# Patient Record
Sex: MALE | Race: WHITE | Employment: OTHER | URBAN - NONMETROPOLITAN AREA
[De-identification: names, ages, dates, MRNs, and addresses within clinical notes are randomized per-mention and may not be internally consistent; named-entity substitution may affect disease eponyms.]

---

## 2017-01-03 RX ORDER — DULOXETIN HYDROCHLORIDE 60 MG/1
60 CAPSULE, DELAYED RELEASE ORAL DAILY
Qty: 90 CAPSULE | Refills: 3 | Status: SHIPPED | OUTPATIENT
Start: 2017-01-03 | End: 2018-01-02 | Stop reason: SDUPTHER

## 2017-02-15 ENCOUNTER — OFFICE VISIT (OUTPATIENT)
Dept: CARDIOLOGY | Age: 82
End: 2017-02-15
Payer: MEDICARE

## 2017-02-15 VITALS
DIASTOLIC BLOOD PRESSURE: 78 MMHG | WEIGHT: 211 LBS | BODY MASS INDEX: 29.54 KG/M2 | HEART RATE: 67 BPM | HEIGHT: 71 IN | SYSTOLIC BLOOD PRESSURE: 128 MMHG

## 2017-02-15 DIAGNOSIS — I25.10 CORONARY ARTERY DISEASE INVOLVING NATIVE CORONARY ARTERY OF NATIVE HEART WITHOUT ANGINA PECTORIS: Primary | ICD-10-CM

## 2017-02-15 DIAGNOSIS — I10 ESSENTIAL HYPERTENSION: ICD-10-CM

## 2017-02-15 DIAGNOSIS — E78.00 HYPERCHOLESTEROLEMIA: ICD-10-CM

## 2017-02-15 PROCEDURE — 1036F TOBACCO NON-USER: CPT | Performed by: INTERNAL MEDICINE

## 2017-02-15 PROCEDURE — G8484 FLU IMMUNIZE NO ADMIN: HCPCS | Performed by: INTERNAL MEDICINE

## 2017-02-15 PROCEDURE — 99212 OFFICE O/P EST SF 10 MIN: CPT | Performed by: INTERNAL MEDICINE

## 2017-02-15 PROCEDURE — G8598 ASA/ANTIPLAT THER USED: HCPCS | Performed by: INTERNAL MEDICINE

## 2017-02-15 PROCEDURE — 1123F ACP DISCUSS/DSCN MKR DOCD: CPT | Performed by: INTERNAL MEDICINE

## 2017-02-15 PROCEDURE — G8427 DOCREV CUR MEDS BY ELIG CLIN: HCPCS | Performed by: INTERNAL MEDICINE

## 2017-02-15 PROCEDURE — 4040F PNEUMOC VAC/ADMIN/RCVD: CPT | Performed by: INTERNAL MEDICINE

## 2017-02-15 PROCEDURE — G8420 CALC BMI NORM PARAMETERS: HCPCS | Performed by: INTERNAL MEDICINE

## 2017-04-11 ENCOUNTER — OFFICE VISIT (OUTPATIENT)
Dept: GASTROENTEROLOGY | Facility: CLINIC | Age: 82
End: 2017-04-11

## 2017-04-11 VITALS
DIASTOLIC BLOOD PRESSURE: 70 MMHG | HEART RATE: 83 BPM | SYSTOLIC BLOOD PRESSURE: 132 MMHG | WEIGHT: 196 LBS | TEMPERATURE: 97.1 F | BODY MASS INDEX: 27.44 KG/M2 | HEIGHT: 71 IN | OXYGEN SATURATION: 90 %

## 2017-04-11 DIAGNOSIS — K63.5 COLON POLYPS: Primary | ICD-10-CM

## 2017-04-11 PROBLEM — C18.9 COLON CANCER (HCC): Status: ACTIVE | Noted: 2017-04-11

## 2017-04-11 PROBLEM — C18.9 COLON CANCER (HCC): Status: RESOLVED | Noted: 2017-04-11 | Resolved: 2017-04-11

## 2017-04-11 PROBLEM — Z85.038 HISTORY OF COLON CANCER: Status: ACTIVE | Noted: 2017-04-11

## 2017-04-11 PROCEDURE — S0260 H&P FOR SURGERY: HCPCS | Performed by: NURSE PRACTITIONER

## 2017-04-11 RX ORDER — SODIUM, POTASSIUM,MAG SULFATES 17.5-3.13G
2 SOLUTION, RECONSTITUTED, ORAL ORAL ONCE
Qty: 2 BOTTLE | Refills: 0 | Status: SHIPPED | OUTPATIENT
Start: 2017-04-11 | End: 2017-04-11

## 2017-04-11 NOTE — PROGRESS NOTES
Chief Complaint   Patient presents with   • Colon Cancer Screening     Patient is here today for colon screening.     Subjective   HPI  Nicolas Nieves is a 81 y.o. male who presents as a referral for preventative maintenance. He has no complaints of nausea or vomiting. No change in bowels. No wt loss. No BRBPR. No melena. There is no family hx for colon cancer. No abdominal pain. Last colonoscopy performed on 4/25/14 polyps.History of colon cancer.  Past Medical History:   Diagnosis Date   • Harmon syndrome    • CAD (coronary artery disease)    • Cancer     COLON   • Colon polyp    • Gastritis    • GERD (gastroesophageal reflux disease)    • Hemorrhoids    • Hyperlipidemia    • Hypertension    • Peptic ulcer    • Renal calculi      Past Surgical History:   Procedure Laterality Date   • CHOLECYSTECTOMY     • COLONOSCOPY  04/25/2014   • LUNG REMOVAL, PARTIAL     • UPPER GASTROINTESTINAL ENDOSCOPY  04/17/2013       Current Outpatient Prescriptions:   •  aspirin 81 MG EC tablet, Take 81 mg by mouth Daily., Disp: , Rfl:   •  ezetimibe (ZETIA) 10 MG tablet, Take 10 mg by mouth Daily., Disp: , Rfl:   •  isosorbide mononitrate (IMDUR) 30 MG 24 hr tablet, Take 30 mg by mouth Daily., Disp: , Rfl:   •  metoprolol tartrate (LOPRESSOR) 50 MG tablet, Take 50 mg by mouth 2 (Two) Times a Day., Disp: , Rfl:   •  pantoprazole (PROTONIX) 40 MG EC tablet, Take 40 mg by mouth Daily., Disp: , Rfl:   •  pregabalin (LYRICA) 75 MG capsule, Take 75 mg by mouth 2 (Two) Times a Day., Disp: , Rfl:   •  sodium-potassium-magnesium sulfates (SUPREP BOWEL PREP) solution oral solution, Take 2 bottles by mouth 1 (One) Time for 1 dose. Split dose prep as directed by office instructions provided.  2 bottles = one kit., Disp: 2 bottle, Rfl: 0  Allergies   Allergen Reactions   • Lortab [Hydrocodone-Acetaminophen]    • Morphine And Related      Social History     Social History   • Marital status:      Spouse name: N/A   • Number of children: N/A   •  "Years of education: N/A     Occupational History   • Not on file.     Social History Main Topics   • Smoking status: Former Smoker   • Smokeless tobacco: Not on file   • Alcohol use No   • Drug use: Not on file   • Sexual activity: Not on file     Other Topics Concern   • Not on file     Social History Narrative     History reviewed. No pertinent family history.    REVIEW OF SYSTEMS  General: well appearing, no fever chills or sweats, no unexplained wt loss  HEENT: no acute visual or hearing disturbances  Cardiovascular: No chest pain or palpitations  Pulmonary: No shortness of breath, coughing, wheezing or hemoptysis  : No burning, urgency, hematuria, or dysuria  Musculoskeletal: No joint pain or stiffness  Peripheral: no edema  Skin: No lesions or rashes  Neuro: No dizziness, headaches, stroke, syncope  Endocrine: No hot or cold intolerances  Hematological: No blood dyscrasias    Objective   Vitals:    04/11/17 1357   BP: 132/70   Pulse: 83   Temp: 97.1 °F (36.2 °C)   SpO2: 90%   Weight: 196 lb (88.9 kg)   Height: 71\" (180.3 cm)     Body mass index is 27.34 kg/(m^2).    PHYSICAL EXAM  General: age appropriate well nourished well appearing, no acute distress  Head: normocephalic and atraumatic  Global assessment-supple  Neck-No JVD noted, no lymphadenopathy  Pulmonary-clear to auscultation bilaterally, normal respiratory effort  Cardiovascular-normal rate and rhythm, normal heart sounds, S1 and S2 noted  Abdomen-soft, non tender, non distended, normal bowel sounds all 4 quadrants, no hepatosplenomegaly noted  Extremities-No clubbing cyanosis or edema  Neuro-Non focal, converses appropriately, awake, alert, oriented    Imaging Results (most recent)     None        Assessment/Plan   Nicolas was seen today for colon cancer screening.    Diagnoses and all orders for this visit:    Colon polyps  -     Case request    Other orders  -     sodium-potassium-magnesium sulfates (SUPREP BOWEL PREP) solution oral solution; Take " 2 bottles by mouth 1 (One) Time for 1 dose. Split dose prep as directed by office instructions provided.  2 bottles = one kit.      COLONOSCOPY WITH ANESTHESIA (N/A)  No Follow-up on file.  There are no Patient Instructions on file for this visit.    All risks, benefits, alternatives, and indications of colonoscopy procedure have been discussed with the patient. Risks to include perforation of the colon requiring possible surgery or colostomy, risk of bleeding from biopsies or removal of colon tissue, possibility of missing a colon polyp or cancer, or adverse drug reaction.  Benefits to include the diagnosis and management of disease of the colon and rectum. Alternatives to include barium enema, radiographic evaluation, lab testing or no intervention. Pt verbalizes understanding and agrees.

## 2017-04-21 RX ORDER — METOPROLOL TARTRATE 50 MG/1
TABLET, FILM COATED ORAL
Qty: 90 TABLET | Refills: 0 | Status: SHIPPED | OUTPATIENT
Start: 2017-04-21 | End: 2017-07-24 | Stop reason: SDUPTHER

## 2017-05-16 ENCOUNTER — ANESTHESIA EVENT (OUTPATIENT)
Dept: GASTROENTEROLOGY | Facility: HOSPITAL | Age: 82
End: 2017-05-16

## 2017-05-17 ENCOUNTER — ANESTHESIA (OUTPATIENT)
Dept: GASTROENTEROLOGY | Facility: HOSPITAL | Age: 82
End: 2017-05-17

## 2017-05-17 ENCOUNTER — HOSPITAL ENCOUNTER (OUTPATIENT)
Facility: HOSPITAL | Age: 82
Setting detail: HOSPITAL OUTPATIENT SURGERY
Discharge: HOME | End: 2017-05-17
Attending: INTERNAL MEDICINE | Admitting: INTERNAL MEDICINE

## 2017-05-17 VITALS
TEMPERATURE: 97 F | OXYGEN SATURATION: 94 % | WEIGHT: 186 LBS | HEIGHT: 71 IN | HEART RATE: 64 BPM | DIASTOLIC BLOOD PRESSURE: 68 MMHG | BODY MASS INDEX: 26.04 KG/M2 | RESPIRATION RATE: 16 BRPM | SYSTOLIC BLOOD PRESSURE: 131 MMHG

## 2017-05-17 PROCEDURE — G0105 COLORECTAL SCRN; HI RISK IND: HCPCS | Performed by: INTERNAL MEDICINE

## 2017-05-17 PROCEDURE — 25010000002 PROPOFOL 10 MG/ML EMULSION: Performed by: NURSE ANESTHETIST, CERTIFIED REGISTERED

## 2017-05-17 RX ORDER — SODIUM CHLORIDE 0.9 % (FLUSH) 0.9 %
1-10 SYRINGE (ML) INJECTION AS NEEDED
Status: DISCONTINUED | OUTPATIENT
Start: 2017-05-17 | End: 2017-05-17 | Stop reason: HOSPADM

## 2017-05-17 RX ORDER — PROPOFOL 10 MG/ML
VIAL (ML) INTRAVENOUS AS NEEDED
Status: DISCONTINUED | OUTPATIENT
Start: 2017-05-17 | End: 2017-05-17 | Stop reason: SURG

## 2017-05-17 RX ORDER — LIDOCAINE HYDROCHLORIDE 20 MG/ML
INJECTION, SOLUTION INFILTRATION; PERINEURAL AS NEEDED
Status: DISCONTINUED | OUTPATIENT
Start: 2017-05-17 | End: 2017-05-17 | Stop reason: SURG

## 2017-05-17 RX ORDER — SODIUM CHLORIDE 9 MG/ML
100 INJECTION, SOLUTION INTRAVENOUS CONTINUOUS
Status: DISCONTINUED | OUTPATIENT
Start: 2017-05-17 | End: 2017-05-17 | Stop reason: HOSPADM

## 2017-05-17 RX ADMIN — PROPOFOL 50 MG: 10 INJECTION, EMULSION INTRAVENOUS at 09:35

## 2017-05-17 RX ADMIN — PROPOFOL 50 MG: 10 INJECTION, EMULSION INTRAVENOUS at 09:38

## 2017-05-17 RX ADMIN — PROPOFOL 50 MG: 10 INJECTION, EMULSION INTRAVENOUS at 09:43

## 2017-05-17 RX ADMIN — SODIUM CHLORIDE 100 ML/HR: 9 INJECTION, SOLUTION INTRAVENOUS at 09:00

## 2017-05-17 RX ADMIN — PROPOFOL 50 MG: 10 INJECTION, EMULSION INTRAVENOUS at 09:32

## 2017-05-17 RX ADMIN — LIDOCAINE HYDROCHLORIDE 1 ML: 10 INJECTION, SOLUTION EPIDURAL; INFILTRATION; INTRACAUDAL; PERINEURAL at 09:00

## 2017-05-17 RX ADMIN — PROPOFOL 50 MG: 10 INJECTION, EMULSION INTRAVENOUS at 09:41

## 2017-05-17 RX ADMIN — LIDOCAINE HYDROCHLORIDE 40 MG: 20 INJECTION, SOLUTION INFILTRATION; PERINEURAL at 09:32

## 2017-07-07 ENCOUNTER — HOSPITAL ENCOUNTER (EMERGENCY)
Age: 82
Discharge: HOME | End: 2017-07-07
Attending: EMERGENCY MEDICINE
Payer: MEDICARE

## 2017-07-07 ENCOUNTER — APPOINTMENT (OUTPATIENT)
Dept: GENERAL RADIOLOGY | Age: 82
End: 2017-07-07
Payer: MEDICARE

## 2017-07-07 VITALS
SYSTOLIC BLOOD PRESSURE: 154 MMHG | BODY MASS INDEX: 26.6 KG/M2 | RESPIRATION RATE: 22 BRPM | HEART RATE: 76 BPM | DIASTOLIC BLOOD PRESSURE: 89 MMHG | HEIGHT: 71 IN | TEMPERATURE: 97.8 F | OXYGEN SATURATION: 97 % | WEIGHT: 190 LBS

## 2017-07-07 DIAGNOSIS — R07.9 CHEST PAIN, UNSPECIFIED TYPE: Primary | ICD-10-CM

## 2017-07-07 LAB
ALBUMIN SERPL-MCNC: 4.6 G/DL (ref 3.5–5.2)
ALP BLD-CCNC: 66 U/L (ref 40–130)
ALT SERPL-CCNC: 25 U/L (ref 5–41)
ANION GAP SERPL CALCULATED.3IONS-SCNC: 14 MMOL/L (ref 7–19)
AST SERPL-CCNC: 24 U/L (ref 5–40)
BASOPHILS ABSOLUTE: 0 K/UL (ref 0–0.2)
BASOPHILS RELATIVE PERCENT: 0.1 % (ref 0–1)
BILIRUB SERPL-MCNC: 0.8 MG/DL (ref 0.2–1.2)
BUN BLDV-MCNC: 17 MG/DL (ref 8–23)
CALCIUM SERPL-MCNC: 9.8 MG/DL (ref 8.8–10.2)
CHLORIDE BLD-SCNC: 104 MMOL/L (ref 98–111)
CO2: 26 MMOL/L (ref 22–29)
CREAT SERPL-MCNC: 1.2 MG/DL (ref 0.5–1.2)
EOSINOPHILS ABSOLUTE: 0.2 K/UL (ref 0–0.6)
EOSINOPHILS RELATIVE PERCENT: 2.4 % (ref 0–5)
GFR NON-AFRICAN AMERICAN: 58
GLUCOSE BLD-MCNC: 106 MG/DL (ref 74–109)
HCT VFR BLD CALC: 46.2 % (ref 42–52)
HEMOGLOBIN: 15.6 G/DL (ref 14–18)
LYMPHOCYTES ABSOLUTE: 1.7 K/UL (ref 1.1–4.5)
LYMPHOCYTES RELATIVE PERCENT: 21.2 % (ref 20–40)
MCH RBC QN AUTO: 32.1 PG (ref 27–31)
MCHC RBC AUTO-ENTMCNC: 33.8 G/DL (ref 33–37)
MCV RBC AUTO: 95.1 FL (ref 80–94)
MONOCYTES ABSOLUTE: 0.9 K/UL (ref 0–0.9)
MONOCYTES RELATIVE PERCENT: 10.8 % (ref 0–10)
NEUTROPHILS ABSOLUTE: 5.1 K/UL (ref 1.5–7.5)
NEUTROPHILS RELATIVE PERCENT: 64.9 % (ref 50–65)
PDW BLD-RTO: 13.8 % (ref 11.5–14.5)
PERFORMED ON: NORMAL
PERFORMED ON: NORMAL
PLATELET # BLD: 178 K/UL (ref 130–400)
PMV BLD AUTO: 10.4 FL (ref 9.4–12.4)
POC TROPONIN I: 0.01 NG/ML (ref 0–0.08)
POC TROPONIN I: 0.02 NG/ML (ref 0–0.08)
POTASSIUM SERPL-SCNC: 4.4 MMOL/L (ref 3.5–5)
RBC # BLD: 4.86 M/UL (ref 4.7–6.1)
SODIUM BLD-SCNC: 144 MMOL/L (ref 136–145)
TOTAL PROTEIN: 7.9 G/DL (ref 6.6–8.7)
WBC # BLD: 7.9 K/UL (ref 4.8–10.8)

## 2017-07-07 PROCEDURE — 36415 COLL VENOUS BLD VENIPUNCTURE: CPT

## 2017-07-07 PROCEDURE — 99285 EMERGENCY DEPT VISIT HI MDM: CPT

## 2017-07-07 PROCEDURE — 93350 STRESS TTE ONLY: CPT

## 2017-07-07 PROCEDURE — 99283 EMERGENCY DEPT VISIT LOW MDM: CPT | Performed by: EMERGENCY MEDICINE

## 2017-07-07 PROCEDURE — 84484 ASSAY OF TROPONIN QUANT: CPT

## 2017-07-07 PROCEDURE — 6370000000 HC RX 637 (ALT 250 FOR IP): Performed by: EMERGENCY MEDICINE

## 2017-07-07 PROCEDURE — 80053 COMPREHEN METABOLIC PANEL: CPT

## 2017-07-07 PROCEDURE — 93005 ELECTROCARDIOGRAM TRACING: CPT

## 2017-07-07 PROCEDURE — 85025 COMPLETE CBC W/AUTO DIFF WBC: CPT

## 2017-07-07 PROCEDURE — 71010 XR CHEST PORTABLE: CPT

## 2017-07-07 RX ORDER — NITROGLYCERIN 0.4 MG/1
0.4 TABLET SUBLINGUAL EVERY 5 MIN PRN
Status: DISCONTINUED | OUTPATIENT
Start: 2017-07-07 | End: 2017-07-07 | Stop reason: HOSPADM

## 2017-07-07 RX ORDER — PANTOPRAZOLE SODIUM 40 MG/1
40 TABLET, DELAYED RELEASE ORAL
COMMUNITY
End: 2017-07-18

## 2017-07-07 RX ORDER — ISOSORBIDE MONONITRATE 30 MG/1
30 TABLET, EXTENDED RELEASE ORAL
COMMUNITY
End: 2017-07-18

## 2017-07-07 RX ORDER — ASPIRIN 81 MG/1
81 TABLET ORAL
COMMUNITY
End: 2017-07-18

## 2017-07-07 RX ORDER — METOPROLOL TARTRATE 50 MG/1
50 TABLET, FILM COATED ORAL
COMMUNITY
End: 2017-07-18

## 2017-07-07 RX ADMIN — NITROGLYCERIN 0.4 MG: 0.4 TABLET SUBLINGUAL at 13:19

## 2017-07-07 RX ADMIN — NITROGLYCERIN 0.4 MG: 0.4 TABLET SUBLINGUAL at 13:28

## 2017-07-07 RX ADMIN — NITROGLYCERIN 0.4 MG: 0.4 TABLET SUBLINGUAL at 13:12

## 2017-07-07 ASSESSMENT — ENCOUNTER SYMPTOMS
SHORTNESS OF BREATH: 0
NAUSEA: 1
BACK PAIN: 0
VOMITING: 0

## 2017-07-07 ASSESSMENT — PAIN SCALES - GENERAL
PAINLEVEL_OUTOF10: 6
PAINLEVEL_OUTOF10: 0

## 2017-07-10 LAB
EKG P AXIS: 63 DEGREES
EKG P-R INTERVAL: 152 MS
EKG Q-T INTERVAL: 322 MS
EKG QRS DURATION: 86 MS
EKG QTC CALCULATION (BAZETT): 385 MS
EKG T AXIS: 43 DEGREES

## 2017-07-11 ENCOUNTER — CARE COORDINATION (OUTPATIENT)
Dept: PRIMARY CARE CLINIC | Age: 82
End: 2017-07-11

## 2017-07-11 LAB
EKG P AXIS: 44 DEGREES
EKG P-R INTERVAL: 154 MS
EKG Q-T INTERVAL: 348 MS
EKG QRS DURATION: 88 MS
EKG QTC CALCULATION (BAZETT): 370 MS
EKG T AXIS: 33 DEGREES

## 2017-07-18 ENCOUNTER — OFFICE VISIT (OUTPATIENT)
Dept: PRIMARY CARE CLINIC | Age: 82
End: 2017-07-18
Payer: MEDICARE

## 2017-07-18 VITALS
SYSTOLIC BLOOD PRESSURE: 130 MMHG | DIASTOLIC BLOOD PRESSURE: 82 MMHG | TEMPERATURE: 98.4 F | HEIGHT: 71 IN | OXYGEN SATURATION: 99 % | HEART RATE: 76 BPM | BODY MASS INDEX: 26.46 KG/M2 | WEIGHT: 189 LBS

## 2017-07-18 DIAGNOSIS — I10 ESSENTIAL HYPERTENSION: ICD-10-CM

## 2017-07-18 DIAGNOSIS — K21.00 GASTROESOPHAGEAL REFLUX DISEASE WITH ESOPHAGITIS: ICD-10-CM

## 2017-07-18 DIAGNOSIS — K22.70 BARRETT'S ESOPHAGUS WITHOUT DYSPLASIA: ICD-10-CM

## 2017-07-18 DIAGNOSIS — R07.89 OTHER CHEST PAIN: Primary | ICD-10-CM

## 2017-07-18 DIAGNOSIS — I25.10 CORONARY ARTERY DISEASE INVOLVING NATIVE CORONARY ARTERY OF NATIVE HEART WITHOUT ANGINA PECTORIS: ICD-10-CM

## 2017-07-18 DIAGNOSIS — I71.40 ABDOMINAL AORTIC ANEURYSM (AAA) WITHOUT RUPTURE: ICD-10-CM

## 2017-07-18 DIAGNOSIS — E78.00 HYPERCHOLESTEROLEMIA: ICD-10-CM

## 2017-07-18 PROCEDURE — G8598 ASA/ANTIPLAT THER USED: HCPCS | Performed by: PEDIATRICS

## 2017-07-18 PROCEDURE — G8427 DOCREV CUR MEDS BY ELIG CLIN: HCPCS | Performed by: PEDIATRICS

## 2017-07-18 PROCEDURE — 1123F ACP DISCUSS/DSCN MKR DOCD: CPT | Performed by: PEDIATRICS

## 2017-07-18 PROCEDURE — 1036F TOBACCO NON-USER: CPT | Performed by: PEDIATRICS

## 2017-07-18 PROCEDURE — G8419 CALC BMI OUT NRM PARAM NOF/U: HCPCS | Performed by: PEDIATRICS

## 2017-07-18 PROCEDURE — 99214 OFFICE O/P EST MOD 30 MIN: CPT | Performed by: PEDIATRICS

## 2017-07-18 PROCEDURE — 4040F PNEUMOC VAC/ADMIN/RCVD: CPT | Performed by: PEDIATRICS

## 2017-07-18 ASSESSMENT — ENCOUNTER SYMPTOMS
CHEST TIGHTNESS: 0
COUGH: 0
ABDOMINAL PAIN: 0
NAUSEA: 0
BACK PAIN: 0
SHORTNESS OF BREATH: 0
RHINORRHEA: 0
SINUS PRESSURE: 0
DIARRHEA: 0
EYE PAIN: 0
BLOOD IN STOOL: 0
SORE THROAT: 0
CONSTIPATION: 0
WHEEZING: 0
COLOR CHANGE: 0
EYE ITCHING: 0
VOMITING: 0
EYE REDNESS: 0
EYE DISCHARGE: 0

## 2017-07-25 RX ORDER — PANTOPRAZOLE SODIUM 40 MG/1
40 TABLET, DELAYED RELEASE ORAL DAILY
Qty: 90 TABLET | Refills: 3 | Status: SHIPPED | OUTPATIENT
Start: 2017-07-25 | End: 2018-07-24 | Stop reason: SDUPTHER

## 2017-07-25 RX ORDER — METOPROLOL TARTRATE 50 MG/1
50 TABLET, FILM COATED ORAL DAILY
Qty: 90 TABLET | Refills: 3 | Status: SHIPPED | OUTPATIENT
Start: 2017-07-25 | End: 2017-10-18 | Stop reason: DRUGHIGH

## 2017-07-25 RX ORDER — TAMSULOSIN HYDROCHLORIDE 0.4 MG/1
0.4 CAPSULE ORAL DAILY
Qty: 90 CAPSULE | Refills: 3 | Status: SHIPPED | OUTPATIENT
Start: 2017-07-25 | End: 2017-10-18

## 2017-07-25 RX ORDER — ISOSORBIDE MONONITRATE 30 MG/1
TABLET, EXTENDED RELEASE ORAL
Qty: 90 TABLET | Refills: 3 | Status: SHIPPED | OUTPATIENT
Start: 2017-07-25 | End: 2018-07-24 | Stop reason: SDUPTHER

## 2017-08-16 ENCOUNTER — OFFICE VISIT (OUTPATIENT)
Dept: CARDIOLOGY | Age: 82
End: 2017-08-16
Payer: MEDICARE

## 2017-08-16 VITALS
HEIGHT: 71 IN | HEART RATE: 78 BPM | BODY MASS INDEX: 26.6 KG/M2 | DIASTOLIC BLOOD PRESSURE: 52 MMHG | SYSTOLIC BLOOD PRESSURE: 128 MMHG | WEIGHT: 190 LBS

## 2017-08-16 DIAGNOSIS — I25.10 CORONARY ARTERY DISEASE INVOLVING NATIVE CORONARY ARTERY OF NATIVE HEART WITHOUT ANGINA PECTORIS: Primary | ICD-10-CM

## 2017-08-16 DIAGNOSIS — I10 ESSENTIAL HYPERTENSION: ICD-10-CM

## 2017-08-16 DIAGNOSIS — E78.00 HYPERCHOLESTEROLEMIA: ICD-10-CM

## 2017-08-16 PROCEDURE — 1123F ACP DISCUSS/DSCN MKR DOCD: CPT | Performed by: INTERNAL MEDICINE

## 2017-08-16 PROCEDURE — G8598 ASA/ANTIPLAT THER USED: HCPCS | Performed by: INTERNAL MEDICINE

## 2017-08-16 PROCEDURE — G8427 DOCREV CUR MEDS BY ELIG CLIN: HCPCS | Performed by: INTERNAL MEDICINE

## 2017-08-16 PROCEDURE — 4040F PNEUMOC VAC/ADMIN/RCVD: CPT | Performed by: INTERNAL MEDICINE

## 2017-08-16 PROCEDURE — G8419 CALC BMI OUT NRM PARAM NOF/U: HCPCS | Performed by: INTERNAL MEDICINE

## 2017-08-16 PROCEDURE — 1036F TOBACCO NON-USER: CPT | Performed by: INTERNAL MEDICINE

## 2017-08-16 PROCEDURE — 99212 OFFICE O/P EST SF 10 MIN: CPT | Performed by: INTERNAL MEDICINE

## 2017-10-18 ENCOUNTER — OFFICE VISIT (OUTPATIENT)
Dept: PRIMARY CARE CLINIC | Age: 82
End: 2017-10-18
Payer: MEDICARE

## 2017-10-18 VITALS
OXYGEN SATURATION: 94 % | WEIGHT: 194.5 LBS | HEIGHT: 71 IN | HEART RATE: 63 BPM | SYSTOLIC BLOOD PRESSURE: 128 MMHG | BODY MASS INDEX: 27.23 KG/M2 | TEMPERATURE: 97 F | DIASTOLIC BLOOD PRESSURE: 64 MMHG

## 2017-10-18 DIAGNOSIS — H61.23 BILATERAL IMPACTED CERUMEN: ICD-10-CM

## 2017-10-18 DIAGNOSIS — E78.00 HYPERCHOLESTEROLEMIA: ICD-10-CM

## 2017-10-18 DIAGNOSIS — M94.0 COSTOCHONDRITIS: ICD-10-CM

## 2017-10-18 DIAGNOSIS — Z00.00 VISIT FOR PREVENTIVE HEALTH EXAMINATION: Primary | ICD-10-CM

## 2017-10-18 DIAGNOSIS — R53.83 FATIGUE, UNSPECIFIED TYPE: ICD-10-CM

## 2017-10-18 DIAGNOSIS — I25.10 CORONARY ARTERY DISEASE INVOLVING NATIVE CORONARY ARTERY OF NATIVE HEART WITHOUT ANGINA PECTORIS: ICD-10-CM

## 2017-10-18 DIAGNOSIS — M15.9 PRIMARY OSTEOARTHRITIS INVOLVING MULTIPLE JOINTS: ICD-10-CM

## 2017-10-18 DIAGNOSIS — I10 ESSENTIAL HYPERTENSION: ICD-10-CM

## 2017-10-18 DIAGNOSIS — K21.00 GASTROESOPHAGEAL REFLUX DISEASE WITH ESOPHAGITIS: ICD-10-CM

## 2017-10-18 DIAGNOSIS — Z23 NEED FOR PNEUMOCOCCAL VACCINATION: ICD-10-CM

## 2017-10-18 DIAGNOSIS — K22.70 BARRETT'S ESOPHAGUS WITHOUT DYSPLASIA: ICD-10-CM

## 2017-10-18 PROCEDURE — G0009 ADMIN PNEUMOCOCCAL VACCINE: HCPCS | Performed by: PEDIATRICS

## 2017-10-18 PROCEDURE — 90732 PPSV23 VACC 2 YRS+ SUBQ/IM: CPT | Performed by: PEDIATRICS

## 2017-10-18 PROCEDURE — G0439 PPPS, SUBSEQ VISIT: HCPCS | Performed by: PEDIATRICS

## 2017-10-18 RX ORDER — CHOLECALCIFEROL (VITAMIN D3) 1250 MCG
50000 CAPSULE ORAL DAILY
COMMUNITY
End: 2019-05-03 | Stop reason: ALTCHOICE

## 2017-10-18 ASSESSMENT — ENCOUNTER SYMPTOMS
SORE THROAT: 0
BACK PAIN: 0
DIARRHEA: 0
SHORTNESS OF BREATH: 0
VOMITING: 0
CHEST TIGHTNESS: 0
WHEEZING: 0
NAUSEA: 0
ABDOMINAL PAIN: 0
COUGH: 0

## 2017-10-18 NOTE — PROGRESS NOTES
[facility], [address]  Phone [phone]   Fax [phone]      OFFICE VISIT: 10/18/2017    Brunilda Pass- : 1935      HPI  Reason For Visit:  Jhony Monroe is a [de-identified] y.o. Health Maintenance   He is due for a tetanus and pneumococcal 23 valent vaccine    Date of Most Recent Physical:  Over a year  Medicare Health Risk Assessment Form completed and in chart?  no    The patient presents today for three month follow up and annual wellness exam  He was last seen on 2017 for hospital follow-up    He is having his costochondrititis. This is baseline for him    Hyperlipidemia:  He has not had any labs done recently  He is not on a statin medication    Hypertension:  Not monitoring at all   Lopressor but taking the tartrate form once daily  imdur    Cad: Stable and had recent eval  He is taking asa    GERD:  Well controlled without any medication. height is 5' 11\" (1.803 m) and weight is 194 lb 8 oz (88.2 kg). His temporal temperature is 97 °F (36.1 °C). His blood pressure is 128/64 and his pulse is 63. His oxygen saturation is 94%. Body mass index is 27.13 kg/m².       I have reviewed the following with the Mr. Riana Alvarado   Lab Review   Admission on 2017, Discharged on 2017   Component Date Value    P-R Interval 07/10/2017 152     QRS Duration 07/10/2017 86     Q-T Interval 07/10/2017 322     QTc Calculation (Bazett) 07/10/2017 385     P Axis 07/10/2017 63     T Axis 07/10/2017 43     WBC 2017 7.9     RBC 2017 4.86     Hemoglobin 2017 15.6     Hematocrit 2017 46.2     MCV 2017 95.1*    MCH 2017 32.1*    MCHC 2017 33.8     RDW 2017 13.8     Platelets  178     MPV 2017 10.4     Neutrophils % 2017 64.9     Lymphocytes % 2017 21.2     Monocytes % 2017 10.8*    Eosinophils % 2017 2.4     Basophils % 2017 0.1     Neutrophils # 2017 5.1     Lymphocytes # 07/07/2017 1.7     Monocytes # 07/07/2017 0.90     Eosinophils # 07/07/2017 0.20     Basophils # 07/07/2017 0.00     Sodium 07/07/2017 144     Potassium 07/07/2017 4.4     Chloride 07/07/2017 104     CO2 07/07/2017 26     Anion Gap 07/07/2017 14     Glucose 07/07/2017 106     BUN 07/07/2017 17     CREATININE 07/07/2017 1.2     GFR Non- 07/07/2017 58*    Calcium 07/07/2017 9.8     Total Protein 07/07/2017 7.9     Alb 07/07/2017 4.6     Total Bilirubin 07/07/2017 0.8     Alkaline Phosphatase 07/07/2017 66     ALT 07/07/2017 25     AST 07/07/2017 24     POC Troponin I 07/07/2017 0.01     Performed on 07/07/2017 i-Stat     P-R Interval 07/11/2017 154     QRS Duration 07/11/2017 88     Q-T Interval 07/11/2017 348     QTc Calculation (Bazett) 07/11/2017 370     P Axis 07/11/2017 44     T Axis 07/11/2017 33     POC Troponin I 07/07/2017 0.02     Performed on 07/07/2017 i-Stat      Copies of these are in the chart. Current Outpatient Prescriptions   Medication Sig Dispense Refill    Cholecalciferol (VITAMIN D3) 66239 units CAPS Take 50,000 Units by mouth      metoprolol tartrate (LOPRESSOR) 25 MG tablet Take 1 tablet by mouth 2 times daily 60 tablet 11    pantoprazole (PROTONIX) 40 MG tablet Take 1 tablet by mouth daily 90 tablet 3    isosorbide mononitrate (IMDUR) 30 MG extended release tablet TAKE ONE TABLET BY MOUTH EVERY 24 HOURS 90 tablet 3    Omega-3 Fatty Acids (OMEGA 3 PO) Take by mouth      DULoxetine (CYMBALTA) 60 MG extended release capsule Take 1 capsule by mouth daily 90 capsule 3    vitamin B-12 (CYANOCOBALAMIN) 100 MCG tablet Take 50 mcg by mouth daily.  aspirin 81 MG EC tablet Take 81 mg by mouth daily. No current facility-administered medications for this visit. Allergies: Review of patient's allergies indicates no known allergies.     Past Medical History:   Diagnosis Date    Adenocarcinoma ([facility] [address]     [facility] Neurological: He is alert and oriented to person, place, and time. He has normal strength. No sensory deficit (no numbness or tingling). Skin: Skin is warm and dry. No rash noted. Psychiatric: He has a normal mood and affect. His behavior is normal. Judgment and thought content normal.   Vitals reviewed. ASSESSMENT      ICD-10-CM ICD-9-CM    1. Visit for preventive health examination Z00.00 V70.0 Comprehensive Metabolic Panel      CBC Auto Differential      Lipid Panel      Microalbumin / Creatinine Urine Ratio      TSH without Reflex      T4, Free   2. Essential hypertension I10 401.9 Comprehensive Metabolic Panel      CBC Auto Differential      metoprolol tartrate (LOPRESSOR) 25 MG tablet   3. Hypercholesterolemia E78.00 272.0 Lipid Panel   4. Coronary artery disease involving native coronary artery of native heart without angina pectoris I25.10 414.01 metoprolol tartrate (LOPRESSOR) 25 MG tablet   5. Costochondritis M94.0 733.6    6. Gastroesophageal reflux disease with esophagitis K21.0 530.11    7. Burgos's esophagus without dysplasia K22.70 530.85    8. Fatigue, unspecified type R53.83 780.79 TSH without Reflex      T4, Free   9. Bilateral impacted cerumen H61.23 380.4    10. Need for pneumococcal vaccination Z23 V03.82    11. Primary osteoarthritis involving multiple joints M15.0 715.09        PLAN      ICD-10-CM ICD-9-CM    1. Visit for preventive health examination Z00.00 V70.0 Comprehensive Metabolic Panel      CBC Auto Differential      Lipid Panel      Microalbumin / Creatinine Urine Ratio      TSH without Reflex      T4, Free  Routine age-appropriate anticipatory guidance was provided. 2. Essential hypertension I10 401.9 Comprehensive Metabolic Panel      CBC Auto Differential      metoprolol tartrate (LOPRESSOR) 25 MG tablet  25 mg twice daily (every 12 hours will replace 50 mg of the tartrate form once daily that he was previously on.   Blood pressure has been excellently controlled self tracking handout on bp and instructed them to bring it with them to his next appointment. Discussed use, benefit, and side effects of prescribed medications. Barriers to medication compliance addressed. All patient questions answered. Pt voiced understanding. If you need to reach us for an appointment or any other urgent   scheduling issue,   please press the (*) sign at the beginning of the phone tree prompt after   Dialing the normal office number.     Melissa Watt, DO

## 2017-10-18 NOTE — PATIENT INSTRUCTIONS
and other things that can increase your risk for heart attack and stroke. · Blood pressure. Have your blood pressure checked during a routine doctor visit. Your doctor will tell you how often to check your blood pressure based on your age, your blood pressure results, and other factors. · Diabetes. Ask your doctor whether you should have tests for diabetes. · Vision. Experts recommend that you have yearly exams for glaucoma and other age-related eye problems. · Hearing. Tell your doctor if you notice any change in your hearing. You can have tests to find out how well you hear. · Colon cancer tests. Keep having colon cancer tests as your doctor recommends. You can have one of several types of tests. · Heart attack and stroke risk. At least every 4 to 6 years, you should have your risk for heart attack and stroke assessed. Your doctor uses factors such as your age, blood pressure, cholesterol, and whether you smoke or have diabetes to show what your risk for a heart attack or stroke is over the next 10 years. · Osteoporosis. Talk to your doctor about whether you should have a bone density test to find out whether you have thinning bones. Also ask your doctor about whether you should take calcium and vitamin D supplements. For women  · Pap test and pelvic exam. You may no longer need a Pap test. Talk with your doctor about whether to stop or continue to have Pap tests. · Breast exam and mammogram. Ask how often you should have a mammogram, which is an X-ray of your breasts. A mammogram can spot breast cancer before it can be felt and when it is easiest to treat. · Thyroid disease. Talk to your doctor about whether to have your thyroid checked as part of a regular physical exam. Women have an increased chance of a thyroid problem. For men  · Prostate exam. Talk to your doctor about whether you should have a blood test (called a PSA test) for prostate cancer.  Experts disagree on whether men should have this

## 2017-11-22 ENCOUNTER — OFFICE VISIT (OUTPATIENT)
Dept: VASCULAR SURGERY | Age: 82
End: 2017-11-22
Payer: MEDICARE

## 2017-11-22 ENCOUNTER — HOSPITAL ENCOUNTER (OUTPATIENT)
Dept: ULTRASOUND IMAGING | Age: 82
Discharge: HOME | End: 2017-11-22
Payer: MEDICARE

## 2017-11-22 VITALS
DIASTOLIC BLOOD PRESSURE: 70 MMHG | RESPIRATION RATE: 18 BRPM | HEART RATE: 72 BPM | OXYGEN SATURATION: 96 % | SYSTOLIC BLOOD PRESSURE: 120 MMHG

## 2017-11-22 DIAGNOSIS — I71.40 ABDOMINAL AORTIC ANEURYSM (AAA) WITHOUT RUPTURE: Primary | ICD-10-CM

## 2017-11-22 DIAGNOSIS — I71.40 ABDOMINAL AORTIC ANEURYSM (AAA) WITHOUT RUPTURE: ICD-10-CM

## 2017-11-22 PROCEDURE — G8482 FLU IMMUNIZE ORDER/ADMIN: HCPCS | Performed by: NURSE PRACTITIONER

## 2017-11-22 PROCEDURE — 93978 VASCULAR STUDY: CPT

## 2017-11-22 PROCEDURE — 99212 OFFICE O/P EST SF 10 MIN: CPT | Performed by: NURSE PRACTITIONER

## 2017-11-22 PROCEDURE — 4040F PNEUMOC VAC/ADMIN/RCVD: CPT | Performed by: NURSE PRACTITIONER

## 2017-11-22 PROCEDURE — 1123F ACP DISCUSS/DSCN MKR DOCD: CPT | Performed by: NURSE PRACTITIONER

## 2017-11-22 PROCEDURE — G8427 DOCREV CUR MEDS BY ELIG CLIN: HCPCS | Performed by: NURSE PRACTITIONER

## 2017-11-22 PROCEDURE — 1036F TOBACCO NON-USER: CPT | Performed by: NURSE PRACTITIONER

## 2017-11-22 PROCEDURE — G8598 ASA/ANTIPLAT THER USED: HCPCS | Performed by: NURSE PRACTITIONER

## 2017-11-22 PROCEDURE — G8417 CALC BMI ABV UP PARAM F/U: HCPCS | Performed by: NURSE PRACTITIONER

## 2017-11-22 NOTE — PROGRESS NOTES
Patient Care Team:  Savita Cooper DO as PCP - General  Leander White MD as PCP - Cardiology (Cardiology)  Savita Cooper DO as PCP - S Attributed Provider  Leander White MD (Cardiology)  Shannon Ramirez (Nurse Practitioner)  Chel Su (Gastroenterology)  Savita Cooper DO      Subjective    He has a known history of abdominal aortic aneurysm for 1 - 5 years. He has not had abdominal pain. He has chronic had back pain in the cervical spine, thoracic spine, lumbar spine and sacral spine region. This pain is unchanged since the last visit. Pain is rated as 4.      Agnieszka Kraft is a [de-identified] y.o. male with the following history reviewed and recorded in [facility]:  Patient Active Problem List    Diagnosis Date Noted    CAD (coronary artery disease), native coronary artery      Priority: High     Stents in the LAD, LCX, RCA  12/14/2007  Cath  Patent stents, normal LVFX  12/26/2008  SE  negative for myocardial ischemia  7/13/2009  Cath  Patent stents, normal LVFX  2/15/2010  SE  negative for myocardial ischemia  10/4/2011  Cath  Patent stents, normal LVFX  8/8/12  SE  negative for myocardial ischemia, DTS 0.5 [facility])  5/3/2013  SE  negative for myocardial ischemia, DTS 5  7/7/17 SE negative for myocardial ischemia        AAA (abdominal aortic aneurysm) ([facility] [address] 11/12/2013    Hypercholesterolemia     Weight loss     Burgos's esophagus     Ulcer, gastric, acute 03/14/2012    Non-cardiac chest pain 12/20/2011    Hypertension     Gastroesophageal reflux disease     Arthritis     Renal calculi     Costochondritis      Current Outpatient Prescriptions   Medication Sig Dispense Refill    Cholecalciferol (VITAMIN D3) 55124 units CAPS Take 50,000 Units by mouth      metoprolol tartrate (LOPRESSOR) 25 MG tablet Take 1 tablet by mouth 2 times daily 60 tablet 11    pantoprazole (PROTONIX) 40 MG tablet Take 1 tablet by mouth daily 90 tablet 3    isosorbide mononitrate (IMDUR) 30 MG extended release tablet rub, or gallop. Carotid pulses are 2+ to palpation bilaterally without bruit. Extremities - Radial and brachial pulses are 2+ to palpation bilaterally. Right femoral pulse: present 2+; Right popliteal pulse: absent Right DP: absent; Right PT absent; Left femoral pulse: present 2+; Left popliteal pulse: absent; Left DP: absent; Left PT: absent   No cyanosis, clubbing, or significant edema. No signs atheroembolic event. Pulmonary  effort appears normal.  No respiratory distress. Lungs - Breath sounds normal. No wheezes or rales. GI - Abdomen  soft, non tender, bowel sounds X 4 quadrants. No guarding or rebound tenderness. No distension or palpable mass. Genitourinary  deferred. Musculoskeletal  ROM appears normal.  No significant edema. Neurologic  alert and oriented X 3. Physiologic. Skin  warm, dry, and intact. No rash, erythema, or pallor. Psychiatric  mood, affect, and behavior appear normal.  Judgment and thought processes appear normal.    Risk factors for aneurysmal disease including tobacco abuse, hyperlipidemia, male gender, age >57, emphysema, obesity, HTN, atherosclerosis, family history, and diabetes mellitus were discussed with the patient. Aortic ultrasound:  3.7 cm abdominal aortic aneurysm. Individual films reviewed:  Yes. This aneurysm has increased since the last visit. These results have been reviewed with the patient. Options have been discussed with the patient including continued medical management. Patient has opted to proceed with continued medical management. Assessment    1. Abdominal aortic aneurysm (AAA) without rupture ([address]          Plan    Follow up in  12  Month(s) with aortic u/s  Strongly encouraged start/continue statin therapy  Recommended no smoking  Symptoms of rupture reviewed with the patient including sudden onset severe back pain or abdominal pain. This pain can sometimes radiate into the groin or leg.   The patient may experience a

## 2017-12-11 ENCOUNTER — HOSPITAL ENCOUNTER (INPATIENT)
Age: 82
LOS: 1 days | Discharge: HOME | DRG: 310 | End: 2017-12-12
Attending: INTERNAL MEDICINE | Admitting: INTERNAL MEDICINE
Payer: MEDICARE

## 2017-12-11 PROBLEM — I48.91 ATRIAL FIBRILLATION (HCC): Status: ACTIVE | Noted: 2017-12-11

## 2017-12-11 PROCEDURE — 2140000000 HC CCU INTERMEDIATE R&B

## 2017-12-12 VITALS
DIASTOLIC BLOOD PRESSURE: 67 MMHG | HEART RATE: 67 BPM | WEIGHT: 191.6 LBS | HEIGHT: 71 IN | BODY MASS INDEX: 26.82 KG/M2 | OXYGEN SATURATION: 93 % | TEMPERATURE: 97.6 F | SYSTOLIC BLOOD PRESSURE: 108 MMHG | RESPIRATION RATE: 16 BRPM

## 2017-12-12 PROBLEM — I48.0 PAROXYSMAL ATRIAL FIBRILLATION (HCC): Status: ACTIVE | Noted: 2017-12-11

## 2017-12-12 LAB
ANION GAP SERPL CALCULATED.3IONS-SCNC: 15 MMOL/L (ref 7–19)
BUN BLDV-MCNC: 17 MG/DL (ref 8–23)
CALCIUM SERPL-MCNC: 9.1 MG/DL (ref 8.8–10.2)
CHLORIDE BLD-SCNC: 107 MMOL/L (ref 98–111)
CO2: 23 MMOL/L (ref 22–29)
CREAT SERPL-MCNC: 1.3 MG/DL (ref 0.5–1.2)
GFR NON-AFRICAN AMERICAN: 53
GLUCOSE BLD-MCNC: 139 MG/DL (ref 74–109)
HCT VFR BLD CALC: 41 % (ref 42–52)
HEMOGLOBIN: 13.7 G/DL (ref 14–18)
LV EF: 58 %
LVEF MODALITY: NORMAL
MCH RBC QN AUTO: 31.5 PG (ref 27–31)
MCHC RBC AUTO-ENTMCNC: 33.4 G/DL (ref 33–37)
MCV RBC AUTO: 94.3 FL (ref 80–94)
PDW BLD-RTO: 14.1 % (ref 11.5–14.5)
PLATELET # BLD: 158 K/UL (ref 130–400)
PMV BLD AUTO: 10.9 FL (ref 9.4–12.4)
POTASSIUM SERPL-SCNC: 4.1 MMOL/L (ref 3.5–5)
PRO-BNP: 1530 PG/ML (ref 0–1800)
RBC # BLD: 4.35 M/UL (ref 4.7–6.1)
SODIUM BLD-SCNC: 145 MMOL/L (ref 136–145)
T4 FREE: 1.2 NG/DL (ref 0.9–1.7)
TROPONIN: <0.01 NG/ML (ref 0–0.03)
TSH SERPL DL<=0.05 MIU/L-ACNC: 3.9 UIU/ML (ref 0.27–4.2)
WBC # BLD: 8 K/UL (ref 4.8–10.8)

## 2017-12-12 PROCEDURE — 6360000002 HC RX W HCPCS: Performed by: INTERNAL MEDICINE

## 2017-12-12 PROCEDURE — 93306 TTE W/DOPPLER COMPLETE: CPT

## 2017-12-12 PROCEDURE — 6370000000 HC RX 637 (ALT 250 FOR IP): Performed by: NURSE PRACTITIONER

## 2017-12-12 PROCEDURE — 6370000000 HC RX 637 (ALT 250 FOR IP): Performed by: INTERNAL MEDICINE

## 2017-12-12 PROCEDURE — 84443 ASSAY THYROID STIM HORMONE: CPT

## 2017-12-12 PROCEDURE — 2500000003 HC RX 250 WO HCPCS: Performed by: INTERNAL MEDICINE

## 2017-12-12 PROCEDURE — 99236 HOSP IP/OBS SAME DATE HI 85: CPT | Performed by: INTERNAL MEDICINE

## 2017-12-12 PROCEDURE — 93005 ELECTROCARDIOGRAM TRACING: CPT

## 2017-12-12 PROCEDURE — 80048 BASIC METABOLIC PNL TOTAL CA: CPT

## 2017-12-12 PROCEDURE — 36415 COLL VENOUS BLD VENIPUNCTURE: CPT

## 2017-12-12 PROCEDURE — 99999 PR OFFICE/OUTPT VISIT,PROCEDURE ONLY: CPT | Performed by: INTERNAL MEDICINE

## 2017-12-12 PROCEDURE — 83880 ASSAY OF NATRIURETIC PEPTIDE: CPT

## 2017-12-12 PROCEDURE — 84439 ASSAY OF FREE THYROXINE: CPT

## 2017-12-12 PROCEDURE — 85027 COMPLETE CBC AUTOMATED: CPT

## 2017-12-12 PROCEDURE — 84484 ASSAY OF TROPONIN QUANT: CPT

## 2017-12-12 RX ORDER — OMEGA-3 FATTY ACIDS CAP DELAYED RELEASE 1000 MG 1000 MG
1000 CAPSULE DELAYED RELEASE ORAL DAILY
Status: DISCONTINUED | OUTPATIENT
Start: 2017-12-12 | End: 2017-12-12 | Stop reason: HOSPADM

## 2017-12-12 RX ORDER — DULOXETIN HYDROCHLORIDE 60 MG/1
60 CAPSULE, DELAYED RELEASE ORAL DAILY
Status: DISCONTINUED | OUTPATIENT
Start: 2017-12-12 | End: 2017-12-12 | Stop reason: HOSPADM

## 2017-12-12 RX ORDER — ASPIRIN 81 MG/1
81 TABLET ORAL DAILY
Status: DISCONTINUED | OUTPATIENT
Start: 2017-12-12 | End: 2017-12-12 | Stop reason: HOSPADM

## 2017-12-12 RX ORDER — UBIDECARENONE 75 MG
50 CAPSULE ORAL DAILY
Status: DISCONTINUED | OUTPATIENT
Start: 2017-12-12 | End: 2017-12-12 | Stop reason: HOSPADM

## 2017-12-12 RX ORDER — TAMSULOSIN HYDROCHLORIDE 0.4 MG/1
0.4 CAPSULE ORAL DAILY
COMMUNITY
End: 2018-12-23

## 2017-12-12 RX ORDER — DILTIAZEM HCL/D5W 125 MG/125
5 PLASTIC BAG, INJECTION (ML) INTRAVENOUS CONTINUOUS
Status: DISCONTINUED | OUTPATIENT
Start: 2017-12-12 | End: 2017-12-12

## 2017-12-12 RX ORDER — TAMSULOSIN HYDROCHLORIDE 0.4 MG/1
0.4 CAPSULE ORAL DAILY
Status: DISCONTINUED | OUTPATIENT
Start: 2017-12-12 | End: 2017-12-12 | Stop reason: HOSPADM

## 2017-12-12 RX ORDER — CHOLECALCIFEROL (VITAMIN D3) 1250 MCG
50000 CAPSULE ORAL DAILY
Status: DISCONTINUED | OUTPATIENT
Start: 2017-12-12 | End: 2017-12-12

## 2017-12-12 RX ORDER — PANTOPRAZOLE SODIUM 40 MG/1
40 TABLET, DELAYED RELEASE ORAL DAILY
Status: DISCONTINUED | OUTPATIENT
Start: 2017-12-12 | End: 2017-12-12 | Stop reason: HOSPADM

## 2017-12-12 RX ORDER — METOPROLOL TARTRATE 50 MG/1
50 TABLET, FILM COATED ORAL 2 TIMES DAILY
Status: DISCONTINUED | OUTPATIENT
Start: 2017-12-12 | End: 2017-12-12

## 2017-12-12 RX ORDER — METOPROLOL TARTRATE 75 MG/1
75 TABLET, FILM COATED ORAL 2 TIMES DAILY
Qty: 60 TABLET | Refills: 3 | Status: SHIPPED | OUTPATIENT
Start: 2017-12-12 | End: 2018-04-11 | Stop reason: SDUPTHER

## 2017-12-12 RX ORDER — ISOSORBIDE MONONITRATE 30 MG/1
30 TABLET, EXTENDED RELEASE ORAL DAILY
Status: DISCONTINUED | OUTPATIENT
Start: 2017-12-12 | End: 2017-12-12 | Stop reason: HOSPADM

## 2017-12-12 RX ADMIN — ENOXAPARIN SODIUM 90 MG: 100 INJECTION SUBCUTANEOUS at 00:50

## 2017-12-12 RX ADMIN — ASPIRIN 81 MG: 81 TABLET, COATED ORAL at 09:42

## 2017-12-12 RX ADMIN — VITAMIN B12 0.1 MG ORAL TABLET 50 MCG: 0.1 TABLET ORAL at 09:44

## 2017-12-12 RX ADMIN — METOPROLOL TARTRATE 50 MG: 50 TABLET ORAL at 09:42

## 2017-12-12 RX ADMIN — ISOSORBIDE MONONITRATE 30 MG: 30 TABLET, EXTENDED RELEASE ORAL at 09:42

## 2017-12-12 RX ADMIN — APIXABAN 2.5 MG: 2.5 TABLET, FILM COATED ORAL at 10:10

## 2017-12-12 RX ADMIN — PANTOPRAZOLE SODIUM 40 MG: 40 TABLET, DELAYED RELEASE ORAL at 09:42

## 2017-12-12 RX ADMIN — DULOXETINE HYDROCHLORIDE 60 MG: 60 CAPSULE, DELAYED RELEASE ORAL at 09:42

## 2017-12-12 RX ADMIN — DILTIAZEM HCL 125 MG/125ML IN DEXTROSE 5% IV SOLN (1 MG/ML) 2.5 MG/HR: 125-5/125 SOLUTION at 09:40

## 2017-12-12 RX ADMIN — TAMSULOSIN HYDROCHLORIDE 0.4 MG: 0.4 CAPSULE ORAL at 09:42

## 2017-12-12 RX ADMIN — OMEGA-3 FATTY ACIDS CAP DELAYED RELEASE 1000 MG 1000 MG: 1000 CAPSULE DELAYED RELEASE at 09:42

## 2017-12-12 RX ADMIN — DILTIAZEM HCL 125 MG/125ML IN DEXTROSE 5% IV SOLN (1 MG/ML) 5 MG/HR: 125-5/125 SOLUTION at 00:50

## 2017-12-12 NOTE — PLAN OF CARE
Problem: FALL RISK  Goal: Able to ambulate independently  Able to ambulate independently    Outcome: Met This Shift

## 2017-12-12 NOTE — PROGRESS NOTES
Patient converted to NSR approx 08:05. EKG ordered.    Electronically signed by Kasey Rae RN on 12/12/2017 at 10:16 AM

## 2017-12-12 NOTE — PLAN OF CARE
Problem: FALL RISK  Goal: Able to ambulate independently  Able to ambulate independently     Outcome: Ongoing

## 2017-12-12 NOTE — H&P
[facility]      History and Physical Cardiology       Date of Admission:  12/11/2017 11:56 PM    Date of Initially Being Seen / Consultation:  12/12/17    Cardiologist:  Dr. Deniece Kayser  Cardiology Attending: Dr. Janie Moreland Attending: Dr. Deniece Kayser     PCP:  Mariza Naik DO    Reason for Consultation or Admission / Chief Complaint:  Atrial fibrillation    SUBJECTIVE AND HISTORY OF PRESENT ILLNESS:    Source of the history:  Patient, family, previous inpatient and outpatient records in [facility], and from records from:  April Huang is a [de-identified] y.o. male who presents to [facility] PCU as a direct admit from [facility] with symptoms / signs / problem or diagnosis of atrial fibrillation. Patient states for the past three days he has been fatigued and not felt himself. Last night he felt it was getting worse and checked his blood pressure and heart rate on a home device. His blood pressure was elevated and heart rate was in the 140's. Patient presented to [facility] ER where an EKG performed showed atrial fibrillation with RVR, 152bpm. Patient was given cardizem bolus and drip and sent here for further evaluation and treatment. Patient is currently on cardizem drip and telemetry shows SR, 72 bpm. Patient states he has had intermittent chest discomfort. He states it is never in the same spot and last for a few minutes at a time. There are no alleviating or aggravating factors. Currently he has mild, substernal chest discomfort. This is a dull ache. He does take IMDUR and states that it is probably anxiety related. He denies history of atrial fibrillation. He does have a history of CAD. He denied fever/chills, shortness of breath, diaphoresis, lightheadedness, dizziness, palpitations, and syncope.      Family present:  no      CARDIAC RISK PROFILE:    Risk Factor Yes / No / Unknown       Gender Male   Cigarette Use Yes: Former   Family History of Cardiovascular Disease Yes: Father   Diabetes Mellitus no   Hypercholesteremia yes   Hypertension yes          Cardiac Specific Problems:    Specialty Problems        Cardiology Problems    CAD (coronary artery disease), native coronary artery        Hypertension        Hypercholesterolemia        AAA (abdominal aortic aneurysm) ([facility])        Atrial fibrillation ([facility])                PRIOR CARDIAC PROBLEM LIST  (IF APPLICABLE):    Stents in the LAD, LCX, RCA  12/14/2007  Cath  Patent stents, normal LVFX  12/26/2008  SE  negative for myocardial ischemia  7/13/2009  Cath  Patent stents, normal LVFX  2/15/2010  SE  negative for myocardial ischemia  10/4/2011  Cath  Patent stents, normal LVFX  8/8/12  SE  negative for myocardial ischemia, DTS 0.5 [facility])  5/3/2013  SE  negative for myocardial ischemia, DTS 5  7/7/17 SE negative for myocardial ischemia      Past Medical History:    Past Medical History:   Diagnosis Date    Adenocarcinoma ([address]     Aneurysm ([address] aortic    Anxiety     Arthritis     Polyarticular    Burgos's esophagus 2010    H/o    CAD (coronary artery disease)     CAD (coronary artery disease), native coronary artery     Chronic respiratory failure (HCC)     Costochondritis     Erectile dysfunction     Gastroesophageal reflux disease     GERD (gastroesophageal reflux disease)     Heart block stents    High cholesterol     Hypercholesterolemia 2010    Hypertension     Hypertension     MI (mitral incompetence)     x4 stents    Nephrolithiasis     Non-cardiac chest pain 12/20/2011    Renal calculi     Right upper lobe consolidation ([address]     Tobacco abuse 2010    Ulcer, gastric, acute 3/14/2012    Weight loss 2010         Past Surgical History:    Past Surgical History:   Procedure Laterality Date    ANKLE SURGERY      left    APPENDECTOMY      CARDIAC CATHETERIZATION  12/12/07,07/13/09    Left Heart Cath    CHOLECYSTECTOMY      right    COLON SURGERY      GALLBLADDER SURGERY      LEG SURGERY      right    LUNG REMOVAL, PARTIAL      MANDIBLE FRACTURE SURGERY      ORTHOPEDIC SURGERY      legs, hand and ankles    SKIN CANCER EXCISION  2016         Home Medications:   Prior to Admission medications    Medication Sig Start Date End Date Taking? Authorizing Provider   tamsulosin (FLOMAX) 0.4 MG capsule Take 0.4 mg by mouth daily   Yes Historical Provider, MD   Cholecalciferol (VITAMIN D3) 83144 units CAPS Take 50,000 Units by mouth daily    Yes Historical Provider, MD   metoprolol tartrate (LOPRESSOR) 25 MG tablet Take 1 tablet by mouth 2 times daily  Patient taking differently: Take 50 mg by mouth 2 times daily  10/18/17  Yes GISELLE Mulligan DO   pantoprazole (PROTONIX) 40 MG tablet Take 1 tablet by mouth daily 7/25/17  Yes DAVIE Torres   isosorbide mononitrate (IMDUR) 30 MG extended release tablet TAKE ONE TABLET BY MOUTH EVERY 24 HOURS 7/25/17  Yes DAVIE Torres   Omega-3 Fatty Acids (OMEGA 3 PO) Take by mouth   Yes Historical Provider, MD   DULoxetine (CYMBALTA) 60 MG extended release capsule Take 1 capsule by mouth daily 1/3/17  Yes GISELLE Mulligan DO   vitamin B-12 (CYANOCOBALAMIN) 100 MCG tablet Take 50 mcg by mouth daily. Yes Historical Provider, MD   aspirin 81 MG EC tablet Take 81 mg by mouth daily. Yes Historical Provider, MD        Facility Administered Medications:    aspirin  81 mg Oral Daily    DULoxetine  60 mg Oral Daily    isosorbide mononitrate  30 mg Oral Daily    fish oil  1,000 mg Oral Daily    pantoprazole  40 mg Oral Daily    tamsulosin  0.4 mg Oral Daily    vitamin B-12  50 mcg Oral Daily    metoprolol tartrate  75 mg Oral BID    apixaban  2.5 mg Oral BID       Allergies:  Review of patient's allergies indicates no known allergies.      Social History:       Social History     Social History    Marital status:      Spouse name: N/A    Number of children: N/A    Years of education: N/A Occupational History    Not on file. Social History Main Topics    Smoking status: Former Smoker    Smokeless tobacco: Never Used      Comment: quit in 1983    Alcohol use No    Drug use: No    Sexual activity: Not on file     Other Topics Concern    Not on file     Social History Narrative    No narrative on file       Family History:     Family History   Problem Relation Age of Onset    Diabetes Mother     Coronary Art Dis Father     High Blood Pressure Father     High Cholesterol Father     Heart Attack Father     Stroke Father     Kidney Disease Son     Kidney Disease Son          REVIEW OF SYSTEMS:     Except as noted in the HPI, all other systems are negative        PHYSICAL EXAMINATION:     /62 Comment: manual  Pulse 68   Temp 97 °F (36.1 °C)   Resp 18   Ht 5' 11\" (1.803 m)   Wt 191 lb 9.6 oz (86.9 kg)   SpO2 93%   BMI 26.72 kg/m²     GENERAL - well developed and well nourished, in no amount of generalized distress  HEENT   PERRLA, Hearing appears normal, conjunctiva and lids are normal, ears and nose appear normal  NECK - no thyromegaly, no JVD, trachea is in the midline  CARDIOVASCULAR  PMI is in the left mid line clavicular position, Normal S1 and S2 without systolic murmur. No S3 or S4    PULMONARY  No respiratory distress. No wheezes and rales.   Breath sounds in both  lung fields are Normal  ABDOMEN   soft, non tender, no rebound, no hepatomegaly or splenomegaly  MUSCULOSKELETAL   Sitting, digitals and nails are without clubbing or cyanosis  EXTREMITIES - No edema  NEUROLOGIC - cranial nerves, II-XII, are normal  SKIN - turgor is normal, no rash  PSYCHIATRIC - normal mood and affect, alert and orientated x 3, judgement and insight appear appropriate      LABORATORY EVALUATION & TESTING:    I have personally reviewed and interpreted the results of the following diagnostic testing      EKG and or Telemetry:  which was personally reviewed me:  Sinus rhythm, 72 Increase Metoprolol dose. [id number] Score for Atrial Fibrillation Stroke Risk   Risk   Factors  Component Value   C CHF No 0   H HTN Yes 1   A2 Age >= 76 Yes,  [de-identified] y.o.) 2   D DM No 0   S2 Prior Stroke/TIA No 0   V Vascular Disease No 0   A Age 74-69 No,  [de-identified] y.o.) 0   Sc Sex male 0    [id number]  Score  3   Score last updated 02/31/07 36:34 AM    Click here for a link to the UpToDate guideline \"Atrial Fibrillation: Anticoagulation therapy to prevent embolization    Disclaimer: Risk Score calculation is dependent on accuracy of patient problem list and past encounter diagnosis. 2. Chest discomfort Initial presentation during this evaluation   EKG without acute change. Troponin negative. Review and summation of old records:    Stents in the LAD, LCX, RCA  12/14/2007  Cath  Patent stents, normal LVFX  12/26/2008  SE  negative for myocardial ischemia  7/13/2009  Cath  Patent stents, normal LVFX  2/15/2010  SE  negative for myocardial ischemia  10/4/2011  Cath  Patent stents, normal LVFX  8/8/12  SE  negative for myocardial ischemia, DTS 0.5 [facility])  5/3/2013  SE  negative for myocardial ischemia, DTS 5  7/7/17 SE negative for myocardial ischemia   Yes: TTE Continue current medications:    Yes: IMDUR           3. Hypertension Initial presentation during this evaluation Metoprolol and IMDUR  SBP min 96 max 154  DBP min 62 max 84  No Continue current medications:       No: Increase Metoprolol dose         PLAN:    1. Continue present medications except for changes as noted above  2. Continue to monitor rhythm  3. Further orders per clinical course. 4. Admit to PCU           Discussed with patient and nursing.     I greatly appreciate the opportunity and your confidence in allowing me to participate in the care of Stas Wilson    Electronically signed by Carin Cushing, CNP on 12/12/17     [facility]

## 2017-12-12 NOTE — DISCHARGE SUMMARY
given cardizem bolus and drip and sent here for further evaluation and treatment. Patient is currently on cardizem drip and telemetry shows SR, 72 bpm. Patient states he has had intermittent chest discomfort. He states it is never in the same spot and last for a few minutes at a time. There are no alleviating or aggravating factors. Currently he has mild, substernal chest discomfort. This is a dull ache. He does take IMDUR and states that it is probably anxiety related. He denies history of atrial fibrillation. He does have a history of CAD. He denied fever/chills, shortness of breath, diaphoresis, lightheadedness, dizziness, palpitations, and syncope. Hospital Course:     After admission, the patient was noted to be normal sinus rhythm. The lopressor was increased to 75 mg orally twice a day. Eliquis 2.5 mg orally twice a day was started. An echocardiogram revealed:    Normal size left atrium.   Normal left ventricular size with preserved LV function and an estimated   ejection fraction of approximately 55-60%.  Mild concentric left ventricular hypertrophy. The rest of the patient's hospitalization was uneventful. He was discharged home on medical therapy with outpatient follow up. Consults:     None      Significant Diagnostic Studies:    1. echocardiogram, 12/12/17     Significant Therapeutic Endeavors:      1. none      Activity: activity as directed per discharge instructions. Diet:  Cardiac diet    Labs:  For convenience and continuity at follow-up the following most recent labs are provided:    CBC:    Lab Results   Component Value Date    WBC 8.0 12/12/2017    HGB 13.7 12/12/2017    HCT 41.0 12/12/2017    HCT 44.7 10/06/2011     12/12/2017     10/06/2011       Renal:    Lab Results   Component Value Date     12/12/2017     10/06/2011    K 4.1 12/12/2017    K 4.5 10/06/2011     12/12/2017     10/06/2011    CO2 23 12/12/2017    BUN 17 12/12/2017 CREATININE 1.3 12/12/2017    CREATININE 1.3 10/06/2011    CALCIUM 9.1 12/12/2017         Discharge Medications:     Current Discharge Medication List           Details   apixaban (ELIQUIS) 2.5 MG TABS tablet Take 1 tablet by mouth 2 times daily  Qty: 60 tablet, Refills: 3              Details   metoprolol tartrate 75 MG TABS Take 75 mg by mouth 2 times daily  Qty: 60 tablet, Refills: 3              Details   tamsulosin (FLOMAX) 0.4 MG capsule Take 0.4 mg by mouth daily      Cholecalciferol (VITAMIN D3) 66516 units CAPS Take 50,000 Units by mouth daily       pantoprazole (PROTONIX) 40 MG tablet Take 1 tablet by mouth daily  Qty: 90 tablet, Refills: 3      isosorbide mononitrate (IMDUR) 30 MG extended release tablet TAKE ONE TABLET BY MOUTH EVERY 24 HOURS  Qty: 90 tablet, Refills: 3      Omega-3 Fatty Acids (OMEGA 3 PO) Take by mouth      DULoxetine (CYMBALTA) 60 MG extended release capsule Take 1 capsule by mouth daily  Qty: 90 capsule, Refills: 3      vitamin B-12 (CYANOCOBALAMIN) 100 MCG tablet Take 50 mcg by mouth daily. aspirin 81 MG EC tablet Take 81 mg by mouth daily. Disposition:      1. Home  2. Follow up with cardiology as arranged  3. Follow up with primary care provider as arranged  .     Electronically signed by Azam Huggins MD on 12/12/17

## 2017-12-13 ENCOUNTER — CARE COORDINATION (OUTPATIENT)
Dept: CASE MANAGEMENT | Age: 82
End: 2017-12-13

## 2017-12-13 LAB
EKG P AXIS: 13 DEGREES
EKG P-R INTERVAL: 124 MS
EKG Q-T INTERVAL: 376 MS
EKG QRS DURATION: 92 MS
EKG QTC CALCULATION (BAZETT): 395 MS
EKG T AXIS: 18 DEGREES

## 2017-12-14 NOTE — CARE COORDINATION
Natalie 45 Transitions Initial Follow Up Call    Call within 2 business days of discharge: Yes    Patient: Rashmi Manley Patient : 1935   MRN: [id number]  Reason for Admission: There are no discharge diagnoses documented for the most recent discharge. Discharge Date: 17 RARS: [facility] Risk Score: 7.5     Spoke with: Wife    [id number]    Non-face-to-face services provided:      Inpatient Assessment  Care Transitions 24 Hour Call    Do you have any ongoing symptoms?:  No  Do you have a copy of your discharge instructions?:  Yes  Do you have all of your prescriptions and are they filled?:  Yes  Have you scheduled your follow up appointment?:  Yes  How are you going to get to your appointment?:  Car - family or friend to transport  Were you discharged with any Home Care or Post Acute Services:  No  Do you feel like you have everything you need to keep you well at home?:  Yes  Care Transitions Interventions         Follow Up: Spoke with wife. She reported that patient is doing well. Denied any complaints or problems. Discussed meds. Voiced understanding of new meds as well as old home meds. Reported that he is eating well, drinking, up and about. No chest pain, shortness of breath or other issues reported.     Future Appointments  Date Time Provider Reuben Rice   2017 4:00 PM [facility]   1/10/2018 8:15 AM Fernando Roberts MD [facility]   2018 8:30 AM DO [facility]da [facility]       Rick Galaviz RN

## 2017-12-15 ENCOUNTER — CARE COORDINATION (OUTPATIENT)
Dept: CASE MANAGEMENT | Age: 82
End: 2017-12-15

## 2017-12-19 ENCOUNTER — CARE COORDINATION (OUTPATIENT)
Dept: CASE MANAGEMENT | Age: 82
End: 2017-12-19

## 2017-12-19 NOTE — CARE COORDINATION
Natalie 45 Transitions Follow Up Call    2017    Patient: Guera Wheeler  Patient : 1935   MRN: [id number]  Reason for Admission: There are no discharge diagnoses documented for the most recent discharge. Discharge Date: 17 RARS: Risk Score: 7.5       Spoke with: Guera Wheeler    Non-face-to-face services provided:      Inpatient Assessment  Care Transitions Subsequent and Final Call    Subsequent and Final Calls  Do you have any ongoing symptoms?:  No  Have your medications changed?:  No  Do you have any questions related to your medications?:  No  Do you currently have any active services?:  No  Do you have any needs or concerns that I can assist you with?:  No  Identified Barriers:  None  Care Transitions Interventions  Other Interventions: Follow Up:  Spoke with patient for follow up call. Denied any chest pain, shortness of breath or other symptoms. Reported that he is doing well. Eating well, drinking well. Up and about without distress. Sleeping well. Goes to see PCP tomorrow. Knowledgeable about meds. No new issues identified. Discussed CTC discharge after PCP appointment if all is well. Agreeable.      Future Appointments  Date Time Provider Reuben Rice   2017 4:00 PM [facility] DAVIE Domínguez Dam[facility]   1/10/2018 8:15 AM [facility]   2018 8:30 AM DO Nereyda Williamson [facility]       Charisma Akins, MADONNA

## 2017-12-20 ENCOUNTER — OFFICE VISIT (OUTPATIENT)
Dept: PRIMARY CARE CLINIC | Age: 82
End: 2017-12-20
Payer: MEDICARE

## 2017-12-20 VITALS
TEMPERATURE: 98.1 F | SYSTOLIC BLOOD PRESSURE: 110 MMHG | HEIGHT: 71 IN | WEIGHT: 194.75 LBS | DIASTOLIC BLOOD PRESSURE: 70 MMHG | BODY MASS INDEX: 27.27 KG/M2 | HEART RATE: 53 BPM | OXYGEN SATURATION: 95 %

## 2017-12-20 DIAGNOSIS — I25.10 CORONARY ARTERY DISEASE INVOLVING NATIVE CORONARY ARTERY OF NATIVE HEART WITHOUT ANGINA PECTORIS: ICD-10-CM

## 2017-12-20 DIAGNOSIS — I48.0 PAROXYSMAL ATRIAL FIBRILLATION (HCC): Primary | ICD-10-CM

## 2017-12-20 DIAGNOSIS — I10 ESSENTIAL HYPERTENSION: ICD-10-CM

## 2017-12-20 PROCEDURE — 99495 TRANSJ CARE MGMT MOD F2F 14D: CPT | Performed by: NURSE PRACTITIONER

## 2017-12-20 PROCEDURE — 1111F DSCHRG MED/CURRENT MED MERGE: CPT | Performed by: NURSE PRACTITIONER

## 2017-12-20 ASSESSMENT — ENCOUNTER SYMPTOMS
EYE REDNESS: 0
VOMITING: 0
COUGH: 0
SORE THROAT: 0
RHINORRHEA: 0
SHORTNESS OF BREATH: 0
DIARRHEA: 0
CONSTIPATION: 0

## 2017-12-20 NOTE — PATIENT INSTRUCTIONS
Patient Education        Atrial Fibrillation: Care Instructions  Your Care Instructions    Atrial fibrillation is an irregular and often fast heartbeat. Treating this condition is important for several reasons. It can cause blood clots, which can travel from your heart to your brain and cause a stroke. If you have a fast heartbeat, you may feel lightheaded, dizzy, and weak. An irregular heartbeat can also increase your risk for heart failure. Atrial fibrillation is often the result of another heart condition, such as high blood pressure or coronary artery disease. Making changes to improve your heart condition will help you stay healthy and active. Follow-up care is a key part of your treatment and safety. Be sure to make and go to all appointments, and call your doctor if you are having problems. It's also a good idea to know your test results and keep a list of the medicines you take. How can you care for yourself at home? Medicines  ? · Take your medicines exactly as prescribed. Call your doctor if you think you are having a problem with your medicine. You will get more details on the specific medicines your doctor prescribes. ? · If your doctor has given you a blood thinner to prevent a stroke, be sure you get instructions about how to take your medicine safely. Blood thinners can cause serious bleeding problems. ? · Do not take any vitamins, over-the-counter drugs, or herbal products without talking to your doctor first.   ? Lifestyle changes  ? · Do not smoke. Smoking can increase your chance of a stroke and heart attack. If you need help quitting, talk to your doctor about stop-smoking programs and medicines. These can increase your chances of quitting for good. ? · Eat a heart-healthy diet. ? · Stay at a healthy weight. Lose weight if you need to.   ? · Limit alcohol to 2 drinks a day for men and 1 drink a day for women. Too much alcohol can cause health problems. ? · Avoid colds and flu.  Get a pneumococcal vaccine shot. If you have had one before, ask your doctor whether you need another dose. Get a flu shot every year. If you must be around people with colds or flu, wash your hands often. Activity  ? · If your doctor recommends it, get more exercise. Walking is a good choice. Bit by bit, increase the amount you walk every day. Try for at least 30 minutes on most days of the week. You also may want to swim, bike, or do other activities. Your doctor may suggest that you join a cardiac rehabilitation program so that you can have help increasing your physical activity safely. ? · Start light exercise if your doctor says it is okay. Even a small amount will help you get stronger, have more energy, and manage stress. Walking is an easy way to get exercise. Start out by walking a little more than you did in the hospital. Gradually increase the amount you walk. ? · When you exercise, watch for signs that your heart is working too hard. You are pushing too hard if you cannot talk while you are exercising. If you become short of breath or dizzy or have chest pain, sit down and rest immediately. ? · Check your pulse regularly. Place two fingers on the artery at the palm side of your wrist, in line with your thumb. If your heartbeat seems uneven or fast, talk to your doctor. When should you call for help? Call 911 anytime you think you may need emergency care. For example, call if:  ? · You have symptoms of a heart attack. These may include:  ¨ Chest pain or pressure, or a strange feeling in the chest.  ¨ Sweating. ¨ Shortness of breath. ¨ Nausea or vomiting. ¨ Pain, pressure, or a strange feeling in the back, neck, jaw, or upper belly or in one or both shoulders or arms. ¨ Lightheadedness or sudden weakness. ¨ A fast or irregular heartbeat. After you call 911, the  may tell you to chew 1 adult-strength or 2 to 4 low-dose aspirin. Wait for an ambulance. Do not try to drive yourself.    ? · You have symptoms of a stroke. These may include:  ¨ Sudden numbness, tingling, weakness, or loss of movement in your face, arm, or leg, especially on only one side of your body. ¨ Sudden vision changes. ¨ Sudden trouble speaking. ¨ Sudden confusion or trouble understanding simple statements. ¨ Sudden problems with walking or balance. ¨ A sudden, severe headache that is different from past headaches. ? · You passed out (lost consciousness). ?Call your doctor now or seek immediate medical care if:  ? · You have new or increased shortness of breath. ? · You feel dizzy or lightheaded, or you feel like you may faint. ? · Your heart rate becomes irregular. ? · You can feel your heart flutter in your chest or skip heartbeats. Tell your doctor if these symptoms are new or worse. ? Watch closely for changes in your health, and be sure to contact your doctor if you have any problems. Where can you learn more? Go to https://[employer]Traveler | VIP. org and sign in to your [employer] account. Enter U020 in the Meet.com box to learn more about \"Atrial Fibrillation: Care Instructions. \"     If you do not have an account, please click on the \"Sign Up Now\" link. Current as of: September 21, 2016  Content Version: 11.4  © 0647-1073 [employer], [employer]. Care instructions adapted under license by [facility] [address] If you have questions about a medical condition or this instruction, always ask your healthcare professional. [place] [address] any warranty or liability for your use of this information.

## 2017-12-20 NOTE — PROGRESS NOTES
hospital encounter of 34/02/20   Basic Metabolic Panel   Result Value Ref Range    Sodium 145 136 - 145 mmol/L    Potassium 4.1 3.5 - 5.0 mmol/L    Chloride 107 98 - 111 mmol/L    CO2 23 22 - 29 mmol/L    Anion Gap 15 7 - 19 mmol/L    Glucose 139 (H) 74 - 109 mg/dL    BUN 17 8 - 23 mg/dL    CREATININE 1.3 (H) 0.5 - 1.2 mg/dL    GFR Non- 53 (A) >60    Calcium 9.1 8.8 - 10.2 mg/dL   CBC   Result Value Ref Range    WBC 8.0 4.8 - 10.8 K/uL    RBC 4.35 (L) 4.70 - 6.10 M/uL    Hemoglobin 13.7 (L) 14.0 - 18.0 g/dL    Hematocrit 41.0 (L) 42.0 - 52.0 %    MCV 94.3 (H) 80.0 - 94.0 fL    MCH 31.5 (H) 27.0 - 31.0 pg    MCHC 33.4 33.0 - 37.0 g/dL    RDW 14.1 11.5 - 14.5 %    Platelets 323 427 - 052 K/uL    MPV 10.9 9.4 - 12.4 fL   Troponin   Result Value Ref Range    Troponin <0.01 0.00 - 0.03 ng/mL   TSH without Reflex   Result Value Ref Range    TSH 3.900 0.270 - 4.200 uIU/mL   T4, Free   Result Value Ref Range    T4 Free 1.2 0.9 - 1.7 ng/dL   PROBNP, N-TERMINAL   Result Value Ref Range    Pro-BNP 1,530 0 - 1,800 pg/mL   EKG 12 Lead   Result Value Ref Range    P-R Interval 124 ms    QRS Duration 92 ms    Q-T Interval 376 ms    QTc Calculation (Bazett) 395 ms    P Axis 13 degrees    T Axis 18 degrees       I have reviewed the following with the Mr. Lakshmi Herrera   Lab Review   Admission on 12/11/2017, Discharged on 12/12/2017   Component Date Value    Sodium 12/12/2017 145     Potassium 12/12/2017 4.1     Chloride 12/12/2017 107     CO2 12/12/2017 23     Anion Gap 12/12/2017 15     Glucose 12/12/2017 139*    BUN 12/12/2017 17     CREATININE 12/12/2017 1.3*    GFR Non- 12/12/2017 53*    Calcium 12/12/2017 9.1     WBC 12/12/2017 8.0     RBC 12/12/2017 4.35*    Hemoglobin 12/12/2017 13.7*    Hematocrit 12/12/2017 41.0*    MCV 12/12/2017 94.3*    MCH 12/12/2017 31.5*    MCHC 12/12/2017 33.4     RDW 12/12/2017 14.1     Platelets 36/65/8126 158     MPV 12/12/2017 10.9     Troponin 12/12/2017 <0.01     P-R Interval 12/13/2017 124     QRS Duration 12/13/2017 92     Q-T Interval 12/13/2017 376     QTc Calculation (Bazett) 12/13/2017 395     P Axis 12/13/2017 13     T Axis 12/13/2017 18     TSH 12/12/2017 3.900     T4 Free 12/12/2017 1.2     Pro-BNP 12/12/2017 1530    Admission on 07/07/2017, Discharged on 07/07/2017   Component Date Value    P-R Interval 07/10/2017 152     QRS Duration 07/10/2017 86     Q-T Interval 07/10/2017 322     QTc Calculation (Bazett) 07/10/2017 385     P Axis 07/10/2017 63     T Axis 07/10/2017 43     WBC 07/07/2017 7.9     RBC 07/07/2017 4.86     Hemoglobin 07/07/2017 15.6     Hematocrit 07/07/2017 46.2     MCV 07/07/2017 95.1*    MCH 07/07/2017 32.1*    MCHC 07/07/2017 33.8     RDW 07/07/2017 13.8     Platelets 44/90/0327 178     MPV 07/07/2017 10.4     Neutrophils % 07/07/2017 64.9     Lymphocytes % 07/07/2017 21.2     Monocytes % 07/07/2017 10.8*    Eosinophils % 07/07/2017 2.4     Basophils % 07/07/2017 0.1     Neutrophils # 07/07/2017 5.1     Lymphocytes # 07/07/2017 1.7     Monocytes # 07/07/2017 0.90     Eosinophils # 07/07/2017 0.20     Basophils # 07/07/2017 0.00     Sodium 07/07/2017 144     Potassium 07/07/2017 4.4     Chloride 07/07/2017 104     CO2 07/07/2017 26     Anion Gap 07/07/2017 14     Glucose 07/07/2017 106     BUN 07/07/2017 17     CREATININE 07/07/2017 1.2     GFR Non- 07/07/2017 58*    Calcium 07/07/2017 9.8     Total Protein 07/07/2017 7.9     Alb 07/07/2017 4.6     Total Bilirubin 07/07/2017 0.8     Alkaline Phosphatase 07/07/2017 66     ALT 07/07/2017 25     AST 07/07/2017 24     POC Troponin I 07/07/2017 0.01     Performed on 07/07/2017 i-Stat     P-R Interval 07/11/2017 154     QRS Duration 07/11/2017 88     Q-T Interval 07/11/2017 348     QTc Calculation (Bazett) 07/11/2017 370     P Axis 07/11/2017 44     T Axis 07/11/2017 33     POC Troponin I 07/07/2017 0.02  Performed on 07/07/2017 i-Stat      Copies of these are in the chart. Prior to Visit Medications    Medication Sig Taking? Authorizing Provider   tamsulosin (FLOMAX) 0.4 MG capsule Take 0.4 mg by mouth daily Yes Historical Provider, MD   apixaban (ELIQUIS) 2.5 MG TABS tablet Take 1 tablet by mouth 2 times daily Yes Richard Ramires MD   metoprolol tartrate 75 MG TABS Take 75 mg by mouth 2 times daily Yes Richard Ramires MD   Cholecalciferol (VITAMIN D3) 91176 units CAPS Take 50,000 Units by mouth daily  Yes Historical Provider, MD   pantoprazole (PROTONIX) 40 MG tablet Take 1 tablet by mouth daily Yes Stana Rings APROLIVER   isosorbide mononitrate (IMDUR) 30 MG extended release tablet TAKE ONE TABLET BY MOUTH EVERY 24 HOURS Yes Stana Rings APRN   Omega-3 Fatty Acids (OMEGA 3 PO) Take by mouth Yes Historical Provider, MD   DULoxetine (CYMBALTA) 60 MG extended release capsule Take 1 capsule by mouth daily Yes B Epifanio Day, DO   vitamin B-12 (CYANOCOBALAMIN) 100 MCG tablet Take 50 mcg by mouth daily. Yes Historical Provider, MD   aspirin 81 MG EC tablet Take 81 mg by mouth daily. Yes Historical Provider, MD       Allergies: Review of patient's allergies indicates no known allergies.     Past Medical History:   Diagnosis Date    Aden[facility] ([facility]     Aneurysm ([facility] aortic    Anxiety     Arthritis     Polyarticular    Burgos's esophagus 2010    H/o    CAD (coronary artery disease)     CAD (coronary artery disease), native coronary artery     Chronic respiratory failure (HCC)     Costochondritis     Erectile dysfunction     Gastroesophageal reflux disease     GERD (gastroesophageal reflux disease)     Heart block stents    High cholesterol     Hypercholesterolemia 2010    Hypertension     Hypertension     MI (mitral incompetence)     x4 stents    Nephrolithiasis     Non-cardiac chest pain 12/20/2011    Renal calculi     Right upper lobe consolidation ([facility]     reviewed. ASSESSMENT      ICD-10-CM ICD-9-CM    1. Paroxysmal atrial fibrillation (HCC) I48.0 427.31 Continue Eliquis  Continue metoprolol   2. Essential hypertension I10 401.9 The current medical regimen is effective;  continue present plan and medications. 3. Coronary artery disease involving native coronary artery of native heart without angina pectoris I25.10 414.01 The current medical regimen is effective;  continue present plan and medications. Keep routine follow-up with Dr. Thony Sawyer    No orders of the defined types were placed in this encounter. Return in about 6 months (around 6/20/2018), or if symptoms worsen or fail to improve. Patient Instructions       Patient Education        Atrial Fibrillation: Care Instructions  Your Care Instructions    Atrial fibrillation is an irregular and often fast heartbeat. Treating this condition is important for several reasons. It can cause blood clots, which can travel from your heart to your brain and cause a stroke. If you have a fast heartbeat, you may feel lightheaded, dizzy, and weak. An irregular heartbeat can also increase your risk for heart failure. Atrial fibrillation is often the result of another heart condition, such as high blood pressure or coronary artery disease. Making changes to improve your heart condition will help you stay healthy and active. Follow-up care is a key part of your treatment and safety. Be sure to make and go to all appointments, and call your doctor if you are having problems. It's also a good idea to know your test results and keep a list of the medicines you take. How can you care for yourself at home? Medicines  ? · Take your medicines exactly as prescribed. Call your doctor if you think you are having a problem with your medicine. You will get more details on the specific medicines your doctor prescribes.    ? · If your doctor has given you a blood thinner to prevent a stroke, be sure you get thumb. If your heartbeat seems uneven or fast, talk to your doctor. When should you call for help? Call 911 anytime you think you may need emergency care. For example, call if:  ? · You have symptoms of a heart attack. These may include:  ¨ Chest pain or pressure, or a strange feeling in the chest.  ¨ Sweating. ¨ Shortness of breath. ¨ Nausea or vomiting. ¨ Pain, pressure, or a strange feeling in the back, neck, jaw, or upper belly or in one or both shoulders or arms. ¨ Lightheadedness or sudden weakness. ¨ A fast or irregular heartbeat. After you call 911, the  may tell you to chew 1 adult-strength or 2 to 4 low-dose aspirin. Wait for an ambulance. Do not try to drive yourself. ? · You have symptoms of a stroke. These may include:  ¨ Sudden numbness, tingling, weakness, or loss of movement in your face, arm, or leg, especially on only one side of your body. ¨ Sudden vision changes. ¨ Sudden trouble speaking. ¨ Sudden confusion or trouble understanding simple statements. ¨ Sudden problems with walking or balance. ¨ A sudden, severe headache that is different from past headaches. ? · You passed out (lost consciousness). ?Call your doctor now or seek immediate medical care if:  ? · You have new or increased shortness of breath. ? · You feel dizzy or lightheaded, or you feel like you may faint. ? · Your heart rate becomes irregular. ? · You can feel your heart flutter in your chest or skip heartbeats. Tell your doctor if these symptoms are new or worse. ? Watch closely for changes in your health, and be sure to contact your doctor if you have any problems. Where can you learn more? Go to [email or web address] and sign in to your [employer] account. Enter U020 in the Interventional Imaging box to learn more about \"Atrial Fibrillation: Care Instructions. \"     If you do not have an account, please click on the \"Sign Up Now\" link.   Current as of: September 21, 2016  Content Version: 11.4  © 4695-1119 Healthwise, Incorporated. Care instructions adapted under license by [facility] ([facility]). If you have questions about a medical condition or this instruction, always ask your healthcare professional. [address] any warranty or liability for your use of this information. Controlled Substances Monitoring:  n/a            Additional Instructions: As always, patient is advised to bring in medication bottles in order to correctly reconcile with our current list.    Vincent Rios received counseling on the following healthy behaviors: n/a    Patient given educational materials on dx    I have instructed Vincent Rios to complete a self tracking handout on n/a and instructed them to bring it with them to his next appointment. Discussed use, benefit, and side effects of prescribed medications. Barriers to medication compliance addressed. All patient questions answered. Pt voiced understanding.      DAVIE Whiteheda

## 2017-12-22 ENCOUNTER — CARE COORDINATION (OUTPATIENT)
Dept: CASE MANAGEMENT | Age: 82
End: 2017-12-22

## 2017-12-22 NOTE — CARE COORDINATION
Natalie [facility] Transitions Follow Up Call    2017    Patient: Chip Blind  Patient : 1935   MRN: [id number]  Reason for Admission: There are no discharge diagnoses documented for the most recent discharge. Discharge Date: 17 RARS: Risk Score: 7.5       Spoke with: N/A    Non-face-to-face services provided:      Inpatient Assessment  Care Transitions Subsequent and Final Call    Subsequent and Final Calls  Care Transitions Interventions  Other Interventions: Follow Up: Attempted to make contact with patient for follow up. No answer, left message.     Future Appointments  Date Time Provider Reuben Rice   1/10/2018 8:15 AM MD LUBA Jacobs [facility]   2018 8:30 AM DO Nereyda Love [facility]   2018 11:00 AM Arcelia Fernandez, [address] [place] [address]       Bruna Contreras RN

## 2017-12-26 ENCOUNTER — CARE COORDINATION (OUTPATIENT)
Dept: CASE MANAGEMENT | Age: 82
End: 2017-12-26

## 2017-12-26 NOTE — CARE COORDINATION
Natalie 45 Transitions Follow Up Call    2017    Patient: Stephanie William  Patient : 1935   MRN: [id number]  Reason for Admission: There are no discharge diagnoses documented for the most recent discharge. Discharge Date: 17 RARS: Risk Score: 7.5       Spoke with: Mrs. Gonzalez [facility]    Non-face-to-face services provided:      Inpatient Assessment  Care Transitions Subsequent and Final Call    Subsequent and Final Calls  Do you have any ongoing symptoms?:  No  Have your medications changed?:  No  Do you have any questions related to your medications?:  No  Do you currently have any active services?:  No  Do you have any needs or concerns that I can assist you with?:  No  Identified Barriers:  None  Care Transitions Interventions  Other Interventions: Follow Up: Spoke with wife, with patient at her side. She would ask him the questions that I asked her and then relay his response back to me. Reported that he is doing very well, other than just feeling a little tired all of the time. She reported that they do check his blood pressure often and so far, it has been running within normal limits. Denied any chest pain, shortness of breath or other issues. Eating and drinking well. Up and about, seeing to his personal care needs. No issues noted. Did see PCP. No changes. Goes to see cardiologist tomorrow. Discussed discharge from [facility] process. They are agreeable.     Future Appointments  Date Time Provider Reuben Rice   1/10/2018 8:15 AM MD LUBA Sandoval [facility]   2018 8:30 AM DO Nereyda Atkinson [facility]   2018 11:00 AM Machelle Perez, [address] [place], [place] [address]JO ANN Liu RN

## 2017-12-27 ENCOUNTER — OFFICE VISIT (OUTPATIENT)
Dept: CARDIOLOGY | Age: 82
End: 2017-12-27
Payer: MEDICARE

## 2017-12-27 VITALS
HEART RATE: 56 BPM | BODY MASS INDEX: 27.58 KG/M2 | WEIGHT: 197 LBS | DIASTOLIC BLOOD PRESSURE: 70 MMHG | SYSTOLIC BLOOD PRESSURE: 120 MMHG | HEIGHT: 71 IN

## 2017-12-27 DIAGNOSIS — I25.10 CORONARY ARTERY DISEASE INVOLVING NATIVE CORONARY ARTERY OF NATIVE HEART WITHOUT ANGINA PECTORIS: Primary | ICD-10-CM

## 2017-12-27 DIAGNOSIS — I10 ESSENTIAL HYPERTENSION: ICD-10-CM

## 2017-12-27 DIAGNOSIS — E78.00 HYPERCHOLESTEROLEMIA: ICD-10-CM

## 2017-12-27 PROCEDURE — G8482 FLU IMMUNIZE ORDER/ADMIN: HCPCS | Performed by: INTERNAL MEDICINE

## 2017-12-27 PROCEDURE — G8417 CALC BMI ABV UP PARAM F/U: HCPCS | Performed by: INTERNAL MEDICINE

## 2017-12-27 PROCEDURE — 1111F DSCHRG MED/CURRENT MED MERGE: CPT | Performed by: INTERNAL MEDICINE

## 2017-12-27 PROCEDURE — 1036F TOBACCO NON-USER: CPT | Performed by: INTERNAL MEDICINE

## 2017-12-27 PROCEDURE — G8598 ASA/ANTIPLAT THER USED: HCPCS | Performed by: INTERNAL MEDICINE

## 2017-12-27 PROCEDURE — 4040F PNEUMOC VAC/ADMIN/RCVD: CPT | Performed by: INTERNAL MEDICINE

## 2017-12-27 PROCEDURE — 99213 OFFICE O/P EST LOW 20 MIN: CPT | Performed by: INTERNAL MEDICINE

## 2017-12-27 PROCEDURE — G8427 DOCREV CUR MEDS BY ELIG CLIN: HCPCS | Performed by: INTERNAL MEDICINE

## 2017-12-27 PROCEDURE — 1123F ACP DISCUSS/DSCN MKR DOCD: CPT | Performed by: INTERNAL MEDICINE

## 2017-12-27 NOTE — PROGRESS NOTES
Heart Attack Father     Stroke Father     Kidney Disease Son     Kidney Disease Son      Social History   Substance Use Topics    Smoking status: Former Smoker    Smokeless tobacco: Never Used      Comment: quit in 1983    Alcohol use No          Review of Systems:    General:      Complaint / Symptom Yes / No / Description if Yes       Fatigue No   Weight gain No   Insomnia No       Respiratory:        Complaint / Symptom Yes / No / Description if Yes       Cough No   Horseness No       Cardiovascular:    Complaint / Symptom Yes / No / Description if Yes       Chest Pain No   Shortness of Air / Orthopnea No   Presyncope / Syncope No   Palpitations No         Objective:    /70   Pulse 56   Ht 5' 11\" (1.803 m)   Wt 197 lb (89.4 kg)   BMI 27.48 kg/m²     GENERAL - well developed and well nourished, conversant  HEENT   PERRLA, Hearing appears normal  NECK - no thyromegaly, no JVD, trachea is in the midline  CARDIOVASCULAR  PMI is in the mid line clavicular position, Normal S1 and S2 with a grade 1/6 systolic murmur. No S3 or S4    PULMONARY  no respiratory distress. No wheezes or rales. Lungs are clear to ausculation   ABDOMEN   soft, non tender, no rebound  MUSCULOSKELETAL   range of motion of the upper and lower extermites appears normal and equal and is without pain   EXTREMITIES - no significant edema   NEUROLOGIC  gait and station are normal  SKIN - turgor is normal  PSYCHIATRIC - normal mood and affect, alert and orientated x 3,      ASSESSMENT:    ALL THE CARDIOLOGY PROBLEMS ARE LISTED ABOVE; HOWEVER, THE FOLLOWING SPECIFIC CARDIAC PROBLEMS / CONDITIONS WERE ADDRESSED AND TREATED DURING THE OFFICE VISIT TODAY:                                                                                            MEDICAL DECISION MAKING             Cardiac Specific Problem / Diagnosis  Discussion and Data Reviewed Diagnostic Procedures Ordered Management Options Selected           1.  CAD  show no

## 2018-01-03 RX ORDER — DULOXETIN HYDROCHLORIDE 60 MG/1
CAPSULE, DELAYED RELEASE ORAL
Qty: 90 CAPSULE | Refills: 3 | Status: SHIPPED | OUTPATIENT
Start: 2018-01-03 | End: 2018-04-10 | Stop reason: SDUPTHER

## 2018-04-10 ENCOUNTER — OFFICE VISIT (OUTPATIENT)
Dept: PRIMARY CARE CLINIC | Age: 83
End: 2018-04-10
Payer: MEDICARE

## 2018-04-10 VITALS
WEIGHT: 199.75 LBS | SYSTOLIC BLOOD PRESSURE: 120 MMHG | TEMPERATURE: 98.6 F | HEART RATE: 85 BPM | DIASTOLIC BLOOD PRESSURE: 70 MMHG | HEIGHT: 71 IN | OXYGEN SATURATION: 99 % | BODY MASS INDEX: 27.97 KG/M2

## 2018-04-10 DIAGNOSIS — E78.00 HYPERCHOLESTEROLEMIA: ICD-10-CM

## 2018-04-10 DIAGNOSIS — I10 ESSENTIAL HYPERTENSION: Primary | ICD-10-CM

## 2018-04-10 DIAGNOSIS — I25.10 CORONARY ARTERY DISEASE INVOLVING NATIVE CORONARY ARTERY OF NATIVE HEART WITHOUT ANGINA PECTORIS: ICD-10-CM

## 2018-04-10 DIAGNOSIS — H61.22 IMPACTED CERUMEN OF LEFT EAR: ICD-10-CM

## 2018-04-10 DIAGNOSIS — M94.0 COSTOCHONDRITIS: ICD-10-CM

## 2018-04-10 PROCEDURE — 1036F TOBACCO NON-USER: CPT | Performed by: PEDIATRICS

## 2018-04-10 PROCEDURE — G8598 ASA/ANTIPLAT THER USED: HCPCS | Performed by: PEDIATRICS

## 2018-04-10 PROCEDURE — 99214 OFFICE O/P EST MOD 30 MIN: CPT | Performed by: PEDIATRICS

## 2018-04-10 PROCEDURE — G8427 DOCREV CUR MEDS BY ELIG CLIN: HCPCS | Performed by: PEDIATRICS

## 2018-04-10 PROCEDURE — G8417 CALC BMI ABV UP PARAM F/U: HCPCS | Performed by: PEDIATRICS

## 2018-04-10 PROCEDURE — 1123F ACP DISCUSS/DSCN MKR DOCD: CPT | Performed by: PEDIATRICS

## 2018-04-10 PROCEDURE — 4040F PNEUMOC VAC/ADMIN/RCVD: CPT | Performed by: PEDIATRICS

## 2018-04-10 RX ORDER — DULOXETIN HYDROCHLORIDE 60 MG/1
60 CAPSULE, DELAYED RELEASE ORAL DAILY
Qty: 90 CAPSULE | Refills: 3 | Status: ON HOLD
Start: 2018-04-10 | End: 2020-01-03

## 2018-04-10 ASSESSMENT — ENCOUNTER SYMPTOMS
COUGH: 0
BACK PAIN: 0
SHORTNESS OF BREATH: 0
CHEST TIGHTNESS: 0
VOMITING: 0
SORE THROAT: 0
NAUSEA: 0
WHEEZING: 0
DIARRHEA: 0
ABDOMINAL PAIN: 0

## 2018-04-12 RX ORDER — METOPROLOL TARTRATE 75 MG/1
TABLET, FILM COATED ORAL
Qty: 180 TABLET | Refills: 3 | Status: SHIPPED | OUTPATIENT
Start: 2018-04-12 | End: 2018-07-26 | Stop reason: DRUGHIGH

## 2018-04-12 RX ORDER — APIXABAN 2.5 MG/1
TABLET, FILM COATED ORAL
Qty: 180 TABLET | Refills: 3 | Status: SHIPPED | OUTPATIENT
Start: 2018-04-12 | End: 2019-04-13 | Stop reason: SDUPTHER

## 2018-04-16 ENCOUNTER — OFFICE VISIT (OUTPATIENT)
Dept: PRIMARY CARE CLINIC | Age: 83
End: 2018-04-16
Payer: MEDICARE

## 2018-04-16 VITALS
BODY MASS INDEX: 27.58 KG/M2 | DIASTOLIC BLOOD PRESSURE: 80 MMHG | OXYGEN SATURATION: 95 % | HEIGHT: 71 IN | TEMPERATURE: 97.4 F | SYSTOLIC BLOOD PRESSURE: 143 MMHG | WEIGHT: 197 LBS | HEART RATE: 69 BPM

## 2018-04-16 DIAGNOSIS — M43.6 TORTICOLLIS, ACUTE: Primary | ICD-10-CM

## 2018-04-16 PROCEDURE — 1123F ACP DISCUSS/DSCN MKR DOCD: CPT | Performed by: NURSE PRACTITIONER

## 2018-04-16 PROCEDURE — G8417 CALC BMI ABV UP PARAM F/U: HCPCS | Performed by: NURSE PRACTITIONER

## 2018-04-16 PROCEDURE — G8427 DOCREV CUR MEDS BY ELIG CLIN: HCPCS | Performed by: NURSE PRACTITIONER

## 2018-04-16 PROCEDURE — 1036F TOBACCO NON-USER: CPT | Performed by: NURSE PRACTITIONER

## 2018-04-16 PROCEDURE — 99213 OFFICE O/P EST LOW 20 MIN: CPT | Performed by: NURSE PRACTITIONER

## 2018-04-16 PROCEDURE — 4040F PNEUMOC VAC/ADMIN/RCVD: CPT | Performed by: NURSE PRACTITIONER

## 2018-04-16 PROCEDURE — G8598 ASA/ANTIPLAT THER USED: HCPCS | Performed by: NURSE PRACTITIONER

## 2018-04-16 RX ORDER — DIAZEPAM 5 MG/1
5 TABLET ORAL EVERY 8 HOURS PRN
Qty: 30 TABLET | Refills: 0 | Status: SHIPPED | OUTPATIENT
Start: 2018-04-16 | End: 2018-04-26

## 2018-04-16 RX ORDER — METHYLPREDNISOLONE 4 MG/1
TABLET ORAL
Qty: 1 KIT | Refills: 0 | Status: SHIPPED | OUTPATIENT
Start: 2018-04-16 | End: 2018-04-22

## 2018-04-16 ASSESSMENT — ENCOUNTER SYMPTOMS
TROUBLE SWALLOWING: 0
SHORTNESS OF BREATH: 0
COUGH: 0
CONSTIPATION: 0
SORE THROAT: 0
ABDOMINAL PAIN: 0
RHINORRHEA: 0
VOMITING: 0
NAUSEA: 0
DIARRHEA: 0

## 2018-07-06 ENCOUNTER — OFFICE VISIT (OUTPATIENT)
Dept: CARDIOLOGY | Age: 83
End: 2018-07-06
Payer: MEDICARE

## 2018-07-06 VITALS
SYSTOLIC BLOOD PRESSURE: 100 MMHG | DIASTOLIC BLOOD PRESSURE: 60 MMHG | BODY MASS INDEX: 27.16 KG/M2 | HEIGHT: 71 IN | HEART RATE: 60 BPM | WEIGHT: 194 LBS

## 2018-07-06 DIAGNOSIS — I71.40 ABDOMINAL AORTIC ANEURYSM (AAA) WITHOUT RUPTURE: ICD-10-CM

## 2018-07-06 DIAGNOSIS — I48.0 PAROXYSMAL ATRIAL FIBRILLATION (HCC): ICD-10-CM

## 2018-07-06 DIAGNOSIS — I10 ESSENTIAL HYPERTENSION: Primary | ICD-10-CM

## 2018-07-06 DIAGNOSIS — E78.00 HYPERCHOLESTEROLEMIA: ICD-10-CM

## 2018-07-06 DIAGNOSIS — I25.10 CORONARY ARTERY DISEASE INVOLVING NATIVE CORONARY ARTERY OF NATIVE HEART WITHOUT ANGINA PECTORIS: ICD-10-CM

## 2018-07-06 PROBLEM — K63.5 COLON POLYPS: Status: ACTIVE | Noted: 2017-04-11

## 2018-07-06 PROBLEM — Z85.038 HISTORY OF COLON CANCER: Status: ACTIVE | Noted: 2017-04-11

## 2018-07-06 PROCEDURE — G8598 ASA/ANTIPLAT THER USED: HCPCS | Performed by: NURSE PRACTITIONER

## 2018-07-06 PROCEDURE — 1036F TOBACCO NON-USER: CPT | Performed by: NURSE PRACTITIONER

## 2018-07-06 PROCEDURE — G8417 CALC BMI ABV UP PARAM F/U: HCPCS | Performed by: NURSE PRACTITIONER

## 2018-07-06 PROCEDURE — 1123F ACP DISCUSS/DSCN MKR DOCD: CPT | Performed by: NURSE PRACTITIONER

## 2018-07-06 PROCEDURE — G8427 DOCREV CUR MEDS BY ELIG CLIN: HCPCS | Performed by: NURSE PRACTITIONER

## 2018-07-06 PROCEDURE — 99213 OFFICE O/P EST LOW 20 MIN: CPT | Performed by: NURSE PRACTITIONER

## 2018-07-06 PROCEDURE — 4040F PNEUMOC VAC/ADMIN/RCVD: CPT | Performed by: NURSE PRACTITIONER

## 2018-07-24 RX ORDER — ISOSORBIDE MONONITRATE 30 MG/1
TABLET, EXTENDED RELEASE ORAL
Qty: 90 TABLET | Refills: 3 | Status: SHIPPED | OUTPATIENT
Start: 2018-07-24 | End: 2019-07-24 | Stop reason: SDUPTHER

## 2018-07-24 RX ORDER — PANTOPRAZOLE SODIUM 40 MG/1
TABLET, DELAYED RELEASE ORAL
Qty: 90 TABLET | Refills: 3 | Status: SHIPPED | OUTPATIENT
Start: 2018-07-24 | End: 2019-12-11

## 2018-07-24 NOTE — TELEPHONE ENCOUNTER
Received fax from pharmacy requesting refill on pts medication(s). Pt was last seen in office on 4/16/2018  and has a follow up scheduled for Visit date not found. Will send request to  Johnathan Beauchamp  for authorization.      Requested Prescriptions     Pending Prescriptions Disp Refills    isosorbide mononitrate (IMDUR) 30 MG extended release tablet [Pharmacy Med Name: ISOSORB MONO ER 30MGTAB] 90 tablet 3     Sig: TAKE ONE TABLET BY MOUTH EVERY 24 HOURS    pantoprazole (PROTONIX) 40 MG tablet [Pharmacy Med Name: PANTOPRAZOLE SOD 40MG TAB] 90 tablet 3     Sig: TAKE ONE TABLET BY MOUTH ONCE DAILY

## 2018-07-25 ENCOUNTER — TELEPHONE (OUTPATIENT)
Dept: CARDIOLOGY | Age: 83
End: 2018-07-25

## 2018-07-26 RX ORDER — METOPROLOL TARTRATE 50 MG/1
50 TABLET, FILM COATED ORAL 2 TIMES DAILY
Qty: 60 TABLET | Refills: 3 | Status: SHIPPED | OUTPATIENT
Start: 2018-07-26 | End: 2018-11-26 | Stop reason: SDUPTHER

## 2018-07-26 RX ORDER — METOPROLOL TARTRATE 50 MG/1
50 TABLET, FILM COATED ORAL 2 TIMES DAILY
COMMUNITY
End: 2018-07-26 | Stop reason: SDUPTHER

## 2018-08-13 RX ORDER — TAMSULOSIN HYDROCHLORIDE 0.4 MG/1
CAPSULE ORAL
Qty: 90 CAPSULE | Refills: 3 | Status: SHIPPED | OUTPATIENT
Start: 2018-08-13 | End: 2019-08-13 | Stop reason: SDUPTHER

## 2018-11-26 ENCOUNTER — HOSPITAL ENCOUNTER (OUTPATIENT)
Dept: ULTRASOUND IMAGING | Age: 83
Discharge: HOME | End: 2018-11-26
Payer: MEDICARE

## 2018-11-26 ENCOUNTER — OFFICE VISIT (OUTPATIENT)
Dept: VASCULAR SURGERY | Age: 83
End: 2018-11-26
Payer: MEDICARE

## 2018-11-26 VITALS
SYSTOLIC BLOOD PRESSURE: 126 MMHG | HEART RATE: 90 BPM | OXYGEN SATURATION: 96 % | TEMPERATURE: 98.1 F | RESPIRATION RATE: 18 BRPM | DIASTOLIC BLOOD PRESSURE: 83 MMHG

## 2018-11-26 DIAGNOSIS — I71.40 ABDOMINAL AORTIC ANEURYSM (AAA) WITHOUT RUPTURE: ICD-10-CM

## 2018-11-26 DIAGNOSIS — I71.40 ABDOMINAL AORTIC ANEURYSM (AAA) WITHOUT RUPTURE: Primary | ICD-10-CM

## 2018-11-26 PROCEDURE — 1101F PT FALLS ASSESS-DOCD LE1/YR: CPT | Performed by: PHYSICIAN ASSISTANT

## 2018-11-26 PROCEDURE — 4040F PNEUMOC VAC/ADMIN/RCVD: CPT | Performed by: PHYSICIAN ASSISTANT

## 2018-11-26 PROCEDURE — G8417 CALC BMI ABV UP PARAM F/U: HCPCS | Performed by: PHYSICIAN ASSISTANT

## 2018-11-26 PROCEDURE — G8427 DOCREV CUR MEDS BY ELIG CLIN: HCPCS | Performed by: PHYSICIAN ASSISTANT

## 2018-11-26 PROCEDURE — 99213 OFFICE O/P EST LOW 20 MIN: CPT | Performed by: PHYSICIAN ASSISTANT

## 2018-11-26 PROCEDURE — G8484 FLU IMMUNIZE NO ADMIN: HCPCS | Performed by: PHYSICIAN ASSISTANT

## 2018-11-26 PROCEDURE — 1036F TOBACCO NON-USER: CPT | Performed by: PHYSICIAN ASSISTANT

## 2018-11-26 PROCEDURE — 1123F ACP DISCUSS/DSCN MKR DOCD: CPT | Performed by: PHYSICIAN ASSISTANT

## 2018-11-26 PROCEDURE — G8598 ASA/ANTIPLAT THER USED: HCPCS | Performed by: PHYSICIAN ASSISTANT

## 2018-11-26 PROCEDURE — 93978 VASCULAR STUDY: CPT

## 2018-11-26 RX ORDER — METOPROLOL TARTRATE 50 MG/1
TABLET, FILM COATED ORAL
Qty: 180 TABLET | Refills: 3 | Status: SHIPPED | OUTPATIENT
Start: 2018-11-26 | End: 2018-12-23

## 2018-11-26 RX ORDER — METOPROLOL TARTRATE 50 MG/1
50 TABLET, FILM COATED ORAL 2 TIMES DAILY
Qty: 180 TABLET | Refills: 1 | Status: SHIPPED | OUTPATIENT
Start: 2018-11-26 | End: 2019-01-18 | Stop reason: DRUGHIGH

## 2018-12-02 ENCOUNTER — APPOINTMENT (OUTPATIENT)
Dept: GENERAL RADIOLOGY | Age: 83
End: 2018-12-02
Payer: MEDICARE

## 2018-12-02 ENCOUNTER — HOSPITAL ENCOUNTER (EMERGENCY)
Age: 83
Discharge: HOME | End: 2018-12-02
Attending: EMERGENCY MEDICINE
Payer: MEDICARE

## 2018-12-02 VITALS
RESPIRATION RATE: 20 BRPM | WEIGHT: 200 LBS | SYSTOLIC BLOOD PRESSURE: 178 MMHG | BODY MASS INDEX: 27.89 KG/M2 | HEART RATE: 59 BPM | OXYGEN SATURATION: 95 % | DIASTOLIC BLOOD PRESSURE: 75 MMHG | TEMPERATURE: 98.7 F

## 2018-12-02 DIAGNOSIS — I49.3 PVC (PREMATURE VENTRICULAR CONTRACTION): Primary | ICD-10-CM

## 2018-12-02 DIAGNOSIS — Z87.09 HISTORY OF COPD: ICD-10-CM

## 2018-12-02 LAB
ALBUMIN SERPL-MCNC: 4.3 G/DL (ref 3.5–5.2)
ALP BLD-CCNC: 66 U/L (ref 40–130)
ALT SERPL-CCNC: 22 U/L (ref 5–41)
ANION GAP SERPL CALCULATED.3IONS-SCNC: 7 MMOL/L (ref 7–19)
APTT: 32.5 SEC (ref 26–36.2)
AST SERPL-CCNC: 19 U/L (ref 5–40)
BASOPHILS ABSOLUTE: 0.1 K/UL (ref 0–0.2)
BASOPHILS RELATIVE PERCENT: 0.6 % (ref 0–1)
BILIRUB SERPL-MCNC: 0.7 MG/DL (ref 0.2–1.2)
BUN BLDV-MCNC: 16 MG/DL (ref 8–23)
CALCIUM SERPL-MCNC: 9.6 MG/DL (ref 8.8–10.2)
CHLORIDE BLD-SCNC: 103 MMOL/L (ref 98–111)
CO2: 30 MMOL/L (ref 22–29)
CREAT SERPL-MCNC: 1.2 MG/DL (ref 0.5–1.2)
EOSINOPHILS ABSOLUTE: 0.3 K/UL (ref 0–0.6)
EOSINOPHILS RELATIVE PERCENT: 3.2 % (ref 0–5)
GFR NON-AFRICAN AMERICAN: 58
GLUCOSE BLD-MCNC: 71 MG/DL (ref 74–109)
HCT VFR BLD CALC: 44.7 % (ref 42–52)
HEMOGLOBIN: 14.5 G/DL (ref 14–18)
INR BLD: 1.08 (ref 0.88–1.18)
LYMPHOCYTES ABSOLUTE: 1.7 K/UL (ref 1.1–4.5)
LYMPHOCYTES RELATIVE PERCENT: 20.6 % (ref 20–40)
MCH RBC QN AUTO: 31.5 PG (ref 27–31)
MCHC RBC AUTO-ENTMCNC: 32.4 G/DL (ref 33–37)
MCV RBC AUTO: 97 FL (ref 80–94)
MONOCYTES ABSOLUTE: 0.9 K/UL (ref 0–0.9)
MONOCYTES RELATIVE PERCENT: 10.7 % (ref 0–10)
NEUTROPHILS ABSOLUTE: 5.4 K/UL (ref 1.5–7.5)
NEUTROPHILS RELATIVE PERCENT: 64.1 % (ref 50–65)
PDW BLD-RTO: 13.5 % (ref 11.5–14.5)
PERFORMED ON: NORMAL
PLATELET # BLD: 169 K/UL (ref 130–400)
PMV BLD AUTO: 10.2 FL (ref 9.4–12.4)
POC TROPONIN I: 0 NG/ML (ref 0–0.08)
POTASSIUM SERPL-SCNC: 4.5 MMOL/L (ref 3.5–5)
PROTHROMBIN TIME: 13.9 SEC (ref 12–14.6)
RBC # BLD: 4.61 M/UL (ref 4.7–6.1)
SODIUM BLD-SCNC: 140 MMOL/L (ref 136–145)
TOTAL PROTEIN: 7.2 G/DL (ref 6.6–8.7)
TROPONIN: <0.01 NG/ML (ref 0–0.03)
WBC # BLD: 8.4 K/UL (ref 4.8–10.8)

## 2018-12-02 PROCEDURE — 85610 PROTHROMBIN TIME: CPT

## 2018-12-02 PROCEDURE — 99285 EMERGENCY DEPT VISIT HI MDM: CPT

## 2018-12-02 PROCEDURE — 93005 ELECTROCARDIOGRAM TRACING: CPT

## 2018-12-02 PROCEDURE — 99284 EMERGENCY DEPT VISIT MOD MDM: CPT | Performed by: EMERGENCY MEDICINE

## 2018-12-02 PROCEDURE — 85025 COMPLETE CBC W/AUTO DIFF WBC: CPT

## 2018-12-02 PROCEDURE — 85730 THROMBOPLASTIN TIME PARTIAL: CPT

## 2018-12-02 PROCEDURE — 71045 X-RAY EXAM CHEST 1 VIEW: CPT

## 2018-12-02 PROCEDURE — 36415 COLL VENOUS BLD VENIPUNCTURE: CPT

## 2018-12-02 PROCEDURE — 80053 COMPREHEN METABOLIC PANEL: CPT

## 2018-12-02 PROCEDURE — 84484 ASSAY OF TROPONIN QUANT: CPT

## 2018-12-02 NOTE — ED PROVIDER NOTES
by ED Physician - none    LABS:  Labs Reviewed   CBC WITH AUTO DIFFERENTIAL - Abnormal; Notable for the following:        Result Value    RBC 4.61 (*)     MCV 97.0 (*)     MCH 31.5 (*)     MCHC 32.4 (*)     Monocytes % 10.7 (*)     All other components within normal limits   COMPREHENSIVE METABOLIC PANEL - Abnormal; Notable for the following:     CO2 30 (*)     Glucose 71 (*)     GFR Non- 58 (*)     All other components within normal limits   APTT   PROTIME-INR   TROPONIN   POCT VENOUS       All other labs were within normal range or not returned as of this dictation. EMERGENCY DEPARTMENT COURSE and DIFFERENTIALDIAGNOSIS/MDM:   Vitals:    Vitals:    12/02/18 1532 12/02/18 1536 12/02/18 1602 12/02/18 1606   BP: (!) 148/71  (!) 178/75    Pulse: 63 62 66 59   Resp:       Temp:       TempSrc:       SpO2:  92%  95%   Weight:           MDM  Number of Diagnoses or Management Options  History of COPD:   PVC (premature ventricular contraction):   Diagnosis management comments: Patient admits he is anxious his wife of 61 years. Him evaluated history of having some PVCs history of atrial fib diagnosis recently he wanted to be safely get checked out feeling fine now. He hears report and ready to go home. I think he can go home safely. CONSULTS:  None    PROCEDURES:  Unless otherwise notedbelow, none     Procedures    FINAL IMPRESSION     1. PVC (premature ventricular contraction)    2.  History of COPD          DISPOSITION/PLAN   DISPOSITION Decision To Discharge 12/02/2018 04:16:19 PM      PATIENT REFERRED TO:  @FUP@    DISCHARGE MEDICATIONS:  Discharge Medication List as of 12/2/2018  4:19 PM             (Please note that portions of this note were completed with a voice recognition program.  Efforts were made to edit the dictations butoccasionally words are mis-transcribed.)    Monty Sheridan MD (electronically signed)  AttendingEmergency Physician         Monty Sheridan MD  12/03/18 0170

## 2018-12-02 NOTE — ED NOTES
ASSESSMENT:    PT ALERT/ORIENTED X4. PUPILS EQUAL/REACTIVE    SKIN:  WARM/DRY PINK CAPILLARY REFILL < 2SECS    CARDIAC:  Pt c/o intermittent chest pain in the center that \"occassionally radiates into the back\" with recent diagnosis of afib. LUNGS: CLEAR UPPER AND LOWER LOBES, RESPIRATIONS EVEN/UNLABORED     ABDOMEN: BOWEL SOUNDS NOTED UPPER AND LOWER QUADRANTS                     SOFT AND NONTENDER. EXTREMITIES:  BILATERAL DP AND PT AND NO EDEMA NOTED. NO DISTRESS NOTED. SIDE RAILS UP AND CALL LIGHT IN REACH.      Aleta Driver RN  12/02/18 0847

## 2018-12-03 ASSESSMENT — ENCOUNTER SYMPTOMS
APNEA: 0
FACIAL SWELLING: 0
EYE DISCHARGE: 0
NAUSEA: 0
DIARRHEA: 0
SHORTNESS OF BREATH: 0
BLOOD IN STOOL: 0
VOICE CHANGE: 0
SORE THROAT: 0
SINUS PRESSURE: 0
CHOKING: 0
CONSTIPATION: 0

## 2018-12-04 LAB
EKG P AXIS: 46 DEGREES
EKG P-R INTERVAL: 148 MS
EKG Q-T INTERVAL: 356 MS
EKG QRS DURATION: 100 MS
EKG QTC CALCULATION (BAZETT): 377 MS
EKG T AXIS: 34 DEGREES

## 2018-12-23 ENCOUNTER — APPOINTMENT (OUTPATIENT)
Dept: CT IMAGING | Age: 83
End: 2018-12-23
Payer: MEDICARE

## 2018-12-23 ENCOUNTER — HOSPITAL ENCOUNTER (EMERGENCY)
Age: 83
Discharge: HOME | End: 2018-12-23
Attending: EMERGENCY MEDICINE
Payer: MEDICARE

## 2018-12-23 VITALS
RESPIRATION RATE: 20 BRPM | HEIGHT: 71 IN | WEIGHT: 190 LBS | HEART RATE: 65 BPM | TEMPERATURE: 98.5 F | SYSTOLIC BLOOD PRESSURE: 121 MMHG | OXYGEN SATURATION: 89 % | DIASTOLIC BLOOD PRESSURE: 88 MMHG | BODY MASS INDEX: 26.6 KG/M2

## 2018-12-23 DIAGNOSIS — M54.41 ACUTE MIDLINE LOW BACK PAIN WITH RIGHT-SIDED SCIATICA: Primary | ICD-10-CM

## 2018-12-23 DIAGNOSIS — M54.16 LUMBAR RADICULOPATHY: ICD-10-CM

## 2018-12-23 PROCEDURE — 96372 THER/PROPH/DIAG INJ SC/IM: CPT

## 2018-12-23 PROCEDURE — 99283 EMERGENCY DEPT VISIT LOW MDM: CPT

## 2018-12-23 PROCEDURE — 99284 EMERGENCY DEPT VISIT MOD MDM: CPT | Performed by: EMERGENCY MEDICINE

## 2018-12-23 PROCEDURE — 6360000002 HC RX W HCPCS: Performed by: EMERGENCY MEDICINE

## 2018-12-23 PROCEDURE — 72131 CT LUMBAR SPINE W/O DYE: CPT

## 2018-12-23 RX ORDER — ONDANSETRON 4 MG/1
4 TABLET, ORALLY DISINTEGRATING ORAL ONCE
Status: COMPLETED | OUTPATIENT
Start: 2018-12-23 | End: 2018-12-23

## 2018-12-23 RX ORDER — DEXAMETHASONE SODIUM PHOSPHATE 10 MG/ML
10 INJECTION, SOLUTION INTRAMUSCULAR; INTRAVENOUS ONCE
Status: COMPLETED | OUTPATIENT
Start: 2018-12-23 | End: 2018-12-23

## 2018-12-23 RX ORDER — HYDROCODONE BITARTRATE AND ACETAMINOPHEN 7.5; 325 MG/1; MG/1
1 TABLET ORAL EVERY 6 HOURS PRN
Qty: 20 TABLET | Refills: 0 | Status: SHIPPED | OUTPATIENT
Start: 2018-12-23 | End: 2018-12-28

## 2018-12-23 RX ADMIN — Medication 2 MG: at 02:09

## 2018-12-23 RX ADMIN — DEXAMETHASONE SODIUM PHOSPHATE 10 MG: 10 INJECTION, SOLUTION INTRAMUSCULAR; INTRAVENOUS at 05:56

## 2018-12-23 RX ADMIN — ONDANSETRON 4 MG: 4 TABLET, ORALLY DISINTEGRATING ORAL at 02:09

## 2018-12-23 ASSESSMENT — ENCOUNTER SYMPTOMS
DIARRHEA: 0
SORE THROAT: 0
APNEA: 0
CONSTIPATION: 0
FACIAL SWELLING: 0
NAUSEA: 0
EYE DISCHARGE: 0
CHOKING: 0
SINUS PRESSURE: 0
VOICE CHANGE: 0
BACK PAIN: 1
BLOOD IN STOOL: 0

## 2018-12-23 ASSESSMENT — PAIN DESCRIPTION - DESCRIPTORS: DESCRIPTORS: CONSTANT

## 2018-12-23 ASSESSMENT — PAIN SCALES - GENERAL
PAINLEVEL_OUTOF10: 8
PAINLEVEL_OUTOF10: 8

## 2018-12-23 ASSESSMENT — PAIN DESCRIPTION - LOCATION: LOCATION: BACK

## 2018-12-23 NOTE — ED NOTES
Pt's pulse ox dropped to the 70s after dilaudid. Dr. Delmy Colon notified and pt placed on 2L nc per verbal order.       Guille Mohs, RN  12/23/18 5787

## 2018-12-23 NOTE — ED PROVIDER NOTES
Procedures    FINAL IMPRESSION     1. Acute midline low back pain with right-sided sciatica    2. Lumbar radiculopathy          DISPOSITION/PLAN   DISPOSITION Decision To Discharge 12/23/2018 05:35:33 AM      PATIENT REFERRED TO:  @FUP@    DISCHARGE MEDICATIONS:  New Prescriptions    HYDROCODONE-ACETAMINOPHEN (NORCO) 7.5-325 MG PER TABLET    Take 1 tablet by mouth every 6 hours as needed for Pain for up to 5 days. . Take lowest dose possible to manage pain. Attestation: The Prescription Monitoring Report for this patient was reviewed today.  [facility] #[id number]) Liz Bhagat MD)    (Please note that portions of this note were completed with a voice recognition program.  Efforts were made to edit the dictations butoccasionally words are mis-transcribed.)    Liz Bhagat MD (electronically signed)  AttendingEmergency Physician          Liz Bhagat MD  12/23/18 0924

## 2018-12-31 RX ORDER — DULOXETIN HYDROCHLORIDE 60 MG/1
CAPSULE, DELAYED RELEASE ORAL
Qty: 90 CAPSULE | Refills: 3 | Status: SHIPPED | OUTPATIENT
Start: 2018-12-31 | End: 2019-01-18 | Stop reason: SDUPTHER

## 2019-01-18 ENCOUNTER — OFFICE VISIT (OUTPATIENT)
Dept: CARDIOLOGY | Age: 84
End: 2019-01-18
Payer: MEDICARE

## 2019-01-18 VITALS
SYSTOLIC BLOOD PRESSURE: 132 MMHG | HEIGHT: 71 IN | WEIGHT: 190 LBS | BODY MASS INDEX: 26.6 KG/M2 | HEART RATE: 72 BPM | DIASTOLIC BLOOD PRESSURE: 78 MMHG

## 2019-01-18 DIAGNOSIS — I10 ESSENTIAL HYPERTENSION: ICD-10-CM

## 2019-01-18 DIAGNOSIS — I48.0 PAROXYSMAL ATRIAL FIBRILLATION (HCC): Primary | ICD-10-CM

## 2019-01-18 DIAGNOSIS — E78.00 HYPERCHOLESTEROLEMIA: ICD-10-CM

## 2019-01-18 DIAGNOSIS — I25.10 CORONARY ARTERY DISEASE INVOLVING NATIVE CORONARY ARTERY OF NATIVE HEART WITHOUT ANGINA PECTORIS: ICD-10-CM

## 2019-01-18 DIAGNOSIS — I71.40 ABDOMINAL AORTIC ANEURYSM (AAA) WITHOUT RUPTURE: ICD-10-CM

## 2019-01-18 PROCEDURE — 1036F TOBACCO NON-USER: CPT | Performed by: NURSE PRACTITIONER

## 2019-01-18 PROCEDURE — G8427 DOCREV CUR MEDS BY ELIG CLIN: HCPCS | Performed by: NURSE PRACTITIONER

## 2019-01-18 PROCEDURE — 93000 ELECTROCARDIOGRAM COMPLETE: CPT | Performed by: NURSE PRACTITIONER

## 2019-01-18 PROCEDURE — 1101F PT FALLS ASSESS-DOCD LE1/YR: CPT | Performed by: NURSE PRACTITIONER

## 2019-01-18 PROCEDURE — 4040F PNEUMOC VAC/ADMIN/RCVD: CPT | Performed by: NURSE PRACTITIONER

## 2019-01-18 PROCEDURE — G8598 ASA/ANTIPLAT THER USED: HCPCS | Performed by: NURSE PRACTITIONER

## 2019-01-18 PROCEDURE — G8484 FLU IMMUNIZE NO ADMIN: HCPCS | Performed by: NURSE PRACTITIONER

## 2019-01-18 PROCEDURE — 1123F ACP DISCUSS/DSCN MKR DOCD: CPT | Performed by: NURSE PRACTITIONER

## 2019-01-18 PROCEDURE — 99213 OFFICE O/P EST LOW 20 MIN: CPT | Performed by: NURSE PRACTITIONER

## 2019-01-18 PROCEDURE — G8417 CALC BMI ABV UP PARAM F/U: HCPCS | Performed by: NURSE PRACTITIONER

## 2019-01-25 ENCOUNTER — HOSPITAL ENCOUNTER (OUTPATIENT)
Dept: CT IMAGING | Age: 84
Discharge: HOME | End: 2019-01-25
Payer: MEDICARE

## 2019-01-25 DIAGNOSIS — C18.2 MALIGNANT NEOPLASM OF ASCENDING COLON (HCC): ICD-10-CM

## 2019-01-25 PROCEDURE — 6360000004 HC RX CONTRAST MEDICATION: Performed by: PHYSICIAN ASSISTANT

## 2019-01-25 PROCEDURE — 74177 CT ABD & PELVIS W/CONTRAST: CPT

## 2019-01-25 RX ADMIN — IOPAMIDOL 75 ML: 755 INJECTION, SOLUTION INTRAVENOUS at 08:18

## 2019-02-15 ENCOUNTER — OFFICE VISIT (OUTPATIENT)
Dept: GASTROENTEROLOGY | Facility: CLINIC | Age: 84
End: 2019-02-15

## 2019-02-15 VITALS
OXYGEN SATURATION: 98 % | HEART RATE: 69 BPM | WEIGHT: 191 LBS | BODY MASS INDEX: 26.74 KG/M2 | SYSTOLIC BLOOD PRESSURE: 104 MMHG | HEIGHT: 71 IN | DIASTOLIC BLOOD PRESSURE: 62 MMHG | TEMPERATURE: 97.2 F

## 2019-02-15 DIAGNOSIS — R12 HEARTBURN: ICD-10-CM

## 2019-02-15 DIAGNOSIS — Z79.01 ANTICOAGULATED: ICD-10-CM

## 2019-02-15 DIAGNOSIS — Z87.11 HISTORY OF PEPTIC ULCER DISEASE: ICD-10-CM

## 2019-02-15 DIAGNOSIS — K21.9 GASTROESOPHAGEAL REFLUX DISEASE, ESOPHAGITIS PRESENCE NOT SPECIFIED: ICD-10-CM

## 2019-02-15 DIAGNOSIS — R13.19 OTHER DYSPHAGIA: Primary | ICD-10-CM

## 2019-02-15 DIAGNOSIS — R10.13 EPIGASTRIC PAIN: ICD-10-CM

## 2019-02-15 PROCEDURE — 99214 OFFICE O/P EST MOD 30 MIN: CPT | Performed by: NURSE PRACTITIONER

## 2019-02-15 RX ORDER — APIXABAN 2.5 MG/1
1 TABLET, FILM COATED ORAL 2 TIMES DAILY
COMMUNITY
Start: 2019-02-09

## 2019-02-15 RX ORDER — SUCRALFATE 1 G/1
1 TABLET ORAL 4 TIMES DAILY
Qty: 120 TABLET | Refills: 1 | Status: SHIPPED | OUTPATIENT
Start: 2019-02-15 | End: 2019-06-10

## 2019-02-15 RX ORDER — PANTOPRAZOLE SODIUM 40 MG/1
40 TABLET, DELAYED RELEASE ORAL 2 TIMES DAILY
Qty: 60 TABLET | Refills: 11 | Status: SHIPPED | OUTPATIENT
Start: 2019-02-15 | End: 2020-03-30

## 2019-02-15 RX ORDER — DULOXETIN HYDROCHLORIDE 60 MG/1
1 CAPSULE, DELAYED RELEASE ORAL DAILY
COMMUNITY
Start: 2018-04-10

## 2019-02-15 RX ORDER — SODIUM PHOSPHATE,MONO-DIBASIC 19G-7G/118
1 ENEMA (ML) RECTAL DAILY
COMMUNITY

## 2019-02-15 RX ORDER — UBIDECARENONE 75 MG
1 CAPSULE ORAL DAILY
COMMUNITY

## 2019-02-15 NOTE — PATIENT INSTRUCTIONS
Gastroesophageal Reflux Disease, Adult  Normally, food travels down the esophagus and stays in the stomach to be digested. If a person has gastroesophageal reflux disease (GERD), food and stomach acid move back up into the esophagus. When this happens, the esophagus becomes sore and swollen (inflamed). Over time, GERD can make small holes (ulcers) in the lining of the esophagus.  Follow these instructions at home:  Diet  · Follow a diet as told by your doctor. You may need to avoid foods and drinks such as:  ? Coffee and tea (with or without caffeine).  ? Drinks that contain alcohol.  ? Energy drinks and sports drinks.  ? Carbonated drinks or sodas.  ? Chocolate and cocoa.  ? Peppermint and mint flavorings.  ? Garlic and onions.  ? Horseradish.  ? Spicy and acidic foods, such as peppers, chili powder, heath powder, vinegar, hot sauces, and BBQ sauce.  ? Citrus fruit juices and citrus fruits, such as oranges, kerrie, and limes.  ? Tomato-based foods, such as red sauce, chili, salsa, and pizza with red sauce.  ? Fried and fatty foods, such as donuts, french fries, potato chips, and high-fat dressings.  ? High-fat meats, such as hot dogs, rib eye steak, sausage, ham, and hoover.  ? High-fat dairy items, such as whole milk, butter, and cream cheese.  · Eat small meals often. Avoid eating large meals.  · Avoid drinking large amounts of liquid with your meals.  · Avoid eating meals during the 2-3 hours before bedtime.  · Avoid lying down right after you eat.  · Do not exercise right after you eat.  General instructions  · Pay attention to any changes in your symptoms.  · Take over-the-counter and prescription medicines only as told by your doctor. Do not take aspirin, ibuprofen, or other NSAIDs unless your doctor says it is okay.  · Do not use any tobacco products, including cigarettes, chewing tobacco, and e-cigarettes. If you need help quitting, ask your doctor.  · Wear loose clothes. Do not wear anything tight around  your waist.  · Raise (elevate) the head of your bed about 6 inches (15 cm).  · Try to lower your stress. If you need help doing this, ask your doctor.  · If you are overweight, lose an amount of weight that is healthy for you. Ask your doctor about a safe weight loss goal.  · Keep all follow-up visits as told by your doctor. This is important.  Contact a doctor if:  · You have new symptoms.  · You lose weight and you do not know why it is happening.  · You have trouble swallowing, or it hurts to swallow.  · You have wheezing or a cough that keeps happening.  · Your symptoms do not get better with treatment.  · You have a hoarse voice.  Get help right away if:  · You have pain in your arms, neck, jaw, teeth, or back.  · You feel sweaty, dizzy, or light-headed.  · You have chest pain or shortness of breath.  · You throw up (vomit) and your throw up looks like blood or coffee grounds.  · You pass out (faint).  · Your poop (stool) is bloody or black.  · You cannot swallow, drink, or eat.  This information is not intended to replace advice given to you by your health care provider. Make sure you discuss any questions you have with your health care provider.  Document Released: 06/05/2009 Document Revised: 05/25/2017 Document Reviewed: 04/13/2016  [employer] Patient Education © 2018 [employer]

## 2019-02-15 NOTE — PROGRESS NOTES
"Chief Complaint:   Chief Complaint   Patient presents with   • Difficulty Swallowing     Pt states for the last 2-3 months it feels like food just won't go down         Patient ID: Nicolas Nieves is a 83 y.o. male     History of Present Illness: This is a very pleasant 83-year-old male who was referred to our office by Dr. Malik Mcdonald for complaints of dysphasia and reflux.    The patient states he has had about 3-4 months of dysphagia and reflux. Eating makes this worse.  The patient states \"it feels like food gets stuck and causes pressure in my chest midway.\"He states that he was seen by cardiology for chest discomfort and was cleared.  The patient states that he has quite a bit of reflux and heartburn.  He states at times he has epigastric pain as well.  He states he has not been able to find any relieving factors.  He tells me that he has been taking Protonix for a long time and cannot tell that it is helping anymore.    The patient's last EGD was performed on 4/17/13 for dysphasia with findings of duodenitis and LA grade a esophagitis. Started on PPI once a day.    The patient denies any nausea, vomiting, epigastric pain, or hematemesis.  The patient denies any fever or chills.  Denies any melena or hematochezia.  Denies any unintentional weight loss or loss of appetite.    Past Medical History:   Diagnosis Date   • Atrial fibrillation (CMS/HCC)    • Harmon syndrome    • CAD (coronary artery disease)    • Cancer (CMS/HCC)     COLON   • Colon polyp    • Gastritis    • GERD (gastroesophageal reflux disease)    • Hemorrhoids    • Hyperlipidemia    • Hypertension    • Peptic ulcer    • Renal calculi        Past Surgical History:   Procedure Laterality Date   • CARDIAC SURGERY      Stent X 5    • CHOLECYSTECTOMY     • COLONOSCOPY  04/25/2014    Rectal polyp; Repeat 3 years    • COLONOSCOPY N/A 5/17/2017    Procedure: COLONOSCOPY WITH ANESTHESIA;  Surgeon: Sarah Beth Guerra MD;  Location: [facility] ENDOSCOPY;  " Service:    • COLONOSCOPY  04/17/2013    Mass ascending colon; Internal hemorrhoids    • COLONOSCOPY  02/16/2010    Dr. Sanchez-Internal hermorrhoids; Repeat 3-5 years    • HEMICOLECTOMY Right 05/2013   • LUNG REMOVAL, PARTIAL     • UPPER GASTROINTESTINAL ENDOSCOPY  04/17/2013    Duodenitis; LA grade A esophagitis    • UPPER GASTROINTESTINAL ENDOSCOPY  8/11/2020    Healed gastric ulcers    • UPPER GASTROINTESTINAL ENDOSCOPY  02/16/2010    Gastric ulcers    • UPPER GASTROINTESTINAL ENDOSCOPY  09/15/2009    Normal          Current Outpatient Medications:   •  aspirin 81 MG EC tablet, Take 81 mg by mouth Daily., Disp: , Rfl:   •  Cholecalciferol (VITAMIN D3) 5000 units tablet tablet, Take 5,000 Units by mouth Daily., Disp: , Rfl:   •  DULoxetine (CYMBALTA) 60 MG capsule, Take 1 capsule by mouth Daily., Disp: , Rfl:   •  ELIQUIS 2.5 MG tablet tablet, Take 1 tablet by mouth 2 (Two) Times a Day., Disp: , Rfl:   •  glucosamine-chondroitin 500-400 MG capsule capsule, Take 1 capsule by mouth Daily., Disp: , Rfl:   •  isosorbide mononitrate (IMDUR) 30 MG 24 hr tablet, Take 30 mg by mouth Daily., Disp: , Rfl:   •  metoprolol tartrate (LOPRESSOR) 50 MG tablet, Take 25 mg by mouth 2 (Two) Times a Day., Disp: , Rfl:   •  vitamin B-12 (CYANOCOBALAMIN) 100 MCG tablet, Take 1 tablet by mouth Daily., Disp: , Rfl:   •  pantoprazole (PROTONIX) 40 MG EC tablet, Take 1 tablet by mouth 2 (Two) Times a Day., Disp: 60 tablet, Rfl: 11  •  sucralfate (CARAFATE) 1 g tablet, Take 1 tablet by mouth 4 (Four) Times a Day., Disp: 120 tablet, Rfl: 1    Allergies   Allergen Reactions   • Lortab [Hydrocodone-Acetaminophen] Nausea And Vomiting   • Morphine And Related Nausea And Vomiting       Social History     Socioeconomic History   • Marital status:      Spouse name: Not on file   • Number of children: Not on file   • Years of education: Not on file   • Highest education level: Not on file   Social Needs   • Financial resource strain: Not on  "file   • Food insecurity - worry: Not on file   • Food insecurity - inability: Not on file   • Transportation needs - medical: Not on file   • Transportation needs - non-medical: Not on file   Occupational History   • Not on file   Tobacco Use   • Smoking status: Former Smoker   • Smokeless tobacco: Never Used   Substance and Sexual Activity   • Alcohol use: No   • Drug use: Not on file   • Sexual activity: Not on file   Other Topics Concern   • Not on file   Social History Narrative   • Not on file       Family History   Problem Relation Age of Onset   • Colon cancer Neg Hx    • Colon polyps Neg Hx    • Esophageal cancer Neg Hx        Vitals:    02/15/19 0847   BP: 104/62   BP Location: Left arm   Patient Position: Sitting   Cuff Size: Adult   Pulse: 69   Temp: 97.2 °F (36.2 °C)   TempSrc: Tympanic   SpO2: 98%   Weight: 86.6 kg (191 lb)   Height: 180.3 cm (71\")       Review of Systems:    General:    Present -feeling well   Skin:    Not Present-Rash   HEENT:     Not Present-Acute visual changes or Acute hearing changes   Neck :    Not Present- swollen glands   Genitourinary:      Not Present- burning, frequency, urgency hematuria, dysuria,   Cardiovascular:   Not Present-chest pain, palpitations, or pressure   Respiratory:   Not Present- shortness of breath or cough   Gastrointestinal:  Musculoskeletal:  Neurological:  Psychiatric:   Present as mentioned in the HP    Not Present. Recent gait disturbances.    Not Present-Seizures and weakness in extremities.    Not Present- Anxiety or Depression.       Physical Exam:    General Appearance:    Alert, cooperative, in no acute distress   Psych:    Mood appropriate    Eyes:          conjunctivae and sclerae normal, no   icterus, no pallor   ENMT:    Ears appear intact with no abnormalities noted oral mucosa moist   Neck:   No adenopathy, supple, trachea midline, no thyromegaly, no   carotid bruit, no JVD    Cardiovascular:    Regular rhythm and normal rate, normal S1 " and S2, no            murmur, no gallop, no rub, no click   Gastrointestinal:     Inspection normal.  Normal bowel sounds, no masses, no organomegaly, soft round non-tender, non-distended, no guarding, no rebound or tenderness. No hepatosplenomegaly.   Skin:   No bleeding, bruising or rash   Neurologic:   nonfocal       No results found for: WBC, HGB, HCT, PLT     No results found for: NA, K, CL, CO2, BUN, CREATININE, BILITOT, ALKPHOS, ALT, AST, GLUCOSE    No results found for: INR    Body mass index is 26.64 kg/m². Patient's Body mass index is 26.64 kg/m². BMI is within normal parameters. No follow-up required..    Assessment and Plan:  Assessment/Plan   Nicolas was seen today for difficulty swallowing.    Diagnoses and all orders for this visit:    Other dysphagia  Comments:  Schedule EGD after clearance to hold Eliquis.  Patient with history of A. fib and CAD.  Orders:  -     Case Request; Standing  -     Case Request    Gastroesophageal reflux disease, esophagitis presence not specified  Comments:  Increase Protonix to twice daily.  Initiate Carafate.  Orders:  -     Case Request; Standing  -     Case Request    History of peptic ulcer disease  -     Case Request; Standing  -     Case Request    Anticoagulated  Comments:  hx of afib on Eliqus per  hold 2 days    Heartburn    Epigastric pain    Other orders  -     pantoprazole (PROTONIX) 40 MG EC tablet; Take 1 tablet by mouth 2 (Two) Times a Day.  -     sucralfate (CARAFATE) 1 g tablet; Take 1 tablet by mouth 4 (Four) Times a Day.  -     Follow Anesthesia Guidelines / Standing Orders; Future  -     Implement Anesthesia Orders Day of Procedure; Standing  -     Obtain Informed Consent; Standing      The risks, benefits, and alternatives of endoscopy were reviewed with the patient today.  Risks including perforation, with or without dilation, possibly requiring surgery.  Additional risks include risk of bleeding.  There is also the risk of a drug reaction or  problems with anesthesia.  This will be discussed with the further by the anesthesia team on the day of the procedure. The benefits include the diagnosis and management of disease of the upper digestive tract.  Alternatives to endoscopy include upper GI series, laboratory testing, radiographic evaluation, or no intervention.  The patient verbalizes understanding and agrees.       Patient Instructions   Gastroesophageal Reflux Disease, Adult  Normally, food travels down the esophagus and stays in the stomach to be digested. If a person has gastroesophageal reflux disease (GERD), food and stomach acid move back up into the esophagus. When this happens, the esophagus becomes sore and swollen (inflamed). Over time, GERD can make small holes (ulcers) in the lining of the esophagus.  Follow these instructions at home:  Diet  · Follow a diet as told by your doctor. You may need to avoid foods and drinks such as:  ? Coffee and tea (with or without caffeine).  ? Drinks that contain alcohol.  ? Energy drinks and sports drinks.  ? Carbonated drinks or sodas.  ? Chocolate and cocoa.  ? Peppermint and mint flavorings.  ? Garlic and onions.  ? Horseradish.  ? Spicy and acidic foods, such as peppers, chili powder, heath powder, vinegar, hot sauces, and BBQ sauce.  ? Citrus fruit juices and citrus fruits, such as oranges, kerrie, and limes.  ? Tomato-based foods, such as red sauce, chili, salsa, and pizza with red sauce.  ? Fried and fatty foods, such as donuts, french fries, potato chips, and high-fat dressings.  ? High-fat meats, such as hot dogs, rib eye steak, sausage, ham, and hoover.  ? High-fat dairy items, such as whole milk, butter, and cream cheese.  · Eat small meals often. Avoid eating large meals.  · Avoid drinking large amounts of liquid with your meals.  · Avoid eating meals during the 2-3 hours before bedtime.  · Avoid lying down right after you eat.  · Do not exercise right after you eat.  General  instructions  · Pay attention to any changes in your symptoms.  · Take over-the-counter and prescription medicines only as told by your doctor. Do not take aspirin, ibuprofen, or other NSAIDs unless your doctor says it is okay.  · Do not use any tobacco products, including cigarettes, chewing tobacco, and e-cigarettes. If you need help quitting, ask your doctor.  · Wear loose clothes. Do not wear anything tight around your waist.  · Raise (elevate) the head of your bed about 6 inches (15 cm).  · Try to lower your stress. If you need help doing this, ask your doctor.  · If you are overweight, lose an amount of weight that is healthy for you. Ask your doctor about a safe weight loss goal.  · Keep all follow-up visits as told by your doctor. This is important.  Contact a doctor if:  · You have new symptoms.  · You lose weight and you do not know why it is happening.  · You have trouble swallowing, or it hurts to swallow.  · You have wheezing or a cough that keeps happening.  · Your symptoms do not get better with treatment.  · You have a hoarse voice.  Get help right away if:  · You have pain in your arms, neck, jaw, teeth, or back.  · You feel sweaty, dizzy, or light-headed.  · You have chest pain or shortness of breath.  · You throw up (vomit) and your throw up looks like blood or coffee grounds.  · You pass out (faint).  · Your poop (stool) is bloody or black.  · You cannot swallow, drink, or eat.  This information is not intended to replace advice given to you by your health care provider. Make sure you discuss any questions you have with your health care provider.  Document Released: 06/05/2009 Document Revised: 05/25/2017 Document Reviewed: 04/13/2016  [employer] Interactive Patient Education © 2018 [employer]        Next follow-up appointment        EMR Dragon/Transcription disclaimer:  Much of this encounter note is an electronic transcription/translation of spoken language to printed text. The electronic  translation of spoken language may permit erroneous, or at times, nonsensical words or phrases to be inadvertently transcribed; although I have reviewed the note for such errors, some may still exist.

## 2019-02-20 ENCOUNTER — TELEPHONE (OUTPATIENT)
Dept: CARDIOLOGY | Age: 84
End: 2019-02-20

## 2019-03-15 ENCOUNTER — OFFICE VISIT (OUTPATIENT)
Dept: PRIMARY CARE CLINIC | Age: 84
End: 2019-03-15
Payer: MEDICARE

## 2019-03-15 VITALS
HEART RATE: 65 BPM | WEIGHT: 195.5 LBS | BODY MASS INDEX: 27.37 KG/M2 | SYSTOLIC BLOOD PRESSURE: 118 MMHG | OXYGEN SATURATION: 97 % | HEIGHT: 71 IN | DIASTOLIC BLOOD PRESSURE: 76 MMHG | TEMPERATURE: 97.5 F

## 2019-03-15 DIAGNOSIS — M54.16 LUMBAR BACK PAIN WITH RADICULOPATHY AFFECTING RIGHT LOWER EXTREMITY: Primary | ICD-10-CM

## 2019-03-15 PROCEDURE — 99213 OFFICE O/P EST LOW 20 MIN: CPT | Performed by: NURSE PRACTITIONER

## 2019-03-15 PROCEDURE — 1101F PT FALLS ASSESS-DOCD LE1/YR: CPT | Performed by: NURSE PRACTITIONER

## 2019-03-15 PROCEDURE — G8427 DOCREV CUR MEDS BY ELIG CLIN: HCPCS | Performed by: NURSE PRACTITIONER

## 2019-03-15 PROCEDURE — 4040F PNEUMOC VAC/ADMIN/RCVD: CPT | Performed by: NURSE PRACTITIONER

## 2019-03-15 PROCEDURE — 1123F ACP DISCUSS/DSCN MKR DOCD: CPT | Performed by: NURSE PRACTITIONER

## 2019-03-15 PROCEDURE — G8598 ASA/ANTIPLAT THER USED: HCPCS | Performed by: NURSE PRACTITIONER

## 2019-03-15 PROCEDURE — G8417 CALC BMI ABV UP PARAM F/U: HCPCS | Performed by: NURSE PRACTITIONER

## 2019-03-15 PROCEDURE — 1036F TOBACCO NON-USER: CPT | Performed by: NURSE PRACTITIONER

## 2019-03-15 PROCEDURE — G8484 FLU IMMUNIZE NO ADMIN: HCPCS | Performed by: NURSE PRACTITIONER

## 2019-03-15 RX ORDER — SUCRALFATE 1 G/1
1 TABLET ORAL
COMMUNITY
Start: 2019-02-15 | End: 2019-06-13 | Stop reason: ALTCHOICE

## 2019-03-15 RX ORDER — METHYLPREDNISOLONE 4 MG/1
TABLET ORAL
Qty: 1 KIT | Refills: 0 | Status: SHIPPED | OUTPATIENT
Start: 2019-03-15 | End: 2019-03-21

## 2019-03-15 ASSESSMENT — PATIENT HEALTH QUESTIONNAIRE - PHQ9
2. FEELING DOWN, DEPRESSED OR HOPELESS: 0
SUM OF ALL RESPONSES TO PHQ QUESTIONS 1-9: 0
1. LITTLE INTEREST OR PLEASURE IN DOING THINGS: 0
SUM OF ALL RESPONSES TO PHQ9 QUESTIONS 1 & 2: 0
SUM OF ALL RESPONSES TO PHQ QUESTIONS 1-9: 0

## 2019-03-15 ASSESSMENT — ENCOUNTER SYMPTOMS
VOMITING: 0
COUGH: 0
BACK PAIN: 1
SORE THROAT: 0
DIARRHEA: 0
CONSTIPATION: 0
SHORTNESS OF BREATH: 0
RHINORRHEA: 0
EYE REDNESS: 0

## 2019-03-22 ENCOUNTER — OFFICE VISIT (OUTPATIENT)
Dept: CARDIOLOGY | Age: 84
End: 2019-03-22
Payer: MEDICARE

## 2019-03-22 VITALS
WEIGHT: 195 LBS | HEIGHT: 72 IN | DIASTOLIC BLOOD PRESSURE: 78 MMHG | SYSTOLIC BLOOD PRESSURE: 122 MMHG | HEART RATE: 68 BPM | BODY MASS INDEX: 26.41 KG/M2

## 2019-03-22 DIAGNOSIS — E78.00 HYPERCHOLESTEROLEMIA: ICD-10-CM

## 2019-03-22 DIAGNOSIS — I25.10 CORONARY ARTERY DISEASE INVOLVING NATIVE CORONARY ARTERY OF NATIVE HEART WITHOUT ANGINA PECTORIS: ICD-10-CM

## 2019-03-22 DIAGNOSIS — I48.0 PAROXYSMAL ATRIAL FIBRILLATION (HCC): Primary | ICD-10-CM

## 2019-03-22 DIAGNOSIS — I10 ESSENTIAL HYPERTENSION: ICD-10-CM

## 2019-03-22 PROCEDURE — 1123F ACP DISCUSS/DSCN MKR DOCD: CPT | Performed by: NURSE PRACTITIONER

## 2019-03-22 PROCEDURE — G8427 DOCREV CUR MEDS BY ELIG CLIN: HCPCS | Performed by: NURSE PRACTITIONER

## 2019-03-22 PROCEDURE — 1101F PT FALLS ASSESS-DOCD LE1/YR: CPT | Performed by: NURSE PRACTITIONER

## 2019-03-22 PROCEDURE — G8417 CALC BMI ABV UP PARAM F/U: HCPCS | Performed by: NURSE PRACTITIONER

## 2019-03-22 PROCEDURE — G8598 ASA/ANTIPLAT THER USED: HCPCS | Performed by: NURSE PRACTITIONER

## 2019-03-22 PROCEDURE — 4040F PNEUMOC VAC/ADMIN/RCVD: CPT | Performed by: NURSE PRACTITIONER

## 2019-03-22 PROCEDURE — G8484 FLU IMMUNIZE NO ADMIN: HCPCS | Performed by: NURSE PRACTITIONER

## 2019-03-22 PROCEDURE — 93000 ELECTROCARDIOGRAM COMPLETE: CPT | Performed by: NURSE PRACTITIONER

## 2019-03-22 PROCEDURE — 1036F TOBACCO NON-USER: CPT | Performed by: NURSE PRACTITIONER

## 2019-03-22 PROCEDURE — 99213 OFFICE O/P EST LOW 20 MIN: CPT | Performed by: NURSE PRACTITIONER

## 2019-03-25 ENCOUNTER — TELEPHONE (OUTPATIENT)
Dept: GASTROENTEROLOGY | Facility: CLINIC | Age: 84
End: 2019-03-25

## 2019-03-25 PROBLEM — Z87.11 HISTORY OF PEPTIC ULCER DISEASE: Status: ACTIVE | Noted: 2019-03-25

## 2019-03-25 PROBLEM — R13.19 OTHER DYSPHAGIA: Status: ACTIVE | Noted: 2019-03-25

## 2019-03-25 PROBLEM — K21.9 GASTROESOPHAGEAL REFLUX DISEASE: Status: ACTIVE | Noted: 2019-03-25

## 2019-03-25 NOTE — TELEPHONE ENCOUNTER
OK to schedule procedure now that clearance to hold antiplatelet/anticoagulatant has been obtained.    Sarah Beth Guerra MD

## 2019-04-10 ENCOUNTER — ANESTHESIA EVENT (OUTPATIENT)
Dept: GASTROENTEROLOGY | Facility: HOSPITAL | Age: 84
End: 2019-04-10

## 2019-04-10 ENCOUNTER — HOSPITAL ENCOUNTER (OUTPATIENT)
Facility: HOSPITAL | Age: 84
Setting detail: HOSPITAL OUTPATIENT SURGERY
Discharge: HOME | End: 2019-04-10
Attending: INTERNAL MEDICINE | Admitting: INTERNAL MEDICINE

## 2019-04-10 ENCOUNTER — ANESTHESIA (OUTPATIENT)
Dept: GASTROENTEROLOGY | Facility: HOSPITAL | Age: 84
End: 2019-04-10

## 2019-04-10 VITALS
HEART RATE: 55 BPM | RESPIRATION RATE: 20 BRPM | SYSTOLIC BLOOD PRESSURE: 151 MMHG | HEIGHT: 71 IN | WEIGHT: 195.8 LBS | OXYGEN SATURATION: 93 % | DIASTOLIC BLOOD PRESSURE: 62 MMHG | BODY MASS INDEX: 27.41 KG/M2 | TEMPERATURE: 97.9 F

## 2019-04-10 DIAGNOSIS — K21.9 GASTROESOPHAGEAL REFLUX DISEASE, ESOPHAGITIS PRESENCE NOT SPECIFIED: ICD-10-CM

## 2019-04-10 DIAGNOSIS — Z87.11 HISTORY OF PEPTIC ULCER DISEASE: ICD-10-CM

## 2019-04-10 DIAGNOSIS — R13.19 OTHER DYSPHAGIA: ICD-10-CM

## 2019-04-10 PROCEDURE — 25010000002 PROPOFOL 10 MG/ML EMULSION: Performed by: NURSE ANESTHETIST, CERTIFIED REGISTERED

## 2019-04-10 PROCEDURE — 43249 ESOPH EGD DILATION <30 MM: CPT | Performed by: INTERNAL MEDICINE

## 2019-04-10 PROCEDURE — C1726 CATH, BAL DIL, NON-VASCULAR: HCPCS | Performed by: INTERNAL MEDICINE

## 2019-04-10 PROCEDURE — 43239 EGD BIOPSY SINGLE/MULTIPLE: CPT | Performed by: INTERNAL MEDICINE

## 2019-04-10 PROCEDURE — 88305 TISSUE EXAM BY PATHOLOGIST: CPT | Performed by: INTERNAL MEDICINE

## 2019-04-10 RX ORDER — SODIUM CHLORIDE 0.9 % (FLUSH) 0.9 %
3-10 SYRINGE (ML) INJECTION AS NEEDED
Status: CANCELLED | OUTPATIENT
Start: 2019-04-10

## 2019-04-10 RX ORDER — SODIUM CHLORIDE 9 MG/ML
500 INJECTION, SOLUTION INTRAVENOUS CONTINUOUS PRN
Status: DISCONTINUED | OUTPATIENT
Start: 2019-04-10 | End: 2019-04-10 | Stop reason: HOSPADM

## 2019-04-10 RX ORDER — SODIUM CHLORIDE 0.9 % (FLUSH) 0.9 %
3 SYRINGE (ML) INJECTION AS NEEDED
Status: DISCONTINUED | OUTPATIENT
Start: 2019-04-10 | End: 2019-04-10 | Stop reason: HOSPADM

## 2019-04-10 RX ORDER — LIDOCAINE HYDROCHLORIDE 20 MG/ML
INJECTION, SOLUTION INFILTRATION; PERINEURAL AS NEEDED
Status: DISCONTINUED | OUTPATIENT
Start: 2019-04-10 | End: 2019-04-10 | Stop reason: SURG

## 2019-04-10 RX ORDER — PROPOFOL 10 MG/ML
VIAL (ML) INTRAVENOUS AS NEEDED
Status: DISCONTINUED | OUTPATIENT
Start: 2019-04-10 | End: 2019-04-10 | Stop reason: SURG

## 2019-04-10 RX ORDER — SODIUM CHLORIDE 0.9 % (FLUSH) 0.9 %
3 SYRINGE (ML) INJECTION EVERY 12 HOURS SCHEDULED
Status: CANCELLED | OUTPATIENT
Start: 2019-04-10

## 2019-04-10 RX ORDER — SODIUM CHLORIDE 9 MG/ML
100 INJECTION, SOLUTION INTRAVENOUS CONTINUOUS
Status: CANCELLED | OUTPATIENT
Start: 2019-04-10

## 2019-04-10 RX ADMIN — SODIUM CHLORIDE 500 ML: 9 INJECTION, SOLUTION INTRAVENOUS at 08:53

## 2019-04-10 RX ADMIN — PROPOFOL 20 MG: 10 INJECTION, EMULSION INTRAVENOUS at 10:13

## 2019-04-10 RX ADMIN — PROPOFOL 60 MG: 10 INJECTION, EMULSION INTRAVENOUS at 10:12

## 2019-04-10 RX ADMIN — PROPOFOL 20 MG: 10 INJECTION, EMULSION INTRAVENOUS at 10:19

## 2019-04-10 RX ADMIN — PROPOFOL 20 MG: 10 INJECTION, EMULSION INTRAVENOUS at 10:20

## 2019-04-10 RX ADMIN — PROPOFOL 20 MG: 10 INJECTION, EMULSION INTRAVENOUS at 10:17

## 2019-04-10 RX ADMIN — SODIUM CHLORIDE: 9 INJECTION, SOLUTION INTRAVENOUS at 10:09

## 2019-04-10 RX ADMIN — LIDOCAINE HYDROCHLORIDE 100 MG: 20 INJECTION, SOLUTION INFILTRATION; PERINEURAL at 10:12

## 2019-04-10 RX ADMIN — PROPOFOL 20 MG: 10 INJECTION, EMULSION INTRAVENOUS at 10:15

## 2019-04-10 NOTE — ANESTHESIA PREPROCEDURE EVALUATION
Anesthesia Evaluation     no history of anesthetic complications:  NPO Solid Status: > 8 hours  NPO Liquid Status: > 2 hours           Airway   Mallampati: I  TM distance: >3 FB  Neck ROM: full  Dental    (+) edentulous, upper dentures and lower dentures    Pulmonary - normal exam    breath sounds clear to auscultation  (-) asthma, recent URI, sleep apnea, not a smoker  Cardiovascular - normal exam  Exercise tolerance: good (4-7 METS)    Rhythm: regular  Rate: normal    (+) hypertension, past MI (1996) , cardiac stents (x4, last one 3 yrs ago) dysrhythmias Atrial Fib, angina (Stable, present for several years), hyperlipidemia,   (-) pacemaker, CABG    ROS comment: Follows with Dr. Cook    Neuro/Psych  (-) seizures, TIA, CVA  GI/Hepatic/Renal/Endo    (+)  GERD,  renal disease stones,   (-) liver disease, diabetes, hypothyroidism, hyperthyroidism    Musculoskeletal     Abdominal    Substance History      OB/GYN          Other                        Anesthesia Plan    ASA 3     MAC     intravenous induction   Anesthetic plan, all risks, benefits, and alternatives have been provided, discussed and informed consent has been obtained with: patient.

## 2019-04-10 NOTE — H&P
Chief Complaint:   dysphagia    Subjective     HPI:   Intermittent dysphagia.  H/O SR dilation.    Past Medical History:   Past Medical History:   Diagnosis Date   • Arthritis    • Atrial fibrillation (CMS/HCC)    • Harmon syndrome    • CAD (coronary artery disease)    • Cancer (CMS/HCC)     COLON   • Colon polyp    • COPD (chronic obstructive pulmonary disease) (CMS/HCC)    • Gastritis    • GERD (gastroesophageal reflux disease)    • Hemorrhoids    • Hypertension    • Peptic ulcer    • Renal calculi        Past Surgical History:  Past Surgical History:   Procedure Laterality Date   • CARDIAC CATHETERIZATION      stents x 4   • CHOLECYSTECTOMY     • COLONOSCOPY  04/25/2014    Rectal polyp; Repeat 3 years    • COLONOSCOPY N/A 5/17/2017    Procedure: COLONOSCOPY WITH ANESTHESIA;  Surgeon: Sarah Beth Guerra MD;  Location: [facility] ENDOSCOPY;  Service:    • COLONOSCOPY  04/17/2013    Mass ascending colon; Internal hemorrhoids    • COLONOSCOPY  02/16/2010    Dr. Sanchez-Internal hermorrhoids; Repeat 3-5 years    • HEMICOLECTOMY Right 05/2013   • LUNG REMOVAL, PARTIAL     • UPPER GASTROINTESTINAL ENDOSCOPY  04/17/2013    Duodenitis; LA grade A esophagitis    • UPPER GASTROINTESTINAL ENDOSCOPY  8/11/2020    Healed gastric ulcers    • UPPER GASTROINTESTINAL ENDOSCOPY  02/16/2010    Gastric ulcers    • UPPER GASTROINTESTINAL ENDOSCOPY  09/15/2009    Normal        Family History:  Family History   Problem Relation Age of Onset   • Colon cancer Neg Hx    • Colon polyps Neg Hx    • Esophageal cancer Neg Hx        Social History:   reports that he has quit smoking. He has never used smokeless tobacco. He reports that he does not drink alcohol or use drugs.    Medications:   Medications Prior to Admission   Medication Sig Dispense Refill Last Dose   • aspirin 81 MG EC tablet Take 81 mg by mouth Daily.   4/9/2019 at Unknown time   • Cholecalciferol (VITAMIN D3) 5000 units tablet tablet Take 5,000 Units by mouth Daily.   4/9/2019 at  "Unknown time   • DULoxetine (CYMBALTA) 60 MG capsule Take 1 capsule by mouth Daily.   4/9/2019 at Unknown time   • glucosamine-chondroitin 500-400 MG capsule capsule Take 1 capsule by mouth Daily.   4/9/2019 at Unknown time   • isosorbide mononitrate (IMDUR) 30 MG 24 hr tablet Take 30 mg by mouth Daily.   4/9/2019 at Unknown time   • metoprolol tartrate (LOPRESSOR) 50 MG tablet Take 25 mg by mouth 2 (Two) Times a Day.   4/10/2019 at Unknown time   • pantoprazole (PROTONIX) 40 MG EC tablet Take 1 tablet by mouth 2 (Two) Times a Day. 60 tablet 11 4/9/2019 at Unknown time   • sucralfate (CARAFATE) 1 g tablet Take 1 tablet by mouth 4 (Four) Times a Day. 120 tablet 1 4/9/2019 at Unknown time   • vitamin B-12 (CYANOCOBALAMIN) 100 MCG tablet Take 1 tablet by mouth Daily.   4/9/2019 at Unknown time   • ELIQUIS 2.5 MG tablet tablet Take 1 tablet by mouth 2 (Two) Times a Day.   4/6/2019       Allergies:  Lortab [hydrocodone-acetaminophen] and Morphine and related    ROS:    General: Weight stable  Resp: No SOA  Cardiovascular: No CP      Objective     /78 (Patient Position: Sitting)   Pulse 70   Temp 97.9 °F (36.6 °C) (Temporal)   Resp 20   Ht 180.3 cm (71\")   Wt 88.8 kg (195 lb 12.8 oz)   SpO2 95%   BMI 27.31 kg/m²     Physical Exam   Constitutional: Pt is oriented to person, place, and in no distress.  Eyes: No icterus  ENMT: Unremarkable   Cardiovascular: Normal rate, regular rhythm.    Pulmonary/Chest: No distress.  No audible wheezes  Abdominal: Soft.   Skin: Skin is warm and dry.   Psychiatric: Mood, memory, affect and judgment appear normal.     Assessment/Plan     Diagnosis:  Dysphagia    Anticipated Surgical Procedure:  Endoscopy    The risks, benefits, and alternatives of endoscopy were reviewed with the patient today.  Risks including perforation, with or without dilation, possibly requiring surgery.  Additional risks include risk of bleeding.  There is also the risk of a drug reaction or problems with " anesthesia.  This will be discussed with the further by the anesthesia team on the day of the procedure. The benefits include the diagnosis and management of disease of the upper digestive tract.  Alternatives to endoscopy include upper GI series, laboratory testing, radiographic evaluation, or no intervention.  The patient verbalizes understanding and agrees.    Much of this encounter note is an electronic transcription/translation of spoken language to printed text. The electronic translation of spoken language may permit erroneous, or at times, nonsensical words or phrases to be inadvertently transcribed; although I have reviewed the note for such errors, some may still exist.

## 2019-04-10 NOTE — ANESTHESIA POSTPROCEDURE EVALUATION
Patient: Nicolas Nieves    Procedure Summary     Date:  04/10/19 Room / Location:  [facility] ENDOSCOPY 5 / [facility] ENDOSCOPY    Anesthesia Start:  1009 Anesthesia Stop:  1028    Procedure:  ESOPHAGOGASTRODUODENOSCOPY WITH ANESTHESIA (N/A ) Diagnosis:       Other dysphagia      Gastroesophageal reflux disease, esophagitis presence not specified      History of peptic ulcer disease      (Other dysphagia [R13.19])      (Gastroesophageal reflux disease, esophagitis presence not specified [K21.9])      (History of peptic ulcer disease [Z87.11])    Surgeon:  Sarah Beth Guerra MD Provider:  Nicola Cho CRNA    Anesthesia Type:  MAC ASA Status:  3          Anesthesia Type: MAC  Last vitals  BP   148/78 (04/10/19 0829)   Temp   97.9 °F (36.6 °C) (04/10/19 0829)   Pulse   70 (04/10/19 0829)   Resp   20 (04/10/19 0829)     SpO2   95 % (04/10/19 0829)     Post Anesthesia Care and Evaluation    Patient location during evaluation: PHASE II  Patient participation: complete - patient participated  Level of consciousness: awake  Pain score: 0  Pain management: adequate  Airway patency: patent  Anesthetic complications: No anesthetic complications  PONV Status: none  Cardiovascular status: acceptable  Respiratory status: acceptable  Hydration status: acceptable

## 2019-04-11 LAB
CYTO UR: NORMAL
LAB AP CASE REPORT: NORMAL
LAB AP CLINICAL INFORMATION: NORMAL
PATH REPORT.FINAL DX SPEC: NORMAL
PATH REPORT.GROSS SPEC: NORMAL

## 2019-04-15 RX ORDER — APIXABAN 2.5 MG/1
TABLET, FILM COATED ORAL
Qty: 180 TABLET | Refills: 3 | Status: SHIPPED | OUTPATIENT
Start: 2019-04-15 | End: 2019-12-11

## 2019-05-03 ENCOUNTER — OFFICE VISIT (OUTPATIENT)
Dept: PRIMARY CARE CLINIC | Age: 84
End: 2019-05-03
Payer: MEDICARE

## 2019-05-03 ENCOUNTER — TELEPHONE (OUTPATIENT)
Dept: PRIMARY CARE CLINIC | Age: 84
End: 2019-05-03

## 2019-05-03 VITALS
SYSTOLIC BLOOD PRESSURE: 137 MMHG | HEART RATE: 73 BPM | BODY MASS INDEX: 26.88 KG/M2 | WEIGHT: 192 LBS | OXYGEN SATURATION: 96 % | HEIGHT: 71 IN | TEMPERATURE: 97.6 F | DIASTOLIC BLOOD PRESSURE: 85 MMHG

## 2019-05-03 DIAGNOSIS — Z12.5 SCREENING FOR PROSTATE CANCER: ICD-10-CM

## 2019-05-03 DIAGNOSIS — I10 ESSENTIAL HYPERTENSION: ICD-10-CM

## 2019-05-03 DIAGNOSIS — E78.00 HYPERCHOLESTEROLEMIA: Primary | ICD-10-CM

## 2019-05-03 DIAGNOSIS — I48.0 PAROXYSMAL ATRIAL FIBRILLATION (HCC): ICD-10-CM

## 2019-05-03 DIAGNOSIS — N18.30 CHRONIC KIDNEY DISEASE, STAGE III (MODERATE) (HCC): ICD-10-CM

## 2019-05-03 DIAGNOSIS — M54.41 CHRONIC RIGHT-SIDED LOW BACK PAIN WITH RIGHT-SIDED SCIATICA: ICD-10-CM

## 2019-05-03 DIAGNOSIS — G89.29 CHRONIC RIGHT-SIDED LOW BACK PAIN WITH RIGHT-SIDED SCIATICA: ICD-10-CM

## 2019-05-03 DIAGNOSIS — Z00.00 ROUTINE GENERAL MEDICAL EXAMINATION AT A HEALTH CARE FACILITY: Primary | ICD-10-CM

## 2019-05-03 DIAGNOSIS — I71.40 ABDOMINAL AORTIC ANEURYSM (AAA) WITHOUT RUPTURE: ICD-10-CM

## 2019-05-03 LAB
ALBUMIN SERPL-MCNC: 4.3 G/DL (ref 3.5–5.2)
ALP BLD-CCNC: 68 U/L (ref 40–130)
ALT SERPL-CCNC: 24 U/L (ref 5–41)
ANION GAP SERPL CALCULATED.3IONS-SCNC: 15 MMOL/L (ref 7–19)
AST SERPL-CCNC: 25 U/L (ref 5–40)
BASOPHILS ABSOLUTE: 0 K/UL (ref 0–0.2)
BASOPHILS RELATIVE PERCENT: 0.4 % (ref 0–1)
BILIRUB SERPL-MCNC: 0.5 MG/DL (ref 0.2–1.2)
BUN BLDV-MCNC: 16 MG/DL (ref 8–23)
CALCIUM SERPL-MCNC: 9.6 MG/DL (ref 8.8–10.2)
CHLORIDE BLD-SCNC: 102 MMOL/L (ref 98–111)
CHOLESTEROL, TOTAL: 186 MG/DL (ref 160–199)
CO2: 24 MMOL/L (ref 22–29)
CREAT SERPL-MCNC: 1.1 MG/DL (ref 0.5–1.2)
CREATININE URINE: 170.9 MG/DL (ref 4.2–622)
EOSINOPHILS ABSOLUTE: 0.5 K/UL (ref 0–0.6)
EOSINOPHILS RELATIVE PERCENT: 6.4 % (ref 0–5)
GFR NON-AFRICAN AMERICAN: >60
GLUCOSE BLD-MCNC: 106 MG/DL (ref 74–109)
HCT VFR BLD CALC: 45 % (ref 42–52)
HDLC SERPL-MCNC: 38 MG/DL (ref 55–121)
HEMOGLOBIN: 14.4 G/DL (ref 14–18)
LDL CHOLESTEROL CALCULATED: 119 MG/DL
LYMPHOCYTES ABSOLUTE: 2.4 K/UL (ref 1.1–4.5)
LYMPHOCYTES RELATIVE PERCENT: 30.9 % (ref 20–40)
MCH RBC QN AUTO: 30.4 PG (ref 27–31)
MCHC RBC AUTO-ENTMCNC: 32 G/DL (ref 33–37)
MCV RBC AUTO: 94.9 FL (ref 80–94)
MICROALBUMIN UR-MCNC: 4.8 MG/DL (ref 0–19)
MICROALBUMIN/CREAT UR-RTO: 28.1 MG/G
MONOCYTES ABSOLUTE: 0.9 K/UL (ref 0–0.9)
MONOCYTES RELATIVE PERCENT: 11.7 % (ref 0–10)
NEUTROPHILS ABSOLUTE: 3.8 K/UL (ref 1.5–7.5)
NEUTROPHILS RELATIVE PERCENT: 50.1 % (ref 50–65)
PDW BLD-RTO: 14.1 % (ref 11.5–14.5)
PLATELET # BLD: 175 K/UL (ref 130–400)
PMV BLD AUTO: 10.9 FL (ref 9.4–12.4)
POTASSIUM SERPL-SCNC: 4.5 MMOL/L (ref 3.5–5)
PROSTATE SPECIFIC ANTIGEN: 1.93 NG/ML (ref 0–4)
RBC # BLD: 4.74 M/UL (ref 4.7–6.1)
SODIUM BLD-SCNC: 141 MMOL/L (ref 136–145)
T4 FREE: 1 NG/DL (ref 0.9–1.7)
TOTAL PROTEIN: 7.4 G/DL (ref 6.6–8.7)
TRIGL SERPL-MCNC: 147 MG/DL (ref 0–149)
TSH SERPL DL<=0.05 MIU/L-ACNC: 3.74 UIU/ML (ref 0.27–4.2)
WBC # BLD: 7.6 K/UL (ref 4.8–10.8)

## 2019-05-03 PROCEDURE — 1036F TOBACCO NON-USER: CPT | Performed by: NURSE PRACTITIONER

## 2019-05-03 PROCEDURE — 1123F ACP DISCUSS/DSCN MKR DOCD: CPT | Performed by: NURSE PRACTITIONER

## 2019-05-03 PROCEDURE — G0439 PPPS, SUBSEQ VISIT: HCPCS | Performed by: NURSE PRACTITIONER

## 2019-05-03 PROCEDURE — G8417 CALC BMI ABV UP PARAM F/U: HCPCS | Performed by: NURSE PRACTITIONER

## 2019-05-03 PROCEDURE — 99214 OFFICE O/P EST MOD 30 MIN: CPT | Performed by: NURSE PRACTITIONER

## 2019-05-03 PROCEDURE — G8598 ASA/ANTIPLAT THER USED: HCPCS | Performed by: NURSE PRACTITIONER

## 2019-05-03 PROCEDURE — G8427 DOCREV CUR MEDS BY ELIG CLIN: HCPCS | Performed by: NURSE PRACTITIONER

## 2019-05-03 PROCEDURE — 4040F PNEUMOC VAC/ADMIN/RCVD: CPT | Performed by: NURSE PRACTITIONER

## 2019-05-03 RX ORDER — SIMVASTATIN 20 MG
20 TABLET ORAL EVERY EVENING
Qty: 30 TABLET | Refills: 3 | Status: SHIPPED | OUTPATIENT
Start: 2019-05-03 | End: 2019-06-11 | Stop reason: SDUPTHER

## 2019-05-03 RX ORDER — SODIUM PHOSPHATE,MONO-DIBASIC 19G-7G/118
1 ENEMA (ML) RECTAL
COMMUNITY
End: 2019-12-11 | Stop reason: ALTCHOICE

## 2019-05-03 ASSESSMENT — ENCOUNTER SYMPTOMS
VOMITING: 0
SORE THROAT: 0
EYE REDNESS: 0
RHINORRHEA: 0
COUGH: 0
BACK PAIN: 1
SHORTNESS OF BREATH: 0
CONSTIPATION: 0
DIARRHEA: 0

## 2019-05-03 ASSESSMENT — LIFESTYLE VARIABLES: HOW OFTEN DO YOU HAVE A DRINK CONTAINING ALCOHOL: 0

## 2019-05-03 ASSESSMENT — PATIENT HEALTH QUESTIONNAIRE - PHQ9
SUM OF ALL RESPONSES TO PHQ QUESTIONS 1-9: 0
SUM OF ALL RESPONSES TO PHQ QUESTIONS 1-9: 0

## 2019-05-03 ASSESSMENT — ANXIETY QUESTIONNAIRES: GAD7 TOTAL SCORE: 0

## 2019-05-03 NOTE — TELEPHONE ENCOUNTER
----- Message from DAVIE Peña sent at 5/3/2019  1:51 PM CDT -----  PSA, prostate antigen is within normal limits  CMP: Within normal limits. This includes normal electrolytes, kidney function and liver function  Cholesterol is elevated. Total cholesterol is 186. LDL is 119. We recommend this less than 100 even closer to 79 with a history of cardiac disease. Would recommend adding a statin medication. Recommend adding simvastatin 20 mg, 1 tablet nightly. Dispense #30. Refills #5. In rechecking lipid profile and LFTs in 6-8 weeks.   Thyroid is within normal limits

## 2019-05-03 NOTE — PROGRESS NOTES
2.5 MG TABS tablet TAKE 1 TABLET BY MOUTH TWICE DAILY Yes DAVIE Skaggs   sucralfate (CARAFATE) 1 GM tablet Take 1 g by mouth Yes Historical Provider, MD   metoprolol tartrate (LOPRESSOR) 25 MG tablet Take 1 tablet by mouth 2 times daily Patient will fill next month Yes DAVIE Laboy   tamsulosin (FLOMAX) 0.4 MG capsule TAKE ONE CAPSULE BY MOUTH ONCE DAILY Yes DAVIE Oliver   isosorbide mononitrate (IMDUR) 30 MG extended release tablet TAKE ONE TABLET BY MOUTH EVERY 24 HOURS Yes DAVIE Oliver   pantoprazole (PROTONIX) 40 MG tablet TAKE ONE TABLET BY MOUTH ONCE DAILY Yes DAVIE Oliver   DULoxetine (CYMBALTA) 60 MG extended release capsule Take 1 capsule by mouth daily Yes GISELLE Mina DO   vitamin B-12 (CYANOCOBALAMIN) 100 MCG tablet Take 50 mcg by mouth daily.  Yes Historical Provider, MD      Diagnosis Date    [facility] [address]     Aneurysm ([facility] [address] aortic    Anxiety     Arthritis     Polyarticular    Burgos's esophagus 2010    H/o    CAD (coronary artery disease)     CAD (coronary artery disease), native coronary artery     Chronic respiratory failure (HCC)     Costochondritis     Erectile dysfunction     Gastroesophageal reflux disease     GERD (gastroesophageal reflux disease)     Heart block stents    High cholesterol     Hypercholesterolemia 2010    Hypertension     Hypertension     MI (mitral incompetence)     x4 stents    Nephrolithiasis     Non-cardiac chest pain 12/20/2011    Renal calculi     Right upper lobe consolidation ([facility] [address]     Tobacco abuse 2010    Ulcer, gastric, acute 3/14/2012    Weight loss 2010     Past Surgical History:   Procedure Laterality Date    ANKLE SURGERY      left    APPENDECTOMY      CARDIAC CATHETERIZATION  12/12/07,07/13/09    Left Heart Cath    CHOLECYSTECTOMY      right    COLON SURGERY      GALLBLADDER SURGERY      LEG SURGERY      right    LUNG REMOVAL, PARTIAL  MANDIBLE FRACTURE SURGERY      ORTHOPEDIC SURGERY      legs, hand and ankles    SKIN CANCER EXCISION  2016       Family History   Problem Relation Age of Onset    Diabetes Mother     Coronary Art Dis Father     High Blood Pressure Father     High Cholesterol Father     Heart Attack Father     Stroke Father     Kidney Disease Son     Kidney Disease Son        CareTeam (Including outside providers/suppliers regularly involved in providing care):   Patient Care Team:  [address] [facility],[address], DO as PCP - General  DAVIE Saini as PCP - [facility] Attributed Provider  Geneva Otero MD (Cardiology)  Tom Michael (Nurse Practitioner)  Taylor Bland (Gastroenterology)  Geneva Otero MD as Consulting Physician (Cardiology)  DAVIE Meek as Nurse Practitioner (Cardiology)    Wt Readings from Last 3 Encounters:   05/03/19 192 lb (87.1 kg)   03/22/19 195 lb (88.5 kg)   03/15/19 195 lb 8 oz (88.7 kg)     Vitals:    05/03/19 0856 05/03/19 0934   BP: (!) 157/83 137/85   Pulse: 77 73   Temp: 97.6 °F (36.4 °C)    TempSrc: Temporal    SpO2: 96%    Weight: 192 lb (87.1 kg)    Height: 5' 11\" (1.803 m)      Body mass index is 26.78 kg/m². Based upon direct observation of the patient, evaluation of cognition reveals recent and remote memory intact. Patient's complete Health Risk Assessment and screening values have been reviewed and are found in Flowsheets. The following problems were reviewed today and where indicated follow up appointments were made and/or referrals ordered. Positive Risk Factor Screenings with Interventions:     Cognitive:   Words recalled: 1 Word Recalled  Clock Drawing Test (CDT) Score: (!) Abnormal  Total Score Interpretation: Positive Mini-Cog  Cognitive Impairment Interventions:  · Patient declines any further evaluation/treatment for cognitive impairment    General Health:  General  In general, how would you say your health is?: Good  In the past 7 days, have you experienced any of the following? New or Increased Pain, New or Increased Fatigue, Loneliness, Social Isolation, Stress or Anger?: (!) New or Increased Pain  Do you get the social and emotional support that you need?: Yes  Do you have a Living Will?: (!) No  General Health Risk Interventions:  · No Living Will: patient education    Health Habits/Nutrition:  Health Habits/Nutrition  Do you exercise for at least 20 minutes 2-3 times per week?: Yes  Have you lost any weight without trying in the past 3 months?: No  Do you eat fewer than 2 meals per day?: (!) Yes  Have you seen a dentist within the past year?: Yes  Body mass index is 26.78 kg/m².   Health Habits/Nutrition Interventions:  · Nutritional issues:  educational materials for healthy, well-balanced diet provided    Hearing/Vision:  Hearing/Vision  Do you or your family notice any trouble with your hearing?: (!) Yes  Do you have difficulty driving, watching TV, or doing any of your daily activities because of your eyesight?: No  Have you had an eye exam within the past year?: Yes  Hearing/Vision Interventions:  · Hearing concerns:  patient declines any further evaluation/treatment for hearing issues    Safety:  Safety  Do you have working smoke detectors?: Yes  Have all throw rugs been removed or fastened?: (!) No  Do you have non-slip mats in all bathtubs?: (!) No  Do all of your stairways have a railing or banister?: Yes  Are your doorways, halls and stairs free of clutter?: Yes  Do you always fasten your seatbelt when you are in a car?: Yes  Safety Interventions:  · Home safety tips provided    Personalized Preventive Plan   Current Health Maintenance Status  Immunization History   Administered Date(s) Administered    Influenza Vaccine, unspecified formulation 11/23/2015    Influenza Virus Vaccine 09/28/2017    Pneumococcal 13-valent Conjugate ([id number]) 04/08/2016    Pneumococcal Polysaccharide ([id number]) 10/18/2017        Health Maintenance   Topic Date Due    DTaP/Tdap/Td vaccine (1 - Tdap) 1956    Shingles Vaccine (1 of 2) 1987    Flu vaccine (Season Ended) 2019    Pneumococcal 65+ years Vaccine  Completed     Recommendations for Preventive Services Due: see orders and patient instructions/AVS.  . Recommended screening schedule for the next 5-10 years is provided to the patient in written form: see Patient Instructions/AVS.      Puneet Barfield  Phone [phone]   Fax [phone]      OFFICE VISIT: 5/3/2019    Nomi Adams- : 1935    Chief Complaint:Esau is a 82 y.o. male who is here for Medicare AWV     HPI  The patient presents today for Medicare AWV with the following complaints. Reports continued low back pain. \"I need a referral to Dr. Apollo Thomson. \"  MRI of lumbar spine, 3-:  Lumbar spine alignment is preserved  Severe spinal stenosis at L3-4 and L4-5, mostly the result of facet hypertrophy  There is multi-level neural foraminal narrowing as a result of facet arthropathy  Notable capsular distention at the Level L4-5 facet joints indicating active inflammatory/synovitis    PAF:  He is currently on Eliquis 2.5 mg BID and metoprolol 25 mg BID. Denies any recent episodes of atrial fibrillation. He follows with cardiology regularly. AAA:  CT scan of abdomen/pelvis, 2019: A simple cyst in the left lobe of the liver. There are   several tiny low-density nodules which are too small to be further   characterized. This is unchanged in the previous study in . The stable right renal cysts. The nonobstructing calculi in the lower pole of the left kidney. An enlarged prostate with intrinsic calcification. No evidence of right ascending colectomy and a functioning ileocolic   anastomosis. The diverticulosis of the distal colon. No evidence for   diverticulitis. The enlarged prostate with intrinsic calcification.    There is increase in size of the distal abdominal aortic aneurysm   which now measures 3.6 cm in AP dimension. It measured 3 cm in the   previous study. He follows with Dr. Jesse Boyd    CKD:  12-2-2018  Creat 1.2  BUN: 16  GFR: 58, repeat labs today    HTN:   BP is elevated initially. Repeat blood pressure was within normal limits  He has not taken his BP medication this morning. height is 5' 11\" (1.803 m) and weight is 192 lb (87.1 kg). His temporal temperature is 97.6 °F (36.4 °C). His blood pressure is 137/85 and his pulse is 73. His oxygen saturation is 96%. Body mass index is 26.78 kg/m².     Results for orders placed or performed during the hospital encounter of 12/02/18   APTT   Result Value Ref Range    aPTT 32.5 26.0 - 36.2 sec   CBC Auto Differential   Result Value Ref Range    WBC 8.4 4.8 - 10.8 K/uL    RBC 4.61 (L) 4.70 - 6.10 M/uL    Hemoglobin 14.5 14.0 - 18.0 g/dL    Hematocrit 44.7 42.0 - 52.0 %    MCV 97.0 (H) 80.0 - 94.0 fL    MCH 31.5 (H) 27.0 - 31.0 pg    MCHC 32.4 (L) 33.0 - 37.0 g/dL    RDW 13.5 11.5 - 14.5 %    Platelets 652 314 - 782 K/uL    MPV 10.2 9.4 - 12.4 fL    Neutrophils % 64.1 50.0 - 65.0 %    Lymphocytes % 20.6 20.0 - 40.0 %    Monocytes % 10.7 (H) 0.0 - 10.0 %    Eosinophils % 3.2 0.0 - 5.0 %    Basophils % 0.6 0.0 - 1.0 %    Neutrophils # 5.4 1.5 - 7.5 K/uL    Lymphocytes # 1.7 1.1 - 4.5 K/uL    Monocytes # 0.90 0.00 - 0.90 K/uL    Eosinophils # 0.30 0.00 - 0.60 K/uL    Basophils # 0.10 0.00 - 0.20 K/uL   Comprehensive Metabolic Panel   Result Value Ref Range    Sodium 140 136 - 145 mmol/L    Potassium 4.5 3.5 - 5.0 mmol/L    Chloride 103 98 - 111 mmol/L    CO2 30 (H) 22 - 29 mmol/L    Anion Gap 7 7 - 19 mmol/L    Glucose 71 (L) 74 - 109 mg/dL    BUN 16 8 - 23 mg/dL    CREATININE 1.2 0.5 - 1.2 mg/dL    GFR Non-African American 58 (A) >60    Calcium 9.6 8.8 - 10.2 mg/dL    Total Protein 7.2 6.6 - 8.7 g/dL    Alb 4.3 3.5 - 5.2 g/dL    Total Bilirubin 0.7 0.2 - 1.2 mg/dL    Alkaline Phosphatase 66 40 - 130 U/L    ALT 22 5 - 41 U/L    AST 19 5 - 40 U/L Protime-INR   Result Value Ref Range    Protime 13.9 12.0 - 14.6 sec    INR 1.08 0.88 - 1.18   Troponin   Result Value Ref Range    Troponin <0.01 0.00 - 0.03 ng/mL   POCT Venous   Result Value Ref Range    POC Troponin I 0.00 0.00 - 0.08 ng/mL    Performed on i-Stat    EKG 12 Lead   Result Value Ref Range    P-R Interval 148 ms    QRS Duration 100 ms    Q-T Interval 356 ms    QTc Calculation (Bazett) 377 ms    P Axis 46 degrees    T Axis 34 degrees     have reviewed the following with the Mr. Naa Rush   Lab Review   Admission on 12/02/2018, Discharged on 12/02/2018   Component Date Value    aPTT 12/02/2018 32.5     WBC 12/02/2018 8.4     RBC 12/02/2018 4.61*    Hemoglobin 12/02/2018 14.5     Hematocrit 12/02/2018 44.7     MCV 12/02/2018 97.0*    MCH 12/02/2018 31.5*    MCHC 12/02/2018 32.4*    RDW 12/02/2018 13.5     Platelets 05/52/5295 169     MPV 12/02/2018 10.2     Neutrophils % 12/02/2018 64.1     Lymphocytes % 12/02/2018 20.6     Monocytes % 12/02/2018 10.7*    Eosinophils % 12/02/2018 3.2     Basophils % 12/02/2018 0.6     Neutrophils # 12/02/2018 5.4     Lymphocytes # 12/02/2018 1.7     Monocytes # 12/02/2018 0.90     Eosinophils # 12/02/2018 0.30     Basophils # 12/02/2018 0.10     Sodium 12/02/2018 140     Potassium 12/02/2018 4.5     Chloride 12/02/2018 103     CO2 12/02/2018 30*    Anion Gap 12/02/2018 7     Glucose 12/02/2018 71*    BUN 12/02/2018 16     CREATININE 12/02/2018 1.2     GFR Non- 12/02/2018 58*    Calcium 12/02/2018 9.6     Total Protein 12/02/2018 7.2     Alb 12/02/2018 4.3     Total Bilirubin 12/02/2018 0.7     Alkaline Phosphatase 12/02/2018 66     ALT 12/02/2018 22     AST 12/02/2018 19     P-R Interval 12/02/2018 148     QRS Duration 12/02/2018 100     Q-T Interval 12/02/2018 356     QTc Calculation (Bazett) 12/02/2018 377     P Axis 12/02/2018 46     T Axis 12/02/2018 34     Protime 12/02/2018 13.9     INR 12/02/2018 1.08  Troponin 12/02/2018 <0.01     POC Troponin I 12/02/2018 0.00     Performed on 12/02/2018 i-Stat      Copies of these are in the chart. Prior to Visit Medications    Medication Sig Taking? Authorizing Provider   Cholecalciferol (VITAMIN D3) 5000 units TABS Take 5,000 Units by mouth Yes Historical Provider, MD   glucosamine-chondroitin 500-400 MG CAPS Take 1 capsule by mouth Yes Historical Provider, MD   ELIQUIS 2.5 MG TABS tablet TAKE 1 TABLET BY MOUTH TWICE DAILY Yes [facility], APROLIVER   sucralfate (CARAFATE) 1 GM tablet Take 1 g by mouth Yes Historical Provider, MD   metoprolol tartrate (LOPRESSOR) 25 MG tablet Take 1 tablet by mouth 2 times daily Patient will fill next month [facility]   tamsulosin (FLOMAX) 0.4 MG capsule TAKE ONE CAPSULE BY MOUTH ONCE DAILY Yes DAVIE Leija   isosorbide mononitrate (IMDUR) 30 MG extended release tablet TAKE ONE TABLET BY MOUTH EVERY 24 HOURS Yes DAVIE Leija   pantoprazole (PROTONIX) 40 MG tablet TAKE ONE TABLET BY MOUTH ONCE DAILY Yes DAVIE Leija   DULoxetine (CYMBALTA) 60 MG extended release capsule Take 1 capsule by mouth daily Yes GISELLE Nolan,    vitamin B-12 (CYANOCOBALAMIN) 100 MCG tablet Take 50 mcg by mouth daily. Yes Historical Provider, MD       Allergies: Patient has no known allergies.     Past Medical History:   Diagnosis Date    Aden[facility] [address]     Aneurysm ([facility] [address] aortic    Anxiety     Arthritis     Polyarticular    Burgos's esophagus 2010    H/o    CAD (coronary artery disease)     CAD (coronary artery disease), native coronary artery     Chronic respiratory failure (HCC)     Costochondritis     Erectile dysfunction     Gastroesophageal reflux disease     GERD (gastroesophageal reflux disease)     Heart block stents    High cholesterol     Hypercholesterolemia 2010    Hypertension     Hypertension     MI (mitral incompetence)     x4 stents    Nephrolithiasis     Non-cardiac chest pain 12/20/2011    Renal calculi     Right upper lobe consolidation ([facility] [address]     Tobacco abuse 2010    Ulcer, gastric, acute 3/14/2012    Weight loss 2010       Past Surgical History:   Procedure Laterality Date    ANKLE SURGERY      left    APPENDECTOMY      CARDIAC CATHETERIZATION  12/12/07,07/13/09    Left Heart Cath    CHOLECYSTECTOMY      right    COLON SURGERY      GALLBLADDER SURGERY      LEG SURGERY      right    LUNG REMOVAL, PARTIAL      MANDIBLE FRACTURE SURGERY      ORTHOPEDIC SURGERY      legs, hand and ankles    SKIN CANCER EXCISION  2016       Social History     Tobacco Use    Smoking status: Former Smoker    Smokeless tobacco: Never Used    Tobacco comment: quit in 1983   Substance Use Topics    Alcohol use: No       Family History   Problem Relation Age of Onset    Diabetes Mother     Coronary Art Dis Father     High Blood Pressure Father     High Cholesterol Father     Heart Attack Father     Stroke Father     Kidney Disease Son     Kidney Disease Son        Review of Systems   Constitutional: Negative for chills, fatigue and fever. HENT: Negative for congestion, ear pain, rhinorrhea and sore throat. Eyes: Negative for redness. Respiratory: Negative for cough and shortness of breath. Cardiovascular: Negative for chest pain. Gastrointestinal: Negative for constipation, diarrhea and vomiting. Musculoskeletal: Positive for back pain. Skin: Negative for rash. Neurological: Negative for dizziness and headaches. Psychiatric/Behavioral: Positive for sleep disturbance (decreased due to pain). Physical Exam   Constitutional: He appears well-developed and well-nourished. HENT:   Head: Normocephalic. Right Ear: Tympanic membrane and external ear normal.   Left Ear: Tympanic membrane and external ear normal.   Nose: Nose normal. Right sinus exhibits no maxillary sinus tenderness and no frontal sinus tenderness.  Left Creatinine Urine Ratio    Psa screening    Ambulatory referral to Orthopedic Surgery        Return in about 3 months (around 8/3/2019), or if symptoms worsen or fail to improve, for Medicare Annual Wellness Visit in 1 year. Patient Instructions     Personalized Preventive Plan for Alex Bell - 5/3/2019  Medicare offers a range of preventive health benefits. Some of the tests and screenings are paid in full while other may be subject to a deductible, co-insurance, and/or copay. Some of these benefits include a comprehensive review of your medical history including lifestyle, illnesses that may run in your family, and various assessments and screenings as appropriate. After reviewing your medical record and screening and assessments performed today your provider may have ordered immunizations, labs, imaging, and/or referrals for you. A list of these orders (if applicable) as well as your Preventive Care list are included within your After Visit Summary for your review. Other Preventive Recommendations:    · A preventive eye exam performed by an eye specialist is recommended every 1-2 years to screen for glaucoma; cataracts, macular degeneration, and other eye disorders. · A preventive dental visit is recommended every 6 months. · Try to get at least 150 minutes of exercise per week or 10,000 steps per day on a pedometer . · Order or download the FREE \"Exercise & Physical Activity: Your Everyday Guide\" from [employer]. Call 5-471.854.9743 or search [employer] online. · You need 6169-2600 mg of calcium and 3063-8196 IU of vitamin D per day. It is possible to meet your calcium requirement with diet alone, but a vitamin D supplement is usually necessary to meet this goal.  · When exposed to the sun, use a sunscreen that protects against both UVA and UVB radiation with an SPF of 30 or greater.  Reapply every 2 to 3 hours or after sweating, drying off with a

## 2019-05-23 ENCOUNTER — TELEPHONE (OUTPATIENT)
Dept: CARDIOLOGY | Age: 84
End: 2019-05-23

## 2019-05-23 NOTE — TELEPHONE ENCOUNTER
Date of Surgery: TBD    Cardiologist: Dr. Rosendo Holt    Procedure: Lumbar Epi Steroid Injection    Surgeon: Dr. Felicity Bamberger    Last Office Visit: 3-22-19  Reason for office visit and medical concerns addressed at this office visit: PAF, HTN, CAD, Abd aortic aneurysm, Hyperlipidemia     Testing Performed and Date of Service:  EKG 3-2219  Does the patient have a stent? If so, what type? Stent 2009    Current Medications: Eliquis, Carafate, Lopressor, Flomax, Imdur, Protonix, Cymbalta, B-12    Is the patient currently taking an anticoagulant? Eliquis     Additional Notes: Med hold Eliquis x days 48 hours.

## 2019-05-23 NOTE — TELEPHONE ENCOUNTER
Ok to hold Eliquis 24 hours for low risk and 48 hours for high risk procedure. Resume evening of the procedure and when bleeding risk is acceptable.

## 2019-05-27 ENCOUNTER — APPOINTMENT (OUTPATIENT)
Dept: GENERAL RADIOLOGY | Age: 84
End: 2019-05-27
Payer: MEDICARE

## 2019-05-27 ENCOUNTER — HOSPITAL ENCOUNTER (EMERGENCY)
Age: 84
Discharge: HOME | End: 2019-05-27
Attending: EMERGENCY MEDICINE
Payer: MEDICARE

## 2019-05-27 ENCOUNTER — APPOINTMENT (OUTPATIENT)
Dept: NUCLEAR MEDICINE | Age: 84
End: 2019-05-27
Payer: MEDICARE

## 2019-05-27 VITALS
HEIGHT: 71 IN | TEMPERATURE: 98 F | DIASTOLIC BLOOD PRESSURE: 79 MMHG | WEIGHT: 190 LBS | HEART RATE: 69 BPM | RESPIRATION RATE: 18 BRPM | OXYGEN SATURATION: 93 % | BODY MASS INDEX: 26.6 KG/M2 | SYSTOLIC BLOOD PRESSURE: 142 MMHG

## 2019-05-27 DIAGNOSIS — R07.9 CHEST PAIN, UNSPECIFIED TYPE: Primary | ICD-10-CM

## 2019-05-27 LAB
ALBUMIN SERPL-MCNC: 3.9 G/DL (ref 3.5–5.2)
ALP BLD-CCNC: 53 U/L (ref 40–130)
ALT SERPL-CCNC: 21 U/L (ref 5–41)
ANION GAP SERPL CALCULATED.3IONS-SCNC: 10 MMOL/L (ref 7–19)
APTT: 36.1 SEC (ref 26–36.2)
AST SERPL-CCNC: 23 U/L (ref 5–40)
BASOPHILS ABSOLUTE: 0 K/UL (ref 0–0.2)
BASOPHILS RELATIVE PERCENT: 0.4 % (ref 0–1)
BILIRUB SERPL-MCNC: 0.5 MG/DL (ref 0.2–1.2)
BUN BLDV-MCNC: 17 MG/DL (ref 8–23)
CALCIUM SERPL-MCNC: 8.8 MG/DL (ref 8.8–10.2)
CHLORIDE BLD-SCNC: 107 MMOL/L (ref 98–111)
CO2: 27 MMOL/L (ref 22–29)
CREAT SERPL-MCNC: 1.1 MG/DL (ref 0.5–1.2)
EKG P AXIS: 21 DEGREES
EKG P AXIS: 53 DEGREES
EKG P-R INTERVAL: 156 MS
EKG P-R INTERVAL: 160 MS
EKG Q-T INTERVAL: 378 MS
EKG Q-T INTERVAL: 378 MS
EKG QRS DURATION: 86 MS
EKG QRS DURATION: 90 MS
EKG QTC CALCULATION (BAZETT): 399 MS
EKG QTC CALCULATION (BAZETT): 406 MS
EKG T AXIS: 16 DEGREES
EKG T AXIS: 3 DEGREES
EOSINOPHILS ABSOLUTE: 0.3 K/UL (ref 0–0.6)
EOSINOPHILS RELATIVE PERCENT: 3.7 % (ref 0–5)
GFR NON-AFRICAN AMERICAN: >60
GLUCOSE BLD-MCNC: 136 MG/DL (ref 74–109)
HCT VFR BLD CALC: 39.2 % (ref 42–52)
HEMOGLOBIN: 12.8 G/DL (ref 14–18)
INR BLD: 1.14 (ref 0.88–1.18)
LIPASE: 35 U/L (ref 13–60)
LYMPHOCYTES ABSOLUTE: 1.7 K/UL (ref 1.1–4.5)
LYMPHOCYTES RELATIVE PERCENT: 23.8 % (ref 20–40)
MCH RBC QN AUTO: 31.2 PG (ref 27–31)
MCHC RBC AUTO-ENTMCNC: 32.7 G/DL (ref 33–37)
MCV RBC AUTO: 95.6 FL (ref 80–94)
MONOCYTES ABSOLUTE: 0.9 K/UL (ref 0–0.9)
MONOCYTES RELATIVE PERCENT: 12.8 % (ref 0–10)
NEUTROPHILS ABSOLUTE: 4.1 K/UL (ref 1.5–7.5)
NEUTROPHILS RELATIVE PERCENT: 58.7 % (ref 50–65)
PDW BLD-RTO: 14.2 % (ref 11.5–14.5)
PLATELET # BLD: 137 K/UL (ref 130–400)
PMV BLD AUTO: 10.9 FL (ref 9.4–12.4)
POTASSIUM SERPL-SCNC: 4 MMOL/L (ref 3.5–5)
PRO-BNP: 100 PG/ML (ref 0–1800)
PROTHROMBIN TIME: 14 SEC (ref 12–14.6)
RBC # BLD: 4.1 M/UL (ref 4.7–6.1)
SODIUM BLD-SCNC: 144 MMOL/L (ref 136–145)
TOTAL PROTEIN: 6.2 G/DL (ref 6.6–8.7)
TROPONIN: <0.01 NG/ML (ref 0–0.03)
TROPONIN: <0.01 NG/ML (ref 0–0.03)
WBC # BLD: 6.9 K/UL (ref 4.8–10.8)

## 2019-05-27 PROCEDURE — 80053 COMPREHEN METABOLIC PANEL: CPT

## 2019-05-27 PROCEDURE — 84484 ASSAY OF TROPONIN QUANT: CPT

## 2019-05-27 PROCEDURE — 96374 THER/PROPH/DIAG INJ IV PUSH: CPT

## 2019-05-27 PROCEDURE — A9500 TC99M SESTAMIBI: HCPCS | Performed by: INTERNAL MEDICINE

## 2019-05-27 PROCEDURE — 96375 TX/PRO/DX INJ NEW DRUG ADDON: CPT

## 2019-05-27 PROCEDURE — 85025 COMPLETE CBC W/AUTO DIFF WBC: CPT

## 2019-05-27 PROCEDURE — 93017 CV STRESS TEST TRACING ONLY: CPT

## 2019-05-27 PROCEDURE — 6370000000 HC RX 637 (ALT 250 FOR IP): Performed by: EMERGENCY MEDICINE

## 2019-05-27 PROCEDURE — 99285 EMERGENCY DEPT VISIT HI MDM: CPT

## 2019-05-27 PROCEDURE — 96361 HYDRATE IV INFUSION ADD-ON: CPT

## 2019-05-27 PROCEDURE — 6360000002 HC RX W HCPCS: Performed by: INTERNAL MEDICINE

## 2019-05-27 PROCEDURE — 36415 COLL VENOUS BLD VENIPUNCTURE: CPT

## 2019-05-27 PROCEDURE — 85730 THROMBOPLASTIN TIME PARTIAL: CPT

## 2019-05-27 PROCEDURE — 83880 ASSAY OF NATRIURETIC PEPTIDE: CPT

## 2019-05-27 PROCEDURE — 99285 EMERGENCY DEPT VISIT HI MDM: CPT | Performed by: EMERGENCY MEDICINE

## 2019-05-27 PROCEDURE — 78452 HT MUSCLE IMAGE SPECT MULT: CPT

## 2019-05-27 PROCEDURE — 71045 X-RAY EXAM CHEST 1 VIEW: CPT

## 2019-05-27 PROCEDURE — 83690 ASSAY OF LIPASE: CPT

## 2019-05-27 PROCEDURE — 6360000002 HC RX W HCPCS: Performed by: EMERGENCY MEDICINE

## 2019-05-27 PROCEDURE — 93005 ELECTROCARDIOGRAM TRACING: CPT

## 2019-05-27 PROCEDURE — 85610 PROTHROMBIN TIME: CPT

## 2019-05-27 PROCEDURE — 2580000003 HC RX 258: Performed by: EMERGENCY MEDICINE

## 2019-05-27 PROCEDURE — 3430000000 HC RX DIAGNOSTIC RADIOPHARMACEUTICAL: Performed by: INTERNAL MEDICINE

## 2019-05-27 PROCEDURE — 99223 1ST HOSP IP/OBS HIGH 75: CPT | Performed by: INTERNAL MEDICINE

## 2019-05-27 RX ORDER — KETOROLAC TROMETHAMINE 30 MG/ML
15 INJECTION, SOLUTION INTRAMUSCULAR; INTRAVENOUS ONCE
Status: COMPLETED | OUTPATIENT
Start: 2019-05-27 | End: 2019-05-27

## 2019-05-27 RX ORDER — MORPHINE SULFATE 4 MG/ML
4 INJECTION, SOLUTION INTRAMUSCULAR; INTRAVENOUS ONCE
Status: DISCONTINUED | OUTPATIENT
Start: 2019-05-27 | End: 2019-05-27

## 2019-05-27 RX ORDER — NITROGLYCERIN 0.4 MG/1
0.4 TABLET SUBLINGUAL EVERY 5 MIN PRN
Status: DISCONTINUED | OUTPATIENT
Start: 2019-05-27 | End: 2019-05-27 | Stop reason: HOSPADM

## 2019-05-27 RX ORDER — ONDANSETRON 2 MG/ML
4 INJECTION INTRAMUSCULAR; INTRAVENOUS ONCE
Status: COMPLETED | OUTPATIENT
Start: 2019-05-27 | End: 2019-05-27

## 2019-05-27 RX ORDER — 0.9 % SODIUM CHLORIDE 0.9 %
500 INTRAVENOUS SOLUTION INTRAVENOUS ONCE
Status: COMPLETED | OUTPATIENT
Start: 2019-05-27 | End: 2019-05-27

## 2019-05-27 RX ORDER — ASPIRIN 325 MG
325 TABLET ORAL ONCE
Status: DISCONTINUED | OUTPATIENT
Start: 2019-05-27 | End: 2019-05-27

## 2019-05-27 RX ORDER — ONDANSETRON 2 MG/ML
4 INJECTION INTRAMUSCULAR; INTRAVENOUS ONCE
Status: DISCONTINUED | OUTPATIENT
Start: 2019-05-27 | End: 2019-05-27

## 2019-05-27 RX ADMIN — SODIUM CHLORIDE 500 ML: 9 INJECTION, SOLUTION INTRAVENOUS at 07:21

## 2019-05-27 RX ADMIN — REGADENOSON 0.4 MG: 0.08 INJECTION, SOLUTION INTRAVENOUS at 14:04

## 2019-05-27 RX ADMIN — KETOROLAC TROMETHAMINE 15 MG: 30 INJECTION, SOLUTION INTRAMUSCULAR; INTRAVENOUS at 08:29

## 2019-05-27 RX ADMIN — MUPIROCIN: 20 OINTMENT TOPICAL at 07:50

## 2019-05-27 RX ADMIN — ONDANSETRON 4 MG: 2 INJECTION INTRAMUSCULAR; INTRAVENOUS at 08:29

## 2019-05-27 RX ADMIN — TETRAKIS(2-METHOXYISOBUTYLISOCYANIDE)COPPER(I) TETRAFLUOROBORATE 30 MILLICURIE: 1 INJECTION, POWDER, LYOPHILIZED, FOR SOLUTION INTRAVENOUS at 14:28

## 2019-05-27 RX ADMIN — NITROGLYCERIN 0.4 MG: 0.4 TABLET, ORALLY DISINTEGRATING SUBLINGUAL at 07:21

## 2019-05-27 RX ADMIN — TETRAKIS(2-METHOXYISOBUTYLISOCYANIDE)COPPER(I) TETRAFLUOROBORATE 10 MILLICURIE: 1 INJECTION, POWDER, LYOPHILIZED, FOR SOLUTION INTRAVENOUS at 14:28

## 2019-05-27 ASSESSMENT — ENCOUNTER SYMPTOMS
RHINORRHEA: 0
SHORTNESS OF BREATH: 1
ABDOMINAL PAIN: 0
NAUSEA: 1
DIARRHEA: 0
SORE THROAT: 0
VOMITING: 0
BACK PAIN: 0

## 2019-05-27 ASSESSMENT — PAIN DESCRIPTION - LOCATION: LOCATION: CHEST

## 2019-05-27 ASSESSMENT — PAIN SCALES - GENERAL
PAINLEVEL_OUTOF10: 2
PAINLEVEL_OUTOF10: 4

## 2019-05-27 NOTE — CONSULTS
CATHETERIZATION  12/12/07,07/13/09    Left Heart Cath    CHOLECYSTECTOMY      right    COLON SURGERY      GALLBLADDER SURGERY      LEG SURGERY      right    LUNG REMOVAL, PARTIAL      MANDIBLE FRACTURE SURGERY      ORTHOPEDIC SURGERY      legs, hand and ankles    SKIN CANCER EXCISION  2016         Home Medications:   Prior to Admission medications    Medication Sig Start Date End Date Taking? Authorizing Provider   Cholecalciferol (VITAMIN D3) 5000 units TABS Take 5,000 Units by mouth   Yes Historical Provider, MD   simvastatin (ZOCOR) 20 MG tablet Take 1 tablet by mouth every evening 5/3/19  Yes B Destinee Sorrel, DO   ELIQUIS 2.5 MG TABS tablet TAKE 1 TABLET BY MOUTH TWICE DAILY 4/15/19  Yes DAVIE Quintero   sucralfate (CARAFATE) 1 GM tablet Take 1 g by mouth 2/15/19  Yes Historical Provider, MD   metoprolol tartrate (LOPRESSOR) 25 MG tablet Take 1 tablet by mouth 2 times daily Patient will fill next month 1/18/19  Yes DAVIE Quintero   tamsulosin (FLOMAX) 0.4 MG capsule TAKE ONE CAPSULE BY MOUTH ONCE DAILY 8/13/18  Yes DAVIE Chaudhary   isosorbide mononitrate (IMDUR) 30 MG extended release tablet TAKE ONE TABLET BY MOUTH EVERY 24 HOURS 7/24/18  Yes DAVIE Chaudhary   pantoprazole (PROTONIX) 40 MG tablet TAKE ONE TABLET BY MOUTH ONCE DAILY 7/24/18  Yes DAVIE Chaudhary   DULoxetine (CYMBALTA) 60 MG extended release capsule Take 1 capsule by mouth daily 4/10/18  Yes GISELLE Destinee Sorrel, DO   vitamin B-12 (CYANOCOBALAMIN) 100 MCG tablet Take 50 mcg by mouth daily. Yes Historical Provider, MD   glucosamine-chondroitin 500-400 MG CAPS Take 1 capsule by mouth    Historical Provider, MD        Facility Administered Medications:    mupirocin   Topical Daily       Allergies:  Patient has no known allergies.      Social History:       Social History     Socioeconomic History    Marital status:      Spouse name: Not on file    Number of

## 2019-05-27 NOTE — ED PROVIDER NOTES
[facility] EMERGENCY DEPT  eMERGENCY dEPARTMENT eNCOUnter      Pt Name: Petrona Driver  MRN: [id number]  [place] 1935  Date of evaluation: 5/27/2019  Provider: Jose M Reza MD    [address] [place] [address]       Chief Complaint   Patient presents with    Chest Pain     left side \"woke me up\"         HISTORY OF PRESENT ILLNESS   (Location/Symptom, Timing/Onset,Context/Setting, Quality, Duration, Modifying Factors, Severity)  Note limiting factors. Petrona Driver is a 82 y.o. male who presents to the emergency department chest pain. Woke him up around 3:00 this morning. Heaviness. Located on the left side of his chest. Associated with clamminess nausea and not feeling well. Patient has prior history of 4 stents in his pain is the same as it was prior to stenting. He states he took a full aspirin and some nitro. The nitro did not help his pain. His pain level is about 3 or 4 at this time. Dr Eduardo Rodriguez is his cardiologist. Stress test in July 2017    HPI    NursingNotes were reviewed. REVIEW OF SYSTEMS    (2-9 systems for level 4, 10 or more for level 5)     Review of Systems   Constitutional: Positive for diaphoresis. Negative for chills and fever. HENT: Negative for rhinorrhea and sore throat. Respiratory: Positive for shortness of breath. Cardiovascular: Positive for chest pain. Negative for leg swelling. Gastrointestinal: Positive for nausea. Negative for abdominal pain, diarrhea and vomiting. Genitourinary: Negative for difficulty urinating. Musculoskeletal: Negative for back pain and neck pain. Skin: Negative for rash. Neurological: Negative for weakness and headaches. Psychiatric/Behavioral: Negative for confusion. A complete review of systems was performed and is negative except as noted above in the HPI.        PAST MEDICAL HISTORY     Past Medical History:   Diagnosis Date    [facility] ([facility] [address]     Aneurysm ([facility] [address] aortic    Anxiety     Arthritis     Polyarticular    Burgos's esophagus

## 2019-05-29 LAB
LV EF: 72 %
LVEF MODALITY: NORMAL

## 2019-06-10 ENCOUNTER — OFFICE VISIT (OUTPATIENT)
Dept: GASTROENTEROLOGY | Facility: CLINIC | Age: 84
End: 2019-06-10

## 2019-06-10 VITALS
HEART RATE: 64 BPM | OXYGEN SATURATION: 98 % | DIASTOLIC BLOOD PRESSURE: 72 MMHG | BODY MASS INDEX: 26.6 KG/M2 | SYSTOLIC BLOOD PRESSURE: 122 MMHG | WEIGHT: 190 LBS | HEIGHT: 71 IN | TEMPERATURE: 97.8 F

## 2019-06-10 DIAGNOSIS — R13.19 OTHER DYSPHAGIA: ICD-10-CM

## 2019-06-10 DIAGNOSIS — K21.9 GASTROESOPHAGEAL REFLUX DISEASE, ESOPHAGITIS PRESENCE NOT SPECIFIED: Primary | ICD-10-CM

## 2019-06-10 DIAGNOSIS — R12 HEARTBURN: ICD-10-CM

## 2019-06-10 PROCEDURE — 99213 OFFICE O/P EST LOW 20 MIN: CPT | Performed by: NURSE PRACTITIONER

## 2019-06-10 RX ORDER — SIMVASTATIN 20 MG
20 TABLET ORAL DAILY
COMMUNITY
Start: 2019-05-03

## 2019-06-10 NOTE — PROGRESS NOTES
Chief Complaint:   Chief Complaint   Patient presents with   • Follow-up     Pt presents today for procedure follow up-had endo 4/10/2019 for dysphagia and reflux; pt states he has been feeling good          Patient ID: Nicolas Nieves is a 84 y.o. male     History of Present Illness: This is a very pleasant 84-year-old male who is here today to follow-up EGD for complaints of dysphagia, GERD and heartburn.  EGD was performed on 4/10/2019 with no endoscopic esophageal abnormality however esophagus was dilated.  There were a few small gastric polyps otherwise normal.  Biopsies were found to be normal.  The patient tells me today he has not been having any more difficulties.  States that he feels that the scope dilation helped quite a bit.    The patient denies any nausea, vomiting, epigastric pain, dysphagia, pyrosis or hematemesis.  The patient denies any fever or chills.  Denies any melena or hematochezia.  Denies any unintentional weight loss or loss of appetite.    Past Medical History:   Diagnosis Date   • Arthritis    • Atrial fibrillation (CMS/HCC)    • Harmon syndrome    • CAD (coronary artery disease)    • Cancer (CMS/HCC)     COLON   • Colon polyp    • COPD (chronic obstructive pulmonary disease) (CMS/HCC)    • Gastritis    • GERD (gastroesophageal reflux disease)    • Hemorrhoids    • Hypertension    • Peptic ulcer    • Renal calculi        Past Surgical History:   Procedure Laterality Date   • CARDIAC CATHETERIZATION      stents x 4   • CHOLECYSTECTOMY     • COLONOSCOPY  04/25/2014    Rectal polyp; Repeat 3 years    • COLONOSCOPY N/A 5/17/2017    The examined portion of the ileum was normal; Patent end-to-side ileo-colonic anastomosis, characterized by healthy appearing mucosa; The entire examined colon is normal; No specimens collected; No plans to repeat due to age    • COLONOSCOPY  04/17/2013    Mass ascending colon; Internal hemorrhoids    • COLONOSCOPY  02/16/2010    Dr. Sanchez-Internal hermorrhoids;  Repeat 3-5 years    • ENDOSCOPY N/A 4/10/2019    No endoscopic esophageal abnormality to explain patient's dysphagia-esophagus dilated; A few gastric polyps-biopsied; Normal examined duodenum   • ENDOSCOPY  04/17/2013    Duodenitis; LA grade A esophagitis    • ENDOSCOPY  8/11/2020    Healed gastric ulcers    • ENDOSCOPY  02/16/2010    Gastric ulcers    • ENDOSCOPY  09/15/2009    Normal    • HEMICOLECTOMY Right 05/2013   • LUNG REMOVAL, PARTIAL           Current Outpatient Medications:   •  aspirin 81 MG EC tablet, Take 81 mg by mouth Daily., Disp: , Rfl:   •  Cholecalciferol (VITAMIN D3) 5000 units tablet tablet, Take 5,000 Units by mouth Daily., Disp: , Rfl:   •  DULoxetine (CYMBALTA) 60 MG capsule, Take 1 capsule by mouth Daily., Disp: , Rfl:   •  ELIQUIS 2.5 MG tablet tablet, Take 1 tablet by mouth 2 (Two) Times a Day., Disp: , Rfl:   •  glucosamine-chondroitin 500-400 MG capsule capsule, Take 1 capsule by mouth Daily., Disp: , Rfl:   •  isosorbide mononitrate (IMDUR) 30 MG 24 hr tablet, Take 30 mg by mouth Daily., Disp: , Rfl:   •  metoprolol tartrate (LOPRESSOR) 50 MG tablet, Take 25 mg by mouth 2 (Two) Times a Day., Disp: , Rfl:   •  pantoprazole (PROTONIX) 40 MG EC tablet, Take 1 tablet by mouth 2 (Two) Times a Day., Disp: 60 tablet, Rfl: 11  •  simvastatin (ZOCOR) 20 MG tablet, Take 20 mg by mouth Daily., Disp: , Rfl:   •  vitamin B-12 (CYANOCOBALAMIN) 100 MCG tablet, Take 1 tablet by mouth Daily., Disp: , Rfl:     Allergies   Allergen Reactions   • Lortab [Hydrocodone-Acetaminophen] Nausea And Vomiting   • Morphine And Related Nausea And Vomiting       Social History     Socioeconomic History   • Marital status:      Spouse name: Not on file   • Number of children: Not on file   • Years of education: Not on file   • Highest education level: Not on file   Tobacco Use   • Smoking status: Former Smoker   • Smokeless tobacco: Never Used   Substance and Sexual Activity   • Alcohol use: No   • Drug use: No  "  • Sexual activity: Defer       Family History   Problem Relation Age of Onset   • Colon cancer Neg Hx    • Colon polyps Neg Hx    • Esophageal cancer Neg Hx        Vitals:    06/10/19 0945   BP: 122/72   BP Location: Left arm   Patient Position: Sitting   Cuff Size: Adult   Pulse: 64   Temp: 97.8 °F (36.6 °C)   TempSrc: Tympanic   SpO2: 98%   Weight: 86.2 kg (190 lb)   Height: 180.3 cm (71\")       Review of Systems:    General:    Present -feeling well   Skin:    Not Present-Rash   HEENT:     Not Present-Acute visual changes or Acute hearing changes   Neck :    Not Present- swollen glands   Genitourinary:      Not Present- burning, frequency, urgency hematuria, dysuria,   Cardiovascular:   Not Present-chest pain, palpitations, or pressure   Respiratory:   Not Present- shortness of breath or cough   Gastrointestinal:  Musculoskeletal:  Neurological:  Psychiatric:   Present as mentioned in the HP    Not Present. Recent gait disturbances.    Not Present-Seizures and weakness in extremities.    Not Present- Anxiety or Depression.       Physical Exam:    General Appearance:    Alert, cooperative, in no acute distress   Psych:    Mood appropriate    Eyes:          conjunctivae and sclerae normal, no   icterus, no pallor   ENMT:    Ears appear intact with no abnormalities noted oral mucosa moist   Neck:   No adenopathy, supple, trachea midline, no thyromegaly, no   carotid bruit, no JVD    Cardiovascular:    Regular rhythm and normal rate, normal S1 and S2, no            murmur, no gallop, no rub, no click   Gastrointestinal:     Inspection normal.  Normal bowel sounds, no masses, no organomegaly, soft round non-tender, non-distended, no guarding, no rebound or tenderness. No hepatosplenomegaly.   Skin:   No bleeding, bruising or rash   Neurologic:   nonfocal       Lab Results   Component Value Date    WBC 6.9 05/27/2019    WBC 7.6 05/03/2019    WBC 8.4 12/02/2018    HGB 12.8 (L) 05/27/2019    HGB 14.4 05/03/2019    HGB " 14.5 12/02/2018    HCT 39.2 (L) 05/27/2019    HCT 45.0 05/03/2019    HCT 44.7 12/02/2018     05/27/2019     05/03/2019     12/02/2018        Lab Results   Component Value Date     05/27/2019     05/03/2019     12/02/2018    K 4.0 05/27/2019    K 4.5 05/03/2019    K 4.5 12/02/2018     05/27/2019     05/03/2019     12/02/2018    CO2 27 05/27/2019    CO2 24 05/03/2019    CO2 30 (H) 12/02/2018    BUN 17 05/27/2019    BUN 16 05/03/2019    BUN 16 12/02/2018    CREATININE 1.1 05/27/2019    CREATININE 1.1 05/03/2019    CREATININE 1.2 12/02/2018    BILITOT 0.5 05/27/2019    BILITOT 0.5 05/03/2019    BILITOT 0.7 12/02/2018    ALKPHOS 53 05/27/2019    ALKPHOS 68 05/03/2019    ALKPHOS 66 12/02/2018    ALT 21 05/27/2019    ALT 24 05/03/2019    ALT 22 12/02/2018    AST 23 05/27/2019    AST 25 05/03/2019    AST 19 12/02/2018    GLUCOSE 136 (H) 05/27/2019    GLUCOSE 106 05/03/2019    GLUCOSE 71 (L) 12/02/2018       Lab Results   Component Value Date    INR 1.14 05/27/2019    INR 1.08 12/02/2018       Body mass index is 26.5 kg/m². Patient's Body mass index is 26.5 kg/m². BMI is within normal parameters. No follow-up required..    Assessment and Plan:  Assessment/Plan   Nicolas was seen today for follow-up.    Diagnoses and all orders for this visit:    Gastroesophageal reflux disease, esophagitis presence not specified  Comments:  Continue PPI    Heartburn  Comments:  Stable per patient.    Other dysphagia  Comments:  Denies at this time             There are no Patient Instructions on file for this visit.    Next follow-up appointment          EMR Dragon/Transcription disclaimer:  Much of this encounter note is an electronic transcription/translation of spoken language to printed text. The electronic translation of spoken language may permit erroneous, or at times, nonsensical words or phrases to be inadvertently transcribed; although I have reviewed the note for such errors,  some may still exist.

## 2019-06-11 DIAGNOSIS — E78.00 HYPERCHOLESTEROLEMIA: ICD-10-CM

## 2019-06-11 RX ORDER — SIMVASTATIN 20 MG
20 TABLET ORAL EVERY EVENING
Qty: 90 TABLET | Refills: 3 | Status: SHIPPED | OUTPATIENT
Start: 2019-06-11 | End: 2020-06-24

## 2019-06-11 NOTE — TELEPHONE ENCOUNTER
Received fax from pharmacy requesting refill on pts medication(s). Pt was last seen in office on 5/3/2019  and has a follow up scheduled for 6/11/2019. Will send request to  Dr. Ramu Douglas  for patient.      Requested Prescriptions     Pending Prescriptions Disp Refills    simvastatin (ZOCOR) 20 MG tablet 90 tablet 3     Sig: Take 1 tablet by mouth every evening

## 2019-06-13 ENCOUNTER — OFFICE VISIT (OUTPATIENT)
Dept: PRIMARY CARE CLINIC | Age: 84
End: 2019-06-13
Payer: MEDICARE

## 2019-06-13 VITALS
HEART RATE: 54 BPM | HEIGHT: 71 IN | OXYGEN SATURATION: 96 % | BODY MASS INDEX: 27.02 KG/M2 | WEIGHT: 193 LBS | TEMPERATURE: 98.1 F | DIASTOLIC BLOOD PRESSURE: 84 MMHG | SYSTOLIC BLOOD PRESSURE: 136 MMHG

## 2019-06-13 DIAGNOSIS — I25.10 CORONARY ARTERY DISEASE INVOLVING NATIVE CORONARY ARTERY OF NATIVE HEART WITHOUT ANGINA PECTORIS: ICD-10-CM

## 2019-06-13 DIAGNOSIS — I48.0 PAROXYSMAL ATRIAL FIBRILLATION (HCC): ICD-10-CM

## 2019-06-13 DIAGNOSIS — R07.9 CHEST PAIN, UNSPECIFIED TYPE: ICD-10-CM

## 2019-06-13 DIAGNOSIS — Z09 HOSPITAL DISCHARGE FOLLOW-UP: Primary | ICD-10-CM

## 2019-06-13 PROCEDURE — 1123F ACP DISCUSS/DSCN MKR DOCD: CPT | Performed by: PEDIATRICS

## 2019-06-13 PROCEDURE — 1111F DSCHRG MED/CURRENT MED MERGE: CPT | Performed by: PEDIATRICS

## 2019-06-13 PROCEDURE — 1036F TOBACCO NON-USER: CPT | Performed by: PEDIATRICS

## 2019-06-13 PROCEDURE — 4040F PNEUMOC VAC/ADMIN/RCVD: CPT | Performed by: PEDIATRICS

## 2019-06-13 PROCEDURE — 99213 OFFICE O/P EST LOW 20 MIN: CPT | Performed by: PEDIATRICS

## 2019-06-13 PROCEDURE — G8427 DOCREV CUR MEDS BY ELIG CLIN: HCPCS | Performed by: PEDIATRICS

## 2019-06-13 PROCEDURE — G8417 CALC BMI ABV UP PARAM F/U: HCPCS | Performed by: PEDIATRICS

## 2019-06-13 PROCEDURE — G8598 ASA/ANTIPLAT THER USED: HCPCS | Performed by: PEDIATRICS

## 2019-06-13 ASSESSMENT — ENCOUNTER SYMPTOMS
VOMITING: 0
DIARRHEA: 0
CHEST TIGHTNESS: 0
NAUSEA: 0
BACK PAIN: 0
SHORTNESS OF BREATH: 0
ABDOMINAL PAIN: 0
WHEEZING: 0
SORE THROAT: 0
COUGH: 0

## 2019-06-13 NOTE — PROGRESS NOTES
[address] [facility], [address]  Phone [phone]   Fax [phone]      OFFICE VISIT: 2019    Vamsi Moctezuma-: 1935      HPI  Reason For Visit:  Kamryn Tejada is a 82 y.o. Health Maintenance    Follow-up [facility] ED follow- no concerns- not having concernig chest pain)    Patient presents on follow-up from emergency department on 2019. He presented with left-sided chest pain that woke him up from sleep at 03:30. He was transported by ambulance. He also complained of diaphoresis with this. He took 2 nitroglycerin and aspirin and presented to the emergency department at [facility].  He does see Dr. Leopoldo Patch as his cardiologist.  Was evaluated in the emergency department. Troponin was negative. He ruled out for myocardial infarction x2 with serial cardiac enzymes. He did undergo a nuclear stress test was interpreted by Dr. Leopoldo Patch. Narrative    ORIGINAL REPORT    Lexiscan/Cardiolyte Nuclear Medicine Report   Date of Procedure: 2019   The patient was injected with 5.85 millicuries (mCi) of Technetium   (Tc99m).  After an appropriate level of stress the patient was   re-injected with 40.03 millicuries (mCi) of Technetium (Tc99m). Repeat gated images were then performed per standard protocol. Findings:   1.  Analysis of the the stress and rest images reveals possible   inferior ischemia vs diaphragmatic artifact. 2.  Analysis of the gated images reveals grossly normal left   ventricular function with a calculated ejection fraction of 72 %. 3. Barr Racer is 8 % ischemic myocardium at risk on stress and 22 %   ischemic myocardium at risk on rest.            Impression   Impression:   There is possible  ischemia vs diaphragmatic artifact, with a   calculated ejection fraction of 72 % with a -14 % of ischemic burden.    Suggest: clinical correlation   Signed by Dr Ricardo Elliott on 2019 3:19 PM ADDENDUM #1      [place] Nuclear Stress Test Report 05/27/2019 0.30     Basophils # 05/27/2019 0.00     Sodium 05/27/2019 144     Potassium 05/27/2019 4.0     Chloride 05/27/2019 107     CO2 05/27/2019 27     Anion Gap 05/27/2019 10     Glucose 05/27/2019 136*    BUN 05/27/2019 17     CREATININE 05/27/2019 1.1     GFR Non- 05/27/2019 >60     Calcium 05/27/2019 8.8     Total Protein 05/27/2019 6.2*    Alb 05/27/2019 3.9     Total Bilirubin 05/27/2019 0.5     Alkaline Phosphatase 05/27/2019 53     ALT 05/27/2019 21     AST 05/27/2019 23     P-R Interval 05/27/2019 160     QRS Duration 05/27/2019 90     Q-T Interval 05/27/2019 378     QTc Calculation (Bazett) 05/27/2019 399     P Axis 05/27/2019 53     T Axis 05/27/2019 3     P-R Interval 05/27/2019 156     QRS Duration 05/27/2019 86     Q-T Interval 05/27/2019 378     QTc Calculation (Bazett) 05/27/2019 406     P Axis 05/27/2019 21     T Axis 05/27/2019 16     Troponin 05/27/2019 <0.01     Troponin 05/27/2019 <0.01     Protime 05/27/2019 14.0     INR 05/27/2019 1.14     aPTT 05/27/2019 36.1     Lipase 05/27/2019 35     Left Ventricular Ejectio* 05/27/2019 72     LVEF MODALITY 05/27/2019 Nuclear    Orders Only on 05/03/2019   Component Date Value    PSA 05/03/2019 1.93     Microalbumin, Random Uri* 05/03/2019 4.80     Creatinine, Ur 05/03/2019 170.9     Microalbumin Creatinine * 05/03/2019 28.1     WBC 05/03/2019 7.6     RBC 05/03/2019 4.74     Hemoglobin 05/03/2019 14.4     Hematocrit 05/03/2019 45.0     MCV 05/03/2019 94.9*    MCH 05/03/2019 30.4     MCHC 05/03/2019 32.0*    RDW 05/03/2019 14.1     Platelets 44/40/4902 175     MPV 05/03/2019 10.9     Neutrophils % 05/03/2019 50.1     Lymphocytes % 05/03/2019 30.9     Monocytes % 05/03/2019 11.7*    Eosinophils % 05/03/2019 6.4*    Basophils % 05/03/2019 0.4     Neutrophils # 05/03/2019 3.8     Lymphocytes # 05/03/2019 2.4     Monocytes # 05/03/2019 0.90     Eosinophils # 05/03/2019 0.50     Basophils # 05/03/2019 0.00     Sodium 05/03/2019 141     Potassium 05/03/2019 4.5     Chloride 05/03/2019 102     CO2 05/03/2019 24     Anion Gap 05/03/2019 15     Glucose 05/03/2019 106     BUN 05/03/2019 16     CREATININE 05/03/2019 1.1     GFR Non- 05/03/2019 >60     Calcium 05/03/2019 9.6     Total Protein 05/03/2019 7.4     Alb 05/03/2019 4.3     Total Bilirubin 05/03/2019 0.5     Alkaline Phosphatase 05/03/2019 68     ALT 05/03/2019 24     AST 05/03/2019 25     TSH 05/03/2019 3.740     T4 Free 05/03/2019 1.0     Cholesterol, Total 05/03/2019 186     Triglycerides 05/03/2019 147     HDL 05/03/2019 38*    LDL Calculated 05/03/2019 119      Copies of these are in the chart. Current Outpatient Medications   Medication Sig Dispense Refill    simvastatin (ZOCOR) 20 MG tablet Take 1 tablet by mouth every evening 90 tablet 3    Cholecalciferol (VITAMIN D3) 5000 units TABS Take 5,000 Units by mouth      glucosamine-chondroitin 500-400 MG CAPS Take 1 capsule by mouth      ELIQUIS 2.5 MG TABS tablet TAKE 1 TABLET BY MOUTH TWICE DAILY 180 tablet 3    metoprolol tartrate (LOPRESSOR) 25 MG tablet Take 1 tablet by mouth 2 times daily Patient will fill next month 180 tablet 3    tamsulosin (FLOMAX) 0.4 MG capsule TAKE ONE CAPSULE BY MOUTH ONCE DAILY 90 capsule 3    isosorbide mononitrate (IMDUR) 30 MG extended release tablet TAKE ONE TABLET BY MOUTH EVERY 24 HOURS 90 tablet 3    pantoprazole (PROTONIX) 40 MG tablet TAKE ONE TABLET BY MOUTH ONCE DAILY 90 tablet 3    DULoxetine (CYMBALTA) 60 MG extended release capsule Take 1 capsule by mouth daily 90 capsule 3    vitamin B-12 (CYANOCOBALAMIN) 100 MCG tablet Take 50 mcg by mouth daily. No current facility-administered medications for this visit. Allergies: Patient has no known allergies.      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    Anxiety     Arthritis Polyarticular    Burgos's esophagus 2010    H/o    CAD (coronary artery disease)     CAD (coronary artery disease), native coronary artery     Chronic respiratory failure (HCC)     Costochondritis     Erectile dysfunction     Gastroesophageal reflux disease     GERD (gastroesophageal reflux disease)     Heart block stents    High cholesterol     Hypercholesterolemia 2010    Hypertension     Hypertension     MI (mitral incompetence)     x4 stents    Nephrolithiasis     Non-cardiac chest pain 12/20/2011    Renal calculi     Right upper lobe consolidation ([address]     Tobacco abuse 2010    Ulcer, gastric, acute 3/14/2012    Weight loss 2010       Family History   Problem Relation Age of Onset    Diabetes Mother     Coronary Art Dis Father     High Blood Pressure Father     High Cholesterol Father     Heart Attack Father     Stroke Father     Kidney Disease Son     Kidney Disease Son        Past Surgical History:   Procedure Laterality Date    ANKLE SURGERY      left    APPENDECTOMY      CARDIAC CATHETERIZATION  12/12/07,07/13/09    Left Heart Cath    CHOLECYSTECTOMY      right    COLON SURGERY      GALLBLADDER SURGERY      LEG SURGERY      right    LUNG REMOVAL, PARTIAL      MANDIBLE FRACTURE SURGERY      ORTHOPEDIC SURGERY      legs, hand and ankles    SKIN CANCER EXCISION  2016       Social History     Tobacco Use    Smoking status: Former Smoker    Smokeless tobacco: Never Used    Tobacco comment: quit in 1983   Substance Use Topics    Alcohol use: No        Review of Systems   Constitutional: Negative for chills, fatigue and fever. HENT: Negative for congestion, ear pain and sore throat. Eyes: Negative for visual disturbance. Respiratory: Negative for cough, chest tightness, shortness of breath and wheezing. Cardiovascular: Negative for chest pain (none since his ED visit), palpitations and leg swelling.    Gastrointestinal: Negative for abdominal pain, diarrhea, nausea and vomiting. Endocrine: Negative for polyuria. Genitourinary: Negative for frequency and urgency. Musculoskeletal: Negative for back pain and neck pain. Skin: Negative for rash. Neurological: Negative for dizziness and headaches. Psychiatric/Behavioral: Negative for self-injury. The patient is not nervous/anxious. Physical Exam   Constitutional: He is oriented to person, place, and time. He appears well-developed and well-nourished. No distress. HENT:   Head: Normocephalic and atraumatic. Right Ear: Hearing and external ear normal.   Left Ear: Hearing and external ear normal.   Nose: Nose normal.   Mouth/Throat: Oropharynx is clear and moist.   Eyes: Pupils are equal, round, and reactive to light. Conjunctivae and EOM are normal. No scleral icterus. Neck: Normal range of motion. Neck supple. No JVD present. Carotid bruit is not present. No thyromegaly present. Cardiovascular: Normal rate, regular rhythm, S1 normal, S2 normal and normal heart sounds. No extrasystoles are present. PMI is not displaced. Exam reveals no gallop and no friction rub. No murmur heard. Pulmonary/Chest: Effort normal and breath sounds normal. No respiratory distress. He has no wheezes. He has no rhonchi. He has no rales. Abdominal: Soft. Bowel sounds are normal. There is no hepatosplenomegaly. There is no tenderness. There is no rebound, no guarding and no CVA tenderness. Genitourinary:   Genitourinary Comments: Exam deferred   Musculoskeletal: Normal range of motion. He exhibits no edema or tenderness. Lymphadenopathy:     He has no cervical adenopathy. Neurological: He is alert and oriented to person, place, and time. He has normal strength. No sensory deficit (no numbness or tingling). Skin: Skin is warm and dry. No rash noted. Psychiatric: He has a normal mood and affect. His behavior is normal. Judgment and thought content normal.   Vitals reviewed. ASSESSMENT      ICD-10-CM    1. Hospital discharge follow-up Z09    2. Chest pain, unspecified type R07.9    3. Paroxysmal atrial fibrillation (HCC) I48.0    4. Coronary artery disease involving native coronary artery of native heart without angina pectoris I25.10        PLAN      ICD-10-CM    1. Hospital discharge follow-up Z09    2. Chest pain, unspecified type R07.9    3. Paroxysmal atrial fibrillation (HCC) I48.0    4. Coronary artery disease involving native coronary artery of native heart without angina pectoris I25.10      He does not want any additional medications at this time. He does not want any changes in his medication regimen. We did discuss stopping Carafate as he was taking this with his other medications including his cardiac meds and his anticoagulation medications. He will follow-up with cardiology as previously scheduled. No orders of the defined types were placed in this encounter. No follow-ups on file.

## 2019-07-05 ENCOUNTER — OFFICE VISIT (OUTPATIENT)
Dept: PRIMARY CARE CLINIC | Age: 84
End: 2019-07-05
Payer: MEDICARE

## 2019-07-05 VITALS
OXYGEN SATURATION: 96 % | HEIGHT: 71 IN | DIASTOLIC BLOOD PRESSURE: 80 MMHG | TEMPERATURE: 98 F | WEIGHT: 189 LBS | BODY MASS INDEX: 26.46 KG/M2 | SYSTOLIC BLOOD PRESSURE: 138 MMHG | HEART RATE: 65 BPM

## 2019-07-05 DIAGNOSIS — S49.92XA SHOULDER INJURY, LEFT, INITIAL ENCOUNTER: ICD-10-CM
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
## 2019-07-05 DIAGNOSIS — M25.512 ACUTE PAIN OF LEFT SHOULDER: ICD-10-CM
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
## 2019-07-05 DIAGNOSIS — S46.009A ROTATOR CUFF INJURY, INITIAL ENCOUNTER: ICD-10-CM

## 2019-07-05 PROCEDURE — G8427 DOCREV CUR MEDS BY ELIG CLIN: HCPCS | Performed by: PEDIATRICS

## 2019-07-05 PROCEDURE — 99213 OFFICE O/P EST LOW 20 MIN: CPT | Performed by: PEDIATRICS

## 2019-07-05 PROCEDURE — 1036F TOBACCO NON-USER: CPT | Performed by: PEDIATRICS

## 2019-07-05 PROCEDURE — G8417 CALC BMI ABV UP PARAM F/U: HCPCS | Performed by: PEDIATRICS

## 2019-07-05 PROCEDURE — G8598 ASA/ANTIPLAT THER USED: HCPCS | Performed by: PEDIATRICS

## 2019-07-05 PROCEDURE — 1123F ACP DISCUSS/DSCN MKR DOCD: CPT | Performed by: PEDIATRICS

## 2019-07-05 PROCEDURE — 4040F PNEUMOC VAC/ADMIN/RCVD: CPT | Performed by: PEDIATRICS

## 2019-07-05 RX ORDER — TRAMADOL HYDROCHLORIDE 50 MG/1
50 TABLET ORAL EVERY 6 HOURS PRN
Qty: 12 TABLET | Refills: 0 | Status: SHIPPED | OUTPATIENT
Start: 2019-07-05 | End: 2019-07-08

## 2019-07-05 RX ORDER — PREDNISONE 10 MG/1
TABLET ORAL
Qty: 43 TABLET | Refills: 0 | Status: SHIPPED | OUTPATIENT
Start: 2019-07-05 | End: 2019-11-01 | Stop reason: ALTCHOICE

## 2019-07-05 ASSESSMENT — ENCOUNTER SYMPTOMS
RESPIRATORY NEGATIVE: 1
EYES NEGATIVE: 1
ALLERGIC/IMMUNOLOGIC NEGATIVE: 1
GASTROINTESTINAL NEGATIVE: 1

## 2019-07-05 NOTE — PROGRESS NOTES
[facility], [address]  Phone [phone]   Fax [phone]      OFFICE VISIT: 2019    Leonard Moctezuma-: 1935      HPI  Reason For Visit:  Blanquita Collins is a 82 y.o. Health Maintenance    Fall and Shoulder Pain (left shoulder)    Patient presents status post a fall onto his left shoulder. He was coming out his front door and he fell backwards and he hit his shoulder on the door. He is now unable to abduct his L arm. He has normal strength in his biceps          height is 5' 11\" (1.803 m) and weight is 189 lb (85.7 kg). His temporal temperature is 98 °F (36.7 °C). His blood pressure is 138/80 and his pulse is 65. His oxygen saturation is 96%. Body mass index is 26.36 kg/m².     I have reviewed the following with the Mr. Paddy Abreu   Lab Review  Admission on 2019, Discharged on 2019   Component Date Value    Pro-BNP 2019 100     WBC 2019 6.9     RBC 2019 4.10*    Hemoglobin 2019 12.8*    Hematocrit 2019 39.2*    MCV 2019 95.6*    MCH 2019 31.2*    MCHC 2019 32.7*    RDW 2019 14.2     Platelets 15/66/0654 137     MPV 2019 10.9     Neutrophils % 2019 58.7     Lymphocytes % 2019 23.8     Monocytes % 2019 12.8*    Eosinophils % 2019 3.7     Basophils % 2019 0.4     Neutrophils # 2019 4.1     Lymphocytes # 2019 1.7     Monocytes # 2019 0.90     Eosinophils # 2019 0.30     Basophils # 2019 0.00     Sodium 2019 144     Potassium 2019 4.0     Chloride 2019 107     CO2 2019 27     Anion Gap 2019 10     Glucose 2019 136*    BUN 2019 17     CREATININE 2019 1.1     GFR Non- 2019 >60     Calcium 2019 8.8     Total Protein 2019 6.2*    Alb 2019 3.9     Total Bilirubin 2019 0.5     Alkaline Phosphatase 2019 53     ALT 05/27/2019 21     AST 05/27/2019 23     P-R Interval 05/27/2019 160     QRS Duration 05/27/2019 90     Q-T Interval 05/27/2019 378     QTc Calculation (Bazett) 05/27/2019 399     P Axis 05/27/2019 53     T Axis 05/27/2019 3     P-R Interval 05/27/2019 156     QRS Duration 05/27/2019 86     Q-T Interval 05/27/2019 378     QTc Calculation (Bazett) 05/27/2019 406     P Axis 05/27/2019 21     T Axis 05/27/2019 16     Troponin 05/27/2019 <0.01     Troponin 05/27/2019 <0.01     Protime 05/27/2019 14.0     INR 05/27/2019 1.14     aPTT 05/27/2019 36.1     Lipase 05/27/2019 35     Left Ventricular Ejectio* 05/27/2019 72     LVEF MODALITY 05/27/2019 Nuclear    Orders Only on 05/03/2019   Component Date Value    PSA 05/03/2019 1.93     Microalbumin, Random Uri* 05/03/2019 4.80     Creatinine, Ur 05/03/2019 170.9     Microalbumin Creatinine * 05/03/2019 28.1     WBC 05/03/2019 7.6     RBC 05/03/2019 4.74     Hemoglobin 05/03/2019 14.4     Hematocrit 05/03/2019 45.0     MCV 05/03/2019 94.9*    MCH 05/03/2019 30.4     MCHC 05/03/2019 32.0*    RDW 05/03/2019 14.1     Platelets 14/96/7903 175     MPV 05/03/2019 10.9     Neutrophils % 05/03/2019 50.1     Lymphocytes % 05/03/2019 30.9     Monocytes % 05/03/2019 11.7*    Eosinophils % 05/03/2019 6.4*    Basophils % 05/03/2019 0.4     Neutrophils # 05/03/2019 3.8     Lymphocytes # 05/03/2019 2.4     Monocytes # 05/03/2019 0.90     Eosinophils # 05/03/2019 0.50     Basophils # 05/03/2019 0.00     Sodium 05/03/2019 141     Potassium 05/03/2019 4.5     Chloride 05/03/2019 102     CO2 05/03/2019 24     Anion Gap 05/03/2019 15     Glucose 05/03/2019 106     BUN 05/03/2019 16     CREATININE 05/03/2019 1.1     GFR Non- 05/03/2019 >60     Calcium 05/03/2019 9.6     Total Protein 05/03/2019 7.4     Alb 05/03/2019 4.3     Total Bilirubin 05/03/2019 0.5     Alkaline Phosphatase 05/03/2019 68     ALT 05/03/2019 24     AST 05/03/2019 25     TSH 05/03/2019 3.740     T4 Free 05/03/2019 1.0     Cholesterol, Total 05/03/2019 186     Triglycerides 05/03/2019 147     HDL 05/03/2019 38*    LDL Calculated 05/03/2019 119      Copies of these are in the chart. Current Outpatient Medications   Medication Sig Dispense Refill    predniSONE (DELTASONE) 10 MG tablet Days 1,2,3 = 60 mg (6 pills), Days 4,5 = 50 mg, Days 6,7 = 40 mg, Day 8 = 30 mg, Day 9 = 20 mg, Day 10 = 10 mg, Day 11,12 = 5 mg (1/2 tab) 43 tablet 0    traMADol (ULTRAM) 50 MG tablet Take 1 tablet by mouth every 6 hours as needed for Pain for up to 3 days. Intended supply: 3 days. Take lowest dose possible to manage pain 12 tablet 0    simvastatin (ZOCOR) 20 MG tablet Take 1 tablet by mouth every evening 90 tablet 3    Cholecalciferol (VITAMIN D3) 5000 units TABS Take 5,000 Units by mouth      glucosamine-chondroitin 500-400 MG CAPS Take 1 capsule by mouth      ELIQUIS 2.5 MG TABS tablet TAKE 1 TABLET BY MOUTH TWICE DAILY 180 tablet 3    metoprolol tartrate (LOPRESSOR) 25 MG tablet Take 1 tablet by mouth 2 times daily Patient will fill next month 180 tablet 3    tamsulosin (FLOMAX) 0.4 MG capsule TAKE ONE CAPSULE BY MOUTH ONCE DAILY 90 capsule 3    isosorbide mononitrate (IMDUR) 30 MG extended release tablet TAKE ONE TABLET BY MOUTH EVERY 24 HOURS 90 tablet 3    pantoprazole (PROTONIX) 40 MG tablet TAKE ONE TABLET BY MOUTH ONCE DAILY 90 tablet 3    DULoxetine (CYMBALTA) 60 MG extended release capsule Take 1 capsule by mouth daily 90 capsule 3    vitamin B-12 (CYANOCOBALAMIN) 100 MCG tablet Take 50 mcg by mouth daily. No current facility-administered medications for this visit. Allergies: Patient has no known allergies.      Past Medical History:   Diagnosis Date    [facility]en[facility] [address]     Aneurysm ([facility] [address] aortic    Anxiety     Arthritis     Polyarticular    Burgos's esophagus 2010    H/o    CAD (coronary artery disease)     CAD (coronary artery disease), native coronary artery     Chronic respiratory failure (HCC)     Costochondritis     Erectile dysfunction     Gastroesophageal reflux disease     GERD (gastroesophageal reflux disease)     Heart block stents    High cholesterol     Hypercholesterolemia 2010    Hypertension     Hypertension     MI (mitral incompetence)     x4 stents    Nephrolithiasis     Non-cardiac chest pain 12/20/2011    Renal calculi     Right upper lobe consolidation ([facility] [address]     Tobacco abuse 2010    Ulcer, gastric, acute 3/14/2012    Weight loss 2010       Family History   Problem Relation Age of Onset    Diabetes Mother     Coronary Art Dis Father     High Blood Pressure Father     High Cholesterol Father     Heart Attack Father     Stroke Father     Kidney Disease Son     Kidney Disease Son        Past Surgical History:   Procedure Laterality Date    ANKLE SURGERY      left    APPENDECTOMY      CARDIAC CATHETERIZATION  12/12/07,07/13/09    Left Heart Cath    CHOLECYSTECTOMY      right    COLON SURGERY      GALLBLADDER SURGERY      LEG SURGERY      right    LUNG REMOVAL, PARTIAL      MANDIBLE FRACTURE SURGERY      ORTHOPEDIC SURGERY      legs, hand and ankles    SKIN CANCER EXCISION  2016       Social History     Tobacco Use    Smoking status: Former Smoker    Smokeless tobacco: Never Used    Tobacco comment: quit in 1983   Substance Use Topics    Alcohol use: No        Review of Systems   Constitutional: Negative. HENT: Negative. Eyes: Negative. Respiratory: Negative. Cardiovascular: Negative. Gastrointestinal: Negative. Endocrine: Negative. Genitourinary: Negative. Musculoskeletal: Negative. Skin: Negative. Allergic/Immunologic: Negative. Neurological: Positive for weakness (left arm). Hematological: Negative. Psychiatric/Behavioral: Negative. Physical Exam   Constitutional: He is oriented to person, place, and time.  He appears Fall at home, initial encounter [address] XXXA traMADol (ULTRAM) 50 MG tablet    Y92.009    2. Acute pain of left shoulder M25.512 traMADol (ULTRAM) 50 MG tablet   3. Shoulder injury, left, initial encounter S49. 92XA traMADol (ULTRAM) 50 MG tablet   4. Rotator cuff injury, initial encounter S46.009A traMADol (ULTRAM) 50 MG tablet       No orders of the defined types were placed in this encounter. No follow-ups on file.

## 2019-07-08 ENCOUNTER — HOSPITAL ENCOUNTER (OUTPATIENT)
Dept: INFUSION THERAPY | Age: 84
Discharge: HOME | End: 2019-07-08
Payer: MEDICARE

## 2019-07-08 PROCEDURE — 36415 COLL VENOUS BLD VENIPUNCTURE: CPT

## 2019-07-08 PROCEDURE — 82378 CARCINOEMBRYONIC ANTIGEN: CPT

## 2019-07-08 PROCEDURE — 80053 COMPREHEN METABOLIC PANEL: CPT

## 2019-07-24 RX ORDER — ISOSORBIDE MONONITRATE 30 MG/1
30 TABLET, EXTENDED RELEASE ORAL DAILY
Qty: 90 TABLET | Refills: 3 | Status: SHIPPED | OUTPATIENT
Start: 2019-07-24 | End: 2020-07-02

## 2019-07-24 NOTE — TELEPHONE ENCOUNTER
Received fax from pharmacy requesting refill on pts medication(s). Pt was last seen in office on 7/5/2019  and has a follow up scheduled for Visit date not found. Will send request to  Dr. Kailey Camacho  for patient.      Requested Prescriptions     Pending Prescriptions Disp Refills    isosorbide mononitrate (IMDUR) 30 MG extended release tablet [Pharmacy Med Name: ISOSORB MONO ER 30MGTAB] 90 tablet 3     Sig: TAKE 1 TABLET BY MOUTH ONCE DAILY

## 2019-07-26 VITALS
BODY MASS INDEX: 26.64 KG/M2 | SYSTOLIC BLOOD PRESSURE: 120 MMHG | WEIGHT: 191 LBS | HEART RATE: 78 BPM | OXYGEN SATURATION: 94 % | DIASTOLIC BLOOD PRESSURE: 7 MMHG

## 2019-07-26 RX ORDER — ZOLPIDEM TARTRATE 10 MG/1
TABLET ORAL NIGHTLY PRN
COMMUNITY
End: 2019-12-11

## 2019-07-26 RX ORDER — FERROUS SULFATE 325(65) MG
325 TABLET ORAL
COMMUNITY
End: 2019-12-11

## 2019-08-14 RX ORDER — TAMSULOSIN HYDROCHLORIDE 0.4 MG/1
0.4 CAPSULE ORAL DAILY
Qty: 90 CAPSULE | Refills: 3 | Status: SHIPPED | OUTPATIENT
Start: 2019-08-14 | End: 2020-08-10

## 2019-08-16 NOTE — PROGRESS NOTES
his lung. Over the course of the year, Mr. Reyes Aguilar was treated with antibiotics and aggressive workup that has failed to reveal malignancy with stable abnormality in the right upper lobe of the lung. A bronchoscopy and biopsy and subsequently a CT guided biopsy of this area has been done and was reported to be negative. A CT scan and full evaluation here in [place] at [facility] revealed a 3 cm cavitating like lesion in the right upper lobe of the lung. He was seen by Dr. Camron Hughes, Dr. Kendall Dumont, Dr. Juan Francisco Naranjo and Dr. Tariq Chandler here in [place]. Mr. Reyes Aguilar was then initially seen on 04/06/10 regarding above. A formal CT scan of the chest and a PET scan at [facility] was performed on 04/08/10. The right upper lobe lung nodule was compared to the CT scan of 01/07/10 and was less cavitary and more solid than it had been three months prior. The PET scan, however on 04/08/10 revealed the mass in the right upper lobe of the lung demonstrating intense abnormal metabolic activity consistent with a neoplastic process with an SUV of 4.6. Recommendations were for resection. Dr. Paras Nation subsequently referred him to Dr. Zari Ratliff in [place]. Dr. 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 Numerous AFB organisms were seen on stains within the mass. Lymph nodes were negative for malignancy. He was treated with a year of medication for this finding. There was no evidence of malignancy.       Allergies:  Lortab [hydrocodone-acetaminophen] and Morphine and related    Medicines:  Current Outpatient Medications   Medication Sig Dispense Refill    tamsulosin (FLOMAX) 0.4 MG capsule Take 1 capsule by mouth daily 90 capsule 3    ferrous sulfate 325 (65 Fe) MG tablet Take 325 mg by mouth daily (with breakfast)      isosorbide mononitrate (IMDUR) 30 MG extended release tablet Take 1 tablet by mouth %.    Physical Exam   Constitutional: Oriented to person, place, and time. No acute distress. Head: Normocephalic and atraumatic. Nose: Nose normal.   Mouth/Throat: Oropharynx is clear and moist. No oropharyngeal exudate. Eyes: Pupils are equal and round. Conjunctivae and EOM are normal. No scleral icterus. Neck: Normal range of motion. Neck supple. No JVD. No appreciable thyromegaly. Cardiovascular: Normal rate, regular rhythm, normal heart sounds and intact distal pulses. Exam reveals no gallop, murmurs or friction rub. Pulmonary/Chest: Effort normal and breath sounds normal. No respiratory distress. No wheezes. Abdominal: Soft. Bowel sounds are normal. No organomegally or masses. No tenderness. There is no rebound and no guarding. Musculoskeletal: complains of sciatica-like pain and shoulder pain elbow pain multiple joint aches and pains that is activelybeing addressed at the orthopedic clinic. Lymphadenopathy: No cervical, axillary or inguinal lymphadenopathy. Neurological: Alert and oriented to person, place, and time. Cranial nerves are intact. Neurological exam is nonfocal  Skin: Skin is warm and dry. No rash noted. No erythema. No pallor. Psychiatric: Judgment normal.     Labs:  BMP: No results for input(s): NA, K, CL, CO2, PHOS, BUN, CREATININE, CALCIUM in the last 72 hours. CBC: No results for input(s): WBC, HGB, HCT, MCV, PLT in the last 72 hours. LIVER PROFILE: No results for input(s): AST, ALT, LIPASE, BILIDIR, BILITOT, ALKPHOS in the last 72 hours. Invalid input(s): AMYLASE,  ALB  PT/INR: No results for input(s): PROTIME, INR in the last 72 hours. APTT: No results for input(s): APTT in the last 72 hours. BNP:  No results for input(s): BNP in the last 72 hours. Ionized Calcium:No results for input(s): IONCA in the last 72 hours. Magnesium:No results for input(s): MG in the last 72 hours. Phosphorus:No results for input(s): PHOS in the last 72 hours.   HgbA1C: No results

## 2019-08-19 ENCOUNTER — HOSPITAL ENCOUNTER (OUTPATIENT)
Dept: INFUSION THERAPY | Age: 84
Discharge: HOME | End: 2019-08-19
Payer: MEDICARE

## 2019-08-19 ENCOUNTER — OFFICE VISIT (OUTPATIENT)
Dept: HEMATOLOGY | Age: 84
End: 2019-08-19
Payer: MEDICARE

## 2019-08-19 VITALS
DIASTOLIC BLOOD PRESSURE: 74 MMHG | HEART RATE: 67 BPM | SYSTOLIC BLOOD PRESSURE: 128 MMHG | BODY MASS INDEX: 26.25 KG/M2 | OXYGEN SATURATION: 97 % | WEIGHT: 187.5 LBS | HEIGHT: 71 IN

## 2019-08-19 DIAGNOSIS — Z85.038 HISTORY OF COLON CANCER: ICD-10-CM

## 2019-08-19 DIAGNOSIS — Z85.038 HISTORY OF COLON CANCER: Primary | ICD-10-CM

## 2019-08-19 PROCEDURE — 85025 COMPLETE CBC W/AUTO DIFF WBC: CPT

## 2019-08-19 PROCEDURE — 99214 OFFICE O/P EST MOD 30 MIN: CPT | Performed by: INTERNAL MEDICINE

## 2019-11-01 ENCOUNTER — OFFICE VISIT (OUTPATIENT)
Dept: PRIMARY CARE CLINIC | Age: 84
End: 2019-11-01
Payer: MEDICARE

## 2019-11-01 VITALS
OXYGEN SATURATION: 95 % | TEMPERATURE: 97.5 F | SYSTOLIC BLOOD PRESSURE: 148 MMHG | WEIGHT: 185 LBS | HEIGHT: 71 IN | DIASTOLIC BLOOD PRESSURE: 69 MMHG | HEART RATE: 83 BPM | BODY MASS INDEX: 25.9 KG/M2

## 2019-11-01 DIAGNOSIS — H61.23 BILATERAL IMPACTED CERUMEN: Primary | ICD-10-CM

## 2019-11-01 PROCEDURE — G8598 ASA/ANTIPLAT THER USED: HCPCS | Performed by: NURSE PRACTITIONER

## 2019-11-01 PROCEDURE — 1036F TOBACCO NON-USER: CPT | Performed by: NURSE PRACTITIONER

## 2019-11-01 PROCEDURE — 99213 OFFICE O/P EST LOW 20 MIN: CPT | Performed by: NURSE PRACTITIONER

## 2019-11-01 PROCEDURE — G8417 CALC BMI ABV UP PARAM F/U: HCPCS | Performed by: NURSE PRACTITIONER

## 2019-11-01 PROCEDURE — G8484 FLU IMMUNIZE NO ADMIN: HCPCS | Performed by: NURSE PRACTITIONER

## 2019-11-01 PROCEDURE — 1123F ACP DISCUSS/DSCN MKR DOCD: CPT | Performed by: NURSE PRACTITIONER

## 2019-11-01 PROCEDURE — G8427 DOCREV CUR MEDS BY ELIG CLIN: HCPCS | Performed by: NURSE PRACTITIONER

## 2019-11-01 PROCEDURE — 69209 REMOVE IMPACTED EAR WAX UNI: CPT | Performed by: NURSE PRACTITIONER

## 2019-11-01 PROCEDURE — 4040F PNEUMOC VAC/ADMIN/RCVD: CPT | Performed by: NURSE PRACTITIONER

## 2019-11-01 ASSESSMENT — ENCOUNTER SYMPTOMS
EYE REDNESS: 0
SORE THROAT: 0
RHINORRHEA: 0
DIARRHEA: 0
CONSTIPATION: 0
SHORTNESS OF BREATH: 0
COUGH: 0
VOMITING: 0

## 2019-11-26 ENCOUNTER — TELEPHONE (OUTPATIENT)
Dept: CARDIOLOGY | Age: 84
End: 2019-11-26

## 2019-11-26 ENCOUNTER — OFFICE VISIT (OUTPATIENT)
Dept: CARDIOLOGY | Age: 84
End: 2019-11-26
Payer: MEDICARE

## 2019-11-26 VITALS
HEART RATE: 60 BPM | SYSTOLIC BLOOD PRESSURE: 138 MMHG | HEIGHT: 71 IN | OXYGEN SATURATION: 90 % | BODY MASS INDEX: 27.44 KG/M2 | WEIGHT: 196 LBS | DIASTOLIC BLOOD PRESSURE: 82 MMHG

## 2019-11-26 DIAGNOSIS — E78.5 HYPERLIPIDEMIA, UNSPECIFIED HYPERLIPIDEMIA TYPE: ICD-10-CM

## 2019-11-26 DIAGNOSIS — I48.91 ATRIAL FIBRILLATION, UNSPECIFIED TYPE (HCC): Primary | ICD-10-CM

## 2019-11-26 DIAGNOSIS — I10 ESSENTIAL HYPERTENSION: ICD-10-CM

## 2019-11-26 PROCEDURE — 93000 ELECTROCARDIOGRAM COMPLETE: CPT | Performed by: NURSE PRACTITIONER

## 2019-11-26 PROCEDURE — 99214 OFFICE O/P EST MOD 30 MIN: CPT | Performed by: NURSE PRACTITIONER

## 2019-11-26 PROCEDURE — G8484 FLU IMMUNIZE NO ADMIN: HCPCS | Performed by: NURSE PRACTITIONER

## 2019-11-26 PROCEDURE — 4040F PNEUMOC VAC/ADMIN/RCVD: CPT | Performed by: NURSE PRACTITIONER

## 2019-11-26 PROCEDURE — G8598 ASA/ANTIPLAT THER USED: HCPCS | Performed by: NURSE PRACTITIONER

## 2019-11-26 PROCEDURE — 1123F ACP DISCUSS/DSCN MKR DOCD: CPT | Performed by: NURSE PRACTITIONER

## 2019-11-26 PROCEDURE — G8427 DOCREV CUR MEDS BY ELIG CLIN: HCPCS | Performed by: NURSE PRACTITIONER

## 2019-11-26 PROCEDURE — G8417 CALC BMI ABV UP PARAM F/U: HCPCS | Performed by: NURSE PRACTITIONER

## 2019-11-26 PROCEDURE — 1036F TOBACCO NON-USER: CPT | Performed by: NURSE PRACTITIONER

## 2019-12-02 ENCOUNTER — TELEPHONE (OUTPATIENT)
Dept: VASCULAR SURGERY | Age: 84
End: 2019-12-02

## 2019-12-11 ENCOUNTER — HOSPITAL ENCOUNTER (OUTPATIENT)
Dept: GENERAL RADIOLOGY | Age: 84
Discharge: HOME | End: 2019-12-11
Payer: MEDICARE

## 2019-12-11 ENCOUNTER — HOSPITAL ENCOUNTER (OUTPATIENT)
Dept: PREADMISSION TESTING | Age: 84
Discharge: HOME | End: 2019-12-15
Payer: MEDICARE

## 2019-12-11 VITALS — BODY MASS INDEX: 26.6 KG/M2 | WEIGHT: 190 LBS | HEIGHT: 71 IN

## 2019-12-11 LAB
ABO/RH: NORMAL
ABO/RH: NORMAL
ALBUMIN SERPL-MCNC: 4.3 G/DL (ref 3.5–5.2)
ALP BLD-CCNC: 69 U/L (ref 40–130)
ALT SERPL-CCNC: 16 U/L (ref 5–41)
ANION GAP SERPL CALCULATED.3IONS-SCNC: 19 MMOL/L (ref 7–19)
ANTIBODY SCREEN: NORMAL
ANTIBODY SCREEN: NORMAL
APTT: 33.3 SEC (ref 26–36.2)
AST SERPL-CCNC: 19 U/L (ref 5–40)
BACTERIA: NEGATIVE /HPF
BASOPHILS ABSOLUTE: 0 K/UL (ref 0–0.2)
BASOPHILS RELATIVE PERCENT: 0.4 % (ref 0–1)
BILIRUB SERPL-MCNC: 0.5 MG/DL (ref 0.2–1.2)
BILIRUBIN URINE: NEGATIVE
BLOOD, URINE: ABNORMAL
BUN BLDV-MCNC: 16 MG/DL (ref 8–23)
CALCIUM SERPL-MCNC: 9.6 MG/DL (ref 8.8–10.2)
CHLORIDE BLD-SCNC: 104 MMOL/L (ref 98–111)
CLARITY: CLEAR
CO2: 22 MMOL/L (ref 22–29)
COLOR: YELLOW
CREAT SERPL-MCNC: 1.2 MG/DL (ref 0.5–1.2)
EOSINOPHILS ABSOLUTE: 0.4 K/UL (ref 0–0.6)
EOSINOPHILS RELATIVE PERCENT: 5.3 % (ref 0–5)
EPITHELIAL CELLS, UA: 1 /HPF (ref 0–5)
GFR NON-AFRICAN AMERICAN: 58
GLUCOSE BLD-MCNC: 98 MG/DL (ref 74–109)
GLUCOSE URINE: NEGATIVE MG/DL
HCT VFR BLD CALC: 47.1 % (ref 42–52)
HEMOGLOBIN: 15.3 G/DL (ref 14–18)
HYALINE CASTS: 3 /HPF (ref 0–8)
IMMATURE GRANULOCYTES #: 0 K/UL
INR BLD: 1.12 (ref 0.88–1.18)
KETONES, URINE: NEGATIVE MG/DL
LEUKOCYTE ESTERASE, URINE: ABNORMAL
LYMPHOCYTES ABSOLUTE: 1.6 K/UL (ref 1.1–4.5)
LYMPHOCYTES RELATIVE PERCENT: 20.8 % (ref 20–40)
MCH RBC QN AUTO: 31.1 PG (ref 27–31)
MCHC RBC AUTO-ENTMCNC: 32.5 G/DL (ref 33–37)
MCV RBC AUTO: 95.7 FL (ref 80–94)
MONOCYTES ABSOLUTE: 0.9 K/UL (ref 0–0.9)
MONOCYTES RELATIVE PERCENT: 12 % (ref 0–10)
NEUTROPHILS ABSOLUTE: 4.8 K/UL (ref 1.5–7.5)
NEUTROPHILS RELATIVE PERCENT: 61 % (ref 50–65)
NITRITE, URINE: NEGATIVE
PDW BLD-RTO: 13.2 % (ref 11.5–14.5)
PH UA: 6 (ref 5–8)
PLATELET # BLD: 203 K/UL (ref 130–400)
PMV BLD AUTO: 11 FL (ref 9.4–12.4)
POTASSIUM SERPL-SCNC: 4.4 MMOL/L (ref 3.5–5)
PROTEIN UA: NEGATIVE MG/DL
PROTHROMBIN TIME: 13.8 SEC (ref 12–14.6)
RBC # BLD: 4.92 M/UL (ref 4.7–6.1)
RBC UA: 59 /HPF (ref 0–4)
SODIUM BLD-SCNC: 145 MMOL/L (ref 136–145)
SPECIFIC GRAVITY UA: 1.02 (ref 1–1.03)
TOTAL PROTEIN: 7.5 G/DL (ref 6.6–8.7)
URINE REFLEX TO CULTURE: YES
UROBILINOGEN, URINE: 0.2 E.U./DL
WBC # BLD: 7.8 K/UL (ref 4.8–10.8)
WBC UA: 10 /HPF (ref 0–5)

## 2019-12-11 PROCEDURE — 86850 RBC ANTIBODY SCREEN: CPT

## 2019-12-11 PROCEDURE — 81001 URINALYSIS AUTO W/SCOPE: CPT

## 2019-12-11 PROCEDURE — 87086 URINE CULTURE/COLONY COUNT: CPT

## 2019-12-11 PROCEDURE — 85025 COMPLETE CBC W/AUTO DIFF WBC: CPT

## 2019-12-11 PROCEDURE — 80053 COMPREHEN METABOLIC PANEL: CPT

## 2019-12-11 PROCEDURE — 71046 X-RAY EXAM CHEST 2 VIEWS: CPT

## 2019-12-11 PROCEDURE — 86900 BLOOD TYPING SEROLOGIC ABO: CPT

## 2019-12-11 PROCEDURE — 85730 THROMBOPLASTIN TIME PARTIAL: CPT

## 2019-12-11 PROCEDURE — 87081 CULTURE SCREEN ONLY: CPT

## 2019-12-11 PROCEDURE — 86901 BLOOD TYPING SEROLOGIC RH(D): CPT

## 2019-12-11 PROCEDURE — 85610 PROTHROMBIN TIME: CPT

## 2019-12-11 RX ORDER — PANTOPRAZOLE SODIUM 40 MG/1
40 TABLET, DELAYED RELEASE ORAL 2 TIMES DAILY
COMMUNITY
End: 2022-07-27 | Stop reason: SDUPTHER

## 2019-12-12 LAB — MRSA CULTURE ONLY: NORMAL

## 2019-12-13 ENCOUNTER — OFFICE VISIT (OUTPATIENT)
Dept: PRIMARY CARE CLINIC | Age: 84
End: 2019-12-13
Payer: MEDICARE

## 2019-12-13 VITALS
TEMPERATURE: 97.6 F | HEIGHT: 71 IN | OXYGEN SATURATION: 96 % | SYSTOLIC BLOOD PRESSURE: 138 MMHG | DIASTOLIC BLOOD PRESSURE: 80 MMHG | WEIGHT: 197 LBS | BODY MASS INDEX: 27.58 KG/M2

## 2019-12-13 DIAGNOSIS — M16.10 OSTEOARTHRITIS OF ONE HIP: ICD-10-CM

## 2019-12-13 DIAGNOSIS — I10 ESSENTIAL HYPERTENSION: ICD-10-CM

## 2019-12-13 DIAGNOSIS — I48.0 PAROXYSMAL ATRIAL FIBRILLATION (HCC): ICD-10-CM

## 2019-12-13 DIAGNOSIS — Z01.818 PRE-OPERATIVE EXAM: Primary | ICD-10-CM

## 2019-12-13 DIAGNOSIS — R31.21 ASYMPTOMATIC MICROSCOPIC HEMATURIA: ICD-10-CM

## 2019-12-13 LAB — URINE CULTURE, ROUTINE: NORMAL

## 2019-12-13 PROCEDURE — 4040F PNEUMOC VAC/ADMIN/RCVD: CPT | Performed by: NURSE PRACTITIONER

## 2019-12-13 PROCEDURE — G8598 ASA/ANTIPLAT THER USED: HCPCS | Performed by: NURSE PRACTITIONER

## 2019-12-13 PROCEDURE — G8417 CALC BMI ABV UP PARAM F/U: HCPCS | Performed by: NURSE PRACTITIONER

## 2019-12-13 PROCEDURE — G8482 FLU IMMUNIZE ORDER/ADMIN: HCPCS | Performed by: NURSE PRACTITIONER

## 2019-12-13 PROCEDURE — 1123F ACP DISCUSS/DSCN MKR DOCD: CPT | Performed by: NURSE PRACTITIONER

## 2019-12-13 PROCEDURE — G8427 DOCREV CUR MEDS BY ELIG CLIN: HCPCS | Performed by: NURSE PRACTITIONER

## 2019-12-13 PROCEDURE — 99213 OFFICE O/P EST LOW 20 MIN: CPT | Performed by: NURSE PRACTITIONER

## 2019-12-13 PROCEDURE — 1036F TOBACCO NON-USER: CPT | Performed by: NURSE PRACTITIONER

## 2019-12-13 RX ORDER — BROMFENAC SODIUM 0.7 MG/ML
SOLUTION/ DROPS OPHTHALMIC
Refills: 1 | Status: ON HOLD | COMMUNITY
Start: 2019-11-19 | End: 2020-01-03

## 2019-12-13 ASSESSMENT — ENCOUNTER SYMPTOMS
VOMITING: 0
DIARRHEA: 0
ABDOMINAL PAIN: 0
CONSTIPATION: 0
COUGH: 0
SORE THROAT: 0
SHORTNESS OF BREATH: 0
RHINORRHEA: 0
EYE REDNESS: 0

## 2019-12-17 ENCOUNTER — HOSPITAL ENCOUNTER (OUTPATIENT)
Dept: ULTRASOUND IMAGING | Age: 84
Discharge: HOME | End: 2019-12-17
Payer: MEDICARE

## 2019-12-17 ENCOUNTER — OFFICE VISIT (OUTPATIENT)
Dept: VASCULAR SURGERY | Age: 84
End: 2019-12-17
Payer: MEDICARE

## 2019-12-17 VITALS
OXYGEN SATURATION: 97 % | TEMPERATURE: 96.9 F | HEART RATE: 65 BPM | DIASTOLIC BLOOD PRESSURE: 76 MMHG | SYSTOLIC BLOOD PRESSURE: 119 MMHG | RESPIRATION RATE: 18 BRPM

## 2019-12-17 DIAGNOSIS — I71.40 ABDOMINAL AORTIC ANEURYSM (AAA) WITHOUT RUPTURE: ICD-10-CM

## 2019-12-17 DIAGNOSIS — I71.40 ABDOMINAL AORTIC ANEURYSM (AAA) WITHOUT RUPTURE: Primary | ICD-10-CM

## 2019-12-17 PROCEDURE — G8598 ASA/ANTIPLAT THER USED: HCPCS | Performed by: PHYSICIAN ASSISTANT

## 2019-12-17 PROCEDURE — 1123F ACP DISCUSS/DSCN MKR DOCD: CPT | Performed by: PHYSICIAN ASSISTANT

## 2019-12-17 PROCEDURE — 99212 OFFICE O/P EST SF 10 MIN: CPT | Performed by: PHYSICIAN ASSISTANT

## 2019-12-17 PROCEDURE — G8427 DOCREV CUR MEDS BY ELIG CLIN: HCPCS | Performed by: PHYSICIAN ASSISTANT

## 2019-12-17 PROCEDURE — 1036F TOBACCO NON-USER: CPT | Performed by: PHYSICIAN ASSISTANT

## 2019-12-17 PROCEDURE — G8482 FLU IMMUNIZE ORDER/ADMIN: HCPCS | Performed by: PHYSICIAN ASSISTANT

## 2019-12-17 PROCEDURE — 93978 VASCULAR STUDY: CPT

## 2019-12-17 PROCEDURE — 4040F PNEUMOC VAC/ADMIN/RCVD: CPT | Performed by: PHYSICIAN ASSISTANT

## 2019-12-17 PROCEDURE — G8417 CALC BMI ABV UP PARAM F/U: HCPCS | Performed by: PHYSICIAN ASSISTANT

## 2020-01-03 ENCOUNTER — HOSPITAL ENCOUNTER (INPATIENT)
Age: 85
LOS: 1 days | Discharge: HOME | DRG: 470 | End: 2020-01-04
Attending: ORTHOPAEDIC SURGERY | Admitting: ORTHOPAEDIC SURGERY
Payer: MEDICARE

## 2020-01-03 ENCOUNTER — ANESTHESIA EVENT (OUTPATIENT)
Dept: OPERATING ROOM | Age: 85
DRG: 470 | End: 2020-01-03
Payer: MEDICARE

## 2020-01-03 ENCOUNTER — ANESTHESIA (OUTPATIENT)
Dept: OPERATING ROOM | Age: 85
DRG: 470 | End: 2020-01-03
Payer: MEDICARE

## 2020-01-03 ENCOUNTER — APPOINTMENT (OUTPATIENT)
Dept: GENERAL RADIOLOGY | Age: 85
DRG: 470 | End: 2020-01-03
Attending: ORTHOPAEDIC SURGERY
Payer: MEDICARE

## 2020-01-03 VITALS
RESPIRATION RATE: 14 BRPM | DIASTOLIC BLOOD PRESSURE: 45 MMHG | SYSTOLIC BLOOD PRESSURE: 79 MMHG | OXYGEN SATURATION: 89 % | TEMPERATURE: 96.1 F

## 2020-01-03 PROBLEM — M16.11 PRIMARY OSTEOARTHRITIS OF RIGHT HIP: Status: ACTIVE | Noted: 2020-01-03

## 2020-01-03 LAB
ABO/RH: NORMAL
ANTIBODY SCREEN: NORMAL
ANTIBODY SCREEN: NORMAL

## 2020-01-03 PROCEDURE — 2709999900 HC NON-CHARGEABLE SUPPLY: Performed by: ORTHOPAEDIC SURGERY

## 2020-01-03 PROCEDURE — 36415 COLL VENOUS BLD VENIPUNCTURE: CPT

## 2020-01-03 PROCEDURE — C1776 JOINT DEVICE (IMPLANTABLE): HCPCS | Performed by: ORTHOPAEDIC SURGERY

## 2020-01-03 PROCEDURE — 2780000010 HC IMPLANT OTHER: Performed by: ORTHOPAEDIC SURGERY

## 2020-01-03 PROCEDURE — 6370000000 HC RX 637 (ALT 250 FOR IP): Performed by: ORTHOPAEDIC SURGERY

## 2020-01-03 PROCEDURE — 6360000002 HC RX W HCPCS: Performed by: NURSE ANESTHETIST, CERTIFIED REGISTERED

## 2020-01-03 PROCEDURE — 2500000003 HC RX 250 WO HCPCS: Performed by: NURSE ANESTHETIST, CERTIFIED REGISTERED

## 2020-01-03 PROCEDURE — 3209999900 FLUORO FOR SURGICAL PROCEDURES

## 2020-01-03 PROCEDURE — 3600000005 HC SURGERY LEVEL 5 BASE: Performed by: ORTHOPAEDIC SURGERY

## 2020-01-03 PROCEDURE — 3700000000 HC ANESTHESIA ATTENDED CARE: Performed by: ORTHOPAEDIC SURGERY

## 2020-01-03 PROCEDURE — 3700000001 HC ADD 15 MINUTES (ANESTHESIA): Performed by: ORTHOPAEDIC SURGERY

## 2020-01-03 PROCEDURE — 6360000002 HC RX W HCPCS: Performed by: ORTHOPAEDIC SURGERY

## 2020-01-03 PROCEDURE — 86850 RBC ANTIBODY SCREEN: CPT

## 2020-01-03 PROCEDURE — 86900 BLOOD TYPING SEROLOGIC ABO: CPT

## 2020-01-03 PROCEDURE — 2580000003 HC RX 258: Performed by: ANESTHESIOLOGY

## 2020-01-03 PROCEDURE — 0SR904A REPLACEMENT OF RIGHT HIP JOINT WITH CERAMIC ON POLYETHYLENE SYNTHETIC SUBSTITUTE, UNCEMENTED, OPEN APPROACH: ICD-10-PCS | Performed by: ORTHOPAEDIC SURGERY

## 2020-01-03 PROCEDURE — 86901 BLOOD TYPING SEROLOGIC RH(D): CPT

## 2020-01-03 PROCEDURE — 7100000001 HC PACU RECOVERY - ADDTL 15 MIN: Performed by: ORTHOPAEDIC SURGERY

## 2020-01-03 PROCEDURE — 73502 X-RAY EXAM HIP UNI 2-3 VIEWS: CPT

## 2020-01-03 PROCEDURE — 3600000015 HC SURGERY LEVEL 5 ADDTL 15MIN: Performed by: ORTHOPAEDIC SURGERY

## 2020-01-03 PROCEDURE — C9290 INJ, BUPIVACAINE LIPOSOME: HCPCS | Performed by: ORTHOPAEDIC SURGERY

## 2020-01-03 PROCEDURE — 2580000003 HC RX 258: Performed by: ORTHOPAEDIC SURGERY

## 2020-01-03 PROCEDURE — 2500000003 HC RX 250 WO HCPCS: Performed by: ORTHOPAEDIC SURGERY

## 2020-01-03 PROCEDURE — C1713 ANCHOR/SCREW BN/BN,TIS/BN: HCPCS | Performed by: ORTHOPAEDIC SURGERY

## 2020-01-03 PROCEDURE — 7100000000 HC PACU RECOVERY - FIRST 15 MIN: Performed by: ORTHOPAEDIC SURGERY

## 2020-01-03 PROCEDURE — 1210000000 HC MED SURG R&B

## 2020-01-03 DEVICE — CUP ACET DIA54MM 12 H HIP TI GRIPTION VIP TAPR DOME REV: Type: IMPLANTABLE DEVICE | Site: HIP | Status: FUNCTIONAL

## 2020-01-03 DEVICE — STEM FEM SZ 13 L135MM NK L38.5MM 135DEG 41.5MM OFFSET STD: Type: IMPLANTABLE DEVICE | Site: HIP | Status: FUNCTIONAL

## 2020-01-03 DEVICE — LINER ACET OD54MM ID36MM HIP ALTRX PINN: Type: IMPLANTABLE DEVICE | Site: HIP | Status: FUNCTIONAL

## 2020-01-03 DEVICE — SCREW BNE L25MM DIA6.5MM CANC HIP S STL GRIPTION FULL THRD: Type: IMPLANTABLE DEVICE | Site: HIP | Status: FUNCTIONAL

## 2020-01-03 DEVICE — HEAD FEM DIA36MM +1.5MM OFFSET 12/14 TAPR HIP CERAMIC: Type: IMPLANTABLE DEVICE | Site: HIP | Status: FUNCTIONAL

## 2020-01-03 RX ORDER — DEXAMETHASONE SODIUM PHOSPHATE 4 MG/ML
INJECTION, SOLUTION INTRA-ARTICULAR; INTRALESIONAL; INTRAMUSCULAR; INTRAVENOUS; SOFT TISSUE PRN
Status: DISCONTINUED | OUTPATIENT
Start: 2020-01-03 | End: 2020-01-03 | Stop reason: SDUPTHER

## 2020-01-03 RX ORDER — DIPHENHYDRAMINE HYDROCHLORIDE 50 MG/ML
12.5 INJECTION INTRAMUSCULAR; INTRAVENOUS
Status: DISCONTINUED | OUTPATIENT
Start: 2020-01-03 | End: 2020-01-03 | Stop reason: HOSPADM

## 2020-01-03 RX ORDER — SENNA AND DOCUSATE SODIUM 50; 8.6 MG/1; MG/1
1 TABLET, FILM COATED ORAL 2 TIMES DAILY
Status: DISCONTINUED | OUTPATIENT
Start: 2020-01-03 | End: 2020-01-04 | Stop reason: HOSPADM

## 2020-01-03 RX ORDER — MORPHINE SULFATE 4 MG/ML
2 INJECTION, SOLUTION INTRAMUSCULAR; INTRAVENOUS EVERY 5 MIN PRN
Status: DISCONTINUED | OUTPATIENT
Start: 2020-01-03 | End: 2020-01-03 | Stop reason: HOSPADM

## 2020-01-03 RX ORDER — PROPOFOL 10 MG/ML
INJECTION, EMULSION INTRAVENOUS PRN
Status: DISCONTINUED | OUTPATIENT
Start: 2020-01-03 | End: 2020-01-03 | Stop reason: SDUPTHER

## 2020-01-03 RX ORDER — LABETALOL 20 MG/4 ML (5 MG/ML) INTRAVENOUS SYRINGE
PRN
Status: DISCONTINUED | OUTPATIENT
Start: 2020-01-03 | End: 2020-01-03 | Stop reason: SDUPTHER

## 2020-01-03 RX ORDER — ROCURONIUM BROMIDE 10 MG/ML
INJECTION, SOLUTION INTRAVENOUS PRN
Status: DISCONTINUED | OUTPATIENT
Start: 2020-01-03 | End: 2020-01-03 | Stop reason: SDUPTHER

## 2020-01-03 RX ORDER — DOCUSATE SODIUM 100 MG/1
100 CAPSULE, LIQUID FILLED ORAL 2 TIMES DAILY
Qty: 60 CAPSULE | Refills: 1 | Status: SHIPPED | OUTPATIENT
Start: 2020-01-03 | End: 2021-10-07 | Stop reason: ALTCHOICE

## 2020-01-03 RX ORDER — ACETAMINOPHEN 325 MG/1
650 TABLET ORAL EVERY 6 HOURS
Status: DISCONTINUED | OUTPATIENT
Start: 2020-01-03 | End: 2020-01-04 | Stop reason: HOSPADM

## 2020-01-03 RX ORDER — LIDOCAINE HYDROCHLORIDE 10 MG/ML
INJECTION, SOLUTION INFILTRATION; PERINEURAL PRN
Status: DISCONTINUED | OUTPATIENT
Start: 2020-01-03 | End: 2020-01-03 | Stop reason: SDUPTHER

## 2020-01-03 RX ORDER — PANTOPRAZOLE SODIUM 40 MG/1
40 TABLET, DELAYED RELEASE ORAL 2 TIMES DAILY
Status: DISCONTINUED | OUTPATIENT
Start: 2020-01-03 | End: 2020-01-04 | Stop reason: HOSPADM

## 2020-01-03 RX ORDER — SODIUM CHLORIDE, SODIUM LACTATE, POTASSIUM CHLORIDE, CALCIUM CHLORIDE 600; 310; 30; 20 MG/100ML; MG/100ML; MG/100ML; MG/100ML
INJECTION, SOLUTION INTRAVENOUS CONTINUOUS
Status: DISCONTINUED | OUTPATIENT
Start: 2020-01-03 | End: 2020-01-03

## 2020-01-03 RX ORDER — CYCLOBENZAPRINE HCL 5 MG
5 TABLET ORAL 3 TIMES DAILY PRN
Status: DISCONTINUED | OUTPATIENT
Start: 2020-01-03 | End: 2020-01-04 | Stop reason: HOSPADM

## 2020-01-03 RX ORDER — DOCUSATE SODIUM 100 MG/1
100 CAPSULE, LIQUID FILLED ORAL 2 TIMES DAILY
Status: DISCONTINUED | OUTPATIENT
Start: 2020-01-03 | End: 2020-01-04 | Stop reason: HOSPADM

## 2020-01-03 RX ORDER — SODIUM CHLORIDE 9 MG/ML
INJECTION, SOLUTION INTRAVENOUS CONTINUOUS
Status: DISCONTINUED | OUTPATIENT
Start: 2020-01-03 | End: 2020-01-03

## 2020-01-03 RX ORDER — CYCLOBENZAPRINE HCL 5 MG
5 TABLET ORAL 3 TIMES DAILY PRN
Qty: 30 TABLET | Refills: 0 | Status: SHIPPED | OUTPATIENT
Start: 2020-01-03 | End: 2020-01-13

## 2020-01-03 RX ORDER — FENTANYL CITRATE 50 UG/ML
50 INJECTION, SOLUTION INTRAMUSCULAR; INTRAVENOUS
Status: DISCONTINUED | OUTPATIENT
Start: 2020-01-03 | End: 2020-01-03 | Stop reason: HOSPADM

## 2020-01-03 RX ORDER — PROMETHAZINE HYDROCHLORIDE 25 MG/ML
6.25 INJECTION, SOLUTION INTRAMUSCULAR; INTRAVENOUS
Status: DISCONTINUED | OUTPATIENT
Start: 2020-01-03 | End: 2020-01-03 | Stop reason: HOSPADM

## 2020-01-03 RX ORDER — TAMSULOSIN HYDROCHLORIDE 0.4 MG/1
0.4 CAPSULE ORAL DAILY
Status: DISCONTINUED | OUTPATIENT
Start: 2020-01-03 | End: 2020-01-04 | Stop reason: HOSPADM

## 2020-01-03 RX ORDER — FENTANYL CITRATE 50 UG/ML
25 INJECTION, SOLUTION INTRAMUSCULAR; INTRAVENOUS
Status: DISCONTINUED | OUTPATIENT
Start: 2020-01-03 | End: 2020-01-03 | Stop reason: HOSPADM

## 2020-01-03 RX ORDER — OXYCODONE HYDROCHLORIDE 5 MG/1
5 TABLET ORAL EVERY 4 HOURS PRN
Status: DISCONTINUED | OUTPATIENT
Start: 2020-01-03 | End: 2020-01-04 | Stop reason: HOSPADM

## 2020-01-03 RX ORDER — FENTANYL CITRATE 50 UG/ML
INJECTION, SOLUTION INTRAMUSCULAR; INTRAVENOUS PRN
Status: DISCONTINUED | OUTPATIENT
Start: 2020-01-03 | End: 2020-01-03 | Stop reason: SDUPTHER

## 2020-01-03 RX ORDER — OXYCODONE HYDROCHLORIDE 5 MG/1
10 TABLET ORAL EVERY 4 HOURS PRN
Status: DISCONTINUED | OUTPATIENT
Start: 2020-01-03 | End: 2020-01-04 | Stop reason: HOSPADM

## 2020-01-03 RX ORDER — LABETALOL 20 MG/4 ML (5 MG/ML) INTRAVENOUS SYRINGE
5 EVERY 10 MIN PRN
Status: DISCONTINUED | OUTPATIENT
Start: 2020-01-03 | End: 2020-01-03 | Stop reason: HOSPADM

## 2020-01-03 RX ORDER — UBIDECARENONE 75 MG
50 CAPSULE ORAL DAILY
Status: DISCONTINUED | OUTPATIENT
Start: 2020-01-03 | End: 2020-01-04 | Stop reason: HOSPADM

## 2020-01-03 RX ORDER — MEPERIDINE HYDROCHLORIDE 50 MG/ML
12.5 INJECTION INTRAMUSCULAR; INTRAVENOUS; SUBCUTANEOUS EVERY 5 MIN PRN
Status: DISCONTINUED | OUTPATIENT
Start: 2020-01-03 | End: 2020-01-03 | Stop reason: HOSPADM

## 2020-01-03 RX ORDER — SODIUM CHLORIDE 0.9 % (FLUSH) 0.9 %
10 SYRINGE (ML) INJECTION PRN
Status: DISCONTINUED | OUTPATIENT
Start: 2020-01-03 | End: 2020-01-03 | Stop reason: HOSPADM

## 2020-01-03 RX ORDER — ONDANSETRON 2 MG/ML
4 INJECTION INTRAMUSCULAR; INTRAVENOUS EVERY 6 HOURS PRN
Status: DISCONTINUED | OUTPATIENT
Start: 2020-01-03 | End: 2020-01-04 | Stop reason: HOSPADM

## 2020-01-03 RX ORDER — OXYCODONE AND ACETAMINOPHEN 7.5; 325 MG/1; MG/1
1 TABLET ORAL EVERY 4 HOURS PRN
Qty: 70 TABLET | Refills: 0 | Status: SHIPPED | OUTPATIENT
Start: 2020-01-03 | End: 2020-01-31

## 2020-01-03 RX ORDER — ENALAPRILAT 2.5 MG/2ML
1.25 INJECTION INTRAVENOUS
Status: DISCONTINUED | OUTPATIENT
Start: 2020-01-03 | End: 2020-01-03 | Stop reason: HOSPADM

## 2020-01-03 RX ORDER — ONDANSETRON 4 MG/1
4 TABLET, FILM COATED ORAL EVERY 8 HOURS PRN
Qty: 20 TABLET | Refills: 0 | Status: SHIPPED | OUTPATIENT
Start: 2020-01-03 | End: 2020-01-22 | Stop reason: ALTCHOICE

## 2020-01-03 RX ORDER — SODIUM CHLORIDE 0.9 % (FLUSH) 0.9 %
10 SYRINGE (ML) INJECTION EVERY 12 HOURS SCHEDULED
Status: DISCONTINUED | OUTPATIENT
Start: 2020-01-03 | End: 2020-01-03 | Stop reason: HOSPADM

## 2020-01-03 RX ORDER — MIDAZOLAM HYDROCHLORIDE 1 MG/ML
2 INJECTION INTRAMUSCULAR; INTRAVENOUS
Status: DISCONTINUED | OUTPATIENT
Start: 2020-01-03 | End: 2020-01-03 | Stop reason: HOSPADM

## 2020-01-03 RX ORDER — SODIUM CHLORIDE 0.9 % (FLUSH) 0.9 %
10 SYRINGE (ML) INJECTION PRN
Status: DISCONTINUED | OUTPATIENT
Start: 2020-01-03 | End: 2020-01-04 | Stop reason: HOSPADM

## 2020-01-03 RX ORDER — HYDRALAZINE HYDROCHLORIDE 20 MG/ML
5 INJECTION INTRAMUSCULAR; INTRAVENOUS EVERY 10 MIN PRN
Status: DISCONTINUED | OUTPATIENT
Start: 2020-01-03 | End: 2020-01-03 | Stop reason: HOSPADM

## 2020-01-03 RX ORDER — LIDOCAINE HYDROCHLORIDE 10 MG/ML
1 INJECTION, SOLUTION EPIDURAL; INFILTRATION; INTRACAUDAL; PERINEURAL
Status: DISCONTINUED | OUTPATIENT
Start: 2020-01-03 | End: 2020-01-03 | Stop reason: HOSPADM

## 2020-01-03 RX ORDER — GLYCOPYRROLATE 1 MG/5 ML
SYRINGE (ML) INTRAVENOUS PRN
Status: DISCONTINUED | OUTPATIENT
Start: 2020-01-03 | End: 2020-01-03 | Stop reason: SDUPTHER

## 2020-01-03 RX ORDER — SODIUM CHLORIDE 9 MG/ML
INJECTION, SOLUTION INTRAVENOUS CONTINUOUS
Status: DISCONTINUED | OUTPATIENT
Start: 2020-01-03 | End: 2020-01-04 | Stop reason: HOSPADM

## 2020-01-03 RX ORDER — MORPHINE SULFATE 4 MG/ML
4 INJECTION, SOLUTION INTRAMUSCULAR; INTRAVENOUS EVERY 5 MIN PRN
Status: DISCONTINUED | OUTPATIENT
Start: 2020-01-03 | End: 2020-01-03 | Stop reason: HOSPADM

## 2020-01-03 RX ORDER — METOCLOPRAMIDE HYDROCHLORIDE 5 MG/ML
10 INJECTION INTRAMUSCULAR; INTRAVENOUS
Status: DISCONTINUED | OUTPATIENT
Start: 2020-01-03 | End: 2020-01-03 | Stop reason: HOSPADM

## 2020-01-03 RX ORDER — ISOSORBIDE MONONITRATE 30 MG/1
30 TABLET, EXTENDED RELEASE ORAL DAILY
Status: DISCONTINUED | OUTPATIENT
Start: 2020-01-03 | End: 2020-01-04 | Stop reason: HOSPADM

## 2020-01-03 RX ORDER — VANCOMYCIN HYDROCHLORIDE 1 G/20ML
INJECTION, POWDER, LYOPHILIZED, FOR SOLUTION INTRAVENOUS PRN
Status: DISCONTINUED | OUTPATIENT
Start: 2020-01-03 | End: 2020-01-03 | Stop reason: HOSPADM

## 2020-01-03 RX ORDER — BISACODYL 10 MG
10 SUPPOSITORY, RECTAL RECTAL DAILY PRN
Status: DISCONTINUED | OUTPATIENT
Start: 2020-01-03 | End: 2020-01-04 | Stop reason: HOSPADM

## 2020-01-03 RX ORDER — SUCCINYLCHOLINE/SOD CL,ISO/PF 100 MG/5ML
SYRINGE (ML) INTRAVENOUS PRN
Status: DISCONTINUED | OUTPATIENT
Start: 2020-01-03 | End: 2020-01-03 | Stop reason: SDUPTHER

## 2020-01-03 RX ORDER — SODIUM CHLORIDE 0.9 % (FLUSH) 0.9 %
10 SYRINGE (ML) INJECTION EVERY 12 HOURS SCHEDULED
Status: DISCONTINUED | OUTPATIENT
Start: 2020-01-03 | End: 2020-01-04 | Stop reason: HOSPADM

## 2020-01-03 RX ORDER — SIMVASTATIN 20 MG
20 TABLET ORAL EVERY EVENING
Status: DISCONTINUED | OUTPATIENT
Start: 2020-01-03 | End: 2020-01-04 | Stop reason: HOSPADM

## 2020-01-03 RX ADMIN — SODIUM CHLORIDE: 9 INJECTION, SOLUTION INTRAVENOUS at 22:35

## 2020-01-03 RX ADMIN — Medication 120 MG: at 11:05

## 2020-01-03 RX ADMIN — ROCURONIUM BROMIDE 5 MG: 10 SOLUTION INTRAVENOUS at 11:05

## 2020-01-03 RX ADMIN — Medication 5000 UNITS: at 17:17

## 2020-01-03 RX ADMIN — HYDROMORPHONE HYDROCHLORIDE 0.5 MG: 1 INJECTION, SOLUTION INTRAMUSCULAR; INTRAVENOUS; SUBCUTANEOUS at 15:14

## 2020-01-03 RX ADMIN — METOPROLOL TARTRATE 12.5 MG: 25 TABLET ORAL at 22:36

## 2020-01-03 RX ADMIN — ACETAMINOPHEN 650 MG: 325 TABLET ORAL at 17:17

## 2020-01-03 RX ADMIN — APIXABAN 2.5 MG: 2.5 TABLET, FILM COATED ORAL at 22:36

## 2020-01-03 RX ADMIN — SODIUM CHLORIDE, SODIUM LACTATE, POTASSIUM CHLORIDE, AND CALCIUM CHLORIDE: 600; 310; 30; 20 INJECTION, SOLUTION INTRAVENOUS at 10:30

## 2020-01-03 RX ADMIN — CEFAZOLIN SODIUM 2 G: 10 INJECTION, POWDER, FOR SOLUTION INTRAVENOUS at 22:38

## 2020-01-03 RX ADMIN — ROCURONIUM BROMIDE 20 MG: 10 SOLUTION INTRAVENOUS at 13:14

## 2020-01-03 RX ADMIN — HYDROMORPHONE HYDROCHLORIDE 0.5 MG: 1 INJECTION, SOLUTION INTRAMUSCULAR; INTRAVENOUS; SUBCUTANEOUS at 12:59

## 2020-01-03 RX ADMIN — SODIUM CHLORIDE, PRESERVATIVE FREE 10 ML: 5 INJECTION INTRAVENOUS at 22:37

## 2020-01-03 RX ADMIN — ISOSORBIDE MONONITRATE 30 MG: 30 TABLET, EXTENDED RELEASE ORAL at 17:18

## 2020-01-03 RX ADMIN — FENTANYL CITRATE 50 MCG: 50 INJECTION INTRAMUSCULAR; INTRAVENOUS at 11:14

## 2020-01-03 RX ADMIN — SIMVASTATIN 20 MG: 20 TABLET, FILM COATED ORAL at 17:18

## 2020-01-03 RX ADMIN — LIDOCAINE HYDROCHLORIDE 50 MG: 10 INJECTION, SOLUTION INFILTRATION; PERINEURAL at 11:05

## 2020-01-03 RX ADMIN — PROPOFOL 100 MG: 10 INJECTION, EMULSION INTRAVENOUS at 11:05

## 2020-01-03 RX ADMIN — SUGAMMADEX 200 MG: 100 INJECTION, SOLUTION INTRAVENOUS at 13:50

## 2020-01-03 RX ADMIN — ROCURONIUM BROMIDE 40 MG: 10 SOLUTION INTRAVENOUS at 11:15

## 2020-01-03 RX ADMIN — TAMSULOSIN HYDROCHLORIDE 0.4 MG: 0.4 CAPSULE ORAL at 17:18

## 2020-01-03 RX ADMIN — HYDROMORPHONE HYDROCHLORIDE 0.5 MG: 1 INJECTION, SOLUTION INTRAMUSCULAR; INTRAVENOUS; SUBCUTANEOUS at 11:59

## 2020-01-03 RX ADMIN — ACETAMINOPHEN 650 MG: 325 TABLET ORAL at 22:35

## 2020-01-03 RX ADMIN — ROCURONIUM BROMIDE 10 MG: 10 SOLUTION INTRAVENOUS at 12:30

## 2020-01-03 RX ADMIN — SENNOSIDES AND DOCUSATE SODIUM 1 TABLET: 8.6; 5 TABLET ORAL at 22:37

## 2020-01-03 RX ADMIN — DOCUSATE SODIUM 100 MG: 100 CAPSULE, LIQUID FILLED ORAL at 22:37

## 2020-01-03 RX ADMIN — Medication 50 MCG: at 17:18

## 2020-01-03 RX ADMIN — DEXAMETHASONE SODIUM PHOSPHATE 4 MG: 4 INJECTION, SOLUTION INTRAMUSCULAR; INTRAVENOUS at 11:06

## 2020-01-03 RX ADMIN — Medication 0.2 MG: at 11:05

## 2020-01-03 RX ADMIN — Medication 2 G: at 11:04

## 2020-01-03 RX ADMIN — SODIUM CHLORIDE, SODIUM LACTATE, POTASSIUM CHLORIDE, AND CALCIUM CHLORIDE: 600; 310; 30; 20 INJECTION, SOLUTION INTRAVENOUS at 13:24

## 2020-01-03 RX ADMIN — FENTANYL CITRATE 50 MCG: 50 INJECTION INTRAMUSCULAR; INTRAVENOUS at 11:05

## 2020-01-03 RX ADMIN — PANTOPRAZOLE SODIUM 40 MG: 40 TABLET, DELAYED RELEASE ORAL at 22:36

## 2020-01-03 RX ADMIN — LABETALOL 20 MG/4 ML (5 MG/ML) INTRAVENOUS SYRINGE 20 MG: at 11:49

## 2020-01-03 ASSESSMENT — PAIN SCALES - GENERAL
PAINLEVEL_OUTOF10: 4
PAINLEVEL_OUTOF10: 10
PAINLEVEL_OUTOF10: 4
PAINLEVEL_OUTOF10: 4

## 2020-01-03 ASSESSMENT — PAIN DESCRIPTION - ORIENTATION: ORIENTATION: RIGHT

## 2020-01-03 ASSESSMENT — PAIN DESCRIPTION - LOCATION: LOCATION: HIP

## 2020-01-03 ASSESSMENT — PAIN DESCRIPTION - DESCRIPTORS
DESCRIPTORS: THROBBING
DESCRIPTORS: CONSTANT;DISCOMFORT

## 2020-01-03 ASSESSMENT — ENCOUNTER SYMPTOMS: SHORTNESS OF BREATH: 0

## 2020-01-03 ASSESSMENT — PAIN - FUNCTIONAL ASSESSMENT
PAIN_FUNCTIONAL_ASSESSMENT: 0-10
PAIN_FUNCTIONAL_ASSESSMENT: PREVENTS OR INTERFERES SOME ACTIVE ACTIVITIES AND ADLS
PAIN_FUNCTIONAL_ASSESSMENT: PREVENTS OR INTERFERES SOME ACTIVE ACTIVITIES AND ADLS

## 2020-01-03 ASSESSMENT — PAIN DESCRIPTION - FREQUENCY: FREQUENCY: CONTINUOUS

## 2020-01-03 ASSESSMENT — LIFESTYLE VARIABLES: SMOKING_STATUS: 0

## 2020-01-03 ASSESSMENT — PAIN DESCRIPTION - PROGRESSION: CLINICAL_PROGRESSION: GRADUALLY WORSENING

## 2020-01-03 ASSESSMENT — PAIN DESCRIPTION - PAIN TYPE: TYPE: ACUTE PAIN;SURGICAL PAIN

## 2020-01-03 ASSESSMENT — PAIN DESCRIPTION - ONSET: ONSET: ON-GOING

## 2020-01-03 NOTE — DISCHARGE SUMMARY
Yes GISELLE Knight, DO   simvastatin (ZOCOR) 20 MG tablet Take 1 tablet by mouth every evening 6/11/19  Yes GISELLE Knight, DO   Cholecalciferol (VITAMIN D3) 5000 units TABS Take 5,000 Units by mouth daily    Yes Historical Provider, MD   metoprolol tartrate (LOPRESSOR) 25 MG tablet Take 1 tablet by mouth 2 times daily Patient will fill next month  Patient taking differently: Take 12.5 mg by mouth 2 times daily Patient will fill next month 1/18/19  Yes Will Nip, APRN   vitamin B-12 (CYANOCOBALAMIN) 100 MCG tablet Take 50 mcg by mouth daily. Yes Historical Provider, MD       Prescriptions for:  Percocet 7.5/325, #70    Return to Clinic: 2 weeks    Xrays:  Yes     Electronically signed by Enrique Chan MD on 1/3/2020 at 11:08 AM

## 2020-01-03 NOTE — ANESTHESIA PRE PROCEDURE
Department of Anesthesiology  Preprocedure Note       Name:  Annabelle Cheema   Age:  82 y.o.  :  1935                                          MRN:  [id number]         Date:  1/3/2020      Surgeon: Edgar Davila):  Noel Betancourt MD    Procedure: RIGHT TOTAL HIP REPLACEMENT (Right )    Medications prior to admission:   Prior to Admission medications    Medication Sig Start Date End Date Taking? Authorizing Provider   pantoprazole (PROTONIX) 40 MG tablet Take 40 mg by mouth 2 times daily   Yes Historical Provider, MD   apixaban (ELIQUIS) 2.5 MG TABS tablet Take 2.5 mg by mouth 2 times daily   Yes Historical Provider, MD   tamsulosin (FLOMAX) 0.4 MG capsule Take 1 capsule by mouth daily 19  Yes DAVIE Carbajal   isosorbide mononitrate (IMDUR) 30 MG extended release tablet Take 1 tablet by mouth daily 19  Yes GISELLE Bermudez DO   simvastatin (ZOCOR) 20 MG tablet Take 1 tablet by mouth every evening 19  Yes GISELLE Bermudez DO   Cholecalciferol (VITAMIN D3) 5000 units TABS Take 5,000 Units by mouth daily    Yes Historical Provider, MD   metoprolol tartrate (LOPRESSOR) 25 MG tablet Take 1 tablet by mouth 2 times daily Patient will fill next month  Patient taking differently: Take 12.5 mg by mouth 2 times daily Patient will fill next month 19  Yes DAVIE Quiñonez   vitamin B-12 (CYANOCOBALAMIN) 100 MCG tablet Take 50 mcg by mouth daily. Yes Historical Provider, MD       Current medications:    Current Facility-Administered Medications   Medication Dose Route Frequency Provider Last Rate Last Dose    ceFAZolin (ANCEF) 2 g in 0.9% sodium chloride 50 mL IVPB  2 g Intravenous Once Noel Betancourt MD           Allergies:     Allergies   Allergen Reactions    Lortab [Hydrocodone-Acetaminophen]     Morphine And Related        Problem List:    Patient Active Problem List   Diagnosis Code    Hypertension I10    CAD (coronary artery disease), native coronary artery I25.10    Gastroesophageal reflux disease K21.9    Arthritis M19.90    Renal calculi N20.0    Costochondritis M94.0    Non-cardiac chest pain R07.89    Ulcer, gastric, acute K25.3    Hypercholesterolemia E78.00    Weight loss R63.4    Burgos's esophagus K22.70    AAA (abdominal aortic aneurysm) (HCC) I71.4    Paroxysmal atrial fibrillation (HCC) I48.0    Colon polyps K63.5    History of colon cancer Z85.038       Past Medical History:        Diagnosis Date    Adenocarcinoma ([facility] [address]     colon    Aneurysm (HCC) aortic    Anxiety     Arthritis     Polyarticular    Atrial fibrillation (HCC)     Burgos's esophagus 2010    H/o    CAD (coronary artery disease)     CAD (coronary artery disease), native coronary artery     stent; sees dr. Sheri Serrano    Chronic respiratory failure ([facility] [address]     Costochondritis     Erectile dysfunction     Gastroesophageal reflux disease     GERD (gastroesophageal reflux disease)     High cholesterol     Hip pain     Hypercholesterolemia 2010    Hypertension     Hypertension     MI (mitral incompetence)     x4 stents    Nephrolithiasis     Non-cardiac chest pain 12/20/2011    Renal calculi     Right upper lobe consolidation ([facility] [address]     Tobacco abuse 2010    Ulcer, gastric, acute 3/14/2012       Past Surgical History:        Procedure Laterality Date    ANKLE SURGERY      left    APPENDECTOMY      CARDIAC CATHETERIZATION  12/12/07,07/13/09    Left Heart Cath    CHOLECYSTECTOMY      right    COLON SURGERY      GALLBLADDER SURGERY      LEG SURGERY      right    LUNG REMOVAL, PARTIAL      MANDIBLE FRACTURE SURGERY      ORTHOPEDIC SURGERY      legs, hand and ankles    SKIN CANCER EXCISION  2016       Social History:    Social History     Tobacco Use    Smoking status: Former Smoker    Smokeless tobacco: Never Used    Tobacco comment: quit in 1983   Substance Use Topics    Alcohol use:  No                                Counseling given: Not Answered  Comment: quit in [address] [place] [address] Signs (Current):   Vitals:    01/03/20 0803   BP: 136/85   Pulse: 72   Resp: 14   Temp: 98.1 °F (36.7 °C)   TempSrc: Tympanic   SpO2: 94%   Weight: 190 lb (86.2 kg)   Height: 5' 11\" (1.803 m)                                              BP Readings from Last 3 Encounters:   01/03/20 136/85   12/17/19 119/76   12/13/19 138/80       NPO Status: Time of last liquid consumption: 1800                        Time of last solid consumption: 1800                        Date of last liquid consumption: 01/02/20                        Date of last solid food consumption: 01/02/20    BMI:   Wt Readings from Last 3 Encounters:   01/03/20 190 lb (86.2 kg)   12/11/19 190 lb (86.2 kg)   12/13/19 197 lb (89.4 kg)     Body mass index is 26.5 kg/m². CBC:   Lab Results   Component Value Date    WBC 7.8 12/11/2019    RBC 4.92 12/11/2019    RBC 4.59 10/03/2011    HGB 15.3 12/11/2019    HCT 47.1 12/11/2019    HCT 44.7 10/06/2011    MCV 95.7 12/11/2019    RDW 13.2 12/11/2019     12/11/2019     10/06/2011       CMP:   Lab Results   Component Value Date     12/11/2019     10/06/2011    K 4.4 12/11/2019    K 4.5 10/06/2011     12/11/2019     10/06/2011    CO2 22 12/11/2019    BUN 16 12/11/2019    CREATININE 1.2 12/11/2019    CREATININE 1.3 10/06/2011    LABGLOM 58 12/11/2019    GLUCOSE 98 12/11/2019    PROT 7.5 12/11/2019    PROT 7.6 06/11/2012    CALCIUM 9.6 12/11/2019    BILITOT 0.5 12/11/2019    ALKPHOS 69 12/11/2019    ALKPHOS 81 10/06/2011    AST 19 12/11/2019    ALT 16 12/11/2019       POC Tests: No results for input(s): POCGLU, POCNA, POCK, POCCL, POCBUN, POCHEMO, POCHCT in the last 72 hours.     Coags:   Lab Results   Component Value Date    PROTIME 13.8 12/11/2019    PROTIME 10.92 10/03/2011    INR 1.12 12/11/2019    APTT 33.3 12/11/2019       HCG (If Applicable): No results found for: PREGTESTUR, PREGSERUM, HCG, HCGQUANT     ABGs: No results found for: PHART, PO2ART, AVD8QWU, UZA5KXB, BEART,

## 2020-01-03 NOTE — BRIEF OP NOTE
Brief Postoperative Note  ______________________________________________________________    Patient: Ronda Iglesias  YOB: 1935  MRN: [id number]  Date of Procedure: 1/3/2020    Pre-Op Diagnosis: M16.11    Post-Op Diagnosis: Same       Procedure(s):  RIGHT TOTAL HIP REPLACEMENT    Anesthesia: General    Surgeon(s):  Celine Saenz MD    Assistant: 300    Estimated Blood Loss (mL): 121     Complications: Bleeding    Specimens:   * No specimens in log *    Implants:  Implant Name Type Inv.  Item Serial No.  Lot No. LRB No. Used   IMPL HIP MULTI HOLE ACET CUP 54MM Hip IMPL HIP MULTI HOLE ACET CUP 54MM  JNJ: [facility] [employer] [id number] Right 1   SCREW CANC ACET TAPR 6.5X25MM Screw/Plate/Nail/Cristi SCREW CANC ACET TAPR 6.5X25MM  JN: [facility] [id number] Right 1   SCREW CANC ACET TAPR 6.5X25MM Screw/Plate/Nail/Cristi SCREW CANC ACET TAPR 6.5X25MM  JN: [facility] [employer] [id number] Right 1   IMPL HIP LINER ACET NEUTRAL 36/54 Hip IMPL HIP LINER ACET NEUTRAL 36/54  JN: [facility] [employer] [id number] Right 1   IMPL HIP FEM STEM CMNTLSS W/ SZ 13 Hip IMPL HIP FEM STEM CMNTLSS W/ SZ 13  J: [facility] [employer] [id number] Right 1   IMPL HIP DELTA CER HEAD 12/14 36MM Hip IMPL HIP DELTA CER HEAD 12/14 36MM  JNJ: Mina Chan [id number] Right 1         Drains: * No LDAs found *    Findings: 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

## 2020-01-03 NOTE — H&P
mg by mouth 2 times daily   Yes Historical Provider, MD   tamsulosin (FLOMAX) 0.4 MG capsule Take 1 capsule by mouth daily 8/14/19  Yes DAVIE Carbajal   isosorbide mononitrate (IMDUR) 30 MG extended release tablet Take 1 tablet by mouth daily 7/24/19  Yes GISELLE Macias DO   simvastatin (ZOCOR) 20 MG tablet Take 1 tablet by mouth every evening 6/11/19  Yes GISELLE Macias, DO   Cholecalciferol (VITAMIN D3) 5000 units TABS Take 5,000 Units by mouth daily    Yes Historical Provider, MD   metoprolol tartrate (LOPRESSOR) 25 MG tablet Take 1 tablet by mouth 2 times daily Patient will fill next month  Patient taking differently: Take 12.5 mg by mouth 2 times daily Patient will fill next month 1/18/19  Yes DAVIE Juarez   vitamin B-12 (CYANOCOBALAMIN) 100 MCG tablet Take 50 mcg by mouth daily. Yes Historical Provider, MD       Allergies: Allergies   Allergen Reactions    Lortab [Hydrocodone-Acetaminophen]     Morphine And Related        Review of systems:     * Constitutional: negative     * HEENT: negative     * Skin: negative     * Cardiac: negative     * Respiratory: negative     * GI: negative     * : negative     * Neuro: negative     * CNS: negative     * Extremities: negative     * Endcrine: negative     Physical Exam:   /85   Pulse 72   Temp 98.1 °F (36.7 °C) (Tympanic)   Resp 14   Ht 5' 11\" (1.803 m)   Wt 190 lb (86.2 kg)   SpO2 94%   BMI 26.50 kg/m²     - General: normal development and appearance     - HEENT: normocephalic, RAJNI     - Skin: pink, warm, normal tone     - Neck: supple, no masses,     - Chest: no rales or wheezes, normal expansion     - Heart: RRR, no murmurs/gallops     - Abdomen: soft, nondistended, nontender, normal sounds     - Vascular: normal pulses, color, tone     - Pysch/Neuro: normal affect, mood, alert and oriented     - Musculoskeletal: Exam of right hip shows the skin is clean, dry, and intact.  No deformities, errythema, or ecchymosis

## 2020-01-03 NOTE — OP NOTE
OPERATIVE NOTE    PREOPERATIVE DIAGNOSIS: Right hip primary osteoarthritis     POSTOPERATIVE DIAGNOSIS:  Right hip primary osteoarthritis     PROCEDURE:  Right Total Hip Arthroplasty     SURGEON:  Nyoka Gaucher, MD    ASSISTANT:  Scrub Person First: Ken Hatch  Scrub Person Second: Karthik Adames    The assistant aided in limb position as well as retractor placement.  The assistant was also critical in implantation of the prosthesis.  It was necessary to have the assistant present in order to complete the procedure. ANESTHESIA:  General    ESTIMATED BLOOD LOSS:  300 cc. COMPLICATIONS:  Bleeding. CONDITION:  Stable. IMPLANTS:    Implant Name Type Inv. Item Serial No.  Lot No. LRB No. Used   IMPL HIP MULTI HOLE ACET CUP 54MM Hip IMPL HIP MULTI HOLE ACET CUP 54MM  JNJ: [facility] ORTHOPAEDICS [id number] Right 1   SCREW CANC ACET TAPR 6.5X25MM Screw/Plate/Nail/Cristi SCREW CANC ACET TAPR 6.5X25MM  [facility] [id number] Right 1   SCREW CANC ACET TAPR 6.5X25MM Screw/Plate/Nail/Cristi SCREW CANC ACET TAPR 6.5X25MM  JN: [facility] [id number] Right 1   IMPL HIP LINER ACET NEUTRAL 36/54 Hip IMPL HIP LINER ACET NEUTRAL 36/54  JNJ: [facility] [id number] Right 1   IMPL HIP FEM STEM CMNTLSS W/ SZ 13 Hip IMPL HIP FEM STEM CMNTLSS W/ SZ 13  JN: [facility]THOPA[employer]ICS [id number] Right 1   IMPL HIP DELTA CER HEAD 12/14 36MM Hip IMPL HIP DELTA CER HEAD 12/14 36MM  JNJ: Joon Des [id number] Right 1       Implants: Speakap System with the following components:               97 LR Gription acetabular shell multihole              36 mm Wal[place]f polyethylene acetabular liner              36 mm diameter +1.5 ceramic femoral head              Size 13 cementless femoral press-fit stem              Waldorf cancellus 6.5 mm screws, 25 mm ×2     OPERATIVE INDICATIONS: 84 y. o. male with progressive arthritis of the  right hip, failing conservative care.  Due to failed conservative measures, it purchase.  Two posterior superior screw hole trajectories were then drilled and measured and noting a 25 mm screw be placed with excellent purchase in the most cephalad orientation with a 25 mm screw placed in the more lateral position noting to gain excellent purchase.  The shell was then copiously irrigated one final time before placement of a 36 mm liner that was locked in the acetabular shell after impaction and noted to be well fixated.     At this point, the leg was externally rotated and extended.  Continued capsular excision was performed, but ultimately the proximal extent of the incision was extended to help with exposure.  Under fluoroscopic guidance the canal was entered with a canal finder.  Sequential broaching was then performed up to a size 13 broach followed by the use of a calcar planer.  A trial reduction was performed.  C-arm fluoroscopy images were then taken of the pelvis in the AP trajectory to include visualization of the bilateral lesser trochanters to ensure appropriate restoration of leg length.  Finally, a final size 13 CORAIL stem with 135° neck shaft angle was press-fit down the proximal femoral canal.  A final 36 mm +1.5 ceramic head was placed on the Schulte taper.  It was impacted into place and noted to be firmly fixated.  The hip was reduced with excellent stability.  Anterior maneuvers including hyperextension with external rotation and abduction were performed with no evidence of anterior instability.  Fluoroscopic views revealed excellent alignment of the femoral aspect Of components.  The wound was then copiously irrigated with 2 L of irrigation.  TFL fascia was then closed with an 0 Vicryl suture.  2-0 Vicryl suture was then used for the subcutaneous closure of the tissues.  A final 3-0 Vicryl and a running subcuticular fashion was placed.  Finally, a pernio Dermabond adhesive skin dressing was applied to the incision without tension.    With drape removal, a superficial skin

## 2020-01-03 NOTE — CARE COORDINATION
and NPP Signed () 13    Outside Record   records from Dr. Himanshu Vasquez   Other   13 SIGNED [address]   Physician Orders   ATIVAN 1MG-14   Drug and Alcohol Form   LIGIA 14   Progress Notes   2014  [facility]    Colonoscopy   2014  colonoscopy  -Adriane Krishna MD   Consultation Reports/Note   [address] Consent for the Release of  Confidential Alcohol or Drug Treatment Information Not Received     ACO Declining to [address] Not Received     (OLD) [facility] Physician Consent and NPP Signed () 14    Drug and Alcohol Form   11/10/14 Adiel Gut   Progress Notes   [facility]OLOGY [facility] [place]-4/9/15   Physician Orders   signed ativan   Correspondence   ligia   Photo ID Received () 11/17/15 EXP 18   Physician Orders   SIGNED-ATIVAN 1MG-8/28/15   Correspondence   2015 Adiel Gut   Physician Orders   signed ativan   (OLD) [facility] ([facility]) Physician Consent and NPP Signed () 11/17/15    Financial Responsibility Form Signed () 11/17/15    Progress Notes   [place] [facility]   Insurance Card Received () 17 AETNA   Correspondence   ligia 2/10/16   Physician Orders   dilan drx   Correspondence   [facility] 16   HIM MIL Authorization  16 Sent records to Prateek Hilda. Vita Walls. Altaf Cano 16   Cardiac Rehab Phase 3 Payment Not Received     Correspondence   16 ligia    Form   ligia   Correspondence   16 Release Point Correspondence   Correspondence   16 Release Point Correspondence/ Fax Confirmation   Physician Orders   signed rx   Form   LIGIA   Physician Orders   signed rx   Form   ligia   Correspondence   [facility] 16   Physician Orders   SIGNED RX   (OLD) [facility] ([facility]) Physician Consent and NPP Signed () 16    Financial Responsibility Form Signed 16    Progress Notes   P Gastroenterology   Progress Notes   P Progress Notes    Physician Communication Release NPP Signed () 19 HIPAA   [facility] ([facility]) Physician Consent and NPP Signed () 18    Form   Vaccine Consent   Form Received 10/18/17 Vaccine Consent    Photo ID Received () 17   [facility] ([facility]) Physician Consent and NPP Signed () 17    Text Reminder Consent Received () 17    [facility] Physician Communication Release NPP Signed () 17 HIPAA   Advance Directive Assessment Received 17    Form Received 18 Adrienne Munroe   Progress Notes Received 18 [facility]   Form Received 10/22/18 Loetta Em / immunization   Progress Notes Received 18 [facility]   Insurance Card Received 19 Medicare NEW/Aetna Senior   Insurance Card Received 18 medicare   Photo ID Received () 18 exp. 22   Advance Directive Assessment Received 18    Progress Notes Received 19 [facility]   Progress Notes Received 19 [facility]   Progress Notes Received 19 [facility] [place]   Text Reminder Consent Received 19    Correspondence Received 19 [place] [place] [facility] request & fax verification   Lab Result Scan Received 19 endoscopy   Correspondence Received 19 [address] ASSESSMENT   Radiology Report Received 19 MRI RESULTS-IMAC   Referral Form Received 19 referral to [facility] HOSP AT [facility]   Progress Notes Received 05/10/19 oiwk   Progress Notes Received 19 OIWK   Correspondence Received 19 Cardiac Risk Stratication Letter   Miscellaneous Received 19 Cardiac Clearance 19   Advance Directive Assessment Received 19    Progress Notes Received 06/10/19 [facility]   Correspondence Received 19 rohit   Medication Contract Received 19 Adrienne Munroe   Progress Notes Received 19 [address] Consent/HIPAA Scanned Received () 19    [facility] ([facility]) Physician Consent and NPP Signed 11/26/19    Lab Result Scan Received 08/26/19 PMOH CBC [id number]   Correspondence Received 09/11/19 OIWK OV [id number]. pdf   Outside Record Received 09/24/19 fax confirmation of mr    [facility] Communication Release NPP Signed 11/26/19    Miscellaneous Received 11/27/19 Cardiac Clearance   Photo ID Received 12/11/19    Advance Directive Assessment Received 12/11/19    Advance Directive Assessment Received 01/03/20    Financial Responsibility Form Signed (Deleted) 06/23/11    HIPAA Notice of Privacy Signed (Deleted) 12/11/12    Financial Responsibility Form Signed (Deleted) 11/13/14    [facility] Hospital Consent/HIPAA Scanned Not Received (Deleted)  PATIENT DIDN'T SIGN   Documents for the [facility] Consent Treat/HIPAA Received 12/11/19    IMM Medicare Not Received     IMM - Medicare Second Copy Given to Pt      Observation Notification Not Received     Hospital Consent [address] [facility] Consent Treat/HIPAA Received 01/03/20    Hospital Consent Treat/HIPAA Received 01/03/20    Admission Information     Current Information     Attending Provider Admitting Provider Admission Type Admission Status   MD Jethro Barba MD Elective Admission (Confirmed)          Admission Date/Time Discharge Date Hospital Service Auth/Cert Status   99/23/63  07:36 AM  Surgery [address] [facility] Unit Room/Bed    [facility] 19 Pool/NONE           Admission     Complaint   [id number]   Hospital Account     Name Acct ID Class Status Primary Coverage   Ross Loots [id number] Inpatient Open MEDICARE - MEDICARE PART A AND B          Guarantor Account (for Hospital Account [de-identified])     Name Relation to Pt Service Area Active? Acct Type   Ross Loots Self [facility] Yes Personal/Family   Address Phone     [address] [facility] [facility] [phone])            Coverage Information (for Hospital Account [de-identified])     1.  MEDICARE/MEDICARE PART A AND B     F/O signed by Nery Angeles RN on 1/3/2020 at 11:10 AM

## 2020-01-04 VITALS
BODY MASS INDEX: 26.6 KG/M2 | HEIGHT: 71 IN | RESPIRATION RATE: 18 BRPM | SYSTOLIC BLOOD PRESSURE: 123 MMHG | WEIGHT: 190 LBS | TEMPERATURE: 99 F | DIASTOLIC BLOOD PRESSURE: 74 MMHG | OXYGEN SATURATION: 91 % | HEART RATE: 95 BPM

## 2020-01-04 LAB
ANION GAP SERPL CALCULATED.3IONS-SCNC: 12 MMOL/L (ref 7–19)
BUN BLDV-MCNC: 18 MG/DL (ref 8–23)
CALCIUM SERPL-MCNC: 8.2 MG/DL (ref 8.8–10.2)
CHLORIDE BLD-SCNC: 103 MMOL/L (ref 98–111)
CO2: 24 MMOL/L (ref 22–29)
CREAT SERPL-MCNC: 1 MG/DL (ref 0.5–1.2)
GFR NON-AFRICAN AMERICAN: >60
GLUCOSE BLD-MCNC: 145 MG/DL (ref 74–109)
HCT VFR BLD CALC: 35.2 % (ref 42–52)
HEMOGLOBIN: 11.3 G/DL (ref 14–18)
POTASSIUM SERPL-SCNC: 4.6 MMOL/L (ref 3.5–5)
SODIUM BLD-SCNC: 139 MMOL/L (ref 136–145)

## 2020-01-04 PROCEDURE — 80048 BASIC METABOLIC PNL TOTAL CA: CPT

## 2020-01-04 PROCEDURE — 97161 PT EVAL LOW COMPLEX 20 MIN: CPT

## 2020-01-04 PROCEDURE — 2580000003 HC RX 258: Performed by: ORTHOPAEDIC SURGERY

## 2020-01-04 PROCEDURE — 6370000000 HC RX 637 (ALT 250 FOR IP): Performed by: ORTHOPAEDIC SURGERY

## 2020-01-04 PROCEDURE — 97110 THERAPEUTIC EXERCISES: CPT

## 2020-01-04 PROCEDURE — 36415 COLL VENOUS BLD VENIPUNCTURE: CPT

## 2020-01-04 PROCEDURE — 85018 HEMOGLOBIN: CPT

## 2020-01-04 PROCEDURE — 6360000002 HC RX W HCPCS: Performed by: ORTHOPAEDIC SURGERY

## 2020-01-04 PROCEDURE — 85014 HEMATOCRIT: CPT

## 2020-01-04 RX ADMIN — DOCUSATE SODIUM 100 MG: 100 CAPSULE, LIQUID FILLED ORAL at 07:56

## 2020-01-04 RX ADMIN — CEFAZOLIN SODIUM 2 G: 10 INJECTION, POWDER, FOR SOLUTION INTRAVENOUS at 02:19

## 2020-01-04 RX ADMIN — METOPROLOL TARTRATE 12.5 MG: 25 TABLET ORAL at 07:57

## 2020-01-04 RX ADMIN — HYDROMORPHONE HYDROCHLORIDE 0.5 MG: 1 INJECTION, SOLUTION INTRAMUSCULAR; INTRAVENOUS; SUBCUTANEOUS at 04:29

## 2020-01-04 RX ADMIN — ISOSORBIDE MONONITRATE 30 MG: 30 TABLET, EXTENDED RELEASE ORAL at 07:56

## 2020-01-04 RX ADMIN — SODIUM CHLORIDE, PRESERVATIVE FREE 10 ML: 5 INJECTION INTRAVENOUS at 07:58

## 2020-01-04 RX ADMIN — APIXABAN 2.5 MG: 2.5 TABLET, FILM COATED ORAL at 07:57

## 2020-01-04 RX ADMIN — ONDANSETRON 4 MG: 2 INJECTION INTRAMUSCULAR; INTRAVENOUS at 04:33

## 2020-01-04 RX ADMIN — Medication 50 MCG: at 07:57

## 2020-01-04 RX ADMIN — TAMSULOSIN HYDROCHLORIDE 0.4 MG: 0.4 CAPSULE ORAL at 07:57

## 2020-01-04 RX ADMIN — ACETAMINOPHEN 650 MG: 325 TABLET ORAL at 02:19

## 2020-01-04 RX ADMIN — OXYCODONE HYDROCHLORIDE 10 MG: 5 TABLET ORAL at 02:19

## 2020-01-04 RX ADMIN — CYCLOBENZAPRINE HYDROCHLORIDE 5 MG: 5 TABLET, FILM COATED ORAL at 02:19

## 2020-01-04 RX ADMIN — SENNOSIDES AND DOCUSATE SODIUM 1 TABLET: 8.6; 5 TABLET ORAL at 07:57

## 2020-01-04 RX ADMIN — Medication 5000 UNITS: at 07:56

## 2020-01-04 RX ADMIN — OXYCODONE HYDROCHLORIDE 5 MG: 5 TABLET ORAL at 11:08

## 2020-01-04 RX ADMIN — ACETAMINOPHEN 650 MG: 325 TABLET ORAL at 11:08

## 2020-01-04 RX ADMIN — PANTOPRAZOLE SODIUM 40 MG: 40 TABLET, DELAYED RELEASE ORAL at 07:57

## 2020-01-04 ASSESSMENT — PAIN DESCRIPTION - LOCATION: LOCATION: HIP

## 2020-01-04 ASSESSMENT — PAIN SCALES - GENERAL
PAINLEVEL_OUTOF10: 10
PAINLEVEL_OUTOF10: 5
PAINLEVEL_OUTOF10: 10
PAINLEVEL_OUTOF10: 6

## 2020-01-04 ASSESSMENT — PAIN DESCRIPTION - PAIN TYPE: TYPE: ACUTE PAIN

## 2020-01-04 ASSESSMENT — PAIN DESCRIPTION - ORIENTATION: ORIENTATION: RIGHT

## 2020-01-04 NOTE — CONSULTS
[facility] Consult      Patient:  Gisele Zapata  MRN: [id number]    CHIEF COMPLAINT: Medical management    History Obtained From: Patient and EMR    PCP: Sun Bustillo DO    HISTORY OF PRESENT ILLNESS:   82 y.o. male who presents with osteoarthritis requiring right hip arthroplasty with orthopedic surgery. Patient had successful total hip arthroplasty with Dr. Aida Milian. Internal medicine was consulted for medical management while patient is inpatient. Patient currently denies chest pain, shortness of breath, nausea, vomiting, diarrhea, constipation, fevers, chills, sweats, dysuria, cough and pain is well controlled with current pain regimen. REVIEW OF SYSTEMS:    Review of Systems    All other 14 systems reviewed and negative except as noted above.     Past Medical History:      Diagnosis Date    Adenocarcinoma ([address]     colon    Aneurysm ([address] aortic    Anxiety     Arthritis     Polyarticular    Atrial fibrillation ([address]     Burgos's esophagus 2010    H/o    CAD (coronary artery disease)     CAD (coronary artery disease), native coronary artery     stent; sees dr. Mildred Mcdowell    Chronic respiratory failure ([address]     Costochondritis     Erectile dysfunction     Gastroesophageal reflux disease     GERD (gastroesophageal reflux disease)     High cholesterol     Hip pain     Hypercholesterolemia 2010    Hypertension     Hypertension     MI (mitral incompetence)     x4 stents    Nephrolithiasis     Non-cardiac chest pain 12/20/2011    Renal calculi     Right upper lobe consolidation ([address]     Tobacco abuse 2010    Ulcer, gastric, acute 3/14/2012       Past Surgical History:      Procedure Laterality Date    ANKLE SURGERY      left    APPENDECTOMY      CARDIAC CATHETERIZATION  12/12/07,07/13/09    Left Heart Cath    CHOLECYSTECTOMY      right    COLON SURGERY      GALLBLADDER SURGERY      LEG SURGERY      right    LUNG REMOVAL, PARTIAL      MANDIBLE [hydrocodone-acetaminophen] and Morphine and related    Social History:   TOBACCO:   reports that he has quit smoking. He has never used smokeless tobacco.  ETOH:   reports no history of alcohol use. Family History:       Problem Relation Age of Onset    Diabetes Mother     Coronary Art Dis Father     High Blood Pressure Father     High Cholesterol Father     Heart Attack Father     Stroke Father     Kidney Disease Son     Kidney Disease Son            Physical Exam:      Vitals: /68   Pulse 73   Temp 97.6 °F (36.4 °C) (Temporal)   Resp 16   Ht 5' 11\" (1.803 m)   Wt 190 lb (86.2 kg)   SpO2 94%   BMI 26.50 kg/m²   24HR INTAKE/OUTPUT:      Intake/Output Summary (Last 24 hours) at 1/3/2020 1845  Last data filed at 1/3/2020 1400  Gross per 24 hour   Intake 1400 ml   Output 640 ml   Net 760 ml       General appearance: AAOx3    HEENT: AT/NC  Lungs: Clear to auscultation bilaterally without rales, rhonchi or wheezes, no use of accessory muscles  Heart: Regular rate and rhythm, S1, S2 normal, no murmur, click, no gallop or rubs. No reproducible chest pain on palpation of the chest wall. Abdomen: Soft, non-tender; no peritoneal signs, bowel sounds normal; no masses,  no organomegaly. Extremities: No edema, s/p right hip arthroplasty  Neurologic: AAOx3. Gross motor and sensory function intact. Psychiatric: Does not seem depressed, anxious or agitated. Denies suicidal or homicidal ideation. Skin: warm    CBC: No results for input(s): WBC, HGB, PLT in the last 72 hours. BMP:  No results for input(s): NA, K, CL, CO2, BUN, CREATININE, GLUCOSE in the last 72 hours. CMP: No results for input(s): NA, K, CL, CO2, BUN, CREATININE, GLUCOSE, CALCIUM, BILITOT, ALKPHOS, AST, ALT in the last 72 hours. Invalid input(s):  ALBALBU  Hepatic: No results for input(s): AST, ALT, ALB, BILITOT, ALKPHOS in the last 72 hours. Lactid Acid:  No results for input(s): LACTA in the last 72 hours.   Procalcitonin: Troponin T: No results for input(s): TROPONINI in the last 72 hours. Pro-BNP: No results for input(s): BNP in the last 72 hours. Lipids: No results for input(s): CHOL, HDL in the last 72 hours. Invalid input(s): LDLCALCU  INR: No results for input(s): INR in the last 72 hours. A1c:Invalid input(s): HEMOGLOBIN A1C    SEPSIS Criteria:   Temp: Temp  Av.8 °F (36 °C)  Min: 96.1 °F (35.6 °C)  Max: 98.1 °F (36.7 °C)    HR Range: Pulse  Av.8  Min: 63  Max: 73   RR Range: Resp  Av  Min: 2  Max: 21   WBC: No results for input(s): WBC in the last 72 hours. Lactic acid: No results for input(s): LACTA in the last 72 hours. Creatinine: No results for input(s): CREATININE in the last 72 hours. Troponin: No results for input(s): TROPONINI in the last 72 hours. LFTs: No results for input(s): AST, ALT, ALB, BILITOT, ALKPHOS in the last 72 hours. ABGs: No results for input(s): PHART, EXS9XII, PO2ART, BVP0WUU, BEART, HGBAE, T8RCTIOV, CARBOXHGBART, 02THERAPY in the last 72 hours. Urine drug screen: No results for input(s): Theadore Esvin, LABBENZ, LABMETH, OPIAU, PHENCYCU, PPXUR, OXTCOSU, METAMPU, MDMA, BUPRENUR in the last 72 hours. Invalid input(s): COCAINEMETABOLITE, URINE    Cultures:   No results for input(s): BC in the last 72 hours. No results for input(s): Sallye Callow in the last 72 hours. No results for input(s): CULTRESP in the last 72 hours. No results for input(s): LABURIN in the last 72 hours. -----------------------------------------------------------------    Imaging Studies:    XR HIP 2-3 VW W PELVIS RIGHT   Final Result   Normal right hip arthroplasty. Fluoroscopy time: 74 seconds. Total dose: [id number]. Signed by Dr Ann Melchor on 1/3/2020 3:12 PM      [facility] FOR SURGICAL PROCEDURES   Final Result      XR HIP 2-3 VW W PELVIS RIGHT   Final Result   A normal right hip arthroplasty.    Signed by Dr Ann Melchor on 1/3/2020 3:08 PM      [facility] FOR SURGICAL

## 2020-01-04 NOTE — PROGRESS NOTES
Orthopedic Surgery Right VEENA Progress Note    Shaniqua Rich  2020  [id number]  POD# 1 Day Post-Op Procedure(s):  RIGHT TOTAL HIP REPLACEMENT    Subjective: Interval History: Patient states that pain is tolerable. He has some uneasiness out falling, but states that he wants to go home    Systemic or Specific Complaints: Pain Control    Pain waxing and waning    Objective:     General: A&O x3, NAD, No signs or symptoms of PE. Wound: clean, dry, intact             Dressing: clean, dry, and intact   Extremity: Distal NVI, Soft throughout           DVT Exam: No evidence of DVT seen on physical exam.                   Data Review:  Vitals:  BP (!) 152/72   Pulse 87   Temp 100.9 °F (38.3 °C) (Temporal)   Resp 18   Ht 5' 11\" (1.803 m)   Wt 190 lb (86.2 kg)   SpO2 94%   BMI 26.50 kg/m²   Temp (24hrs), Av.4 °F (36.3 °C), Min:96.1 °F (35.6 °C), Max:100.9 °F (38.3 °C)      I/O (24Hr):     Intake/Output Summary (Last 24 hours) at 2020 0851  Last data filed at 2020 0802  Gross per 24 hour   Intake 2720 ml   Output 1240 ml   Net 1480 ml       Assessment:     <principal problem not specified>  POD 1 Day Post-Op Procedure(s):  RIGHT TOTAL HIP REPLACEMENT      Plan:      1:  DVT prophylaxis, ICE, elevate  2:  Pain control  3:  Physical therapy/Occupation therapy  4:  Anticipate discharge today if pain well controlled  5: Weight bearing as tolerated operative extremity    Electronically signed by Max Sepulveda on 2020 at 8:51 AM
Patient being discharged home per order. Patient to have outpatient therapy. Patient and family have no complaints or concerns at this time. Patient given oxycodone as requested prior to discharge. Patient has all DME as needed. Patient did not want to listen to my dischagre instructions and stated he could read them. Patient instructed on instructions that he wanted to listen to. Patient instructed to use walker at all times. Patient instructed multiple times he was a risk for falls. Patient stable and waiting on transportation for wheelchair to private auto.
Bearing  Lower Extremity Weight Bearing Restrictions  Right Lower Extremity Weight Bearing: Weight Bearing As Tolerated  Position Activity Restriction  Other position/activity restrictions: ANTERIOR PRECAUTIONS  Vision/Hearing        Subjective  General  Diagnosis: R THR  Subjective  Subjective: Pt READY TO TRY OOB  Pain Assessment  Pain Assessment: 0-10  Pain Level: 10  Pain Type: Acute pain  Pain Location: Hip  Pain Orientation: Right       Orientation     Social/Functional History  Social/Functional History  Lives With: Spouse  Type of Home: House  Home Layout: One level  Home Access: Stairs to enter with rails  Entrance Stairs - Number of Steps: 6  Bathroom Shower/Tub: Walk-in shower  Bathroom Equipment: Toilet raiser  Home Equipment: Rolling walker  ADL Assistance: Independent  Homemaking Assistance: Independent  Homemaking Responsibilities: Yes  Ambulation Assistance: Independent  Transfer Assistance: Independent  Active : Yes  Cognition        Objective          AROM RLE (degrees)  RLE General AROM: DEC OVERALL DUE TO PAIN  Strength RLE  Comment: GROSSLY 3/5        Bed mobility  Supine to Sit: Minimal assistance(INC TIME)  Transfers  Sit to Stand: Minimal Assistance  Bed to Chair: Minimal assistance(STAND PIVOT, RW)  Comment: ANTALGIC, SLOW MOVEMENTS  Ambulation  Ambulation?: No     Balance  Sitting - Dynamic: Good;-  Standing - Dynamic: Fair        Plan   Plan  Times per week: AT LEAST 7  Current Treatment Recommendations: Strengthening, ROM, Balance Training, Functional Mobility Training, Transfer Training, Gait Training, Patient/Caregiver Education & Training, Safety Education & Training  Safety Devices  Type of devices: Left in chair, Call light within reach    G-Code       OutComes Score                                                  AM-PAC Score             Goals  Short term goals  Time Frame for Short term goals: 14 DAYS  Short term goal 1: BED MOB MOD IND  Short term goal 2: TRANSERS

## 2020-01-06 ENCOUNTER — TELEPHONE (OUTPATIENT)
Dept: INPATIENT UNIT | Age: 85
End: 2020-01-06

## 2020-01-06 ENCOUNTER — CARE COORDINATION (OUTPATIENT)
Dept: CASE MANAGEMENT | Age: 85
End: 2020-01-06

## 2020-01-06 NOTE — CARE COORDINATION
Natalie 45 Transitions Initial Follow Up Call    Call within 2 business days of discharge: Yes    Patient: Gisele Zapata Patient : 1935   MRN: [id number]  Reason for Admission: THR  Discharge Date: 20 RARS: Readmission Risk Score: 10      Last Discharge [facility]       Complaint Diagnosis Description Type Department Provider    1/3/20  Primary osteoarthritis of right hip Admission (Discharged) [facility] 5 Regla Stephenson MD           Spoke with: Mrs. [address]: Ginette Yeung 19    Non-face-to-face services provided:  Reviewed encounter information for continuity of care prior to follow up phone call - chart notes, consults, progress notes, test results, med list, appointments, AVS, other information. Care Transitions 24 Hour Call    Schedule Follow Up Appointment with PCP:  Declined  Do you have a copy of your discharge instructions?:  Yes  Do you have all of your prescriptions and are they filled?:  Yes  Have you been contacted by a [facility] Pharmacist?:  No  Have you scheduled your follow up appointment?:  No  Were you discharged with any Home Care or Post Acute Services:  No  Care Transitions Interventions                                 Follow Up: Placed a call to the home for an initial follow up call after discharge. Spoke with wife. She reported that patient is not doing very well. She said he is having pain and is taking his pain medicine. She is having nausea, no vomiting, but is also sweating a lot. She said he did this in the hospital.  She reported that he has Zofran and is taking it. He also is having bms and not other GI issues. He is eating some and is drinking, not a lot, but enough to keep from getting dehydrated she said. She said the incision looks good. She also said that he is supposed to be starting outpatient PT at [facility] but she has not heard anything from them.   She did say she has called Dr. Nisha Yoo office and has not heard from them either. No home health ordered. No other issues reported. I was about to ask to review meds, follow up appointments and status of incision, she said she had another call to take and hung up. Will follow up as indicated.      Plan for next call - assess overall status, abnormal signs and symptoms, pain, nausea, appetite, intake, incision status, etc.,  effectiveness of medications, activity level and tolerance, falls/other unexpected events, findings from follow up appointments, medication/treatment changes, any additional teaching needs, etc.      Future Appointments   Date Time Provider Reuben Rice   8/4/2020 10:30 AM Elmira Feldman MD LPS Cardio [facility]   12/22/2020  8:30 AM [facility] US RM 1 [facility] ULTRA [facility] Hos Rad   12/22/2020  9:00 AM Calin Chi, RENEE Vasc Spec [facility]       Anisa Mcwilliams, MADONNA

## 2020-01-07 ENCOUNTER — CARE COORDINATION (OUTPATIENT)
Dept: CASE MANAGEMENT | Age: 85
End: 2020-01-07

## 2020-01-07 NOTE — CARE COORDINATION
Natalie 45 Transitions Follow Up Call    2020    Patient: Ahmet Santacruz  Patient : 1935   MRN: [id number]    Discharge Date: 20 RARS: Readmission Risk Score: 8         Spoke with: Mrs. Treadwell 15 Transitions Subsequent and Final Call    Subsequent and Final Calls  Have your medications changed?:  No  Do you have any questions related to your medications?:  No  Do you currently have any active services?:  No  Do you have any needs or concerns that I can assist you with?:  No  Identified Barriers:  None  Care Transitions Interventions                          Other Interventions: Follow Up: Spoke with wife. She reported that patient is doing better. She said he is still having issues with pain and with nausea, but the meds do help. She said he went out for therapy this am and had a hard time getting in and out of the car at first, but did ok. Therapy went well. She said the therapist said he has a great deal of bruising in the hip and leg area. She said that she thought that might be causing some increased pain for him. Informed wife to watch for increased warmth, redness, etc.  Stated incision looks good. She states no appetite. Informed her that this may be normal for him for a few days, weeks even. Encouraged to eat small, frequent meals, healthy snacks, plenty of liquids. , etc.  Voiced understanding. No other issues reported. Will follow up in a few days. To call prn issues.      Plan for next call - assess overall status, abnormal signs and symptoms, wound status, pain and nausea, effectiveness of medications, activity level and tolerance, falls/other unexpected events, findings from follow up appointments, medication/treatment changes, any additional teaching needs, etc.      Future Appointments   Date Time Provider Reuben Rice   2020 10:30 AM Mark Padilla MD [facility] Cardio [facility]   2020  8:30 AM [facility] US RM 1 [facility] ULTRA [facility] Hos Rad   2020  9:00 AM

## 2020-01-08 NOTE — TELEPHONE ENCOUNTER
Received fax from pharmacy requesting refill on pts medication(s). Pt was last seen in office on 12/13/2019  and has a follow up scheduled for Visit date not found. Will send request to  Dr. Gavino Perez  for authorization.      Requested Prescriptions     Pending Prescriptions Disp Refills    DULoxetine HCl 60 MG CSDR 30 capsule 5     Sig: Take 60 mg by mouth daily

## 2020-01-09 ENCOUNTER — TELEPHONE (OUTPATIENT)
Dept: ADMINISTRATIVE | Age: 85
End: 2020-01-09

## 2020-01-09 ENCOUNTER — NURSE TRIAGE (OUTPATIENT)
Dept: OTHER | Facility: CLINIC | Age: 85
End: 2020-01-09

## 2020-01-10 ENCOUNTER — CARE COORDINATION (OUTPATIENT)
Dept: CASE MANAGEMENT | Age: 85
End: 2020-01-10

## 2020-01-13 ENCOUNTER — CARE COORDINATION (OUTPATIENT)
Dept: CASE MANAGEMENT | Age: 85
End: 2020-01-13

## 2020-01-13 NOTE — CARE COORDINATION
Placed a call to [facility] to check to see if patient was still in acute care there. Was told that he is not current. Will try at home.

## 2020-01-13 NOTE — CARE COORDINATION
Natalie 45 Transitions Follow Up Call    2020    Patient: Jean Carlos Voss  Patient : 1935   MRN: [id number]  Discharge Date: 20 RARS: Readmission Risk Score: 8         Spoke with: Mrs. Treadwell 15 Transitions Subsequent and Final Call    Schedule Follow Up Appointment with PCP:  Declined  Subsequent and Final Calls  Do you have any ongoing symptoms?:  No  Have your medications changed?:  No  Do you have any questions related to your medications?:  No  Do you currently have any active services?:  No  Do you have any needs or concerns that I can assist you with?:  No  Identified Barriers:  None  Care Transitions Interventions                          Other Interventions: Follow Up: Placed a call to the home and spoke with wife. She reported that patient was in the hospital at [facility] from Thursday to Saturday. She said that he had pneumonia. She said therapy worked with him while he was there and he is moving about fairly well now. She did say that he is no longer having the weakness, cough, congestion that he had a couple of days prior to hospitalization. She said he was started on nebulizer treatments while there. Not sent home on any antibiotics, steroids, etc.  He is eating and drinking well. Has not made a hospital follow up appointment. I offered to help with that. She said she would call shortly and do that, as she has to see how long their son is going to be here from out of town to be able to help out with transportation, etc.  Has a dressing on the incision. She said they have not checked it since being home. She did say that she has dressing to use to change it. Did encourage her to do that, to look at it and clean it as previously instructed by surgeon. No other issues noted. Will follow up as indicated.         Future Appointments   Date Time Provider Reuben Rice   2020 10:30 AM Paulo Simon MD [facility]io [facility]   2020  8:30 AM [place]

## 2020-01-17 ENCOUNTER — CARE COORDINATION (OUTPATIENT)
Dept: CASE MANAGEMENT | Age: 85
End: 2020-01-17

## 2020-01-17 NOTE — CARE COORDINATION
[facility] Transitions Follow Up Call    2020    Patient: Shantel Dos Santos  Patient : 1935   MRN: [id number]  Reason for Admission:   Discharge Date: 20 RARS: Readmission Risk Score: 10         Spoke with: Bakari Puentes, patient's wife    Care Transitions Subsequent and Final Call    Subsequent and Final Calls  Do you have any ongoing symptoms?:  No  Have your medications changed?:  No  Do you have any questions related to your medications?:  No  Do you currently have any active services?:  Yes  Are you currently active with any services?:  Home Health  Do you have any needs or concerns that I can assist you with?:  No  Identified Barriers:  None  Care Transitions Interventions                          Other Interventions: Follow Up : Spoke with patient's wife Bakari Puentes for follow up phone call. She says he is doing much better, says his appetite is better as well. She says [facility] was out this morning, and he worked well with them. She says that they followed up with Dr. Reema Portillo, and he says the incision looks good, there is some blood just below the surface of the skin after surgery. Did let her know that should absorb, but it does take time. No new complaints or problems at this time. Encouraged to call with any needs that come up. Will follow up with patient at a later time.    Future Appointments   Date Time Provider Reuben Rice   2020  1:00 PM DAVIE Asher [facility]   2020 10:30 AM Keara Pierre MD [facility] Cardio [facility]   2020  8:30 AM [facility]  RM 1 [facility] ULTRA [facility] Hos Rad   2020  9:00 AM Jason Dangelo PA-C Vasc Spec [facility]       Beula Door, [address] [facility]

## 2020-01-22 ENCOUNTER — OFFICE VISIT (OUTPATIENT)
Dept: PRIMARY CARE CLINIC | Age: 85
End: 2020-01-22
Payer: MEDICARE

## 2020-01-22 ENCOUNTER — CARE COORDINATION (OUTPATIENT)
Dept: CASE MANAGEMENT | Age: 85
End: 2020-01-22

## 2020-01-22 VITALS
HEART RATE: 89 BPM | HEIGHT: 71 IN | DIASTOLIC BLOOD PRESSURE: 82 MMHG | WEIGHT: 185 LBS | BODY MASS INDEX: 25.9 KG/M2 | SYSTOLIC BLOOD PRESSURE: 146 MMHG | TEMPERATURE: 97.9 F | OXYGEN SATURATION: 97 %

## 2020-01-22 DIAGNOSIS — R53.82 CHRONIC FATIGUE: ICD-10-CM

## 2020-01-22 DIAGNOSIS — D50.9 IRON DEFICIENCY ANEMIA, UNSPECIFIED IRON DEFICIENCY ANEMIA TYPE: ICD-10-CM

## 2020-01-22 LAB
ALBUMIN SERPL-MCNC: 3.9 G/DL (ref 3.5–5.2)
ALP BLD-CCNC: 92 U/L (ref 40–130)
ALT SERPL-CCNC: 19 U/L (ref 5–41)
ANION GAP SERPL CALCULATED.3IONS-SCNC: 16 MMOL/L (ref 7–19)
AST SERPL-CCNC: 25 U/L (ref 5–40)
BASOPHILS ABSOLUTE: 0 K/UL (ref 0–0.2)
BASOPHILS RELATIVE PERCENT: 0.3 % (ref 0–1)
BILIRUB SERPL-MCNC: 0.8 MG/DL (ref 0.2–1.2)
BUN BLDV-MCNC: 14 MG/DL (ref 8–23)
CALCIUM SERPL-MCNC: 9.7 MG/DL (ref 8.8–10.2)
CHLORIDE BLD-SCNC: 102 MMOL/L (ref 98–111)
CO2: 21 MMOL/L (ref 22–29)
CREAT SERPL-MCNC: 1.1 MG/DL (ref 0.5–1.2)
EOSINOPHILS ABSOLUTE: 0.2 K/UL (ref 0–0.6)
EOSINOPHILS RELATIVE PERCENT: 2.3 % (ref 0–5)
FERRITIN: 232.3 NG/ML (ref 30–400)
GFR NON-AFRICAN AMERICAN: >60
GLUCOSE BLD-MCNC: 110 MG/DL (ref 74–109)
HBA1C MFR BLD: 5.3 % (ref 4–6)
HCT VFR BLD CALC: 41.2 % (ref 42–52)
HEMOGLOBIN: 12.2 G/DL (ref 14–18)
IMMATURE GRANULOCYTES #: 0.1 K/UL
IRON: 63 UG/DL (ref 59–158)
LYMPHOCYTES ABSOLUTE: 2 K/UL (ref 1.1–4.5)
LYMPHOCYTES RELATIVE PERCENT: 21.9 % (ref 20–40)
MCH RBC QN AUTO: 29.5 PG (ref 27–31)
MCHC RBC AUTO-ENTMCNC: 29.6 G/DL (ref 33–37)
MCV RBC AUTO: 99.8 FL (ref 80–94)
MONOCYTES ABSOLUTE: 0.8 K/UL (ref 0–0.9)
MONOCYTES RELATIVE PERCENT: 8.7 % (ref 0–10)
NEUTROPHILS ABSOLUTE: 6.2 K/UL (ref 1.5–7.5)
NEUTROPHILS RELATIVE PERCENT: 66.2 % (ref 50–65)
PDW BLD-RTO: 15.2 % (ref 11.5–14.5)
PLATELET # BLD: 300 K/UL (ref 130–400)
PMV BLD AUTO: 10.9 FL (ref 9.4–12.4)
POTASSIUM SERPL-SCNC: 4.5 MMOL/L (ref 3.5–5)
RBC # BLD: 4.13 M/UL (ref 4.7–6.1)
SODIUM BLD-SCNC: 139 MMOL/L (ref 136–145)
TOTAL PROTEIN: 7.3 G/DL (ref 6.6–8.7)
TSH SERPL DL<=0.05 MIU/L-ACNC: 1.68 UIU/ML (ref 0.27–4.2)
VITAMIN B-12: >2000 PG/ML (ref 211–946)
WBC # BLD: 9.3 K/UL (ref 4.8–10.8)

## 2020-01-22 PROCEDURE — 1036F TOBACCO NON-USER: CPT | Performed by: NURSE PRACTITIONER

## 2020-01-22 PROCEDURE — G8417 CALC BMI ABV UP PARAM F/U: HCPCS | Performed by: NURSE PRACTITIONER

## 2020-01-22 PROCEDURE — 4040F PNEUMOC VAC/ADMIN/RCVD: CPT | Performed by: NURSE PRACTITIONER

## 2020-01-22 PROCEDURE — 1111F DSCHRG MED/CURRENT MED MERGE: CPT | Performed by: NURSE PRACTITIONER

## 2020-01-22 PROCEDURE — G8482 FLU IMMUNIZE ORDER/ADMIN: HCPCS | Performed by: NURSE PRACTITIONER

## 2020-01-22 PROCEDURE — G8427 DOCREV CUR MEDS BY ELIG CLIN: HCPCS | Performed by: NURSE PRACTITIONER

## 2020-01-22 PROCEDURE — 1123F ACP DISCUSS/DSCN MKR DOCD: CPT | Performed by: NURSE PRACTITIONER

## 2020-01-22 PROCEDURE — 99213 OFFICE O/P EST LOW 20 MIN: CPT | Performed by: NURSE PRACTITIONER

## 2020-01-22 RX ORDER — PREDNISONE 20 MG/1
TABLET ORAL
COMMUNITY
Start: 2020-01-14 | End: 2020-01-31 | Stop reason: ALTCHOICE

## 2020-01-22 RX ORDER — CEFDINIR 300 MG/1
CAPSULE ORAL
COMMUNITY
Start: 2020-01-14 | End: 2020-01-31 | Stop reason: ALTCHOICE

## 2020-01-22 RX ORDER — CYCLOBENZAPRINE HCL 10 MG
TABLET ORAL
COMMUNITY
Start: 2020-01-15 | End: 2021-03-09

## 2020-01-22 ASSESSMENT — ENCOUNTER SYMPTOMS
SORE THROAT: 0
VOMITING: 0
EYE REDNESS: 0
COUGH: 1
DIARRHEA: 0
SHORTNESS OF BREATH: 1
RHINORRHEA: 0
CONSTIPATION: 0

## 2020-01-22 NOTE — PROGRESS NOTES
[address]  [address]  Phone [phone]   Fax [phone]      OFFICE VISIT: 2020    Bernardo Joseph- : 1935    Chief Complaint:Esau is a 82 y.o. male who is here for Follow-Up from Hospital (anemia and pneumonia)     HPI  The patient presents today for follow-up from the hospital.  He had right total hip replacement on 1-3-2020. He is currently doing in home PT. His pain is about a 3-4. Pain is better post-operatively than pre-operatively. \"I was told I was anemic. \"  2020, Hgb/Hct 11.3/35.2  Denies any blood in stool. He had a large hematoma after surgery. He went to [facility] ED. \"He was talking out of his head,\" per patient's wife  \"He was coughing & spitting up green stuff,\" per patient's wife  He was admitted for two nights (2020)  He continues on [place] and Ba. \"I have had an RN and therapist come out. \"    Denies SOA. Reports coughing. Dry cough. Reports decreased appetite. Hx of atrial fibrillation. On 2019: 197 pounds  Today, he weighs 185 pounds     height is 5' 11\" (1.803 m) and weight is 185 lb (83.9 kg). His temporal temperature is 97.9 °F (36.6 °C). His blood pressure is 146/82 (abnormal) and his pulse is 89. His oxygen saturation is 97%. Body mass index is 25.8 kg/m².     Results for orders placed or performed during the hospital encounter of 60/94/50   Basic Metabolic Panel   Result Value Ref Range    Sodium 139 136 - 145 mmol/L    Potassium 4.6 3.5 - 5.0 mmol/L    Chloride 103 98 - 111 mmol/L    CO2 24 22 - 29 mmol/L    Anion Gap 12 7 - 19 mmol/L    Glucose 145 (H) 74 - 109 mg/dL    BUN 18 8 - 23 mg/dL    CREATININE 1.0 0.5 - 1.2 mg/dL    GFR Non-African American >60 >60    Calcium 8.2 (L) 8.8 - 10.2 mg/dL   Hemoglobin and hematocrit, blood   Result Value Ref Range    Hemoglobin 11.3 (L) 14.0 - 18.0 g/dL    Hematocrit 35.2 (L) 42.0 - 52.0 %   TYPE AND SCREEN   Result Value Ref Range    ABO/Rh A POS     Antibody Screen CANCELED    ANTIBODY aPTT 12/11/2019 33.3     ABO/Rh 12/11/2019 A POS     Antibody Screen 12/11/2019 CANCELED     Color, UA 12/11/2019 YELLOW     Clarity, UA 12/11/2019 Clear     Glucose, Ur 12/11/2019 Negative     Bilirubin Urine 12/11/2019 Negative     Ketones, Urine 12/11/2019 Negative     Specific Gravity, UA 12/11/2019 1.022     Blood, Urine 12/11/2019 LARGE*    pH, UA 12/11/2019 6.0     Protein, UA 12/11/2019 Negative     Urobilinogen, Urine 12/11/2019 0.2     Nitrite, Urine 12/11/2019 Negative     Leukocyte Esterase, Urine 12/11/2019 SMALL*    Urine Reflex to Culture 12/11/2019 YES     MRSA Culture Only 12/11/2019 No MRSA detected on culture     Urine Culture, Routine 12/11/2019 No growth at 36-48 hours     Bacteria, UA 12/11/2019 NEGATIVE     Hyaline Casts, UA 12/11/2019 3     WBC, UA 12/11/2019 10*    RBC, UA 12/11/2019 59*    Epi Cells 12/11/2019 1     ABO/Rh 12/11/2019 CANCELED     Antibody Screen 12/11/2019 NEG      Copies of these are in the chart. Prior to Visit Medications    Medication Sig Taking? Authorizing Provider   cefdinir (OMNICEF) 300 MG capsule  Yes Historical Provider, MD   cyclobenzaprine (FLEXERIL) 10 MG tablet TAKE 1 TABLET BY MOUTH THREE TIMES DAILY Yes Historical Provider, MD   predniSONE (DELTASONE) 20 MG tablet  Yes Historical Provider, MD   DULoxetine HCl 60 MG CSDR Take 60 mg by mouth daily Yes GISELLE Andrew,    oxyCODONE-acetaminophen (PERCOCET) 7.5-325 MG per tablet Take 1 tablet by mouth every 4 hours as needed for Pain for up to 28 days.  Intended supply: 28 days Yes Agustín Escobar MD   docusate sodium (COLACE) 100 MG capsule Take 1 capsule by mouth 2 times daily Yes Agustín Escobar MD   pantoprazole (PROTONIX) 40 MG tablet Take 40 mg by mouth 2 times daily Yes Historical Provider, MD   apixaban (ELIQUIS) 2.5 MG TABS tablet Take 2.5 mg by mouth 2 times daily Yes Historical Provider, MD   tamsulosin (FLOMAX) 0.4 MG capsule Take 1 capsule by mouth daily Yes Yadira Blanton DAVIE   isosorbide mononitrate (IMDUR) 30 MG extended release tablet Take 1 tablet by mouth daily Yes GISELLE Galicia DO   simvastatin (ZOCOR) 20 MG tablet Take 1 tablet by mouth every evening Yes GISELLE Galicia DO   Cholecalciferol (VITAMIN D3) 5000 units TABS Take 5,000 Units by mouth daily  Yes Historical Provider, MD   metoprolol tartrate (LOPRESSOR) 25 MG tablet Take 1 tablet by mouth 2 times daily Patient will fill next month  Patient taking differently: Take 12.5 mg by mouth 2 times daily Patient will fill next month Yes DAVIE Skaggs   vitamin B-12 (CYANOCOBALAMIN) 100 MCG tablet Take 50 mcg by mouth daily.  Yes Historical Provider, MD       Allergies: Lortab [hydrocodone-acetaminophen] and Morphine and related    Past Medical History:   Diagnosis Date    Adenocarcinoma [facility] [address]     colon    Aneurysm ([facility] [address] aortic    Anxiety     Arthritis     Polyarticular    Atrial fibrillation ([facility] [address]     Burgos's esophagus 2010    H/o    CAD (coronary artery disease)     CAD (coronary artery disease), native coronary artery     stent; sees dr. Frazier Comment Chronic respiratory failure ([facility] [address]     Costochondritis     Erectile dysfunction     Gastroesophageal reflux disease     GERD (gastroesophageal reflux disease)     High cholesterol     Hip pain     Hypercholesterolemia 2010    Hypertension     Hypertension     MI (mitral incompetence)     x4 stents    Nephrolithiasis     Non-cardiac chest pain 12/20/2011    Renal calculi     Right upper lobe consolidation ([facility] [address]     Tobacco abuse 2010    Ulcer, gastric, acute 3/14/2012       Past Surgical History:   Procedure Laterality Date    ANKLE SURGERY      left    APPENDECTOMY      CARDIAC CATHETERIZATION  12/12/07,07/13/09    Left Heart Cath    CHOLECYSTECTOMY      right    COLON SURGERY      GALLBLADDER SURGERY      LEG SURGERY      right    LUNG REMOVAL, PARTIAL      MANDIBLE FRACTURE SURGERY      ORTHOPEDIC SURGERY      legs, hand General: Bowel sounds are normal.      Palpations: Abdomen is soft. Musculoskeletal:      Right hip: He exhibits tenderness (ecchymosis improving). Lymphadenopathy:      Cervical: No cervical adenopathy. Skin:     General: Skin is dry. Neurological:      Mental Status: He is alert. ASSESSMENT      ICD-10-CM    1. Status post right hip replacement Z96.641  Continue physical therapy  Keep follow-up with orthopedics   2. Chronic fatigue R53.82 CBC Auto Differential     Comprehensive Metabolic Panel     TSH without Reflex     Vitamin B12     Ferritin     Iron   3. Iron deficiency anemia, unspecified iron deficiency anemia type D50.9 CBC Auto Differential     Ferritin     Iron   4. Essential hypertension I10 The current medical regimen is effective;  continue present plan and medications. Patient is asked to monitor BP at home or work, several times per month and return with written values at next office visit. PLAN    Orders Placed This Encounter   Procedures    CBC Auto Differential    Comprehensive Metabolic Panel    TSH without Reflex    Vitamin B12    Ferritin    Iron        Return if symptoms worsen or fail to improve. There are no Patient Instructions on file for this visit. Controlled Substances Monitoring:    n/a          Additional Instructions: As always, patient is advised to bring in medication bottles in order to correctly reconcile with our current list.    Jovanny King received counseling on the following healthy behaviors: n/a    Patient given educational materials on dx    I have instructed Jovanny King to complete a self tracking handout on n/a and instructed them to bring it with them to his next appointment. Discussed use, benefit, and side effects of prescribed medications. Barriers to medication compliance addressed. All patient questions answered. Pt voiced understanding.      DAVIE Graves

## 2020-01-22 NOTE — CARE COORDINATION
Center   8/4/2020 10:30 AM Lubna Coleman MD [facility] Cardio [facility]   12/22/2020  8:30 AM [facility] US RM 1 [facility] ULTRA [facility] Hos Rad   12/22/2020  9:00 AM Estephanie Dangelo PA-C Vasc Spec [facility]       Benji Gauthier RN

## 2020-01-23 ENCOUNTER — TELEPHONE (OUTPATIENT)
Dept: PRIMARY CARE CLINIC | Age: 85
End: 2020-01-23

## 2020-01-28 ENCOUNTER — CARE COORDINATION (OUTPATIENT)
Dept: CASE MANAGEMENT | Age: 85
End: 2020-01-28

## 2020-01-29 ENCOUNTER — CARE COORDINATION (OUTPATIENT)
Dept: CASE MANAGEMENT | Age: 85
End: 2020-01-29

## 2020-01-30 ENCOUNTER — TELEPHONE (OUTPATIENT)
Dept: PRIMARY CARE CLINIC | Age: 85
End: 2020-01-30

## 2020-01-31 ENCOUNTER — OFFICE VISIT (OUTPATIENT)
Dept: PRIMARY CARE CLINIC | Age: 85
End: 2020-01-31
Payer: MEDICARE

## 2020-01-31 VITALS
WEIGHT: 180 LBS | HEIGHT: 71 IN | SYSTOLIC BLOOD PRESSURE: 124 MMHG | BODY MASS INDEX: 25.2 KG/M2 | DIASTOLIC BLOOD PRESSURE: 78 MMHG | HEART RATE: 83 BPM | OXYGEN SATURATION: 97 % | TEMPERATURE: 97 F

## 2020-01-31 PROCEDURE — G8417 CALC BMI ABV UP PARAM F/U: HCPCS | Performed by: NURSE PRACTITIONER

## 2020-01-31 PROCEDURE — 1111F DSCHRG MED/CURRENT MED MERGE: CPT | Performed by: NURSE PRACTITIONER

## 2020-01-31 PROCEDURE — 1036F TOBACCO NON-USER: CPT | Performed by: NURSE PRACTITIONER

## 2020-01-31 PROCEDURE — 4040F PNEUMOC VAC/ADMIN/RCVD: CPT | Performed by: NURSE PRACTITIONER

## 2020-01-31 PROCEDURE — G8427 DOCREV CUR MEDS BY ELIG CLIN: HCPCS | Performed by: NURSE PRACTITIONER

## 2020-01-31 PROCEDURE — G8482 FLU IMMUNIZE ORDER/ADMIN: HCPCS | Performed by: NURSE PRACTITIONER

## 2020-01-31 PROCEDURE — 99213 OFFICE O/P EST LOW 20 MIN: CPT | Performed by: NURSE PRACTITIONER

## 2020-01-31 PROCEDURE — 1123F ACP DISCUSS/DSCN MKR DOCD: CPT | Performed by: NURSE PRACTITIONER

## 2020-01-31 ASSESSMENT — ENCOUNTER SYMPTOMS
EYE REDNESS: 0
EYE DISCHARGE: 0
CHOKING: 0
CONSTIPATION: 0
DIARRHEA: 0
RHINORRHEA: 0
BLOOD IN STOOL: 0
COUGH: 1
WHEEZING: 0
SORE THROAT: 0

## 2020-01-31 NOTE — PROGRESS NOTES
General: Skin is dry. Neurological:      Mental Status: He is alert. /78 (Site: Right Upper Arm, Position: Sitting, Cuff Size: Large Adult)   Pulse 83   Temp 97 °F (36.1 °C) (Temporal)   Ht 5' 11\" (1.803 m)   Wt 180 lb (81.6 kg)   SpO2 97%   BMI 25.10 kg/m²     Assessment:           ICD-10-CM    1. Pneumonia due to infectious organism, unspecified laterality, unspecified part of lung J18.9        Plan:    lungs are clear. If chest congestion or fever returns, return for cxr. Return if symptoms worsen or fail to improve. No orders of the defined types were placed in this encounter. No orders of the defined types were placed in this encounter. Discussed use, benefit, and side effectsof prescribed medications. All patient questions answered. Pt voiced understanding. Reviewed health maintenance. .  Patient agreed with treatment plan. Follow up asdirected. There are no Patient Instructions on file for this visit.       Electronically signed by DAVIE Chew on1/31/2020 at 2:49 PM

## 2020-01-31 NOTE — TELEPHONE ENCOUNTER
Received fax from pharmacy requesting refill on pts medication(s). Pt was last seen in office on 1/22/2020  and has a follow up scheduled for 1/31/2020. Will send request to  Dr. Ani Oropeza  for patient.      Requested Prescriptions     Pending Prescriptions Disp Refills    DULoxetine HCl 60 MG CSDR 90 capsule 3     Sig: Take 60 mg by mouth daily

## 2020-02-05 ENCOUNTER — TELEPHONE (OUTPATIENT)
Dept: PRIMARY CARE CLINIC | Age: 85
End: 2020-02-05

## 2020-02-05 RX ORDER — DULOXETIN HYDROCHLORIDE 60 MG/1
60 CAPSULE, DELAYED RELEASE ORAL DAILY
Qty: 90 CAPSULE | Refills: 3 | Status: SHIPPED | OUTPATIENT
Start: 2020-02-05 | End: 2021-02-01

## 2020-03-10 ENCOUNTER — APPOINTMENT (OUTPATIENT)
Dept: GENERAL RADIOLOGY | Age: 85
End: 2020-03-10
Payer: MEDICARE

## 2020-03-10 ENCOUNTER — HOSPITAL ENCOUNTER (EMERGENCY)
Age: 85
Discharge: HOME | End: 2020-03-10
Attending: EMERGENCY MEDICINE
Payer: MEDICARE

## 2020-03-10 VITALS
SYSTOLIC BLOOD PRESSURE: 139 MMHG | WEIGHT: 175 LBS | BODY MASS INDEX: 24.5 KG/M2 | TEMPERATURE: 97.7 F | HEIGHT: 71 IN | RESPIRATION RATE: 18 BRPM | OXYGEN SATURATION: 94 % | HEART RATE: 67 BPM | DIASTOLIC BLOOD PRESSURE: 74 MMHG

## 2020-03-10 LAB
ALBUMIN SERPL-MCNC: 4.1 G/DL (ref 3.5–5.2)
ALP BLD-CCNC: 90 U/L (ref 40–130)
ALT SERPL-CCNC: 16 U/L (ref 5–41)
AMYLASE: 67 U/L (ref 28–100)
ANION GAP SERPL CALCULATED.3IONS-SCNC: 11 MMOL/L (ref 7–19)
AST SERPL-CCNC: 20 U/L (ref 5–40)
BASOPHILS ABSOLUTE: 0 K/UL (ref 0–0.2)
BASOPHILS RELATIVE PERCENT: 0.3 % (ref 0–1)
BILIRUB SERPL-MCNC: 0.6 MG/DL (ref 0.2–1.2)
BUN BLDV-MCNC: 13 MG/DL (ref 8–23)
CALCIUM SERPL-MCNC: 9.6 MG/DL (ref 8.8–10.2)
CHLORIDE BLD-SCNC: 102 MMOL/L (ref 98–111)
CO2: 26 MMOL/L (ref 22–29)
CREAT SERPL-MCNC: 1.1 MG/DL (ref 0.5–1.2)
EOSINOPHILS ABSOLUTE: 0.2 K/UL (ref 0–0.6)
EOSINOPHILS RELATIVE PERCENT: 3.2 % (ref 0–5)
GFR NON-AFRICAN AMERICAN: >60
GLUCOSE BLD-MCNC: 113 MG/DL (ref 74–109)
HCT VFR BLD CALC: 41.9 % (ref 42–52)
HEMOGLOBIN: 13.3 G/DL (ref 14–18)
IMMATURE GRANULOCYTES #: 0 K/UL
LIPASE: 28 U/L (ref 13–60)
LYMPHOCYTES ABSOLUTE: 1.2 K/UL (ref 1.1–4.5)
LYMPHOCYTES RELATIVE PERCENT: 18.7 % (ref 20–40)
MCH RBC QN AUTO: 29.1 PG (ref 27–31)
MCHC RBC AUTO-ENTMCNC: 31.7 G/DL (ref 33–37)
MCV RBC AUTO: 91.7 FL (ref 80–94)
MONOCYTES ABSOLUTE: 0.7 K/UL (ref 0–0.9)
MONOCYTES RELATIVE PERCENT: 10.8 % (ref 0–10)
NEUTROPHILS ABSOLUTE: 4.3 K/UL (ref 1.5–7.5)
NEUTROPHILS RELATIVE PERCENT: 66.4 % (ref 50–65)
PDW BLD-RTO: 14 % (ref 11.5–14.5)
PLATELET # BLD: 172 K/UL (ref 130–400)
PMV BLD AUTO: 10.6 FL (ref 9.4–12.4)
POTASSIUM REFLEX MAGNESIUM: 4.4 MMOL/L (ref 3.5–5)
PRO-BNP: 255 PG/ML (ref 0–1800)
RBC # BLD: 4.57 M/UL (ref 4.7–6.1)
SODIUM BLD-SCNC: 139 MMOL/L (ref 136–145)
TOTAL PROTEIN: 7 G/DL (ref 6.6–8.7)
TROPONIN: 0.01 NG/ML (ref 0–0.03)
TROPONIN: <0.01 NG/ML (ref 0–0.03)
WBC # BLD: 6.5 K/UL (ref 4.8–10.8)

## 2020-03-10 PROCEDURE — 36415 COLL VENOUS BLD VENIPUNCTURE: CPT

## 2020-03-10 PROCEDURE — 83690 ASSAY OF LIPASE: CPT

## 2020-03-10 PROCEDURE — 82150 ASSAY OF AMYLASE: CPT

## 2020-03-10 PROCEDURE — 83880 ASSAY OF NATRIURETIC PEPTIDE: CPT

## 2020-03-10 PROCEDURE — 85025 COMPLETE CBC W/AUTO DIFF WBC: CPT

## 2020-03-10 PROCEDURE — 71045 X-RAY EXAM CHEST 1 VIEW: CPT

## 2020-03-10 PROCEDURE — 84484 ASSAY OF TROPONIN QUANT: CPT

## 2020-03-10 PROCEDURE — 93005 ELECTROCARDIOGRAM TRACING: CPT | Performed by: EMERGENCY MEDICINE

## 2020-03-10 PROCEDURE — 80053 COMPREHEN METABOLIC PANEL: CPT

## 2020-03-10 PROCEDURE — 99285 EMERGENCY DEPT VISIT HI MDM: CPT

## 2020-03-10 RX ORDER — SUCRALFATE 1 G/1
1 TABLET ORAL 4 TIMES DAILY
Qty: 40 TABLET | Refills: 0 | Status: SHIPPED | OUTPATIENT
Start: 2020-03-10 | End: 2020-11-24

## 2020-03-10 RX ORDER — METOPROLOL TARTRATE 50 MG/1
TABLET, FILM COATED ORAL
Qty: 180 TABLET | Refills: 3 | Status: SHIPPED | OUTPATIENT
Start: 2020-03-10 | End: 2020-11-24

## 2020-03-10 ASSESSMENT — ENCOUNTER SYMPTOMS
BLOOD IN STOOL: 0
NAUSEA: 1
SHORTNESS OF BREATH: 0
FACIAL SWELLING: 0
CHOKING: 0
SORE THROAT: 0
DIARRHEA: 0
EYE DISCHARGE: 0
ABDOMINAL PAIN: 0
APNEA: 0
CONSTIPATION: 0
SINUS PRESSURE: 0
VOICE CHANGE: 0

## 2020-03-10 ASSESSMENT — PAIN SCALES - GENERAL
PAINLEVEL_OUTOF10: 6
PAINLEVEL_OUTOF10: 3

## 2020-03-10 ASSESSMENT — PAIN DESCRIPTION - LOCATION
LOCATION: CHEST
LOCATION: CHEST

## 2020-03-10 NOTE — ED PROVIDER NOTES
SURGERY      LEG SURGERY      right    LUNG REMOVAL, PARTIAL      MANDIBLE FRACTURE SURGERY      ORTHOPEDIC SURGERY      legs, hand and ankles    SKIN CANCER EXCISION  2016    TOTAL HIP ARTHROPLASTY Right 1/3/2020    RIGHT TOTAL HIP REPLACEMENT performed by Celine Saenz MD at [address] [facility]       Previous Medications    APIXABAN (ELIQUIS) 2.5 MG TABS TABLET    Take 2.5 mg by mouth 2 times daily    CHOLECALCIFEROL (VITAMIN D3) 5000 UNITS TABS    Take 5,000 Units by mouth daily     CYCLOBENZAPRINE (FLEXERIL) 10 MG TABLET    TAKE 1 TABLET BY MOUTH THREE TIMES DAILY    DOCUSATE SODIUM (COLACE) 100 MG CAPSULE    Take 1 capsule by mouth 2 times daily    DULOXETINE (CYMBALTA) 60 MG EXTENDED RELEASE CAPSULE    Take 1 capsule by mouth daily    ISOSORBIDE MONONITRATE (IMDUR) 30 MG EXTENDED RELEASE TABLET    Take 1 tablet by mouth daily    METOPROLOL TARTRATE (LOPRESSOR) 25 MG TABLET    Take 1 tablet by mouth 2 times daily Patient will fill next month    PANTOPRAZOLE (PROTONIX) 40 MG TABLET    Take 40 mg by mouth 2 times daily    SIMVASTATIN (ZOCOR) 20 MG TABLET    Take 1 tablet by mouth every evening    TAMSULOSIN (FLOMAX) 0.4 MG CAPSULE    Take 1 capsule by mouth daily    VITAMIN B-12 (CYANOCOBALAMIN) 100 MCG TABLET    Take 50 mcg by mouth daily.        ALLERGIES     Lortab [hydrocodone-acetaminophen] and Morphine and related    FAMILY HISTORY       Family History   Problem Relation Age of Onset    Diabetes Mother     Coronary Art Dis Father     High Blood Pressure Father     High Cholesterol Father     Heart Attack Father     Stroke Father     Kidney Disease Son     Kidney Disease Son           SOCIAL HISTORY       Social History     Socioeconomic History    Marital status:      Spouse name: None    Number of children: None    Years of education: None    Highest education level: None   Occupational History    None   Social Needs    Financial resource strain: None    Food insecurity     Worry: None     Inability: None    Transportation needs     Medical: None     Non-medical: None   Tobacco Use    Smoking status: Former Smoker    Smokeless tobacco: Never Used    Tobacco comment: quit in 1983   Substance and Sexual Activity    Alcohol use: No    Drug use: No    Sexual activity: None   Lifestyle    Physical activity     Days per week: None     Minutes per session: None    Stress: None   Relationships    Social connections     Talks on phone: None     Gets together: None     Attends Sabianism service: None     Active member of club or organization: None     Attends meetings of clubs or organizations: None     Relationship status: None    Intimate partner violence     Fear of current or ex partner: None     Emotionally abused: None     Physically abused: None     Forced sexual activity: None   Other Topics Concern    None   Social History Narrative    None       SCREENINGS    [place] Coma Scale  Eye Opening: Spontaneous  Best Verbal Response: Oriented  Best Motor Response: Obeys commands  [place] Coma Scale Score: 15        PHYSICAL EXAM    (up to 7 for level 4, 8 or more for level 5)     ED Triage Vitals   BP Temp Temp Source Pulse Resp SpO2 Height Weight   03/10/20 1140 03/10/20 1140 03/10/20 1152 03/10/20 1140 03/10/20 1140 03/10/20 1140 03/10/20 1140 03/10/20 1140   103/66 97.6 °F (36.4 °C) Oral 63 19 91 % 5' 11\" (1.803 m) 175 lb (79.4 kg)       Physical Exam  Vitals signs and nursing note reviewed. Constitutional:       General: He is not in acute distress. Appearance: He is well-developed. HENT:      Head: Normocephalic and atraumatic. Right Ear: External ear normal.      Left Ear: External ear normal.      Nose: Nose normal.      Mouth/Throat:      Mouth: Mucous membranes are moist.      Pharynx: Oropharynx is clear. Eyes:      General: No scleral icterus.      Conjunctiva/sclera: Conjunctivae normal.      Pupils: Pupils are equal, round, and reactive to light.   Neck:      Musculoskeletal: Normal range of motion and neck supple. Cardiovascular:      Rate and Rhythm: Normal rate and regular rhythm. Pulses: Normal pulses. Heart sounds: Normal heart sounds. No murmur. Pulmonary:      Effort: Pulmonary effort is normal. No respiratory distress. Breath sounds: Normal breath sounds. No wheezing. Abdominal:      General: Bowel sounds are normal.      Palpations: Abdomen is soft. Tenderness: There is no abdominal tenderness. Musculoskeletal: Normal range of motion. General: No swelling. Right lower leg: No edema. Left lower leg: No edema. Skin:     General: Skin is warm and dry. Neurological:      General: No focal deficit present. Mental Status: He is alert and oriented to person, place, and time. Psychiatric:         Mood and Affect: Mood normal.         Behavior: Behavior normal.         DIAGNOSTIC RESULTS     EKG: All EKG's are interpreted by the Emergency Department Physician who either signs or Co-signs this chart in the absence of a cardiologist.    Sinus rhythm rate 73 he is having some ventricular trigeminal beats at times. On the monitor looks like sinus with occasional PVC. RADIOLOGY:   Non-plain film images such as CT, Ultrasound and MRI are read by the radiologist. Plainradiographic images are visualized and preliminarily interpreted by the emergency physician with the below findings:    I reviewed the images and results. Interpretation per the Radiologist below, if available at the time of this note:    XR CHEST PORTABLE   Final Result   1. COPD with right apical scarring and postsurgical change.    Signed by Dr Remington Donald on 3/10/2020 12:29 PM            ED BEDSIDE ULTRASOUND:   Performed by ED Physician - none    LABS:  Labs Reviewed   CBC WITH AUTO DIFFERENTIAL - Abnormal; Notable for the following components:       Result Value    RBC 4.57 (*)     Hemoglobin 13.3 (*)     Hematocrit 41.9 (*)

## 2020-03-12 LAB
EKG P AXIS: 31 DEGREES
EKG P AXIS: 65 DEGREES
EKG P-R INTERVAL: 152 MS
EKG P-R INTERVAL: 154 MS
EKG Q-T INTERVAL: 388 MS
EKG Q-T INTERVAL: 424 MS
EKG QRS DURATION: 88 MS
EKG QRS DURATION: 90 MS
EKG QTC CALCULATION (BAZETT): 409 MS
EKG QTC CALCULATION (BAZETT): 423 MS
EKG T AXIS: 2 DEGREES
EKG T AXIS: 22 DEGREES

## 2020-03-12 PROCEDURE — 93010 ELECTROCARDIOGRAM REPORT: CPT | Performed by: INTERNAL MEDICINE

## 2020-03-17 ENCOUNTER — OFFICE VISIT (OUTPATIENT)
Dept: CARDIOLOGY | Age: 85
End: 2020-03-17
Payer: MEDICARE

## 2020-03-17 VITALS
WEIGHT: 178 LBS | HEIGHT: 71 IN | DIASTOLIC BLOOD PRESSURE: 64 MMHG | HEART RATE: 80 BPM | BODY MASS INDEX: 24.92 KG/M2 | SYSTOLIC BLOOD PRESSURE: 120 MMHG

## 2020-03-17 PROCEDURE — 4040F PNEUMOC VAC/ADMIN/RCVD: CPT | Performed by: INTERNAL MEDICINE

## 2020-03-17 PROCEDURE — G8482 FLU IMMUNIZE ORDER/ADMIN: HCPCS | Performed by: INTERNAL MEDICINE

## 2020-03-17 PROCEDURE — 99212 OFFICE O/P EST SF 10 MIN: CPT | Performed by: INTERNAL MEDICINE

## 2020-03-17 PROCEDURE — 1036F TOBACCO NON-USER: CPT | Performed by: INTERNAL MEDICINE

## 2020-03-17 PROCEDURE — G8427 DOCREV CUR MEDS BY ELIG CLIN: HCPCS | Performed by: INTERNAL MEDICINE

## 2020-03-17 PROCEDURE — G8420 CALC BMI NORM PARAMETERS: HCPCS | Performed by: INTERNAL MEDICINE

## 2020-03-17 PROCEDURE — 1123F ACP DISCUSS/DSCN MKR DOCD: CPT | Performed by: INTERNAL MEDICINE

## 2020-03-17 NOTE — PROGRESS NOTES
[facility] Patient Office Visit    [address] [address] [id number]  Phone: [phone]  Fax: [phone]        3/17/2020    Chief Complaint / Reason for the Visit   Follow up of:  CAD and Afib and HTN      Specialty Problems        Cardiology Problems    CAD (coronary artery disease), native coronary artery        Hypertension        Non-cardiac chest pain        Hypercholesterolemia        AAA (abdominal aortic aneurysm) (HCC)        Paroxysmal atrial fibrillation (HCC)              Current Status Today According to the patient:  \"I went to the hospital recently. \"    Subjective:  Mr. Henna Guillen is generally feeling stable. Mr. Henna Guillen has the following cardiac complaints / symptoms today:    1. CAD, having non cardiac chest pain    2.  Afib, NSR    3. HTN, well controlled        Henna Guillen is a 82 y.o. male with the following history as recorded in [facility]:    Patient Active Problem List    Diagnosis Date Noted    CAD (coronary artery disease), native coronary artery      Priority: High    Primary osteoarthritis of right hip 01/03/2020    Paroxysmal atrial fibrillation ([facility] [address] 12/11/2017    Colon polyps 04/11/2017    History of colon cancer 04/11/2017    AAA (abdominal aortic aneurysm) ([facility] [address] 11/12/2013    Hypercholesterolemia     Weight loss     Burgos's esophagus     Ulcer, gastric, acute 03/14/2012    Non-cardiac chest pain 12/20/2011    Hypertension     Gastroesophageal reflux disease     Arthritis     Renal calculi     Costochondritis      Current Outpatient Medications   Medication Sig Dispense Refill    metoprolol tartrate (LOPRESSOR) 50 MG tablet Take 1 tablet by mouth twice daily 180 tablet 3    sucralfate (CARAFATE) 1 GM tablet Take 1 tablet by mouth 4 times daily 40 tablet 0    DULoxetine (CYMBALTA) 60 MG extended release capsule Take 1 capsule by mouth daily 90 capsule 3    pantoprazole (PROTONIX) 40 MG lower extermites appears normal and equal and is without pain   EXTREMITIES - no significant edema   NEUROLOGIC - gait and station are normal  SKIN - turgor is normal  PSYCHIATRIC - normal mood and affect, alert and orientated x 3,      ASSESSMENT:    ALL THE CARDIOLOGY PROBLEMS ARE LISTED ABOVE; HOWEVER, THE FOLLOWING SPECIFIC CARDIAC PROBLEMS / CONDITIONS WERE ADDRESSED AND TREATED DURING THE OFFICE VISIT TODAY:                                                                                            MEDICAL DECISION MAKING             Cardiac Specific Problem / Diagnosis  Discussion and Data Reviewed Diagnostic Procedures Ordered Management Options Selected           1. CAD  show no change   Review and summation of old records:    Stents in the LAD, LCX, RCA  12/14/2007  Cath  Patent stents, normal LVFX  12/26/2008  SE  negative for myocardial ischemia  7/13/2009  Cath  Patent stents, normal LVFX  2/15/2010  SE  negative for myocardial ischemia  10/4/2011  Cath  Patent stents, normal LVFX  8/8/12  SE  negative for myocardial ischemia, DTS 0.5 [facility])  5/3/2013  SE  negative for myocardial ischemia, DTS 5  7/7/17 SE negative for myocardial ischemia  5/27/19 Adeline possible ishcemia vs diaphragmatic artifact EF 72%, -14% ischemic burden    Having non cardiac chest pain. No Continue current medications:     Yes           2. HTN  show no change   BP Readings from Last 3 Encounters:   03/17/20 120/64   03/10/20 139/74   01/31/20 124/78       Patient has a history of these risk factors, which are managed medically, and are on current oral therapy  I personally addressed, counselled and educated the patient on this problem / risk factor. I will personally continue and manage prescribed medications and monitor the course of the therapy. No Continue current medications:    {Yes           3. Afib  show no change   NSR today. No Continue current medications:       Yes         Discussed with spouse.     Follow Up Visit Scheduled for:  6 month(s)    I greatly appreciate the opportunity to meet Marilu Crews and your confidence in allowing me to participate in his cardiovascular care. [id number] [facility], [address] [facility] [place] am scribing for and in the presence of J. Aldona Duverney, MD,FAC. Rubens Ramirez MA    3/17/20 11:35 AM  I, J. Aldona Duverney, MD, [facility], personally performed the services described in this documentation as scribed by Chavo Garibay in my presence, and it is both accurate and complete. Electronically signed by Roney Gowers.  Aldona Duverney, MD, [facility]    3/18/20 9:42 AM

## 2020-03-28 DIAGNOSIS — K21.9 GASTROESOPHAGEAL REFLUX DISEASE, ESOPHAGITIS PRESENCE NOT SPECIFIED: Primary | ICD-10-CM

## 2020-03-30 RX ORDER — PANTOPRAZOLE SODIUM 40 MG/1
TABLET, DELAYED RELEASE ORAL
Qty: 180 TABLET | Refills: 0 | Status: SHIPPED | OUTPATIENT
Start: 2020-03-30 | End: 2020-06-16 | Stop reason: SDUPTHER

## 2020-06-15 NOTE — PROGRESS NOTES
Chief Complaint:   Chief Complaint   Patient presents with   • Med Refill     Pt presents today for yearly OV for GERD-Needs refills on BID Pantoprazole; Pt states he is feeling good         Patient ID: Nicolas Nieves is a 85 y.o. male     History of Present Illness: This is a very pleasant 85-year-old male who is here today with need for refill for Protonix and complaints of some heartburn.    The patient takes Protonix 40 mg daily for history of GERD and peptic ulcer disease.  The patient's last EGD was performed on 4/10/2019 with findings of a few gastric polyps that were biopsied.  No abnormality to explain his dysphagia.     The patient states today that he has been having a little more epigastric discomfort and heartburn than normal.  He states he is still taking his Protonix 40 mg twice daily.  After much discussion due to the patient's advanced age and other comorbidities which include treatment with Eliquis, We will forego an EGD at this time and start some Carafate.    The patient denies any nausea, vomiting,  dysphagia,  or hematemesis.  The patient denies any fever or chills.  Denies any melena or hematochezia.  Denies any unintentional weight loss or loss of appetite.    Past Medical History:   Diagnosis Date   • Arthritis    • Atrial fibrillation (CMS/HCC)    • Harmon syndrome    • CAD (coronary artery disease)    • COPD (chronic obstructive pulmonary disease) (CMS/HCC)    • Gastritis    • GERD (gastroesophageal reflux disease)    • Hemorrhoids    • History of colon cancer    • History of colon polyps    • Hypertension    • Peptic ulcer    • Renal calculi        Past Surgical History:   Procedure Laterality Date   • CARDIAC CATHETERIZATION      stents x 4   • CATARACT EXTRACTION, BILATERAL     • CHOLECYSTECTOMY     • COLONOSCOPY  04/25/2014    One 3mm hyperplastic rectal polyp; Repeat 3 years    • COLONOSCOPY N/A 5/17/2017    The examined portion of the ileum was normal; Patent end-to-side ileo-colonic  anastomosis, characterized by healthy appearing mucosa; The entire examined colon is normal; No specimens collected; No plans to repeat due to age    • COLONOSCOPY  04/17/2013    Mass in ascending colon-biopsied-marked with Malorie ink; Internal hemorrhoids    • COLONOSCOPY  02/16/2010    Dr. Sanchez-Internal hermorrhoids; Repeat 3-5 years    • COLONOSCOPY  02/26/2007    Dr. White-Internal hemorrhoids; Diminuitive hyperplastic rectal polyp; AVM right colon; Repeat 4 years   • COLONOSCOPY  04/22/2003    Dr. White-Normal colonoscopy with internal hemorrhoids   • ENDOSCOPY N/A 4/10/2019    No endoscopic esophageal abnormality to explain patient's dysphagia-esophagus dilated; A few gastric polyps-biopsied; Normal examined duodenum   • ENDOSCOPY  04/17/2013    Duodenitis; LA grade A esophagitis    • ENDOSCOPY  8/11/2020    Healed gastric ulcers    • ENDOSCOPY  02/16/2010    Gastric ulcers    • ENDOSCOPY  09/15/2009    Normal    • ENDOSCOPY  05/15/2006    Dr. WhiteQhapv-Zhuujvjxl-tmjfvugy; [facility]    • ENDOSCOPY  01/24/2005    Dr. White-Diffuse esophageal spasm-dilated; Gastritis-biopsied   • ENDOSCOPY  04/25/2003    Dr. White-Stage II reflux esophagitis-rule out short-segment Harmon's esophagus; Schatzki's ring-dilated; Gastritis-biopsied   • HEMICOLECTOMY Right 05/2013   • LUNG REMOVAL, PARTIAL           Current Outpatient Medications:   •  Cholecalciferol (VITAMIN D3) 5000 units tablet tablet, Take 5,000 Units by mouth Daily., Disp: , Rfl:   •  DULoxetine (CYMBALTA) 60 MG capsule, Take 1 capsule by mouth Daily., Disp: , Rfl:   •  ELIQUIS 2.5 MG tablet tablet, Take 1 tablet by mouth 2 (Two) Times a Day., Disp: , Rfl:   •  glucosamine-chondroitin 500-400 MG capsule capsule, Take 1 capsule by mouth Daily., Disp: , Rfl:   •  isosorbide mononitrate (IMDUR) 30 MG 24 hr tablet, Take 30 mg by mouth Daily., Disp: , Rfl:   •  metoprolol tartrate (LOPRESSOR) 50 MG tablet, Take 25 mg by mouth 2 (Two) Times a Day., Disp: , Rfl:   •   "pantoprazole (PROTONIX) 40 MG EC tablet, Take 1 tablet by mouth 2 (Two) Times a Day., Disp: 180 tablet, Rfl: 4  •  simvastatin (ZOCOR) 20 MG tablet, Take 20 mg by mouth Daily., Disp: , Rfl:   •  vitamin B-12 (CYANOCOBALAMIN) 100 MCG tablet, Take 1 tablet by mouth Daily., Disp: , Rfl:   •  sucralfate (Carafate) 1 GM/10ML suspension, Take 10 mL by mouth 4 (Four) Times a Day., Disp: 1200 mL, Rfl: 0    Allergies   Allergen Reactions   • Lortab [Hydrocodone-Acetaminophen] Nausea And Vomiting   • Morphine And Related Nausea And Vomiting       Social History     Socioeconomic History   • Marital status:      Spouse name: Not on file   • Number of children: Not on file   • Years of education: Not on file   • Highest education level: Not on file   Tobacco Use   • Smoking status: Former Smoker   • Smokeless tobacco: Never Used   Substance and Sexual Activity   • Alcohol use: No   • Drug use: No   • Sexual activity: Defer       Family History   Problem Relation Age of Onset   • Colon cancer Neg Hx    • Colon polyps Neg Hx    • Esophageal cancer Neg Hx    • Liver cancer Neg Hx    • Liver disease Neg Hx    • Stomach cancer Neg Hx    • Rectal cancer Neg Hx        Vitals:    06/16/20 0922   BP: 108/68   BP Location: Left arm   Patient Position: Sitting   Cuff Size: Adult   Pulse: 68   Temp: 97.8 °F (36.6 °C)   TempSrc: Tympanic   SpO2: 97%   Weight: 80.7 kg (178 lb)   Height: 180.3 cm (71\")       Review of Systems:    General:    Present -feeling well   Skin:    Not Present-Rash   HEENT:     Not Present-Acute visual changes or Acute hearing changes   Neck :    Not Present- swollen glands   Genitourinary:      Not Present- burning, frequency, urgency hematuria, dysuria,   Cardiovascular:   Not Present-chest pain, palpitations, or pressure   Respiratory:   Not Present- shortness of breath or cough   Gastrointestinal:  Musculoskeletal:  Neurological:  Psychiatric:   Present as mentioned in the HP    Not Present. Recent gait " disturbances.    Not Present-Seizures and weakness in extremities.    Not Present- Anxiety or Depression.       Physical Exam:    General Appearance:    Alert, cooperative, in no acute distress   Psych:    Mood appropriate    Eyes:          conjunctivae and sclerae normal, no   icterus, no pallor   ENMT:    Ears appear intact with no abnormalities noted oral mucosa moist   Neck:   No adenopathy, supple, trachea midline, no thyromegaly, no   carotid bruit, no JVD    Cardiovascular:    Regular rhythm and normal rate, normal S1 and S2, no            murmur, no gallop, no rub, no click   Gastrointestinal:     Inspection normal.  Normal bowel sounds, no masses, no organomegaly, soft round non-tender, non-distended, no guarding, no rebound or tenderness. No hepatosplenomegaly.   Skin:   No bleeding, bruising or rash   Neurologic:   nonfocal       Lab Results - Last 18 Months   Lab Units 03/10/20  1158 01/22/20  1424 01/04/20  0506 12/11/19  0820 05/27/19  0630 05/03/19  0947   GLUCOSE mg/dL 113* 110* 145* 98 136* 106   BUN mg/dL 13 14 18 16 17 16   CREATININE mg/dL 1.1 1.1 1 1.2 1.1 1.1   SODIUM mmol/L 139 139 139 145 144 141   POTASSIUM mmol/L  --  4.5 4.6 4.4 4.0 4.5   CHLORIDE mmol/L 102 102 103 104 107 102   TOTAL CO2 mmol/L 26 21* 24 22 27 24   TOTAL PROTEIN g/dL 7.0 7.3  --  7.5 6.2* 7.4   ALBUMIN g/dL 4.1 3.9  --  4.3 3.9 4.3   ALT (SGPT) U/L 16 19  --  16 21 24   AST (SGOT) U/L 20 25  --  19 23 25   ALK PHOS U/L 90 92  --  69 53 68   BILIRUBIN mg/dL 0.6 0.8  --  0.5 0.5 0.5       Lab Results - Last 18 Months   Lab Units 03/10/20  1158 01/22/20  1424 01/04/20  0506 12/11/19  0820 05/27/19  0630  05/03/19  0947   HEMOGLOBIN g/dL 13.3* 12.2* 11.3* 15.3 12.8*  --  14.4   HEMATOCRIT % 41.9* 41.2* 35.2* 47.1 39.2*  --  45.0   MCV fL 91.7 99.8*  --  95.7* 95.6*  --  94.9*   WBC K/uL 6.5 9.3  --  7.8 6.9  --  7.6   RDW % 14.0 15.2*  --  13.2 14.2  --  14.1   MPV fL 10.6 10.9  --  11.0 10.9  --  10.9   PLATELETS K/uL 172  300  --  203 137  --  175   INR   --   --   --  1.12 1.14   < >  --     < > = values in this interval not displayed.       Lab Results - Last 18 Months   Lab Units 01/22/20  1424 05/03/19  0947   IRON ug/dL 63  --    FERRITIN ng/mL 232.3  --    TSH uIU/mL 1.680 3.740        Lab Results - Last 18 Months   Lab Units 01/22/20  1424   FERRITIN ng/mL 232.3       Body mass index is 24.83 kg/m². Patient's Body mass index is 24.83 kg/m². BMI is within normal parameters. No follow-up required..    Assessment and Plan:  Assessment/Plan   Nicolas was seen today for med refill.    Diagnoses and all orders for this visit:    Gastroesophageal reflux disease, esophagitis presence not specified  -     pantoprazole (PROTONIX) 40 MG EC tablet; Take 1 tablet by mouth 2 (Two) Times a Day.    History of peptic ulcer disease    Epigastric pain    Heartburn    Other orders  -     sucralfate (Carafate) 1 GM/10ML suspension; Take 10 mL by mouth 4 (Four) Times a Day.     After much discussion due to the patient's advanced age and other comorbidities which include treatment with Eliquis, We will forego an EGD at this time and start some Carafate.  The patient also states that due to his age he does not want to have an endoscopy during the COVID crisis.  I have sent in refill for Protonix 40 mg twice daily sent to pharmacy.  Patient is to call me if the symptoms change or worsen.     Patient Instructions   Food Choices for Gastroesophageal Reflux Disease, Adult  When you have gastroesophageal reflux disease (GERD), the foods you eat and your eating habits are very important. Choosing the right foods can help ease your discomfort. Think about working with a nutrition specialist (dietitian) to help you make good choices.  What are tips for following this plan?    Meals  · Choose healthy foods that are low in fat, such as fruits, vegetables, whole grains, low-fat dairy products, and lean meat, fish, and poultry.  · Eat small meals often instead of 3  large meals a day. Eat your meals slowly, and in a place where you are relaxed. Avoid bending over or lying down until 2-3 hours after eating.  · Avoid eating meals 2-3 hours before bed.  · Avoid drinking a lot of liquid with meals.  · Cook foods using methods other than frying. Bake, grill, or broil food instead.  · Avoid or limit:  ? Chocolate.  ? Peppermint or spearmint.  ? Alcohol.  ? Pepper.  ? Black and decaffeinated coffee.  ? Black and decaffeinated tea.  ? Bubbly (carbonated) soft drinks.  ? Caffeinated energy drinks and soft drinks.  · Limit high-fat foods such as:  ? Fatty meat or fried foods.  ? Whole milk, cream, butter, or ice cream.  ? Nuts and nut butters.  ? Pastries, donuts, and sweets made with butter or shortening.  · Avoid foods that cause symptoms. These foods may be different for everyone. Common foods that cause symptoms include:  ? Tomatoes.  ? Oranges, kerrie, and limes.  ? Peppers.  ? Spicy food.  ? Onions and garlic.  ? Vinegar.  Lifestyle  · Maintain a healthy weight. Ask your doctor what weight is healthy for you. If you need to lose weight, work with your doctor to do so safely.  · Exercise for at least 30 minutes for 5 or more days each week, or as told by your doctor.  · Wear loose-fitting clothes.  · Do not smoke. If you need help quitting, ask your doctor.  · Sleep with the head of your bed higher than your feet. Use a wedge under the mattress or blocks under the bed frame to raise the head of the bed.  Summary  · When you have gastroesophageal reflux disease (GERD), food and lifestyle choices are very important in easing your symptoms.  · Eat small meals often instead of 3 large meals a day. Eat your meals slowly, and in a place where you are relaxed.  · Limit high-fat foods such as fatty meat or fried foods.  · Avoid bending over or lying down until 2-3 hours after eating.  · Avoid peppermint and spearmint, caffeine, alcohol, and chocolate.  This information is not intended to  replace advice given to you by your health care provider. Make sure you discuss any questions you have with your health care provider.  Document Released: 06/18/2013 Document Revised: 04/09/2020 Document Reviewed: 01/23/2018  Elsevier Patient Education © 2020 [employer]        Next follow-up appointment        EMR Dragon/Transcription disclaimer:  Much of this encounter note is an electronic transcription/translation of spoken language to printed text. The electronic translation of spoken language may permit erroneous, or at times, nonsensical words or phrases to be inadvertently transcribed; although I have reviewed the note for such errors, some may still exist.

## 2020-06-16 ENCOUNTER — OFFICE VISIT (OUTPATIENT)
Dept: GASTROENTEROLOGY | Facility: CLINIC | Age: 85
End: 2020-06-16

## 2020-06-16 VITALS
TEMPERATURE: 97.8 F | SYSTOLIC BLOOD PRESSURE: 108 MMHG | WEIGHT: 178 LBS | HEIGHT: 71 IN | HEART RATE: 68 BPM | DIASTOLIC BLOOD PRESSURE: 68 MMHG | BODY MASS INDEX: 24.92 KG/M2 | OXYGEN SATURATION: 97 %

## 2020-06-16 DIAGNOSIS — K21.9 GASTROESOPHAGEAL REFLUX DISEASE, ESOPHAGITIS PRESENCE NOT SPECIFIED: Primary | ICD-10-CM

## 2020-06-16 DIAGNOSIS — Z87.11 HISTORY OF PEPTIC ULCER DISEASE: ICD-10-CM

## 2020-06-16 DIAGNOSIS — R12 HEARTBURN: ICD-10-CM

## 2020-06-16 DIAGNOSIS — R10.13 EPIGASTRIC PAIN: ICD-10-CM

## 2020-06-16 PROCEDURE — 99214 OFFICE O/P EST MOD 30 MIN: CPT | Performed by: NURSE PRACTITIONER

## 2020-06-16 RX ORDER — PANTOPRAZOLE SODIUM 40 MG/1
40 TABLET, DELAYED RELEASE ORAL 2 TIMES DAILY
Qty: 180 TABLET | Refills: 4 | Status: SHIPPED | OUTPATIENT
Start: 2020-06-16 | End: 2021-06-16 | Stop reason: SDUPTHER

## 2020-06-16 RX ORDER — SUCRALFATE ORAL 1 G/10ML
1 SUSPENSION ORAL 4 TIMES DAILY
Qty: 1200 ML | Refills: 0 | Status: SHIPPED | OUTPATIENT
Start: 2020-06-16 | End: 2020-11-03

## 2020-06-16 NOTE — PATIENT INSTRUCTIONS
Food Choices for Gastroesophageal Reflux Disease, Adult  When you have gastroesophageal reflux disease (GERD), the foods you eat and your eating habits are very important. Choosing the right foods can help ease your discomfort. Think about working with a nutrition specialist (dietitian) to help you make good choices.  What are tips for following this plan?    Meals  · Choose healthy foods that are low in fat, such as fruits, vegetables, whole grains, low-fat dairy products, and lean meat, fish, and poultry.  · Eat small meals often instead of 3 large meals a day. Eat your meals slowly, and in a place where you are relaxed. Avoid bending over or lying down until 2-3 hours after eating.  · Avoid eating meals 2-3 hours before bed.  · Avoid drinking a lot of liquid with meals.  · Cook foods using methods other than frying. Bake, grill, or broil food instead.  · Avoid or limit:  ? Chocolate.  ? Peppermint or spearmint.  ? Alcohol.  ? Pepper.  ? Black and decaffeinated coffee.  ? Black and decaffeinated tea.  ? Bubbly (carbonated) soft drinks.  ? Caffeinated energy drinks and soft drinks.  · Limit high-fat foods such as:  ? Fatty meat or fried foods.  ? Whole milk, cream, butter, or ice cream.  ? Nuts and nut butters.  ? Pastries, donuts, and sweets made with butter or shortening.  · Avoid foods that cause symptoms. These foods may be different for everyone. Common foods that cause symptoms include:  ? Tomatoes.  ? Oranges, kerrie, and limes.  ? Peppers.  ? Spicy food.  ? Onions and garlic.  ? Vinegar.  Lifestyle  · Maintain a healthy weight. Ask your doctor what weight is healthy for you. If you need to lose weight, work with your doctor to do so safely.  · Exercise for at least 30 minutes for 5 or more days each week, or as told by your doctor.  · Wear loose-fitting clothes.  · Do not smoke. If you need help quitting, ask your doctor.  · Sleep with the head of your bed higher than your feet. Use a wedge under the  mattress or blocks under the bed frame to raise the head of the bed.  Summary  · When you have gastroesophageal reflux disease (GERD), food and lifestyle choices are very important in easing your symptoms.  · Eat small meals often instead of 3 large meals a day. Eat your meals slowly, and in a place where you are relaxed.  · Limit high-fat foods such as fatty meat or fried foods.  · Avoid bending over or lying down until 2-3 hours after eating.  · Avoid peppermint and spearmint, caffeine, alcohol, and chocolate.  This information is not intended to replace advice given to you by your health care provider. Make sure you discuss any questions you have with your health care provider.  Document Released: 06/18/2013 Document Revised: 04/09/2020 Document Reviewed: 01/23/2018  Elsevier Patient Education © 2020 [employer]

## 2020-06-19 ENCOUNTER — TELEPHONE (OUTPATIENT)
Dept: GASTROENTEROLOGY | Facility: CLINIC | Age: 85
End: 2020-06-19

## 2020-06-19 NOTE — TELEPHONE ENCOUNTER
Pt's wife just called me stating the carafate suspension that Vannesa sent in for pt was too expensive-wasn't covered by insurance. I called [facility] and authorized them to switch pt to Carafate 1gm tablets, 1 po QID, #120, NR-left VM on prescriber line. I explained to pt's wife how to make their own suspension with the tablets if he is unable to swallow those. Pt/wife advised to call me back with any further questions/problems.

## 2020-06-24 RX ORDER — SIMVASTATIN 20 MG
20 TABLET ORAL NIGHTLY
Qty: 90 TABLET | Refills: 1 | Status: SHIPPED | OUTPATIENT
Start: 2020-06-24 | End: 2021-01-06

## 2020-07-02 RX ORDER — ISOSORBIDE MONONITRATE 30 MG/1
30 TABLET, EXTENDED RELEASE ORAL DAILY
Qty: 90 TABLET | Refills: 3 | Status: SHIPPED | OUTPATIENT
Start: 2020-07-02 | End: 2021-06-09

## 2020-07-02 NOTE — TELEPHONE ENCOUNTER
Received fax from pharmacy requesting refill on pts medication(s). Pt was last seen in office on 1/31/2020  and has a follow up scheduled for Visit date not found. Will send request to  Dr. Zack Montgomery  for patient.      Requested Prescriptions     Pending Prescriptions Disp Refills    isosorbide mononitrate (IMDUR) 30 MG extended release tablet [Pharmacy Med Name: Isosorbide Mononitrate ER 30 MG Oral Tablet Extended Release 24 Hour] 90 tablet 3     Sig: Take 1 tablet by mouth daily

## 2020-08-07 ENCOUNTER — OFFICE VISIT (OUTPATIENT)
Dept: CARDIOLOGY | Age: 85
End: 2020-08-07
Payer: MEDICARE

## 2020-08-07 VITALS
SYSTOLIC BLOOD PRESSURE: 132 MMHG | WEIGHT: 184 LBS | HEART RATE: 70 BPM | HEIGHT: 71 IN | DIASTOLIC BLOOD PRESSURE: 58 MMHG | BODY MASS INDEX: 25.76 KG/M2

## 2020-08-07 PROCEDURE — 99213 OFFICE O/P EST LOW 20 MIN: CPT | Performed by: INTERNAL MEDICINE

## 2020-08-07 NOTE — PROGRESS NOTES
[facility] Patient Office Visit    [address] [id number]  Phone: [phone]  Fax: [phone]        8/7/2020    Chief Complaint / Reason for the Visit   Follow up of:  CAD and Afib and HTN  History of abdominal aortic aneurysm    Specialty Problems        Cardiology Problems    CAD (coronary artery disease), native coronary artery        Hypertension        Non-cardiac chest pain        Hypercholesterolemia        AAA (abdominal aortic aneurysm) (HCC)        Paroxysmal atrial fibrillation (HCC)              Current Status Today According to the patient:  \"I went to the hospital recently. \"    Subjective:  Mr. Carolin Hanks is generally feeling stable. Mr. Carolin Hanks has the following cardiac complaints / symptoms today:    1. CAD, having non cardiac chest pain    2. Afib, NSR    3. HTN, well controlled  4.  History of AAA last CT scan showed Infrarenal aneurysm with 3.7 cm    Carolin Hanks is a 82 y.o. male with the following history as recorded in [facility]:    Patient Active Problem List    Diagnosis Date Noted    CAD (coronary artery disease), native coronary artery      Priority: High    Primary osteoarthritis of right hip 01/03/2020    Paroxysmal atrial fibrillation ([facility] 12/11/2017    Colon polyps 04/11/2017    History of colon cancer 04/11/2017    AAA (abdominal aortic aneurysm) ([facility] 11/12/2013    Hypercholesterolemia     Weight loss     Burgos's esophagus     Ulcer, gastric, acute 03/14/2012    Non-cardiac chest pain 12/20/2011    Hypertension     Gastroesophageal reflux disease     Arthritis     Renal calculi     Costochondritis      Current Outpatient Medications   Medication Sig Dispense Refill    isosorbide mononitrate (IMDUR) 30 MG extended release tablet Take 1 tablet by mouth daily 90 tablet 3    simvastatin (ZOCOR) 20 MG tablet Take 1 tablet by mouth nightly 90 tablet 1    apixaban (ELIQUIS) 2.5 MG TABS tablet Take 1 tablet by mouth 2 times daily 180 tablet 3    DULoxetine (CYMBALTA) 60 MG extended release capsule Take 1 capsule by mouth daily 90 capsule 3    pantoprazole (PROTONIX) 40 MG tablet Take 40 mg by mouth 2 times daily      tamsulosin (FLOMAX) 0.4 MG capsule Take 1 capsule by mouth daily 90 capsule 3    Cholecalciferol (VITAMIN D3) 5000 units TABS Take 5,000 Units by mouth daily       metoprolol tartrate (LOPRESSOR) 25 MG tablet Take 1 tablet by mouth 2 times daily Patient will fill next month (Patient taking differently: Take 12.5 mg by mouth 2 times daily Patient will fill next month) 180 tablet 3    vitamin B-12 (CYANOCOBALAMIN) 100 MCG tablet Take 50 mcg by mouth daily.  metoprolol tartrate (LOPRESSOR) 50 MG tablet Take 1 tablet by mouth twice daily 180 tablet 3    sucralfate (CARAFATE) 1 GM tablet Take 1 tablet by mouth 4 times daily 40 tablet 0    cyclobenzaprine (FLEXERIL) 10 MG tablet TAKE 1 TABLET BY MOUTH THREE TIMES DAILY      docusate sodium (COLACE) 100 MG capsule Take 1 capsule by mouth 2 times daily 60 capsule 1     No current facility-administered medications for this visit.       Allergies: Lortab [hydrocodone-acetaminophen] and Morphine and related  Past Medical History:   Diagnosis Date    [facility] ([facility]     colon    Aneurysm ([facility] aortic    Anxiety     Arthritis     Polyarticular    Atrial fibrillation ([facility]     Burgos's esophagus 2010    H/o    CAD (coronary artery disease)     CAD (coronary artery disease), native coronary artery     stent; sees dr. Georgi Ramey    Chronic respiratory failure ([facility]     Costochondritis     Erectile dysfunction     Gastroesophageal reflux disease     GERD (gastroesophageal reflux disease)     High cholesterol     Hip pain     Hypercholesterolemia 2010    Hypertension     Hypertension     MI (mitral incompetence)     x4 stents    Nephrolithiasis     Non-cardiac chest pain 12/20/2011    Renal calculi     Right clear to ausculation   ABDOMEN  - soft, non tender, no rebound  MUSCULOSKELETAL  - range of motion of the upper and lower extermites appears normal and equal and is without pain   EXTREMITIES - no significant edema   NEUROLOGIC - gait and station are normal  SKIN - turgor is normal  PSYCHIATRIC - normal mood and affect, alert and orientated x 3,      ASSESSMENT:    ALL THE CARDIOLOGY PROBLEMS ARE LISTED ABOVE; HOWEVER, THE FOLLOWING SPECIFIC CARDIAC PROBLEMS / CONDITIONS WERE ADDRESSED AND TREATED DURING THE OFFICE VISIT TODAY:                                                                                            MEDICAL DECISION MAKING             Cardiac Specific Problem / Diagnosis  Discussion and Data Reviewed Diagnostic Procedures Ordered Management Options Selected           1. CAD  show no change   Review and summation of old records:    Stents in the LAD, LCX, RCA  12/14/2007  Cath  Patent stents, normal LVFX  12/26/2008  SE  negative for myocardial ischemia  7/13/2009  Cath  Patent stents, normal LVFX  2/15/2010  SE  negative for myocardial ischemia  10/4/2011  Cath  Patent stents, normal LVFX  8/8/12  SE  negative for myocardial ischemia, DTS 0.5 [facility])  5/3/2013  SE  negative for myocardial ischemia, DTS 5  7/7/17 SE negative for myocardial ischemia  5/27/19 Adeline possible ishcemia vs diaphragmatic artifact EF 72%, -14% ischemic burden    Having non cardiac chest pain. No Continue current medications:     Yes           2. HTN  show no change   BP Readings from Last 3 Encounters:   08/07/20 (!) 132/58   03/17/20 120/64   03/10/20 139/74       Patient has a history of these risk factors, which are managed medically, and are on current oral therapy  I personally addressed, counselled and educated the patient on this problem / risk factor. I will personally continue and manage prescribed medications and monitor the course of the therapy. No Continue current medications:    {Yes           3.  Afib  show no change   NSR today. No Continue current medications:       Yes     History from frenal abdominal aortic aneurysm, last CT scan was 3.7 cm, patient has family history of AAA in his brother and he has a previous smoking history, will need repeat CT abdomen and pelvis soon    Discussed with spouse. Follow Up Visit Scheduled for:  6 month(s)    I greatly appreciate the opportunity to meet Geovany Gamble and your confidence in allowing me to participate in his cardiovascular care.     [facility] MD, [facility]  Interventional Cardiologist   Medical Director, Structural Heart Program     8/7/20 10:49 AM

## 2020-08-10 RX ORDER — TAMSULOSIN HYDROCHLORIDE 0.4 MG/1
0.4 CAPSULE ORAL DAILY
Qty: 90 CAPSULE | Refills: 3 | Status: SHIPPED | OUTPATIENT
Start: 2020-08-10 | End: 2020-08-14 | Stop reason: SDUPTHER

## 2020-08-14 RX ORDER — DESONIDE 0.5 MG/ML
LOTION TOPICAL
Qty: 1 BOTTLE | Refills: 5 | Status: SHIPPED | OUTPATIENT
Start: 2020-08-14 | End: 2020-08-18 | Stop reason: CLARIF

## 2020-08-14 RX ORDER — TAMSULOSIN HYDROCHLORIDE 0.4 MG/1
0.4 CAPSULE ORAL DAILY
Qty: 90 CAPSULE | Refills: 3 | Status: SHIPPED | OUTPATIENT
Start: 2020-08-14 | End: 2021-08-17

## 2020-08-14 NOTE — TELEPHONE ENCOUNTER
Requested Prescriptions     Pending Prescriptions Disp Refills    tamsulosin (FLOMAX) 0.4 MG capsule 90 capsule 3     Sig: Take 1 capsule by mouth daily    desonide (DESOWEN) 0.05 % lotion 1 Bottle 5     Sig: Apply topically 2 times daily.

## 2020-08-17 NOTE — TELEPHONE ENCOUNTER
Tried doing a PA on pts desonide 0.5% lotion. Received this message. Note: [employer]'s covered (formulary) drugs are triamcinolone acetonide lotion, mometasone topical solution, fluocinolone topical solution, betamethasone valerate lotion AND hydrocortisone lotion. If a covered (formulary) drug is prescribed, a review by [facility] is not required. This is the first time that I see that we refilled this medication. Will send to provider for further recommendations.

## 2020-08-18 RX ORDER — TRIAMCINOLONE ACETONIDE 1 MG/G
CREAM TOPICAL
Qty: 45 G | Refills: 5 | Status: SHIPPED | OUTPATIENT
Start: 2020-08-18 | End: 2021-03-09

## 2020-08-18 NOTE — TELEPHONE ENCOUNTER
Please let the patient know. His wife just asked me to refill it at her appointment.     Okay to switch to triamincinolone

## 2020-08-27 RX ORDER — SUCRALFATE 1 G/1
TABLET ORAL
Qty: 120 TABLET | Refills: 0 | OUTPATIENT
Start: 2020-08-27

## 2020-09-17 ENCOUNTER — OFFICE VISIT (OUTPATIENT)
Age: 85
End: 2020-09-17

## 2020-09-17 VITALS — HEART RATE: 81 BPM | TEMPERATURE: 97.3 F | OXYGEN SATURATION: 97 %

## 2020-09-17 PROCEDURE — 99999 PR OFFICE/OUTPT VISIT,PROCEDURE ONLY: CPT | Performed by: NURSE PRACTITIONER

## 2020-09-19 LAB — SARS-COV-2, NAA: NOT DETECTED

## 2020-11-03 ENCOUNTER — LAB (OUTPATIENT)
Dept: LAB | Facility: HOSPITAL | Age: 85
End: 2020-11-03

## 2020-11-03 ENCOUNTER — OFFICE VISIT (OUTPATIENT)
Dept: GASTROENTEROLOGY | Facility: CLINIC | Age: 85
End: 2020-11-03

## 2020-11-03 VITALS
WEIGHT: 191 LBS | TEMPERATURE: 96.4 F | BODY MASS INDEX: 25.87 KG/M2 | HEIGHT: 72 IN | OXYGEN SATURATION: 97 % | HEART RATE: 69 BPM | SYSTOLIC BLOOD PRESSURE: 112 MMHG | DIASTOLIC BLOOD PRESSURE: 56 MMHG

## 2020-11-03 DIAGNOSIS — R15.2 INCONTINENCE OF FECES WITH FECAL URGENCY: ICD-10-CM

## 2020-11-03 DIAGNOSIS — R15.9 INCONTINENCE OF FECES WITH FECAL URGENCY: ICD-10-CM

## 2020-11-03 DIAGNOSIS — R10.84 GENERALIZED ABDOMINAL PAIN: ICD-10-CM

## 2020-11-03 DIAGNOSIS — R19.7 DIARRHEA, UNSPECIFIED TYPE: Primary | ICD-10-CM

## 2020-11-03 DIAGNOSIS — R19.7 DIARRHEA, UNSPECIFIED TYPE: ICD-10-CM

## 2020-11-03 LAB
ALBUMIN SERPL-MCNC: 4.3 G/DL (ref 3.5–5.2)
ALBUMIN/GLOB SERPL: 1.7 G/DL
ALP SERPL-CCNC: 67 U/L (ref 39–117)
ALT SERPL W P-5'-P-CCNC: 17 U/L (ref 1–41)
ANION GAP SERPL CALCULATED.3IONS-SCNC: 7.3 MMOL/L (ref 5–15)
AST SERPL-CCNC: 19 U/L (ref 1–40)
BASOPHILS # BLD AUTO: 0.03 10*3/MM3 (ref 0–0.2)
BASOPHILS NFR BLD AUTO: 0.4 % (ref 0–1.5)
BILIRUB SERPL-MCNC: 0.6 MG/DL (ref 0–1.2)
BUN SERPL-MCNC: 17 MG/DL (ref 8–23)
BUN/CREAT SERPL: 14.3 (ref 7–25)
CALCIUM SPEC-SCNC: 9.3 MG/DL (ref 8.6–10.5)
CHLORIDE SERPL-SCNC: 106 MMOL/L (ref 98–107)
CO2 SERPL-SCNC: 28.7 MMOL/L (ref 22–29)
CREAT SERPL-MCNC: 1.19 MG/DL (ref 0.76–1.27)
CRP SERPL-MCNC: 2.59 MG/DL (ref 0–0.5)
DEPRECATED RDW RBC AUTO: 50.6 FL (ref 37–54)
EOSINOPHIL # BLD AUTO: 0.4 10*3/MM3 (ref 0–0.4)
EOSINOPHIL NFR BLD AUTO: 4.8 % (ref 0.3–6.2)
ERYTHROCYTE [DISTWIDTH] IN BLOOD BY AUTOMATED COUNT: 15.8 % (ref 12.3–15.4)
GFR SERPL CREATININE-BSD FRML MDRD: 58 ML/MIN/1.73
GLOBULIN UR ELPH-MCNC: 2.5 GM/DL
GLUCOSE SERPL-MCNC: 81 MG/DL (ref 65–99)
HCT VFR BLD AUTO: 37.8 % (ref 37.5–51)
HGB BLD-MCNC: 12.9 G/DL (ref 13–17.7)
IMM GRANULOCYTES # BLD AUTO: 0.04 10*3/MM3 (ref 0–0.05)
IMM GRANULOCYTES NFR BLD AUTO: 0.5 % (ref 0–0.5)
LIPASE SERPL-CCNC: 27 U/L (ref 13–60)
LYMPHOCYTES # BLD AUTO: 1.68 10*3/MM3 (ref 0.7–3.1)
LYMPHOCYTES NFR BLD AUTO: 20.1 % (ref 19.6–45.3)
MCH RBC QN AUTO: 30.6 PG (ref 26.6–33)
MCHC RBC AUTO-ENTMCNC: 34.1 G/DL (ref 31.5–35.7)
MCV RBC AUTO: 89.6 FL (ref 79–97)
MONOCYTES # BLD AUTO: 1.12 10*3/MM3 (ref 0.1–0.9)
MONOCYTES NFR BLD AUTO: 13.4 % (ref 5–12)
NEUTROPHILS NFR BLD AUTO: 5.09 10*3/MM3 (ref 1.7–7)
NEUTROPHILS NFR BLD AUTO: 60.8 % (ref 42.7–76)
NRBC BLD AUTO-RTO: 0 /100 WBC (ref 0–0.2)
PLATELET # BLD AUTO: 173 10*3/MM3 (ref 140–450)
PMV BLD AUTO: 10.7 FL (ref 6–12)
POTASSIUM SERPL-SCNC: 4.4 MMOL/L (ref 3.5–5.2)
PROT SERPL-MCNC: 6.8 G/DL (ref 6–8.5)
RBC # BLD AUTO: 4.22 10*6/MM3 (ref 4.14–5.8)
SODIUM SERPL-SCNC: 142 MMOL/L (ref 136–145)
TSH SERPL DL<=0.05 MIU/L-ACNC: 3.57 UIU/ML (ref 0.27–4.2)
WBC # BLD AUTO: 8.36 10*3/MM3 (ref 3.4–10.8)

## 2020-11-03 PROCEDURE — 86140 C-REACTIVE PROTEIN: CPT | Performed by: NURSE PRACTITIONER

## 2020-11-03 PROCEDURE — 85025 COMPLETE CBC W/AUTO DIFF WBC: CPT | Performed by: NURSE PRACTITIONER

## 2020-11-03 PROCEDURE — 99214 OFFICE O/P EST MOD 30 MIN: CPT | Performed by: NURSE PRACTITIONER

## 2020-11-03 PROCEDURE — 80053 COMPREHEN METABOLIC PANEL: CPT | Performed by: NURSE PRACTITIONER

## 2020-11-03 PROCEDURE — 36415 COLL VENOUS BLD VENIPUNCTURE: CPT

## 2020-11-03 PROCEDURE — 84443 ASSAY THYROID STIM HORMONE: CPT | Performed by: NURSE PRACTITIONER

## 2020-11-03 PROCEDURE — 83690 ASSAY OF LIPASE: CPT | Performed by: NURSE PRACTITIONER

## 2020-11-03 RX ORDER — TAMSULOSIN HYDROCHLORIDE 0.4 MG/1
1 CAPSULE ORAL DAILY
COMMUNITY

## 2020-11-03 NOTE — PROGRESS NOTES
"Chief Complaint:   Chief Complaint   Patient presents with   • Diarrhea     \"severe\"- last colon 05/2017         Patient ID: Nicolas Nieves is a 85 y.o. male     History of Present Illness: This is a very pleasant 85-year-old male who is here today with complaints of diarrhea.    The patient states that his diarrhea has been \"severe.\"  The patient states that over the past 2 to 3 weeks he has been having diarrhea.  Approximately 2 stools a day.  He states he has noted incontinence of stools as well.  He states he is having generalized abdominal pain as well.  He states that he went and had Covid screening due to these problems but was found to be negative.    The patient denies any nausea, vomiting, epigastric pain, dysphagia, pyrosis or hematemesis.  The patient denies any fever or chills.  Denies any melena or hematochezia.  Denies any unintentional weight loss or loss of appetite.    The patient's last EGD was performed on 4/10/2019 no abnormality to explain patient's complaints of dysphagia esophagus dilated.  Otherwise normal.  The patient's last colonoscopy was performed on 5/17/2017 found to be normal with no plans to repeat due to advanced age and other comorbidities.    Past Medical History:   Diagnosis Date   • Arthritis    • Atrial fibrillation (CMS/HCC)    • Harmon syndrome    • CAD (coronary artery disease)    • COPD (chronic obstructive pulmonary disease) (CMS/HCC)    • Gastritis    • GERD (gastroesophageal reflux disease)    • Hemorrhoids    • History of colon cancer    • History of colon polyps    • Hypertension    • Peptic ulcer    • Renal calculi        Past Surgical History:   Procedure Laterality Date   • CARDIAC CATHETERIZATION      stents x 4   • CATARACT EXTRACTION, BILATERAL     • CHOLECYSTECTOMY     • COLONOSCOPY  04/25/2014    One 3mm hyperplastic rectal polyp; Repeat 3 years    • COLONOSCOPY N/A 5/17/2017    The examined portion of the ileum was normal; Patent end-to-side ileo-colonic " anastomosis, characterized by healthy appearing mucosa; The entire examined colon is normal; No specimens collected; No plans to repeat due to age    • COLONOSCOPY  04/17/2013    Mass in ascending colon-biopsied-marked with Malorie ink; Internal hemorrhoids    • COLONOSCOPY  02/16/2010    Dr. Sanchez-Internal hermorrhoids; Repeat 3-5 years    • COLONOSCOPY  02/26/2007    Dr. White-Internal hemorrhoids; Diminuitive hyperplastic rectal polyp; AVM right colon; Repeat 4 years   • COLONOSCOPY  04/22/2003    Dr. White-Normal colonoscopy with internal hemorrhoids   • ENDOSCOPY N/A 4/10/2019    No endoscopic esophageal abnormality to explain patient's dysphagia-esophagus dilated; A few gastric polyps-biopsied; Normal examined duodenum   • ENDOSCOPY  04/17/2013    Duodenitis; LA grade A esophagitis    • ENDOSCOPY  8/11/2020    Healed gastric ulcers    • ENDOSCOPY  02/16/2010    Gastric ulcers    • ENDOSCOPY  09/15/2009    Normal    • ENDOSCOPY  05/15/2006    Dr. WhiteLforb-Eerdtdkip-sfquubmx; [facility]    • ENDOSCOPY  01/24/2005    Dr. White-Diffuse esophageal spasm-dilated; Gastritis-biopsied   • ENDOSCOPY  04/25/2003    Dr. White-Stage II reflux esophagitis-rule out short-segment Harmon's esophagus; Schatzki's ring-dilated; Gastritis-biopsied   • HEMICOLECTOMY Right 05/2013   • LUNG REMOVAL, PARTIAL           Current Outpatient Medications:   •  Cholecalciferol (VITAMIN D3) 5000 units tablet tablet, Take 5,000 Units by mouth Daily., Disp: , Rfl:   •  DULoxetine (CYMBALTA) 60 MG capsule, Take 1 capsule by mouth Daily., Disp: , Rfl:   •  ELIQUIS 2.5 MG tablet tablet, Take 1 tablet by mouth 2 (Two) Times a Day., Disp: , Rfl:   •  glucosamine-chondroitin 500-400 MG capsule capsule, Take 1 capsule by mouth Daily., Disp: , Rfl:   •  isosorbide mononitrate (IMDUR) 30 MG 24 hr tablet, Take 30 mg by mouth Daily., Disp: , Rfl:   •  metoprolol tartrate (LOPRESSOR) 50 MG tablet, Take 25 mg by mouth 2 (Two) Times a Day., Disp: , Rfl:   •   "pantoprazole (PROTONIX) 40 MG EC tablet, Take 1 tablet by mouth 2 (Two) Times a Day., Disp: 180 tablet, Rfl: 4  •  simvastatin (ZOCOR) 20 MG tablet, Take 20 mg by mouth Daily., Disp: , Rfl:   •  tamsulosin (FLOMAX) 0.4 MG capsule 24 hr capsule, Take 1 capsule by mouth Daily., Disp: , Rfl:   •  vitamin B-12 (CYANOCOBALAMIN) 100 MCG tablet, Take 1 tablet by mouth Daily., Disp: , Rfl:     Allergies   Allergen Reactions   • Lortab [Hydrocodone-Acetaminophen] Nausea And Vomiting   • Morphine And Related Nausea And Vomiting       Social History     Socioeconomic History   • Marital status:      Spouse name: Not on file   • Number of children: Not on file   • Years of education: Not on file   • Highest education level: Not on file   Tobacco Use   • Smoking status: Former Smoker   • Smokeless tobacco: Never Used   Substance and Sexual Activity   • Alcohol use: No   • Drug use: No   • Sexual activity: Defer       Family History   Problem Relation Age of Onset   • Colon cancer Neg Hx    • Colon polyps Neg Hx    • Esophageal cancer Neg Hx    • Liver cancer Neg Hx    • Liver disease Neg Hx    • Stomach cancer Neg Hx    • Rectal cancer Neg Hx        Vitals:    11/03/20 1027   BP: 112/56   Pulse: 69   Temp: 96.4 °F (35.8 °C)   SpO2: 97%   Weight: 86.6 kg (191 lb)   Height: 182.9 cm (72\")       Review of Systems:    General:    Present -feeling well   Skin:    Not Present-Rash   HEENT:     Not Present-Acute visual changes or Acute hearing changes   Neck :    Not Present- swollen glands   Genitourinary:      Not Present- burning, frequency, urgency hematuria, dysuria,   Cardiovascular:   Not Present-chest pain, palpitations, or pressure   Respiratory:   Not Present- shortness of breath or cough   Gastrointestinal:  Musculoskeletal:  Neurological:  Psychiatric:   Present as mentioned in the HP    Not Present. Recent gait disturbances.    Not Present-Seizures and weakness in extremities.    Not Present- Anxiety or " Depression.       Physical Exam:    General Appearance:    Alert, cooperative, in no acute distress   Psych:    Mood appropriate    Eyes:          conjunctivae and sclerae normal, no   icterus, no pallor   ENMT:    Ears appear intact with no abnormalities noted oral mucosa moist   Neck:   No adenopathy, supple, trachea midline, no thyromegaly, no   carotid bruit, no JVD    Cardiovascular:    Regular rhythm and normal rate, normal S1 and S2, no            murmur, no gallop, no rub, no click   Gastrointestinal:     Inspection normal.  Normal bowel sounds, no masses, no organomegaly, soft round non-tender, non-distended, no guarding, no rebound or tenderness. No hepatosplenomegaly.   Skin:   No bleeding, bruising or rash   Neurologic:   nonfocal       Lab Results - Last 18 Months   Lab Units 01/22/20  1424 01/04/20  0506 12/11/19  0820 05/27/19  0630   GLUCOSE mg/dL 110* 145* 98 136*   BUN mg/dL 14 18 16 17   CREATININE mg/dL 1.1 1 1.2 1.1   SODIUM mmol/L 139 139 145 144   POTASSIUM mmol/L 4.5 4.6 4.4 4.0   CHLORIDE mmol/L 102 103 104 107   TOTAL CO2 mmol/L 21* 24 22 27   TOTAL PROTEIN g/dL 7.3  --  7.5 6.2*   ALBUMIN g/dL 3.9  --  4.3 3.9   ALT (SGPT) U/L 19  --  16 21   AST (SGOT) U/L 25  --  19 23   ALK PHOS U/L 92  --  69 53   BILIRUBIN mg/dL 0.8  --  0.5 0.5       Lab Results - Last 18 Months   Lab Units 03/10/20  1158 01/22/20  1424 01/04/20  0506 12/11/19  0820 05/27/19  0630   HEMOGLOBIN g/dL 13.3* 12.2* 11.3* 15.3 12.8*   HEMATOCRIT % 41.9* 41.2* 35.2* 47.1 39.2*   MCV fL 91.7 99.8*  --  95.7* 95.6*   WBC K/uL 6.5 9.3  --  7.8 6.9   RDW % 14.0 15.2*  --  13.2 14.2   MPV fL 10.6 10.9  --  11.0 10.9   PLATELETS K/uL 172 300  --  203 137   INR   --   --   --  1.12 1.14       Lab Results - Last 18 Months   Lab Units 01/22/20  1424   IRON ug/dL 63   FERRITIN ng/mL 232.3   TSH uIU/mL 1.680        Lab Results - Last 18 Months   Lab Units 01/22/20  1424   FERRITIN ng/mL 232.3       Body mass index is 25.9 kg/m².  Patient's Body mass index is 25.9 kg/m². BMI is within normal parameters. No follow-up required..    Assessment and Plan:  Assessment/Plan   Diagnoses and all orders for this visit:    1. Diarrhea, unspecified type (Primary)  -     CBC & Differential; Future  -     Comprehensive Metabolic Panel; Future  -     TSH; Future  -     Lipase; Future  -     C-reactive Protein; Future  -     Gastrointestinal Panel, PCR - Stool, Per Rectum; Future  -     Clostridium Difficile Toxin - , Per Rectum; Future  -     Calprotectin, Fecal - Stool, Per Rectum; Future  -     CT Abdomen Pelvis With & Without Contrast; Future    2. Incontinence of feces with fecal urgency  -     CBC & Differential; Future  -     Comprehensive Metabolic Panel; Future  -     TSH; Future  -     Lipase; Future  -     C-reactive Protein; Future  -     Gastrointestinal Panel, PCR - Stool, Per Rectum; Future  -     Clostridium Difficile Toxin - , Per Rectum; Future  -     Calprotectin, Fecal - Stool, Per Rectum; Future  -     CT Abdomen Pelvis With & Without Contrast; Future    3. Generalized abdominal pain  -     CBC & Differential; Future  -     Comprehensive Metabolic Panel; Future  -     TSH; Future  -     Lipase; Future  -     C-reactive Protein; Future  -     Gastrointestinal Panel, PCR - Stool, Per Rectum; Future  -     Clostridium Difficile Toxin - , Per Rectum; Future  -     Calprotectin, Fecal - Stool, Per Rectum; Future  -     CT Abdomen Pelvis With & Without Contrast; Future      At this time will order above-noted labs along with gastrointestinal panel and C. difficile along with CT of the abdomen and pelvis.  Depending on findings may proceed to colonoscopy.  Will call patient with results.  Patient is on Eliquis this would need to be stopped should we proceed to colonoscopy.     There are no Patient Instructions on file for this visit.    Next follow-up appointment      EMR Dragon/Transcription disclaimer:  Much of this encounter note is an  electronic transcription/translation of spoken language to printed text. The electronic translation of spoken language may permit erroneous, or at times, nonsensical words or phrases to be inadvertently transcribed; although I have reviewed the note for such errors, some may still exist.

## 2020-11-05 ENCOUNTER — LAB (OUTPATIENT)
Dept: LAB | Facility: HOSPITAL | Age: 85
End: 2020-11-05

## 2020-11-05 DIAGNOSIS — R19.7 DIARRHEA, UNSPECIFIED TYPE: ICD-10-CM

## 2020-11-05 DIAGNOSIS — R10.84 GENERALIZED ABDOMINAL PAIN: ICD-10-CM

## 2020-11-05 DIAGNOSIS — R15.9 INCONTINENCE OF FECES WITH FECAL URGENCY: ICD-10-CM

## 2020-11-05 DIAGNOSIS — R15.2 INCONTINENCE OF FECES WITH FECAL URGENCY: ICD-10-CM

## 2020-11-05 LAB
ADV 40+41 DNA STL QL NAA+NON-PROBE: NOT DETECTED
ASTRO TYP 1-8 RNA STL QL NAA+NON-PROBE: NOT DETECTED
C CAYETANENSIS DNA STL QL NAA+NON-PROBE: NOT DETECTED
CAMPY SP DNA.DIARRHEA STL QL NAA+PROBE: NOT DETECTED
CRYPTOSP STL CULT: NOT DETECTED
E COLI DNA SPEC QL NAA+PROBE: NOT DETECTED
E HISTOLYT AG STL-ACNC: NOT DETECTED
EAEC PAA PLAS AGGR+AATA ST NAA+NON-PRB: NOT DETECTED
EC STX1 + STX2 GENES STL NAA+PROBE: NOT DETECTED
EPEC EAE GENE STL QL NAA+NON-PROBE: DETECTED
ETEC LTA+ST1A+ST1B TOX ST NAA+NON-PROBE: NOT DETECTED
G LAMBLIA DNA SPEC QL NAA+PROBE: NOT DETECTED
NOROVIRUS GI+II RNA STL QL NAA+NON-PROBE: NOT DETECTED
P SHIGELLOIDES DNA STL QL NAA+PROBE: NOT DETECTED
RV RNA STL NAA+PROBE: NOT DETECTED
SALMONELLA DNA SPEC QL NAA+PROBE: NOT DETECTED
SAPO I+II+IV+V RNA STL QL NAA+NON-PROBE: NOT DETECTED
SHIGELLA SP+EIEC IPAH STL QL NAA+PROBE: NOT DETECTED
V CHOLERAE DNA SPEC QL NAA+PROBE: NOT DETECTED
VIBRIO DNA SPEC NAA+PROBE: NOT DETECTED
YERSINIA STL CULT: NOT DETECTED

## 2020-11-05 PROCEDURE — 83993 ASSAY FOR CALPROTECTIN FECAL: CPT | Performed by: NURSE PRACTITIONER

## 2020-11-05 PROCEDURE — 0097U HC BIOFIRE FILMARRAY GI PANEL: CPT | Performed by: NURSE PRACTITIONER

## 2020-11-06 ENCOUNTER — TELEPHONE (OUTPATIENT)
Dept: GASTROENTEROLOGY | Facility: CLINIC | Age: 85
End: 2020-11-06

## 2020-11-06 NOTE — TELEPHONE ENCOUNTER
----- Message from MARITZA Lincoln sent at 11/5/2020  8:42 AM CST -----  Please notify the patient that labs were essentially unremarkable.  CRP which shows signs of inflammation somewhere in the body but not specific to any part is elevated however white blood cell count is not elevated.  I will await CT scan and stools for further evaluation

## 2020-11-06 NOTE — TELEPHONE ENCOUNTER
----- Message from MARITZA Lincoln sent at 11/5/2020  1:44 PM CST -----  Please notify the patient that stools were positive for E coli.  This is self-limiting meaning that it is not to be treated with antibiotic and should resolve on its own.  As noted on his labs his sodium and chloride were both low therefore I would instruct the patient to make sure he is staying hydrated.  I do know he is on a diuretic which could also be causing his sodium and chloride to be low but if he is having lots of watery stool in his colostomy bag he must keep hydrated.  Could drink Pedialyte as well.  Good handwashing hygiene.

## 2020-11-09 LAB — CALPROTECTIN STL-MCNT: 486 UG/G (ref 0–120)

## 2020-11-19 ENCOUNTER — HOSPITAL ENCOUNTER (OUTPATIENT)
Dept: CT IMAGING | Facility: HOSPITAL | Age: 85
Discharge: HOME | End: 2020-11-19
Admitting: NURSE PRACTITIONER

## 2020-11-19 DIAGNOSIS — R19.7 DIARRHEA, UNSPECIFIED TYPE: ICD-10-CM

## 2020-11-19 DIAGNOSIS — R15.9 INCONTINENCE OF FECES WITH FECAL URGENCY: ICD-10-CM

## 2020-11-19 DIAGNOSIS — R10.84 GENERALIZED ABDOMINAL PAIN: ICD-10-CM

## 2020-11-19 DIAGNOSIS — R15.2 INCONTINENCE OF FECES WITH FECAL URGENCY: ICD-10-CM

## 2020-11-19 LAB — CREAT BLDA-MCNC: 1.2 MG/DL (ref 0.6–1.3)

## 2020-11-19 PROCEDURE — 82565 ASSAY OF CREATININE: CPT

## 2020-11-19 PROCEDURE — 25010000002 IOPAMIDOL 61 % SOLUTION: Performed by: NURSE PRACTITIONER

## 2020-11-19 PROCEDURE — 74178 CT ABD&PLV WO CNTR FLWD CNTR: CPT

## 2020-11-19 RX ADMIN — IOPAMIDOL 50 ML: 612 INJECTION, SOLUTION INTRAVENOUS at 09:57

## 2020-11-19 RX ADMIN — IOPAMIDOL 100 ML: 612 INJECTION, SOLUTION INTRAVENOUS at 09:57

## 2020-11-20 ENCOUNTER — TELEPHONE (OUTPATIENT)
Dept: GASTROENTEROLOGY | Facility: CLINIC | Age: 85
End: 2020-11-20

## 2020-11-20 NOTE — TELEPHONE ENCOUNTER
----- Message from MARITZA Lincoln sent at 11/20/2020  3:00 PM CST -----  Please notify the patient that CT of abdomen and pelvis revealed no acute findings of bowel.  Diverticulosis of colon but no diverticulitis.  Please plan this result to his PCP as there is a new 4.7 cm abdominal aortic aneurysm that need to be followed.  He also does have some small cysts on his kidneys that PCP may need to refer to nephrology for monitoring.

## 2020-11-20 NOTE — TELEPHONE ENCOUNTER
Pt called me back and states he is followed by Dr. Kemp at [facility] for his AAA. I have faxed copy to both PCP and Dr. Kemp-pt was advised to f/u with their offices next week to discuss what further workup is needed. Pt advised to call me back with any further questions/problems.

## 2020-11-20 NOTE — TELEPHONE ENCOUNTER
Called pt re: results-he was not available so I spoke to his wife, Jennifer. I went over results with her and she VU-she is going to have pt call me back when he gets home. I will definitely fax copy of CT to PCP but Jennifer was unsure of who follows pt's aneurysm-she tells me last time he had imaging of it that it was 3.5cm.

## 2020-11-24 ENCOUNTER — OFFICE VISIT (OUTPATIENT)
Dept: PRIMARY CARE CLINIC | Age: 85
End: 2020-11-24
Payer: MEDICARE

## 2020-11-24 VITALS
HEART RATE: 61 BPM | OXYGEN SATURATION: 97 % | WEIGHT: 192.25 LBS | TEMPERATURE: 97.1 F | DIASTOLIC BLOOD PRESSURE: 60 MMHG | HEIGHT: 71 IN | SYSTOLIC BLOOD PRESSURE: 108 MMHG | BODY MASS INDEX: 26.91 KG/M2

## 2020-11-24 PROCEDURE — 1036F TOBACCO NON-USER: CPT | Performed by: PEDIATRICS

## 2020-11-24 PROCEDURE — 1123F ACP DISCUSS/DSCN MKR DOCD: CPT | Performed by: PEDIATRICS

## 2020-11-24 PROCEDURE — G8417 CALC BMI ABV UP PARAM F/U: HCPCS | Performed by: PEDIATRICS

## 2020-11-24 PROCEDURE — G8427 DOCREV CUR MEDS BY ELIG CLIN: HCPCS | Performed by: PEDIATRICS

## 2020-11-24 PROCEDURE — 99214 OFFICE O/P EST MOD 30 MIN: CPT | Performed by: PEDIATRICS

## 2020-11-24 PROCEDURE — 4040F PNEUMOC VAC/ADMIN/RCVD: CPT | Performed by: PEDIATRICS

## 2020-11-24 PROCEDURE — G8482 FLU IMMUNIZE ORDER/ADMIN: HCPCS | Performed by: PEDIATRICS

## 2020-11-24 ASSESSMENT — ENCOUNTER SYMPTOMS
DIARRHEA: 1
EYES NEGATIVE: 1
ALLERGIC/IMMUNOLOGIC NEGATIVE: 1
RESPIRATORY NEGATIVE: 1
ABDOMINAL PAIN: 1

## 2020-11-24 NOTE — PROGRESS NOTES
[address] [facility], [address]  Phone [phone]   Fax [phone]      OFFICE VISIT: 2020    Jese Moctezuma-: 1935      HPI  Reason For Visit:  John Renteria is a 82 y.o. Other (discuss CT from gastro, discuss AAA and kidney issues. will print from care everywhere) and Health Maintenance (scheduled AWV for phone)    Patient presents to review test results and discuss health issues that are presently going on involving other providers. He has been seen by [facility] gastroenterology for diarrhea and fecal incontinence, as well as generalized abdominal pain. They conducted a laboratory evaluation. This did show fecal calprotectin being markedly elevated at 486 with normal being less than 120   This is strongly indicative of some inflammatory process in the gut. This is nonspecific. PCR did show enteropathogenic E. coli present  CBC and electrolytes were normal.  C-reactive protein was also elevated at 2.59.  C. difficile testing was mentioned but apparently not performed. CT scan of the abdomen and pelvis was performed  Result Impression   1. There is a new 4.7 cm abdominal aortic aneurysm. There is  atheromatous disease of the aortoiliac vessels and coronary arteries. 2. Linear scarring or atelectasis in the lingula, right middle lobe and  right lower lobe. 3. Scattered liver cysts. Fatty infiltration of the liver. Prior  cholecystectomy. 4. Right renal cyst. There are a few other tiny cortical lesions in both  kidneys that are too small to fully characterize. These are probably  small cysts. There is mild cortical scarring of the lower pole left  kidney. There is a nonobstructive stone in the lower pole left kidney. 5. Enlarged prostate. Bladder wall thickening may be due to muscular  hypertrophy. Other etiologies less likely. 6. Prior right hemicolectomy. Diverticulosis of the colon. 7. Bilateral fat-containing inguinal hernias.  Other nonacute colon. There has been prior  right hemicolectomy. OTHER: There are bilateral fat-containing inguinal hernias. There is  diastases of the rectus abdominis muscles. There is a 4.7 cm aneurysm of  the abdominal aorta that is new compared to the prior study. There is  atheromatous disease of the aortoiliac vessels. There are degenerative  changes of the spine. Other Result Information   Interface, Rad Results [place] - 11/19/2020  9:59 AM CST  EXAMINATION:  CT ABDOMEN PELVIS WITHOUT AND WITH CONTRAST-  11/19/2020  9:55 AM CST    HISTORY: R19.7-Diarrhea, unspecified; R15.9-Full incontinence of feces;  R15.2-Fecal urgency; R10.84-Generalized abdominal pain. Prior colon  resection for colon cancer. TECHNIQUE: Spiral CT was performed of the abdomen and pelvis without and  with contrast. Cortical, parenchymal and excretory phases were obtained  after contrast administration. Multiplanar images were reconstructed. DLP: 371 mGy-cm. Automated dosage control was utilized. COMPARISON: 8/18/2010. LUNG BASES: There is coronary artery calcification. There is linear  scarring or atelectasis in the lingula, right middle lobe and right  lower lobe. LIVER AND SPLEEN: Prior cholecystectomy. There are scattered liver  cysts, the largest in the left hepatic lobe measuring 2.9 cm. This  previously measured about 3 cm. There is fatty infiltration of the  liver. The unenhanced spleen is unremarkable. PANCREAS: No pancreatic mass or inflammatory change. KIDNEYS, ADRENALS AND URINARY BLADDER: The adrenal glands are  unremarkable. There is a 2.8 cm cyst arising from the lower pole right  kidney. There are couple of other tiny cortical lesions bilaterally that  are too small to fully characterize. There is cortical scarring of the  lower pole left kidney. There is a 6 mm nonobstructive stone in the mid  to lower pole left kidney. The ureters are nondilated. The bladder is  not very well distended.  There is thickening of the bladder wall. The  prostate is enlarged measuring 5.6 cm. There is quite a bit of streak  artifact in the pelvis from prior right hip arthroplasty that makes  evaluation of the lower pelvis limited. BOWEL: There is underdistention of the stomach. Small bowel loops are  nondilated. There is diverticulosis of the colon. There has been prior  right hemicolectomy. OTHER: There are bilateral fat-containing inguinal hernias. There is  diastases of the rectus abdominis muscles. There is a 4.7 cm aneurysm of  the abdominal aorta that is new compared to the prior study. There is  atheromatous disease of the aortoiliac vessels. There are degenerative  changes of the spine. IMPRESSION:  1. There is a new 4.7 cm abdominal aortic aneurysm. There is  atheromatous disease of the aortoiliac vessels and coronary arteries. 2. Linear scarring or atelectasis in the lingula, right middle lobe and  right lower lobe. 3. Scattered liver cysts. Fatty infiltration of the liver. Prior  cholecystectomy. 4. Right renal cyst. There are a few other tiny cortical lesions in both  kidneys that are too small to fully characterize. These are probably  small cysts. There is mild cortical scarring of the lower pole left  kidney. There is a nonobstructive stone in the lower pole left kidney. 5. Enlarged prostate. Bladder wall thickening may be due to muscular  hypertrophy. Other etiologies less likely. 6. Prior right hemicolectomy. Diverticulosis of the colon. 7. Bilateral fat-containing inguinal hernias. Other nonacute findings,  as discussed above. This report was finalized on 11/19/2020 10:56 by Dr. Wilman Dewey MD.   Status Results Details     No cause of diarrhea was noted however he did have rotation of the abdominal aortic aneurysm of 4.7 cm. (This will require follow-up)  There were no significant other findings that would require urgent follow-up. Treatment so far has been nothing.   Last CT of abdomen/pelvis was on 1/25/19 and he had AAA of 3.6cm AP dimension. He had US aorta done on 12/17/2019. Narrative    EXAMINATION: US DUPLEX SCAN OF AORTA 12/17/2019 9:27 AM    HISTORY: Abdominal aortic aneurysm without rupture    COMPARISON: Aortic ultrasound 11/26/2018, CT abdomen and pelvis    contrast 1/25/2019    FINDINGS:    Transabdominal sonographic evaluation of the aorta was performed in    the transverse and longitudinal projections. The proximal abdominal aorta measures 3.0 x 3.0 cm. The mid abdominal    aorta measures 2.6 x 2.8 cm. The distal abdominal aorta measures 3.9 x    3.5 cm, previously measuring 3.5 x 3.9 cm. The aortic bifurcation    appears normal. Color flow evaluation of the distal aorta is somewhat    limited.         Impression    Stable dilatation of the infrarenal abdominal aorta to 3.9    cm. Signed by Dr Alex Hagen on 12/17/2019 9:29 AM             He had CtA scheduled back in August 2020. He did not have this done. We discussed the role of vascular surgery following and due to the rapid growth of the AAA, I would recommend repeat study in 3 months as well. Renal lithiasis:  He does have a stone noted on CT in the left kidney. As long as this remains where it is, it should not be an issue for him. JUNG:  He does have some fatty infiltration of the liver noted on CT scan of the abdomen. We will monitor liver enzymes in the near future. With his routine physical exam.      Inguinal hernias: These are not causing a problem for him now. Since these are not causing a problem, we will not bother with them at this time. height is 5' 11\" (1.803 m) and weight is 192 lb 4 oz (87.2 kg). His temporal temperature is 97.1 °F (36.2 °C). His blood pressure is 108/60 and his pulse is 61. His oxygen saturation is 97%. Body mass index is 26.81 kg/m².     I have reviewed the following with the Mr. Oscar Burton on 09/17/2020   Component Date Value  SARS-CoV-2, AZAM 09/17/2020 NOT DETECTED      Copies of these are in the chart. Current Outpatient Medications   Medication Sig Dispense Refill    rifaximin (XIFAXAN) 550 MG tablet Take 1 tablet by mouth 3 times daily for 14 days 42 tablet 0    triamcinolone (KENALOG) 0.1 % cream Apply topically 2 times daily to rash on arm and abdomen 45 g 5    tamsulosin (FLOMAX) 0.4 MG capsule Take 1 capsule by mouth daily 90 capsule 3    isosorbide mononitrate (IMDUR) 30 MG extended release tablet Take 1 tablet by mouth daily 90 tablet 3    simvastatin (ZOCOR) 20 MG tablet Take 1 tablet by mouth nightly 90 tablet 1    apixaban (ELIQUIS) 2.5 MG TABS tablet Take 1 tablet by mouth 2 times daily 180 tablet 3    DULoxetine (CYMBALTA) 60 MG extended release capsule Take 1 capsule by mouth daily 90 capsule 3    cyclobenzaprine (FLEXERIL) 10 MG tablet TAKE 1 TABLET BY MOUTH THREE TIMES DAILY      docusate sodium (COLACE) 100 MG capsule Take 1 capsule by mouth 2 times daily 60 capsule 1    pantoprazole (PROTONIX) 40 MG tablet Take 40 mg by mouth 2 times daily      Cholecalciferol (VITAMIN D3) 5000 units TABS Take 5,000 Units by mouth daily       metoprolol tartrate (LOPRESSOR) 25 MG tablet Take 1 tablet by mouth 2 times daily Patient will fill next month (Patient taking differently: Take 12.5 mg by mouth 2 times daily Patient will fill next month) 180 tablet 3    vitamin B-12 (CYANOCOBALAMIN) 100 MCG tablet Take 50 mcg by mouth daily. No current facility-administered medications for this visit.         Allergies: Lortab [hydrocodone-acetaminophen] and Morphine and related     Past Medical History:   Diagnosis Date    Adenocarcinoma ([facility]     colon    Aneurysm ([facility] aortic    Anxiety     Arthritis     Polyarticular    Atrial fibrillation ([facility]     Burgos's esophagus 2010    H/o    CAD (coronary artery disease)     CAD (coronary artery disease), native coronary artery     stent; sees dr. Kaylin Gaines respiratory failure (HCC)     Costochondritis     Erectile dysfunction     Gastroesophageal reflux disease     GERD (gastroesophageal reflux disease)     High cholesterol     Hip pain     Hypercholesterolemia 2010    Hypertension     Hypertension     MI (mitral incompetence)     x4 stents    Nephrolithiasis     Non-cardiac chest pain 12/20/2011    Renal calculi     Right upper lobe consolidation ([address]     Tobacco abuse 2010    Ulcer, gastric, acute 3/14/2012       Family History   Problem Relation Age of Onset    Diabetes Mother     Coronary Art Dis Father     High Blood Pressure Father     High Cholesterol Father     Heart Attack Father     Stroke Father     Kidney Disease Son     Kidney Disease Son        Past Surgical History:   Procedure Laterality Date    ANKLE SURGERY      left    APPENDECTOMY      CARDIAC CATHETERIZATION  12/12/07,07/13/09    Left Heart Cath    CHOLECYSTECTOMY      right    COLON SURGERY      GALLBLADDER SURGERY      LEG SURGERY      right    LUNG REMOVAL, PARTIAL      MANDIBLE FRACTURE SURGERY      ORTHOPEDIC SURGERY      legs, hand and ankles    SKIN CANCER EXCISION  2016    TOTAL HIP ARTHROPLASTY Right 1/3/2020    RIGHT TOTAL HIP REPLACEMENT performed by Ana Nolan MD at [facility] OR       Social History     Tobacco Use    Smoking status: Former Smoker    Smokeless tobacco: Never Used    Tobacco comment: quit in 1983   Substance Use Topics    Alcohol use: No        Review of Systems   Constitutional: Negative. HENT: Negative. Eyes: Negative. Respiratory: Negative. Cardiovascular: Negative. Gastrointestinal: Positive for abdominal pain and diarrhea. Endocrine: Negative. Genitourinary: Negative. Musculoskeletal: Positive for arthralgias. Skin: Negative. Allergic/Immunologic: Negative. Neurological: Negative for weakness (left arm). Hematological: Negative. Psychiatric/Behavioral: Negative.         Physical Exam  Vitals signs reviewed. Constitutional:       General: He is not in acute distress. Appearance: He is well-developed and normal weight. HENT:      Head: Normocephalic and atraumatic. Right Ear: Hearing, tympanic membrane, ear canal and external ear normal.      Left Ear: Hearing, tympanic membrane, ear canal and external ear normal.      Nose: Nose normal.      Mouth/Throat:      Mouth: Mucous membranes are moist.      Pharynx: Oropharynx is clear. Eyes:      General: No scleral icterus. Extraocular Movements: Extraocular movements intact. Conjunctiva/sclera: Conjunctivae normal.      Pupils: Pupils are equal, round, and reactive to light. Neck:      Musculoskeletal: Normal range of motion and neck supple. Thyroid: No thyromegaly. Vascular: No carotid bruit or JVD. Cardiovascular:      Rate and Rhythm: Normal rate and regular rhythm. No extrasystoles are present. Chest Wall: PMI is not displaced. Heart sounds: Normal heart sounds, S1 normal and S2 normal. No murmur. No friction rub. No gallop. Pulmonary:      Effort: Pulmonary effort is normal. No respiratory distress. Breath sounds: Normal breath sounds. No wheezing, rhonchi or rales. Abdominal:      General: Bowel sounds are normal.      Palpations: Abdomen is soft. Tenderness: There is no abdominal tenderness. There is no guarding or rebound. Genitourinary:     Comments: Exam deferred  Musculoskeletal: Normal range of motion. General: Tenderness (over Left shoulder) present. Comments: He is unable to abduct his L arm. Lymphadenopathy:      Cervical: No cervical adenopathy. Skin:     General: Skin is warm and dry. Capillary Refill: Capillary refill takes less than 2 seconds. Findings: No rash. Neurological:      General: No focal deficit present. Mental Status: He is alert and oriented to person, place, and time. Sensory: No sensory deficit (no numbness or tingling). Psychiatric:         Behavior: Behavior normal.         Thought Content: Thought content normal.         Judgment: Judgment normal.             ASSESSMENT      ICD-10-CM    1. Abdominal aortic aneurysm (AAA) without rupture [facility])  I71.4 [facility]   2. Small intestinal bacterial overgrowth  K63.89 rifaximin (XIFAXAN) 550 MG tablet   3. Renal lithiasis  N20.0    4. Nonalcoholic steatohepatitis (JUNG)  K75.81    5. Bilateral inguinal hernia without obstruction or gangrene, recurrence not specified  K40.20          PLAN    1. Abdominal aortic aneurysm (AAA) without rupture ([facility]  He does have a significant increase in his abdominal aortic aneurysm compared to last evaluation. He went from 3.9cm to 4.6 cm. We are going to get vascular surgery back involved in his care. He is also seeing cardiology structural heart. I am unsure as to whether they are stenting lesions such as this. We can easily refer him to them as well. I would prefer the least invasive procedure with the greatest efficacy as far as the most viable intervention  [facility]    2. Small intestinal bacterial overgrowth  Recent evaluation by gastroenterology does show that he does have an intestinal bacterial overgrowth. This was evidenced by bowel inflammation and enterotoxigenic E. coli presents. We will treat this with Xifaxan 3 times daily x14 days. This should result in resolution of his symptoms. If this regimen is not approved by insurance, we can use other medication regimens with slightly lesser efficacy  - rifaximin (XIFAXAN) 550 MG tablet; Take 1 tablet by mouth 3 times daily for 14 days  Dispense: 42 tablet; Refill: 0    3. Renal lithiasis  As long as this remains in the kidney and does not move, it should not cause any problems  We did encourage him to drink lots of fluids (water) and maintain his urine very dilute.   This should prevent further propagation of stones and production of stones    4. Nonalcoholic steatohepatitis (JUNG)  CT scan does show steatohepatitis (fatty infiltration of the liver). Present body mass index is 26.8. We did recommend a low-fat low-cholesterol diet and to maximize his exercise as much as tolerated. He does not have elevated liver enzymes. I am not overly concerned about this process in terms of  causing him hazards to his health or disability    5. Bilateral inguinal hernia without obstruction or gangrene, recurrence not specified  He is completely asymptomatic for these. I would not recommend any intervention unless he becomes symptomatic        Orders Placed This Encounter   Procedures   [facility] Vascular Surgery, [place]        Return in about 3 months (around 2/24/2021) for 30. This was an in-house visit.

## 2020-11-30 RX ORDER — AMOXICILLIN AND CLAVULANATE POTASSIUM 875; 125 MG/1; MG/1
1 TABLET, FILM COATED ORAL 2 TIMES DAILY
Qty: 20 TABLET | Refills: 0 | Status: SHIPPED | OUTPATIENT
Start: 2020-11-30 | End: 2020-12-10

## 2020-11-30 RX ORDER — METRONIDAZOLE 500 MG/1
500 TABLET ORAL 3 TIMES DAILY
Qty: 30 TABLET | Refills: 0 | Status: SHIPPED | OUTPATIENT
Start: 2020-11-30 | End: 2020-12-10

## 2020-11-30 NOTE — TELEPHONE ENCOUNTER
Can try Augmentin 875 mg twice daily for 10 days.   Dispense #20 with no refills  We will also use metronidazole 500 mg 3 times daily x10 days  Dispense #30 with no refills    Take both medications together

## 2020-11-30 NOTE — TELEPHONE ENCOUNTER
Pt wife called, the med for E-Coli was too expensive.  Carlos Enrique Pontiff you had another alternative that you could send in that would be cheaper

## 2020-12-04 ENCOUNTER — VIRTUAL VISIT (OUTPATIENT)
Dept: PRIMARY CARE CLINIC | Age: 85
End: 2020-12-04
Payer: MEDICARE

## 2020-12-04 PROCEDURE — G0439 PPPS, SUBSEQ VISIT: HCPCS | Performed by: PEDIATRICS

## 2020-12-04 PROCEDURE — 1123F ACP DISCUSS/DSCN MKR DOCD: CPT | Performed by: PEDIATRICS

## 2020-12-04 PROCEDURE — G8482 FLU IMMUNIZE ORDER/ADMIN: HCPCS | Performed by: PEDIATRICS

## 2020-12-04 PROCEDURE — 4040F PNEUMOC VAC/ADMIN/RCVD: CPT | Performed by: PEDIATRICS

## 2020-12-04 ASSESSMENT — PATIENT HEALTH QUESTIONNAIRE - PHQ9
1. LITTLE INTEREST OR PLEASURE IN DOING THINGS: 0
SUM OF ALL RESPONSES TO PHQ9 QUESTIONS 1 & 2: 0
SUM OF ALL RESPONSES TO PHQ QUESTIONS 1-9: 0
2. FEELING DOWN, DEPRESSED OR HOPELESS: 0
SUM OF ALL RESPONSES TO PHQ QUESTIONS 1-9: 0
SUM OF ALL RESPONSES TO PHQ QUESTIONS 1-9: 0

## 2020-12-04 ASSESSMENT — LIFESTYLE VARIABLES: HOW OFTEN DO YOU HAVE A DRINK CONTAINING ALCOHOL: 0

## 2020-12-04 NOTE — PROGRESS NOTES
[address] [facility],  [address]  Phone [phone]   Fax [phone]      OFFICE VISIT: 2020      Medicare Annual Wellness Visit  Name: Gen Nava Date: 2020   MRN: [id number] Sex: Male   Age: 82 y.o. Ethnicity: Non-/Non    : 1935 Race: Erick Cedeno is here for Medicare AWV; Discuss Medications (augmentin and flagyl is giving him diarrhea); and Other (surgery for aaa is coming closer and would like to discuss concerns)    Screenings for behavioral, psychosocial and functional/safety risks, and cognitive dysfunction are all negative except as indicated below. These results, as well as other patient data from the [address] [facility], are documented in Flowsheets linked to this Encounter. Allergies   Allergen Reactions    Lortab [Hydrocodone-Acetaminophen] Nausea And Vomiting    Morphine And Related Nausea And Vomiting       Prior to Visit Medications    Medication Sig Taking?  Authorizing Provider   amoxicillin-clavulanate (AUGMENTIN) 875-125 MG per tablet Take 1 tablet by mouth 2 times daily for 10 days Yes B Toya Bosworth, DO   metroNIDAZOLE (FLAGYL) 500 MG tablet Take 1 tablet by mouth 3 times daily for 10 days Yes B Toya Bosworth, DO   triamcinolone (KENALOG) 0.1 % cream Apply topically 2 times daily to rash on arm and abdomen Yes DAVIE Carbajal   tamsulosin (FLOMAX) 0.4 MG capsule Take 1 capsule by mouth daily Yes DAVIE Carbajal   isosorbide mononitrate (IMDUR) 30 MG extended release tablet Take 1 tablet by mouth daily Yes B Toya Bosworth, DO   simvastatin (ZOCOR) 20 MG tablet Take 1 tablet by mouth nightly Yes B Toya Bosworth, DO   apixaban (ELIQUIS) 2.5 MG TABS tablet Take 1 tablet by mouth 2 times daily Yes DAVIE Hooper   DULoxetine (CYMBALTA) 60 MG extended release capsule Take 1 capsule by mouth daily Yes B Toya Bosworth, DO   cyclobenzaprine (FLEXERIL) 10 MG tablet TAKE 1 TABLET BY MOUTH THREE TIMES DAILY Yes Historical Provider, MD   docusate sodium (COLACE) 100 MG capsule Take 1 capsule by mouth 2 times daily Yes Era Tsai MD   pantoprazole (PROTONIX) 40 MG tablet Take 40 mg by mouth 2 times daily Yes Historical Provider, MD   Cholecalciferol (VITAMIN D3) 5000 units TABS Take 5,000 Units by mouth daily  Yes Historical Provider, MD   metoprolol tartrate (LOPRESSOR) 25 MG tablet Take 1 tablet by mouth 2 times daily Patient will fill next month  Patient taking differently: Take 12.5 mg by mouth 2 times daily Patient will fill next month Yes DAVIE Skaggs   vitamin B-12 (CYANOCOBALAMIN) 100 MCG tablet Take 50 mcg by mouth daily.  Yes Historical Provider, MD       Past Medical History:   Diagnosis Date    Adenocarcinoma ([facility]     colon    Aneurysm ([facility] aortic    Anxiety     Arthritis     Polyarticular    Atrial fibrillation ([facility]     Burgos's esophagus 2010    H/o    CAD (coronary artery disease)     CAD (coronary artery disease), native coronary artery     stent; sees dr. Aida Romero Chronic respiratory failure ([facility]     Costochondritis     Erectile dysfunction     Gastroesophageal reflux disease     GERD (gastroesophageal reflux disease)     High cholesterol     Hip pain     Hypercholesterolemia 2010    Hypertension     Hypertension     MI (mitral incompetence)     x4 stents    Nephrolithiasis     Non-cardiac chest pain 12/20/2011    Renal calculi     Right upper lobe consolidation ([facility]     Tobacco abuse 2010    Ulcer, gastric, acute 3/14/2012       Past Surgical History:   Procedure Laterality Date    ANKLE SURGERY      left    APPENDECTOMY      CARDIAC CATHETERIZATION  12/12/07,07/13/09    Left Heart Cath    CHOLECYSTECTOMY      right    COLON SURGERY      GALLBLADDER SURGERY      LEG SURGERY      right    LUNG REMOVAL, PARTIAL      MANDIBLE FRACTURE SURGERY      ORTHOPEDIC SURGERY      legs, hand and ankles    SKIN CANCER EXCISION 2016    TOTAL HIP ARTHROPLASTY Right 1/3/2020    RIGHT TOTAL HIP REPLACEMENT performed by Paolo Rosen MD at [facility] OR       Family History   Problem Relation Age of Onset    Diabetes Mother     Coronary Art Dis Father     High Blood Pressure Father     High Cholesterol Father     Heart Attack Father     Stroke Father     Kidney Disease Son     Kidney Disease Son        CareTeam (Including outside providers/suppliers regularly involved in providing care):   Patient Care Team:  Jamia Guzman, DO as PCP - [address], DO as PCP - [address] Provider  Vianca Vazquez (Nurse Practitioner)  Brenton Wharton MD (Gastroenterology)  Marni Marino MD as Consulting Physician (Oncology)  Moises Jain MD as Consulting Physician (Cardiology)    Wt Readings from Last 3 Encounters:   11/24/20 192 lb 4 oz (87.2 kg)   08/07/20 184 lb (83.5 kg)   03/17/20 178 lb (80.7 kg)     No flowsheet data found. There is no height or weight on file to calculate BMI. Based upon direct observation of the patient, evaluation of cognition reveals recent and remote memory intact. Physical exam was not performed as this was a telephone conference visit    Patient's complete Health Risk Assessment and screening values have been reviewed and are found in Flowsheets. The following problems were reviewed today and where indicated follow up appointments were made and/or referrals ordered. Positive Risk Factor Screenings with Interventions:     General Health and ACP:  General  In general, how would you say your health is?: Good  In the past 7 days, have you experienced any of the following?  New or Increased Pain, New or Increased Fatigue, Loneliness, Social Isolation, Stress or Anger?: None of These  Do you get the social and emotional support that you need?: Yes  Do you have a Living Will?: (!) No  Advance Directives     Power of  Living Will ACP-Advance Directive ACP-Power of     Not on Vitals/Constitutional/EENT/Resp/CV/GI//MS/Neuro/Skin/Heme-Lymph-Imm. Pursuant to the emergency declaration under the [address] [place] [address], [address] [place] [address] and the [employer] Act, this Virtual Visit was conducted with patient's (and/or legal guardian's) consent, to reduce the patient's risk of exposure to COVID-19 and provide necessary medical care. The patient (and/or legal guardian) has also been advised to contact this office for worsening conditions or problems, and seek emergency medical treatment and/or call 911 if deemed necessary. Patient identification was verified at the start of the visit: Yes    Services were provided through a video synchronous discussion virtually to substitute for in-person clinic visit. Patient and provider were located at their individual homes. --SPRING Dean DO on 12/4/2020 at 12:54 PM    An electronic signature was used to authenticate this note.

## 2020-12-04 NOTE — PATIENT INSTRUCTIONS
Personalized Preventive Plan for Saad Dangelo - 12/4/2020  Medicare offers a range of preventive health benefits. Some of the tests and screenings are paid in full while other may be subject to a deductible, co-insurance, and/or copay. Some of these benefits include a comprehensive review of your medical history including lifestyle, illnesses that may run in your family, and various assessments and screenings as appropriate. After reviewing your medical record and screening and assessments performed today your provider may have ordered immunizations, labs, imaging, and/or referrals for you. A list of these orders (if applicable) as well as your Preventive Care list are included within your After Visit Summary for your review. Other Preventive Recommendations:    · A preventive eye exam performed by an eye specialist is recommended every 1-2 years to screen for glaucoma; cataracts, macular degeneration, and other eye disorders. · A preventive dental visit is recommended every 6 months. · Try to get at least 150 minutes of exercise per week or 10,000 steps per day on a pedometer . · Order or download the FREE \"Exercise & Physical Activity: Your Everyday Guide\" from [employer]. Call 2-944.392.6530 or search [employer] online. · You need 9483-2948 mg of calcium and 1730-3547 IU of vitamin D per day. It is possible to meet your calcium requirement with diet alone, but a vitamin D supplement is usually necessary to meet this goal.  · When exposed to the sun, use a sunscreen that protects against both UVA and UVB radiation with an SPF of 30 or greater. Reapply every 2 to 3 hours or after sweating, drying off with a towel, or swimming. · Always wear a seat belt when traveling in a car. Always wear a helmet when riding a bicycle or motorcycle.   Patient Education        Well Visit, Over 72: Care Instructions  Your Care Instructions     Physical exams can help you stay healthy. Your doctor has checked your overall health and may have suggested ways to take good care of yourself. He or she also may have recommended tests. At home, you can help prevent illness with healthy eating, regular exercise, and other steps. Follow-up care is a key part of your treatment and safety. Be sure to make and go to all appointments, and call your doctor if you are having problems. It's also a good idea to know your test results and keep a list of the medicines you take. How can you care for yourself at home? Reach and stay at a healthy weight. This will lower your risk for many problems, such as obesity, diabetes, heart disease, and high blood pressure. Get at least 30 minutes of exercise on most days of the week. Walking is a good choice. You also may want to do other activities, such as running, swimming, cycling, or playing tennis or team sports. Do not smoke. Smoking can make health problems worse. If you need help quitting, talk to your doctor about stop-smoking programs and medicines. These can increase your chances of quitting for good. Protect your skin from too much sun. When you're outdoors from 10 a.m. to 4 p.m., stay in the shade or cover up with clothing and a hat with a wide brim. Wear sunglasses that block UV rays. Even when it's cloudy, put broad-spectrum sunscreen (SPF 30 or higher) on any exposed skin. See a dentist one or two times a year for checkups and to have your teeth cleaned. Wear a seat belt in the car. Follow your doctor's advice about when to have certain tests. These tests can spot problems early. For men and women  Cholesterol. Your doctor will tell you how often to have this done based on your overall health and other things that can increase your risk for heart attack and stroke. Blood pressure. Have your blood pressure checked during a routine doctor visit.  Your doctor will tell you how often to check your blood pressure based on your age, your blood pressure results, and other factors. Diabetes. Ask your doctor whether you should have tests for diabetes. Vision. Experts recommend that you have yearly exams for glaucoma and other age-related eye problems. Hearing. Tell your doctor if you notice any change in your hearing. You can have tests to find out how well you hear. Colon cancer tests. Keep having colon cancer tests as your doctor recommends. You can have one of several types of tests. Heart attack and stroke risk. At least every 4 to 6 years, you should have your risk for heart attack and stroke assessed. Your doctor uses factors such as your age, blood pressure, cholesterol, and whether you smoke or have diabetes to show what your risk for a heart attack or stroke is over the next 10 years. Osteoporosis. Talk to your doctor about whether you should have a bone density test to find out whether you have thinning bones. Ask your doctor if you need to take a calcium plus vitamin D supplement. You may be able to get enough calcium and vitamin D through your diet. For women  Pap test and pelvic exam. You may no longer need a Pap test. Talk with your doctor about whether to stop or continue to have Pap tests. Breast exam and mammogram. Ask how often you should have a mammogram, which is an X-ray of your breasts. A mammogram can spot breast cancer before it can be felt and when it is easiest to treat. Thyroid disease. Talk to your doctor about whether to have your thyroid checked as part of a regular physical exam. Women have an increased chance of a thyroid problem. For men  Prostate exam. Talk to your doctor about whether you should have a blood test (called a PSA test) for prostate cancer. Experts recommend that you discuss the benefits and risks of the test with your doctor before you decide whether to have this test. Some experts say that men ages 79 and older no longer need testing. Abdominal aortic aneurysm.  Ask your doctor whether you should have a test to check for an aneurysm. You may need a test if you ever smoked or if your parent, brother, sister, or child has had an aneurysm. When should you call for help? Watch closely for changes in your health, and be sure to contact your doctor if you have any problems or symptoms that concern you. Where can you learn more? Go to https://chpepiceweb.[employer] Real-Time. org and sign in to your VersionOne account. Enter P734 in the Integrated Medical Management box to learn more about \"Well Visit, Over 65: Care Instructions. \"     If you do not have an account, please click on the \"Sign Up Now\" link. Current as of: May 27, 2020               Content Version: 12.6  © 8640-5598 [employer]. Care instructions adapted under license by [facility] [place] ([facility]). If you have questions about a medical condition or this instruction, always ask your healthcare professional. [place] [address] any warranty or liability for your use of this information. Patient Education        Learning About Living Jewell Chance  What is a living will? A living will, also called a declaration, is a legal form. It tells your family and your doctor your wishes when you can't speak for yourself. It's used by the health professionals who will treat you as you near the end of your life or if you get seriously hurt or ill. If you put your wishes in writing, your loved ones and others will know what kind of care you want. They won't need to guess. This can ease your mind and be helpful to others. And you can change or cancel your living will at any time. A living will is not the same as an estate or property will. An estate will explains what you want to happen with your money and property after you die. How do you use it? A living will is used to describe the kinds of treatment or life support you want as you near the end of your life or if you get seriously hurt or ill. Keep these facts in mind about living lay.   Your living will is used only if you can't speak or make decisions for yourself. Most often, one or more doctors must certify that you can't speak or decide for yourself before your living will takes effect. If you get better and can speak for yourself again, you can accept or refuse any treatment. It doesn't matter what you said in your living will. Some states may limit your right to refuse treatment in certain cases. For example, you may need to clearly state in your living will that you don't want artificial hydration and nutrition, such as being fed through a tube. Is a living will a legal document? A living will is a legal document. Each state has its own laws about living lay. And a living will may be called something else in your state. Here are some things to know about living lay. You don't need an  to complete a living will. But legal advice can be helpful if your state's laws are unclear. It can also help if your health history is complicated or your family can't agree on what should be in your living will. You can change your living will at any time. Some people find that their wishes about end-of-life care change as their health changes. If you make big changes to your living will, complete a new form. If you move to another state, make sure that your living will is legal in the state where you now live. In most cases, doctors will respect your wishes even if you have a form from a different state. You might use a universal form that has been approved by many states. This kind of form can sometimes be filled out and stored online. Your digital copy will then be available wherever you have a connection to the internet. The doctors and nurses who need to treat you can find it right away. Your state may offer an online registry. This is another place where you can store your living will online. It's a good idea to get your living will notarized.  This means using a person called a notary 5287-5403 [employer]. Care instructions adapted under license by [facility] ([facility]). If you have questions about a medical condition or this instruction, always ask your healthcare professional. [address] any warranty or liability for your use of this information. Patient Education        Advance Directives: Care Instructions  Overview  An advance directive is a legal way to state your wishes at the end of your life. It tells your family and your doctor what to do if you can't say what you want. There are two main types of advance directives. You can change them any time your wishes change. Living will. This form tells your family and your doctor your wishes about life support and other treatment. The form is also called a declaration. Medical power of . This form lets you name a person to make treatment decisions for you when you can't speak for yourself. This person is called a health care agent (health care proxy, health care surrogate). The form is also called a durable power of  for health care. If you do not have an advance directive, decisions about your medical care may be made by a family member, or by a doctor or a  who doesn't know you. It may help to think of an advance directive as a gift to the people who care for you. If you have one, they won't have to make tough decisions by themselves. Follow-up care is a key part of your treatment and safety. Be sure to make and go to all appointments, and call your doctor if you are having problems. It's also a good idea to know your test results and keep a list of the medicines you take. What should you include in an advance directive? Many states have a unique advance directive form. (It may ask you to address specific issues.) Or you might use a universal form that's approved by many states.   If your form doesn't tell you what to address, it may be hard to know what to include in your advance directive. Use the questions below to help you get started. Who do you want to make decisions about your medical care if you are not able to? What life-support measures do you want if you have a serious illness that gets worse over time or can't be cured? What are you most afraid of that might happen? (Maybe you're afraid of having pain, losing your independence, or being kept alive by machines.)  Where would you prefer to die? (Your home? A hospital? A nursing home?)  Do you want to donate your organs when you die? Do you want certain [employer]ian practices performed before you die? When should you call for help? Be sure to contact your doctor if you have any questions. Where can you learn more? Go to https://[employer] Support.[employer]. org and sign in to your [employer] account. Enter R264 in the [employer] box to learn more about \"Advance Directives: Care Instructions. \"     If you do not have an account, please click on the \"Sign Up Now\" link. Current as of: December 9, 2019               Content Version: 12.6  © 8744-0181 [employer]. Care instructions adapted under license by [facility] ([facility]). If you have questions about a medical condition or this instruction, always ask your healthcare professional. [address] any warranty or liability for your use of this information.

## 2020-12-22 ENCOUNTER — HOSPITAL ENCOUNTER (OUTPATIENT)
Dept: ULTRASOUND IMAGING | Age: 85
Discharge: HOME | End: 2020-12-22
Payer: MEDICARE

## 2020-12-22 PROCEDURE — 93978 VASCULAR STUDY: CPT

## 2020-12-29 ENCOUNTER — VIRTUAL VISIT (OUTPATIENT)
Dept: VASCULAR SURGERY | Age: 85
End: 2020-12-29
Payer: MEDICARE

## 2020-12-29 PROCEDURE — 99441 PR PHYS/QHP TELEPHONE EVALUATION 5-10 MIN: CPT | Performed by: PHYSICIAN ASSISTANT

## 2020-12-29 NOTE — PROGRESS NOTES
Oniel Fernandez is a 82 y.o. male evaluated via telephone on 12/29/2020. Consent:  He and/or health care decision maker is aware that that he may receive a bill for this telephone service, depending on his insurance coverage, and has provided verbal consent to proceed: Yes      Documentation:  I communicated with the patient and/or health care decision maker about due to the covid-19 Pandemic, we are trying to limit exposures to our patients whom have elective follow ups. We are calling each individual patient to make sure they are doing okay.         Oniel Fernandez is a 82 y.o. male with the following history as recorded in [facility]:  Patient Active Problem List    Diagnosis Date Noted    CAD (coronary artery disease), native coronary artery      Priority: High    Primary osteoarthritis of right hip 01/03/2020    Paroxysmal atrial fibrillation ([facility] [address] 12/11/2017    Colon polyps 04/11/2017    History of colon cancer 04/11/2017    AAA (abdominal aortic aneurysm) ([facility] [address] 11/12/2013    Hypercholesterolemia     Weight loss     Burgos's esophagus     Ulcer, gastric, acute 03/14/2012    Non-cardiac chest pain 12/20/2011    Hypertension     Gastroesophageal reflux disease     Arthritis     Renal calculi     Costochondritis      Current Outpatient Medications   Medication Sig Dispense Refill    triamcinolone (KENALOG) 0.1 % cream Apply topically 2 times daily to rash on arm and abdomen 45 g 5    tamsulosin (FLOMAX) 0.4 MG capsule Take 1 capsule by mouth daily 90 capsule 3    isosorbide mononitrate (IMDUR) 30 MG extended release tablet Take 1 tablet by mouth daily 90 tablet 3    simvastatin (ZOCOR) 20 MG tablet Take 1 tablet by mouth nightly 90 tablet 1    apixaban (ELIQUIS) 2.5 MG TABS tablet Take 1 tablet by mouth 2 times daily 180 tablet 3    DULoxetine (CYMBALTA) 60 MG extended release capsule Take 1 capsule by mouth daily 90 capsule 3  cyclobenzaprine (FLEXERIL) 10 MG tablet TAKE 1 TABLET BY MOUTH THREE TIMES DAILY      docusate sodium (COLACE) 100 MG capsule Take 1 capsule by mouth 2 times daily 60 capsule 1    pantoprazole (PROTONIX) 40 MG tablet Take 40 mg by mouth 2 times daily      Cholecalciferol (VITAMIN D3) 5000 units TABS Take 5,000 Units by mouth daily       metoprolol tartrate (LOPRESSOR) 25 MG tablet Take 1 tablet by mouth 2 times daily Patient will fill next month (Patient taking differently: Take 12.5 mg by mouth 2 times daily Patient will fill next month) 180 tablet 3    vitamin B-12 (CYANOCOBALAMIN) 100 MCG tablet Take 50 mcg by mouth daily. No current facility-administered medications for this visit.       Allergies: Lortab [hydrocodone-acetaminophen] and Morphine and related  Past Medical History:   Diagnosis Date    Adenocarcinoma ([facility] [address]     colon    Aneurysm ([facility] [address] aortic    Anxiety     Arthritis     Polyarticular    Atrial fibrillation ([facility] [address]     Burgos's esophagus 2010    H/o    CAD (coronary artery disease)     CAD (coronary artery disease), native coronary artery     stent; sees dr. Paul Rosales Chronic respiratory failure ([facility] [address]     Costochondritis     Erectile dysfunction     Gastroesophageal reflux disease     GERD (gastroesophageal reflux disease)     High cholesterol     Hip pain     Hypercholesterolemia 2010    Hypertension     Hypertension     MI (mitral incompetence)     x4 stents    Nephrolithiasis     Non-cardiac chest pain 12/20/2011    Renal calculi     Right upper lobe consolidation ([facility] [address]     Tobacco abuse 2010    Ulcer, gastric, acute 3/14/2012     Past Surgical History:   Procedure Laterality Date    ANKLE SURGERY      left    APPENDECTOMY      CARDIAC CATHETERIZATION  12/12/07,07/13/09    Left Heart Cath    CHOLECYSTECTOMY      right    COLON SURGERY      GALLBLADDER SURGERY      LEG SURGERY      right    LUNG REMOVAL, PARTIAL      MANDIBLE FRACTURE SURGERY  ORTHOPEDIC SURGERY      legs, hand and ankles    SKIN CANCER EXCISION  2016    TOTAL HIP ARTHROPLASTY Right 1/3/2020    RIGHT TOTAL HIP REPLACEMENT [facility]     Family History   Problem Relation Age of Onset    Diabetes Mother     Coronary Art Dis Father     High Blood Pressure Father     High Cholesterol Father     Heart Attack Father     Stroke Father     Kidney Disease Son     Kidney Disease Son      Social History     Tobacco Use    Smoking status: Former Smoker    Smokeless tobacco: Never Used    Tobacco comment: quit in 1983   Substance Use Topics    Alcohol use: No       Details of this discussion including any medical advice provided: PMH, medications and allergies reviewed. Mr. Joe Gage is a 83 yo male who has a history a AAA. Today we are following up for this. He is currently on Eliquis and a statin. He denies any significant abdominal, back pain or claudication. He currently does not smoke. Aortic us -   FINDINGS: The proximal aorta measures 2.6 x 2.5 cm. The mid aorta   measures 2.9 x 2.8 cm. The distal aorta measures 4.3 x 4.4 cm,   increased from the previous 3.9 x 3.5 cm measurement. Common iliac   arteries are patent and normal in caliber measuring 1.3 cm or less.       Impression   1. Distal abdominal aortic aneurysm now measuring 4.3 x 4.4 cm   suggesting a mild interval increase from last year's 3.9 x 3.5 cm   measurement. Signed by Dr Juwan Doyle on 12/22/2020 9:19 AM       Assessment/Plan -      1.  AAA      Recommend he continue statin therapy  Recommend no smoking We will follow up in 6 months with a CT A/P Symptoms of rupture reviewed with the patient including sudden onset severe back pain or abdominal pain. This pain can sometimes radiate into the groin or leg. The patient may experience a feeling of impending doom or death. If this occurs the patient  has been instructed to call 911 and get to the emergency room telling them you have an aneurysm. Patient has voiced understanding. I affirm this is a Patient Initiated Episode with a Patient who has not had a related appointment within my department in the past 7 days or scheduled within the next 24 hours.     Patient identification was verified at the start of the visit: Yes    Total Time: minutes: 5-10 minutes    Note: not billable if this call serves to triage the patient into an appointment for the relevant concern      Marlyn Lancaster

## 2021-01-05 DIAGNOSIS — E78.00 HYPERCHOLESTEROLEMIA: ICD-10-CM

## 2021-01-06 RX ORDER — SIMVASTATIN 20 MG
20 TABLET ORAL NIGHTLY
Qty: 90 TABLET | Refills: 0 | Status: SHIPPED | OUTPATIENT
Start: 2021-01-06 | End: 2021-04-01

## 2021-02-01 RX ORDER — DULOXETIN HYDROCHLORIDE 60 MG/1
60 CAPSULE, DELAYED RELEASE ORAL DAILY
Qty: 90 CAPSULE | Refills: 3 | Status: SHIPPED | OUTPATIENT
Start: 2021-02-01 | End: 2022-01-18

## 2021-02-09 ENCOUNTER — HOSPITAL ENCOUNTER (EMERGENCY)
Age: 86
Discharge: HOME | End: 2021-02-09
Attending: EMERGENCY MEDICINE
Payer: MEDICARE

## 2021-02-09 ENCOUNTER — APPOINTMENT (OUTPATIENT)
Dept: CT IMAGING | Age: 86
End: 2021-02-09
Payer: MEDICARE

## 2021-02-09 ENCOUNTER — APPOINTMENT (OUTPATIENT)
Dept: GENERAL RADIOLOGY | Age: 86
End: 2021-02-09
Payer: MEDICARE

## 2021-02-09 VITALS
OXYGEN SATURATION: 97 % | HEIGHT: 71 IN | RESPIRATION RATE: 17 BRPM | SYSTOLIC BLOOD PRESSURE: 154 MMHG | WEIGHT: 180 LBS | BODY MASS INDEX: 25.2 KG/M2 | DIASTOLIC BLOOD PRESSURE: 71 MMHG | HEART RATE: 64 BPM | TEMPERATURE: 98.1 F

## 2021-02-09 DIAGNOSIS — R07.9 CHEST PAIN, UNSPECIFIED TYPE: Primary | ICD-10-CM

## 2021-02-09 DIAGNOSIS — I25.10 CORONARY ARTERY DISEASE INVOLVING NATIVE HEART, ANGINA PRESENCE UNSPECIFIED, UNSPECIFIED VESSEL OR LESION TYPE: ICD-10-CM

## 2021-02-09 DIAGNOSIS — I71.40 ABDOMINAL AORTIC ANEURYSM (AAA) WITHOUT RUPTURE: ICD-10-CM

## 2021-02-09 LAB
ALBUMIN SERPL-MCNC: 4.3 G/DL (ref 3.5–5.2)
ALP BLD-CCNC: 74 U/L (ref 40–130)
ALT SERPL-CCNC: 17 U/L (ref 5–41)
ANION GAP SERPL CALCULATED.3IONS-SCNC: 9 MMOL/L (ref 7–19)
AST SERPL-CCNC: 20 U/L (ref 5–40)
BACTERIA: ABNORMAL /HPF
BASOPHILS ABSOLUTE: 0 K/UL (ref 0–0.2)
BASOPHILS RELATIVE PERCENT: 0.2 % (ref 0–1)
BILIRUB SERPL-MCNC: 0.6 MG/DL (ref 0.2–1.2)
BILIRUBIN URINE: NEGATIVE
BLOOD, URINE: ABNORMAL
BUN BLDV-MCNC: 18 MG/DL (ref 8–23)
CALCIUM SERPL-MCNC: 9.5 MG/DL (ref 8.8–10.2)
CHLORIDE BLD-SCNC: 104 MMOL/L (ref 98–111)
CLARITY: CLEAR
CO2: 29 MMOL/L (ref 22–29)
COLOR: YELLOW
CREAT SERPL-MCNC: 1.1 MG/DL (ref 0.5–1.2)
CRYSTALS, UA: ABNORMAL /HPF
EKG P AXIS: -3 DEGREES
EKG P-R INTERVAL: 168 MS
EKG Q-T INTERVAL: 390 MS
EKG QRS DURATION: 92 MS
EKG QTC CALCULATION (BAZETT): 397 MS
EKG T AXIS: 17 DEGREES
EOSINOPHILS ABSOLUTE: 0.2 K/UL (ref 0–0.6)
EOSINOPHILS RELATIVE PERCENT: 2.6 % (ref 0–5)
EPITHELIAL CELLS, UA: ABNORMAL /HPF
GFR AFRICAN AMERICAN: >59
GFR NON-AFRICAN AMERICAN: >60
GLUCOSE BLD-MCNC: 107 MG/DL (ref 74–109)
GLUCOSE URINE: NEGATIVE MG/DL
HCT VFR BLD CALC: 42.4 % (ref 42–52)
HEMOGLOBIN: 13.6 G/DL (ref 14–18)
IMMATURE GRANULOCYTES #: 0 K/UL
INR BLD: 1.14 (ref 0.88–1.18)
KETONES, URINE: NEGATIVE MG/DL
LEUKOCYTE ESTERASE, URINE: ABNORMAL
LIPASE: 31 U/L (ref 13–60)
LYMPHOCYTES ABSOLUTE: 1.6 K/UL (ref 1.1–4.5)
LYMPHOCYTES RELATIVE PERCENT: 17.2 % (ref 20–40)
MCH RBC QN AUTO: 29.6 PG (ref 27–31)
MCHC RBC AUTO-ENTMCNC: 32.1 G/DL (ref 33–37)
MCV RBC AUTO: 92.2 FL (ref 80–94)
MONOCYTES ABSOLUTE: 1 K/UL (ref 0–0.9)
MONOCYTES RELATIVE PERCENT: 10.3 % (ref 0–10)
NEUTROPHILS ABSOLUTE: 6.4 K/UL (ref 1.5–7.5)
NEUTROPHILS RELATIVE PERCENT: 69.4 % (ref 50–65)
NITRITE, URINE: NEGATIVE
PDW BLD-RTO: 15.3 % (ref 11.5–14.5)
PH UA: 5.5 (ref 5–8)
PLATELET # BLD: 171 K/UL (ref 130–400)
PMV BLD AUTO: 10.6 FL (ref 9.4–12.4)
POTASSIUM SERPL-SCNC: 4.9 MMOL/L (ref 3.5–5)
PRO-BNP: 107 PG/ML (ref 0–1800)
PROTEIN UA: NEGATIVE MG/DL
PROTHROMBIN TIME: 14.6 SEC (ref 12–14.6)
RBC # BLD: 4.6 M/UL (ref 4.7–6.1)
RBC UA: ABNORMAL /HPF (ref 0–2)
REASON FOR REJECTION: NORMAL
REJECTED TEST: NORMAL
SARS-COV-2, NAAT: NOT DETECTED
SODIUM BLD-SCNC: 142 MMOL/L (ref 136–145)
SPECIFIC GRAVITY UA: 1.01 (ref 1–1.03)
TOTAL PROTEIN: 7.2 G/DL (ref 6.6–8.7)
TROPONIN: <0.01 NG/ML (ref 0–0.03)
TROPONIN: <0.01 NG/ML (ref 0–0.03)
UROBILINOGEN, URINE: 0.2 E.U./DL
WBC # BLD: 9.3 K/UL (ref 4.8–10.8)
WBC UA: ABNORMAL /HPF (ref 0–5)

## 2021-02-09 PROCEDURE — 87086 URINE CULTURE/COLONY COUNT: CPT

## 2021-02-09 PROCEDURE — 36415 COLL VENOUS BLD VENIPUNCTURE: CPT

## 2021-02-09 PROCEDURE — 83690 ASSAY OF LIPASE: CPT

## 2021-02-09 PROCEDURE — 80053 COMPREHEN METABOLIC PANEL: CPT

## 2021-02-09 PROCEDURE — 99284 EMERGENCY DEPT VISIT MOD MDM: CPT

## 2021-02-09 PROCEDURE — 85025 COMPLETE CBC W/AUTO DIFF WBC: CPT

## 2021-02-09 PROCEDURE — 85610 PROTHROMBIN TIME: CPT

## 2021-02-09 PROCEDURE — 6360000004 HC RX CONTRAST MEDICATION: Performed by: EMERGENCY MEDICINE

## 2021-02-09 PROCEDURE — 84484 ASSAY OF TROPONIN QUANT: CPT

## 2021-02-09 PROCEDURE — 83880 ASSAY OF NATRIURETIC PEPTIDE: CPT

## 2021-02-09 PROCEDURE — 71275 CT ANGIOGRAPHY CHEST: CPT

## 2021-02-09 PROCEDURE — 93005 ELECTROCARDIOGRAM TRACING: CPT | Performed by: EMERGENCY MEDICINE

## 2021-02-09 PROCEDURE — 81001 URINALYSIS AUTO W/SCOPE: CPT

## 2021-02-09 PROCEDURE — 93010 ELECTROCARDIOGRAM REPORT: CPT | Performed by: INTERNAL MEDICINE

## 2021-02-09 PROCEDURE — 71045 X-RAY EXAM CHEST 1 VIEW: CPT

## 2021-02-09 PROCEDURE — U0002 COVID-19 LAB TEST NON-CDC: HCPCS

## 2021-02-09 RX ADMIN — IOPAMIDOL 90 ML: 755 INJECTION, SOLUTION INTRAVENOUS at 15:30

## 2021-02-09 ASSESSMENT — ENCOUNTER SYMPTOMS
ABDOMINAL PAIN: 0
EYE REDNESS: 0
DIARRHEA: 0
COUGH: 0
SHORTNESS OF BREATH: 0
VOMITING: 0
RHINORRHEA: 0
VOICE CHANGE: 0
EYE PAIN: 0

## 2021-02-09 ASSESSMENT — PAIN SCALES - GENERAL
PAINLEVEL_OUTOF10: 7
PAINLEVEL_OUTOF10: 0

## 2021-02-09 NOTE — ED NOTES
Bed: 14  Expected date:   Expected time:   Means of arrival:   Comments:  Wen Huerta RN  02/09/21 3024

## 2021-02-09 NOTE — ED PROVIDER NOTES
[facility] EMERGENCY DEPT  EMERGENCY DEPARTMENT ENCOUNTER      Pt Name: Gulshan Doyle  MRN: [id number]  [place] 1935  Date of evaluation: 2/9/2021  Provider: Brittani Singh MD    CHIEF COMPLAINT       Chief Complaint   Patient presents with    Chest Pain     x24 hours         HISTORY OF PRESENT ILLNESS   (Location/Symptom, Timing/Onset,Context/Setting, Quality, Duration, Modifying Factors, Severity)  Note limiting factors. Gulshan Doyle is a 82 y.o. male who presents to the emergency department with complaint of chest pain that started this morning prior to arrival.  States he has felt generally unwell over the last 2 days but had not had any other specific symptoms to the leg pain started this morning. States he had gotten into his truck and was going to the store and the pain came on suddenly. Describes it as a sharp left-sided chest pain with radiation down the left arm and to the left side of the neck/jaw. States he has a history of similar pain in the past and states that people always thought it was a heart attack but then someone told him one time that it was costochondritis. Patient does have a history of coronary artery disease with several previous coronary stents. Last stress test was in 2019. Has been several years since last heart catheterization. Currently, pain is improved from initial onset but still present. Pain seems to be constant without any specific exacerbating or alleviating factors such as exertion, position changes, certain movements. HPI    NursingNotes were reviewed. REVIEW OF SYSTEMS    (2-9 systems for level 4, 10 or more for level 5)     Review of Systems   Constitutional: Positive for fatigue. Negative for fever. HENT: Negative for congestion, rhinorrhea and voice change. Eyes: Negative for pain and redness. Respiratory: Negative for cough and shortness of breath. Cardiovascular: Positive for chest pain. Gastrointestinal: Negative for abdominal pain, diarrhea and vomiting. Endocrine: Negative. Genitourinary: Negative. Musculoskeletal: Negative for arthralgias and gait problem. Skin: Negative for rash and wound. Neurological: Negative for weakness and headaches. Hematological: Negative. Psychiatric/Behavioral: Negative. All other systems reviewed and are negative. A complete review of systems was performed and is negative except as noted above in the HPI.        PAST MEDICAL HISTORY     Past Medical History:   Diagnosis Date    Adenocarcinoma ([facility]     colon    Aneurysm ([facility] aortic    Anxiety     Arthritis     Polyarticular    Atrial fibrillation ([facility]     Burgos's esophagus 2010    H/o    CAD (coronary artery disease)     CAD (coronary artery disease), native coronary artery     stent; sees dr. Melody Webb    Chronic respiratory failure ([facility]     Costochondritis     Erectile dysfunction     Gastroesophageal reflux disease     GERD (gastroesophageal reflux disease)     High cholesterol     Hip pain     Hypercholesterolemia 2010    Hypertension     Hypertension     MI (mitral incompetence)     x4 stents    Nephrolithiasis     Non-cardiac chest pain 12/20/2011    Renal calculi     Right upper lobe consolidation ([facility]     Tobacco abuse 2010    Ulcer, gastric, acute 3/14/2012         SURGICAL HISTORY       Past Surgical History:   Procedure Laterality Date    ANKLE SURGERY      left    APPENDECTOMY      CARDIAC CATHETERIZATION  12/12/07,07/13/09    Left Heart Cath    CHOLECYSTECTOMY      right    COLON SURGERY      GALLBLADDER SURGERY      LEG SURGERY      right    LUNG REMOVAL, PARTIAL      MANDIBLE FRACTURE SURGERY      ORTHOPEDIC SURGERY      legs, hand and ankles    SKIN CANCER EXCISION  2016    TOTAL HIP ARTHROPLASTY Right 1/3/2020    RIGHT TOTAL HIP REPLACEMENT performed by Lindsey Szymanski MD at [address] Discharge Medication List as of 2/9/2021  3:46 PM      CONTINUE these medications which have NOT CHANGED    Details   DULoxetine (CYMBALTA) 60 MG extended release capsule Take 1 capsule by mouth daily, Disp-90 capsule, R-3Normal      simvastatin (ZOCOR) 20 MG tablet Take 1 tablet by mouth nightly, Disp-90 tablet, R-0Normal      triamcinolone (KENALOG) 0.1 % cream Apply topically 2 times daily to rash on arm and abdomen, Disp-45 g,R-5, Normal      tamsulosin (FLOMAX) 0.4 MG capsule Take 1 capsule by mouth daily, Disp-90 capsule,R-3Normal      isosorbide mononitrate (IMDUR) 30 MG extended release tablet Take 1 tablet by mouth daily, Disp-90 tablet,R-3Normal      apixaban (ELIQUIS) 2.5 MG TABS tablet Take 1 tablet by mouth 2 times daily, Disp-180 tablet,R-3Normal      cyclobenzaprine (FLEXERIL) 10 MG tablet TAKE 1 TABLET BY MOUTH THREE TIMES DAILYHistorical Med      docusate sodium (COLACE) 100 MG capsule Take 1 capsule by mouth 2 times daily, Disp-60 capsule, R-1Print      pantoprazole (PROTONIX) 40 MG tablet Take 40 mg by mouth 2 times dailyHistorical Med      Cholecalciferol (VITAMIN D3) 5000 units TABS Take 5,000 Units by mouth daily Historical Med      metoprolol tartrate (LOPRESSOR) 25 MG tablet Take 1 tablet by mouth 2 times daily Patient will fill next month, Disp-180 tablet, R-3Normal      vitamin B-12 (CYANOCOBALAMIN) 100 MCG tablet Take 50 mcg by mouth daily.              ALLERGIES     Lortab [hydrocodone-acetaminophen] and Morphine and related    FAMILY HISTORY       Family History   Problem Relation Age of Onset    Diabetes Mother     Coronary Art Dis Father     High Blood Pressure Father     High Cholesterol Father     Heart Attack Father     Stroke Father     Kidney Disease Son     Kidney Disease Son           SOCIAL HISTORY       Social History     Socioeconomic History    Marital status:      Spouse name: Not on file    Number of children: Not on file  Years of education: Not on file    Highest education level: Not on file   Occupational History    Not on file   Social Needs    Financial resource strain: Not on file    Food insecurity     Worry: Not on file     Inability: Not on file    Transportation needs     Medical: Not on file     Non-medical: Not on file   Tobacco Use    Smoking status: Former Smoker    Smokeless tobacco: Never Used    Tobacco comment: quit in 1983   Substance and Sexual Activity    Alcohol use: No    Drug use: No    Sexual activity: Not on file   Lifestyle    Physical activity     Days per week: Not on file     Minutes per session: Not on file    Stress: Not on file   Relationships    Social connections     Talks on phone: Not on file     Gets together: Not on file     Attends [employer] service: Not on file     Active member of club or organization: Not on file     Attends meetings of clubs or organizations: Not on file     Relationship status: Not on file    Intimate partner violence     Fear of current or ex partner: Not on file     Emotionally abused: Not on file     Physically abused: Not on file     Forced sexual activity: Not on file   Other Topics Concern    Not on file   Social History Narrative    Not on file       SCREENINGS    Olinda Coma Scale  Eye Opening: Spontaneous  Best Verbal Response: Oriented  Best Motor Response: Obeys commands  [place]inda Coma Scale Score: 15        PHYSICAL EXAM    (up to 7 for level 4, 8 or more for level 5)     ED Triage Vitals [02/09/21 1145]   BP Temp Temp src Pulse Resp SpO2 Height Weight   (!) 154/91 98.1 °F (36.7 °C) -- 72 18 -- 5' 11\" (1.803 m) 180 lb (81.6 kg)       Physical Exam  Vitals signs and nursing note reviewed. Constitutional:       General: He is not in acute distress. Appearance: He is well-developed. He is not diaphoretic. HENT:      Head: Normocephalic and atraumatic. Eyes:      General: No scleral icterus. Neck:      Vascular: No JVD. Cardiovascular:      Rate and Rhythm: Normal rate and regular rhythm. Pulses:           Radial pulses are 2+ on the right side and 2+ on the left side. Dorsalis pedis pulses are 2+ on the right side and 2+ on the left side. Heart sounds: Normal heart sounds. No murmur. No friction rub. No gallop. Pulmonary:      Effort: Pulmonary effort is normal. No accessory muscle usage or respiratory distress. Breath sounds: Normal breath sounds. No stridor. No decreased breath sounds, wheezing, rhonchi or rales. Chest:      Chest wall: No tenderness. Abdominal:      General: There is no distension. Palpations: Abdomen is soft. Tenderness: There is no abdominal tenderness. There is no guarding or rebound. Musculoskeletal: Normal range of motion. General: No deformity. Right lower leg: No edema. Left lower leg: No edema. Skin:     General: Skin is warm and dry. Coloration: Skin is not pale. Findings: No erythema. Neurological:      Mental Status: He is alert and oriented to person, place, and time. GCS: GCS eye subscore is 4. GCS verbal subscore is 5. GCS motor subscore is 6. Cranial Nerves: No cranial nerve deficit. Motor: No abnormal muscle tone.       Coordination: Coordination normal.   Psychiatric:         Behavior: Behavior normal.         Judgment: Judgment normal.         DIAGNOSTIC RESULTS     EKG: All EKG's are interpreted by the Emergency Department Physician who either signs or Co-signs this chart in the absence of a cardiologist.        RADIOLOGY:   Non-plain film images such as CT, Ultrasound and MRI are read by the radiologist. Plainradiographic images are visualized and preliminarily interpreted by the emergency physician with the below findings:        Interpretation per the Radiologist below, if available at the time of this note:    [address]A [address]   Final Result 1. No evidence of pulmonary motion, aortic dissection or acute   intrathoracic pathology. 2. Previous right thoracotomy and partial right lung resection. Mild/moderate changes of paraseptal emphysema. 3. No evidence of intrathoracic lymphadenopathy is identified. 4. Atherosclerotic changes of the thoracic aorta, with no aneurysm. 5. Heavy atherosclerosis of the coronary arteries is demonstrated. Signed by Dr Dede Celis on 2/9/2021 3:42 PM      XR CHEST PORTABLE   Final Result   No active cardiopulmonary disease. The Chronic inflammatory and postsurgical changes of the right chest   and the loss of right lung volume similar to the previous study. Signed by Dr Keke Oquendo on 2/9/2021 12:15 PM            ED BEDSIDE ULTRASOUND:   Performed by ED Physician - none    LABS:  Labs Reviewed   CBC WITH AUTO DIFFERENTIAL - Abnormal; Notable for the following components:       Result Value    RBC 4.60 (*)     Hemoglobin 13.6 (*)     MCHC 32.1 (*)     RDW 15.3 (*)     Neutrophils % 69.4 (*)     Lymphocytes % 17.2 (*)     Monocytes % 10.3 (*)     Monocytes Absolute 1.00 (*)     All other components within normal limits   URINE RT REFLEX TO CULTURE - Abnormal; Notable for the following components:    Blood, Urine MODERATE (*)     Leukocyte Esterase, Urine MODERATE (*)     All other components within normal limits   MICROSCOPIC URINALYSIS - Abnormal; Notable for the following components:    WBC, UA 16-20 (*)     RBC, UA 6-10 (*)     Bacteria, UA None Seen (*)     Crystals, UA 2+ Ca. Oxalate (*)     All other components within normal limits   CULTURE, URINE   PROTIME-INR   SPECIMEN REJECTION   COMPREHENSIVE METABOLIC PANEL   TROPONIN   BRAIN NATRIURETIC PEPTIDE   LIPASE   COVID-19   TROPONIN       All other labs were within normal range or not returned as of this dictation.     Medications   iopamidol (ISOVUE-370) 76 % injection 90 mL (90 mLs Intravenous Given 2/9/21 0860) no EMERGENCY DEPARTMENT COURSE and DIFFERENTIALDIAGNOSIS/MDM:   Vitals:    Vitals:    02/09/21 1145 02/09/21 1203 02/09/21 1235 02/09/21 1600   BP: (!) 154/90 136/70 (!) 160/76 (!) 154/71   Pulse: 68 60 61 64   Resp: 10 18 17    Temp: 98.1 °F (36.7 °C)   98.1 °F (36.7 °C)   TempSrc:    Oral   SpO2:    97%   Weight: 180 lb (81.6 kg)      Height: 5' 11\" (1.803 m)          MDM    ED Course as of Feb 09 1706   Tue Feb 09, 2021   1251 EKG shows sinus rhythm with a rate of 65. No findings of acute ischemia, function, right heart strain, or other abnormalities. [TAWANNA]   5996 Heart score 4    [TAWANNA]   0617 Discussed results and further work-up and management options with patient. Symptoms have resolved at this point. Recommended hospitalization given his age, history, and risk factors without cardiac work-up within the last year. Patient understanding but prefers to go home if possible, stating he is concerned about impending increment weather and his wife being at home by herself tonight. Patient agreeable to staying for second EKG and troponin and if continues to be pain-free and have negative work-up, will agree to discharge home. Patient agrees to following up with primary care or cardiology to schedule stress test as soon as possible. [TAWANNA]   3847 Repeat EKG shows sinus rhythm with a rate of 61. Inferior Q waves present without findings of acute ischemia or infarction    [TAWANNA]      ED Course User Index  [TAWANNA] Stephen Ortega MD     Repeat troponin negative. Pain has not returned on repeat evaluation. CT shows no acute complication of the known AAA to have caused patient's pain on presentation today. Evaluation and work-up here revealed no signs of emergent or life-threatening pathology that would necessitate admission for further work-up or management at this time. Patient is felt to be stable for discharge home with return precautions for worsening of the condition or development of new concerning symptoms. Patient was encouraged to follow-up with their primary care doctor in the appropriate timeframe. Necessary prescriptions and information have been provided for treatment at home. Patient voices understanding and agreement with the plan. CONSULTS:  None    PROCEDURES:  Unless otherwise notedbelow, none     Procedures      FINAL IMPRESSION     1. Chest pain, unspecified type    2. Coronary artery disease involving native heart, angina presence unspecified, unspecified vessel or lesion type    3.  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  Efforts were made to edit the dictations butoccasionally words are mis-transcribed.)    Ivon De La Vega MD (electronically signed)  AttendingEmerOzark Health Medical Centercy Physician         Ivon Owen MD  02/09/21 2492

## 2021-02-10 ENCOUNTER — TELEPHONE (OUTPATIENT)
Dept: PRIMARY CARE CLINIC | Age: 86
End: 2021-02-10

## 2021-02-10 DIAGNOSIS — I71.40 ABDOMINAL AORTIC ANEURYSM (AAA) WITHOUT RUPTURE: ICD-10-CM

## 2021-02-10 DIAGNOSIS — I25.10 CORONARY ARTERY DISEASE INVOLVING NATIVE CORONARY ARTERY OF NATIVE HEART WITHOUT ANGINA PECTORIS: ICD-10-CM

## 2021-02-10 DIAGNOSIS — R07.9 CHEST PAIN, UNSPECIFIED TYPE: Primary | ICD-10-CM

## 2021-02-10 NOTE — TELEPHONE ENCOUNTER
Bill Villa requests that [facility] nurse  return their call. The best time to reach him is Anytime. Pt went to [facility] on 2/9/2021. Pt went for chest pain. Pt is trying to be if [facility] will order a stress test.   Thank you.

## 2021-02-11 LAB — URINE CULTURE, ROUTINE: NORMAL

## 2021-03-05 ENCOUNTER — HOSPITAL ENCOUNTER (OUTPATIENT)
Dept: NUCLEAR MEDICINE | Age: 86
Discharge: HOME | End: 2021-03-07
Payer: MEDICARE

## 2021-03-05 DIAGNOSIS — R07.9 CHEST PAIN, UNSPECIFIED TYPE: ICD-10-CM

## 2021-03-05 DIAGNOSIS — I25.10 CORONARY ARTERY DISEASE INVOLVING NATIVE CORONARY ARTERY OF NATIVE HEART WITHOUT ANGINA PECTORIS: ICD-10-CM

## 2021-03-05 DIAGNOSIS — I71.40 ABDOMINAL AORTIC ANEURYSM (AAA) WITHOUT RUPTURE: ICD-10-CM

## 2021-03-05 PROCEDURE — A9500 TC99M SESTAMIBI: HCPCS | Performed by: PEDIATRICS

## 2021-03-05 PROCEDURE — 3430000000 HC RX DIAGNOSTIC RADIOPHARMACEUTICAL: Performed by: PEDIATRICS

## 2021-03-05 PROCEDURE — 6360000002 HC RX W HCPCS: Performed by: PEDIATRICS

## 2021-03-05 PROCEDURE — 78452 HT MUSCLE IMAGE SPECT MULT: CPT

## 2021-03-05 RX ADMIN — TETRAKIS(2-METHOXYISOBUTYLISOCYANIDE)COPPER(I) TETRAFLUOROBORATE 30 MILLICURIE: 1 INJECTION, POWDER, LYOPHILIZED, FOR SOLUTION INTRAVENOUS at 11:24

## 2021-03-05 RX ADMIN — REGADENOSON 0.4 MG: 0.08 INJECTION, SOLUTION INTRAVENOUS at 11:04

## 2021-03-05 RX ADMIN — TETRAKIS(2-METHOXYISOBUTYLISOCYANIDE)COPPER(I) TETRAFLUOROBORATE 10 MILLICURIE: 1 INJECTION, POWDER, LYOPHILIZED, FOR SOLUTION INTRAVENOUS at 11:24

## 2021-03-08 ENCOUNTER — OFFICE VISIT (OUTPATIENT)
Dept: CARDIOLOGY CLINIC | Age: 86
End: 2021-03-08
Payer: MEDICARE

## 2021-03-08 VITALS
SYSTOLIC BLOOD PRESSURE: 126 MMHG | DIASTOLIC BLOOD PRESSURE: 78 MMHG | OXYGEN SATURATION: 96 % | BODY MASS INDEX: 26.46 KG/M2 | HEART RATE: 59 BPM | HEIGHT: 71 IN | WEIGHT: 189 LBS

## 2021-03-08 DIAGNOSIS — R07.89 CHRONIC CHEST WALL PAIN: ICD-10-CM

## 2021-03-08 DIAGNOSIS — I10 ESSENTIAL HYPERTENSION: ICD-10-CM

## 2021-03-08 DIAGNOSIS — E78.00 HYPERCHOLESTEROLEMIA: ICD-10-CM

## 2021-03-08 DIAGNOSIS — I48.0 PAROXYSMAL ATRIAL FIBRILLATION (HCC): ICD-10-CM

## 2021-03-08 DIAGNOSIS — I25.10 CORONARY ARTERY DISEASE INVOLVING NATIVE CORONARY ARTERY OF NATIVE HEART WITHOUT ANGINA PECTORIS: Primary | ICD-10-CM

## 2021-03-08 DIAGNOSIS — G89.29 CHRONIC CHEST WALL PAIN: ICD-10-CM

## 2021-03-08 DIAGNOSIS — I71.40 ABDOMINAL AORTIC ANEURYSM (AAA) WITHOUT RUPTURE: ICD-10-CM

## 2021-03-08 LAB
LV EF: 52 %
LVEF MODALITY: NORMAL

## 2021-03-08 PROCEDURE — 93000 ELECTROCARDIOGRAM COMPLETE: CPT | Performed by: INTERNAL MEDICINE

## 2021-03-08 PROCEDURE — G8482 FLU IMMUNIZE ORDER/ADMIN: HCPCS | Performed by: INTERNAL MEDICINE

## 2021-03-08 PROCEDURE — 1123F ACP DISCUSS/DSCN MKR DOCD: CPT | Performed by: INTERNAL MEDICINE

## 2021-03-08 PROCEDURE — G8417 CALC BMI ABV UP PARAM F/U: HCPCS | Performed by: INTERNAL MEDICINE

## 2021-03-08 PROCEDURE — 1036F TOBACCO NON-USER: CPT | Performed by: INTERNAL MEDICINE

## 2021-03-08 PROCEDURE — 99213 OFFICE O/P EST LOW 20 MIN: CPT | Performed by: INTERNAL MEDICINE

## 2021-03-08 PROCEDURE — 4040F PNEUMOC VAC/ADMIN/RCVD: CPT | Performed by: INTERNAL MEDICINE

## 2021-03-08 PROCEDURE — G8427 DOCREV CUR MEDS BY ELIG CLIN: HCPCS | Performed by: INTERNAL MEDICINE

## 2021-03-08 ASSESSMENT — ENCOUNTER SYMPTOMS
BLOOD IN STOOL: 0
ANAL BLEEDING: 0
COUGH: 0
SHORTNESS OF BREATH: 1

## 2021-03-08 NOTE — PROGRESS NOTES
Office Visit  Marco Lopez is a 82 y.o. male; who present today for 6 Month Follow-Up ([facility] )      HPI  I am seeing this 61-year-old white male in routine follow-up but it is the first time I am seeing him personally. He has a history of coronary artery disease and remote atrial fibrillation and has been on reduced dose of Eliquis. He is with his wife and he is a good historian. He also has a history of recurrent episodes of chest wall pain that he states was diagnosed as costochondritis and in fact he went to the emergency room last week with discomfort and subsequently had a pharmacologic nuclear stress test that is reported as being negative for ischemia. He is active and does not have exertional chest discomfort. When he has chest wall pain it can go on for long periods of time and is positional with change in arm position, at times some inspiratory component also. He has some dyspnea with exertion that waxes and wanes but overall not any worse than usual.  He denies any palpitations and no presyncope or syncope.   Current Outpatient Medications   Medication Sig Dispense Refill    DULoxetine (CYMBALTA) 60 MG extended release capsule Take 1 capsule by mouth daily 90 capsule 3    simvastatin (ZOCOR) 20 MG tablet Take 1 tablet by mouth nightly 90 tablet 0    triamcinolone (KENALOG) 0.1 % cream Apply topically 2 times daily to rash on arm and abdomen 45 g 5    tamsulosin (FLOMAX) 0.4 MG capsule Take 1 capsule by mouth daily 90 capsule 3    isosorbide mononitrate (IMDUR) 30 MG extended release tablet Take 1 tablet by mouth daily 90 tablet 3    apixaban (ELIQUIS) 2.5 MG TABS tablet Take 1 tablet by mouth 2 times daily 180 tablet 3    pantoprazole (PROTONIX) 40 MG tablet Take 40 mg by mouth 2 times daily      Cholecalciferol (VITAMIN D3) 5000 units TABS Take 5,000 Units by mouth daily       metoprolol tartrate (LOPRESSOR) 25 MG tablet Take 1 tablet by mouth 2 times daily Patient will fill next month native coronary artery - Primary    Hypertension    Chronic chest wall pain    Hypercholesterolemia    Abdominal aortic aneurysm (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 Coronary artery disease involving native coronary artery of native heart without angina pectoris      This, LAD, circumflex, RCA remotely, patent stents by catheterization, 2009 and 2011. Negative Lexiscan nuclear, March 2021   2. Paroxysmal atrial fibrillation (HCC)  EKG 12 lead    Presently sinus rhythm on Eliquis   3. Chronic chest wall pain     4. Essential hypertension     5. Hypercholesterolemia     6. Abdominal aortic aneurysm (AAA) without rupture ([address]      Followed regularly by vascular; planned ultrasound, June 2021       Recommendations:   Diet: Low-sodium, low-fat diet  Activity: Regular activities  Medication Changes: Continue same medications    This patient is stable from cardiac perspective, no changes recommended. It sounds like his chest wall pain clearly does not sound like coronary ischemia in general.  It should be easy to differentiate. No orders of the defined types were placed in this encounter. Orders Placed This Encounter   Procedures    EKG 12 lead     Order Specific Question:   Reason for Exam?     Answer:   Irregular heart rate       Return in about 6 months (around 9/8/2021) for me, routine.

## 2021-03-09 ENCOUNTER — OFFICE VISIT (OUTPATIENT)
Dept: PRIMARY CARE CLINIC | Age: 86
End: 2021-03-09
Payer: MEDICARE

## 2021-03-09 VITALS
OXYGEN SATURATION: 97 % | SYSTOLIC BLOOD PRESSURE: 110 MMHG | DIASTOLIC BLOOD PRESSURE: 72 MMHG | WEIGHT: 183 LBS | HEIGHT: 71 IN | BODY MASS INDEX: 25.62 KG/M2 | TEMPERATURE: 97.7 F | HEART RATE: 64 BPM

## 2021-03-09 DIAGNOSIS — I25.10 CORONARY ARTERY DISEASE INVOLVING NATIVE CORONARY ARTERY OF NATIVE HEART WITHOUT ANGINA PECTORIS: ICD-10-CM

## 2021-03-09 DIAGNOSIS — Z09 HOSPITAL DISCHARGE FOLLOW-UP: ICD-10-CM

## 2021-03-09 DIAGNOSIS — M94.0 COSTOCHONDRITIS: ICD-10-CM

## 2021-03-09 DIAGNOSIS — G62.9 NEUROPATHY: Primary | ICD-10-CM

## 2021-03-09 PROCEDURE — 4040F PNEUMOC VAC/ADMIN/RCVD: CPT | Performed by: PEDIATRICS

## 2021-03-09 PROCEDURE — 1036F TOBACCO NON-USER: CPT | Performed by: PEDIATRICS

## 2021-03-09 PROCEDURE — 1123F ACP DISCUSS/DSCN MKR DOCD: CPT | Performed by: PEDIATRICS

## 2021-03-09 PROCEDURE — G8417 CALC BMI ABV UP PARAM F/U: HCPCS | Performed by: PEDIATRICS

## 2021-03-09 PROCEDURE — G8427 DOCREV CUR MEDS BY ELIG CLIN: HCPCS | Performed by: PEDIATRICS

## 2021-03-09 PROCEDURE — 99214 OFFICE O/P EST MOD 30 MIN: CPT | Performed by: PEDIATRICS

## 2021-03-09 PROCEDURE — G8482 FLU IMMUNIZE ORDER/ADMIN: HCPCS | Performed by: PEDIATRICS

## 2021-03-09 RX ORDER — PREGABALIN 75 MG/1
75 CAPSULE ORAL 2 TIMES DAILY
Qty: 60 CAPSULE | Refills: 0 | Status: SHIPPED | OUTPATIENT
Start: 2021-03-09 | End: 2021-04-06

## 2021-03-09 ASSESSMENT — ENCOUNTER SYMPTOMS
EYES NEGATIVE: 1
RESPIRATORY NEGATIVE: 1
ALLERGIC/IMMUNOLOGIC NEGATIVE: 1
ABDOMINAL PAIN: 1
DIARRHEA: 1

## 2021-03-09 ASSESSMENT — PATIENT HEALTH QUESTIONNAIRE - PHQ9
SUM OF ALL RESPONSES TO PHQ QUESTIONS 1-9: 0
1. LITTLE INTEREST OR PLEASURE IN DOING THINGS: 0
SUM OF ALL RESPONSES TO PHQ9 QUESTIONS 1 & 2: 0

## 2021-03-09 NOTE — PROGRESS NOTES
[facility], [address]  Phone [phone]   Fax [phone]      OFFICE VISIT: 3/9/2021    Stella Moctezuma-: 1935      HPI  Reason For Visit:  Sarai Ochoa is a 82 y.o.     3 Month Follow-Up, Discuss Medications (discuss previous medications that he took for fibromyalgia with the cymbalta. ), and Follow-Up from Hospital (seen at [facility] for chest pain 21, had Milly Vasquez and follow up with [facility] cardio 3/8/21)    Patient presents on routine follow-up for multiple health issues. He would like to discuss his medications. In particular, he has questions about Cymbalta for fibromyalgia. He is also following up from emergency department visit for chest pain at [facility] ([facility]). This was on 2021. He presented with several hours of left-sided chest pain rating down his left arm into the left side of his neck and jaw. This is similar to his pain he had in the past with his coronary artery disease. He does have several coronary artery stents. Laboratory evaluation from emergency department is displayed below. He had a Lexiscan stress evaluation on 3/5/2021  Narrative   James Fuentes Nuclear Stress Test Report   Procedure date: 21   Indications: chest pain   Procedure: Stress was performed with injection of 0.4 mg Lexiscan. Vital signs and EKG were monitored. Technetium-99 sestamibi was   injected in divided doses, approximately 10 mCi and 30 mCi   respectively for rest and stress imaging. The patient was discharged   in stable condition. Results: Patient had symptoms of dyspnea during infusion that resolved   in recovery. Baseline EKG showed sinus bradycardia rhythm without ST/T   changes. During stress there were no significant EKG changes or rhythm   changes. Baseline and peak blood pressures were 133/74, and 164/60   respectively.  Baseline and peak heart rates were 59 and  69   respectively. Myocardial perfusion images showed inferior attenuation artifact. no   ischemia. EF 52%   Signed by Dr Michele Aguero on 3/5/2021 2:14 PM     It was presumed that his recurrent chest pain was related to his costochondritis. height is 5' 11\" (1.803 m) and weight is 183 lb (83 kg). His temporal temperature is 97.7 °F (36.5 °C). His blood pressure is 110/72 and his pulse is 64. His oxygen saturation is 97%. Body mass index is 25.52 kg/m².     I have reviewed the following with the Mr. Mabel Mckoy   Lab Review  Admission on 02/09/2021, Discharged on 02/09/2021   Component Date Value    WBC 02/09/2021 9.3     RBC 02/09/2021 4.60*    Hemoglobin 02/09/2021 13.6*    Hematocrit 02/09/2021 42.4     MCV 02/09/2021 92.2     MCH 02/09/2021 29.6     MCHC 02/09/2021 32.1*    RDW 02/09/2021 15.3*    Platelets 49/92/2587 171     MPV 02/09/2021 10.6     Neutrophils % 02/09/2021 69.4*    Lymphocytes % 02/09/2021 17.2*    Monocytes % 02/09/2021 10.3*    Eosinophils % 02/09/2021 2.6     Basophils % 02/09/2021 0.2     Neutrophils Absolute 02/09/2021 6.4     Immature Granulocytes # 02/09/2021 0.0     Lymphocytes Absolute 02/09/2021 1.6     Monocytes Absolute 02/09/2021 1.00*    Eosinophils Absolute 02/09/2021 0.20     Basophils Absolute 02/09/2021 0.00     Protime 02/09/2021 14.6     INR 02/09/2021 1.14     Color, UA 02/09/2021 YELLOW     Clarity, UA 02/09/2021 Clear     Glucose, Ur 02/09/2021 Negative     Bilirubin Urine 02/09/2021 Negative     Ketones, Urine 02/09/2021 Negative     Specific Gravity, UA 02/09/2021 1.013     Blood, Urine 02/09/2021 MODERATE*    pH, UA 02/09/2021 5.5     Protein, UA 02/09/2021 Negative     Urobilinogen, Urine 02/09/2021 0.2     Nitrite, Urine 02/09/2021 Negative     Leukocyte Esterase, Urine 02/09/2021 MODERATE*    Rejected Test 02/09/2021 cmp,trop,bnp,li     Reason for Rejection 02/09/2021 see below     Sodium 02/09/2021 142     Potassium 02/09/2021 4.9     Chloride 02/09/2021 104     CO2 02/09/2021 29     Anion Gap 02/09/2021 9     Glucose 02/09/2021 107     BUN 02/09/2021 18     CREATININE 02/09/2021 1.1     GFR Non- 02/09/2021 >60     GFR  02/09/2021 >59     Calcium 02/09/2021 9.5     Total Protein 02/09/2021 7.2     Albumin 02/09/2021 4.3     Total Bilirubin 02/09/2021 0.6     Alkaline Phosphatase 02/09/2021 74     ALT 02/09/2021 17     AST 02/09/2021 20     Troponin 02/09/2021 <0.01     Pro-BNP 02/09/2021 107     Lipase 02/09/2021 31     SARS-CoV-2, NAAT 02/09/2021 Not Detected     WBC, UA 02/09/2021 16-20*    RBC, UA 02/09/2021 6-10*    Epithelial Cells, UA 02/09/2021 0-2     Bacteria, UA 02/09/2021 None Seen*    Crystals, UA 02/09/2021 2+ Ca. Oxalate*    Urine Culture, Routine 02/09/2021 <50,000 CFU/ml mixed skin/urogenital kaia. No further workup     Troponin 02/09/2021 <0.01     P-R Interval 02/09/2021 168     QRS Duration 02/09/2021 92     Q-T Interval 02/09/2021 390     QTc Calculation (Bazett) 02/09/2021 397     P Axis 02/09/2021 -3     T Axis 02/09/2021 17    Office Visit on 09/17/2020   Component Date Value    SARS-CoV-2, AZAM 09/17/2020 NOT DETECTED      Copies of these are in the chart. Current Outpatient Medications   Medication Sig Dispense Refill    pregabalin (LYRICA) 75 MG capsule Take 1 capsule by mouth 2 times daily for 30 days.  60 capsule 0    DULoxetine (CYMBALTA) 60 MG extended release capsule Take 1 capsule by mouth daily 90 capsule 3    simvastatin (ZOCOR) 20 MG tablet Take 1 tablet by mouth nightly 90 tablet 0    tamsulosin (FLOMAX) 0.4 MG capsule Take 1 capsule by mouth daily 90 capsule 3    isosorbide mononitrate (IMDUR) 30 MG extended release tablet Take 1 tablet by mouth daily 90 tablet 3    apixaban (ELIQUIS) 2.5 MG TABS tablet Take 1 tablet by mouth 2 times daily 180 tablet 3    docusate sodium (COLACE) 100 MG capsule Take 1 capsule by mouth 2 times daily 60 capsule 1    pantoprazole (PROTONIX) 40 MG tablet Take 40 mg by mouth 2 times daily      Cholecalciferol (VITAMIN D3) 5000 units TABS Take 5,000 Units by mouth daily       metoprolol tartrate (LOPRESSOR) 25 MG tablet Take 1 tablet by mouth 2 times daily Patient will fill next month (Patient taking differently: Take 12.5 mg by mouth 2 times daily Patient will fill next month) 180 tablet 3    vitamin B-12 (CYANOCOBALAMIN) 100 MCG tablet Take 50 mcg by mouth daily. No current facility-administered medications for this visit.         Allergies: Lortab [hydrocodone-acetaminophen] and Morphine and related     Past Medical History:   Diagnosis Date    Adenocarcinoma ([facility] [address]     colon    Aneurysm ([facility] [address] aortic    Anxiety     Arthritis     Polyarticular    Atrial fibrillation ([facility] [address]     Burgos's esophagus 2010    H/o    CAD (coronary artery disease)     CAD (coronary artery disease), native coronary artery     stent; sees dr. Roxanne Beebe    [facility]     Costochondritis     Erectile dysfunction     Gastroesophageal reflux disease     GERD (gastroesophageal reflux disease)     High cholesterol     Hip pain     Hypercholesterolemia 2010    Hypertension     Hypertension     MI (mitral incompetence)     x4 stents    Nephrolithiasis     Non-cardiac chest pain 12/20/2011    Renal calculi     Right upper lobe consolidation ([facility] [address]     Tobacco abuse 2010    Ulcer, gastric, acute 3/14/2012       Family History   Problem Relation Age of Onset    Diabetes Mother     Coronary Art Dis Father     High Blood Pressure Father     High Cholesterol Father     Heart Attack Father     Stroke Father     Kidney Disease Son     Kidney Disease Son        Past Surgical History:   Procedure Laterality Date    ANKLE SURGERY      left    APPENDECTOMY      CARDIAC CATHETERIZATION  12/12/07,07/13/09    Left Heart Cath    CHOLECYSTECTOMY      right    COLON SURGERY      GALLBLADDER SURGERY      LEG SURGERY      right    LUNG REMOVAL, PARTIAL      MANDIBLE FRACTURE SURGERY      ORTHOPEDIC SURGERY      legs, hand and ankles    SKIN CANCER EXCISION  2016    TOTAL HIP ARTHROPLASTY Right 1/3/2020    RIGHT TOTAL HIP REPLACEMENT performed by Shabana Washington MD at [facility] OR       Social History     Tobacco Use    Smoking status: Former Smoker    Smokeless tobacco: Never Used    Tobacco comment: quit in 1983   Substance Use Topics    Alcohol use: No        Review of Systems   Constitutional: Negative. HENT: Negative. Eyes: Negative. Respiratory: Negative. Cardiovascular: Negative. Gastrointestinal: Positive for abdominal pain and diarrhea. Endocrine: Negative. Genitourinary: Negative. Musculoskeletal: Positive for arthralgias. Skin: Negative. Allergic/Immunologic: Negative. Neurological: Negative for weakness (left arm). Hematological: Negative. Psychiatric/Behavioral: Negative. Physical Exam  Vitals signs reviewed. Constitutional:       General: He is not in acute distress. Appearance: He is well-developed and normal weight. HENT:      Head: Normocephalic and atraumatic. Right Ear: Hearing, tympanic membrane, ear canal and external ear normal.      Left Ear: Hearing, tympanic membrane, ear canal and external ear normal.      Nose: Nose normal.      Mouth/Throat:      Mouth: Mucous membranes are moist.      Pharynx: Oropharynx is clear. Eyes:      General: No scleral icterus. Extraocular Movements: Extraocular movements intact. Conjunctiva/sclera: Conjunctivae normal.      Pupils: Pupils are equal, round, and reactive to light. Neck:      Musculoskeletal: Normal range of motion and neck supple. Thyroid: No thyromegaly. Vascular: No carotid bruit or JVD. Cardiovascular:      Rate and Rhythm: Normal rate and regular rhythm. No extrasystoles are present. Chest Wall: PMI is not displaced. Heart sounds: Normal heart sounds, S1 normal and S2 normal. No murmur. No friction rub. No gallop. Pulmonary:      Effort: Pulmonary effort is normal. No respiratory distress. Breath sounds: Normal breath sounds. No wheezing, rhonchi or rales. Chest:      Comments: Tenderness over palpation of chest wall at the lower sternum  Abdominal:      General: Bowel sounds are normal.      Palpations: Abdomen is soft. Tenderness: There is no abdominal tenderness. There is no guarding or rebound. Genitourinary:     Comments: Exam deferred  Musculoskeletal: Normal range of motion. General: No tenderness (over lower sternum). Lymphadenopathy:      Cervical: No cervical adenopathy. Skin:     General: Skin is warm and dry. Capillary Refill: Capillary refill takes less than 2 seconds. Findings: No rash. Neurological:      General: No focal deficit present. Mental Status: He is alert and oriented to person, place, and time. Sensory: No sensory deficit (no numbness or tingling). Psychiatric:         Behavior: Behavior normal.         Thought Content: Thought content normal.         Judgment: Judgment normal.           ASSESSMENT      ICD-10-CM    1. Neuropathy  G62.9 pregabalin (LYRICA) 75 MG capsule   2. Costochondritis  M94.0    3. Hospital discharge follow-up  Z09    4. Coronary artery disease involving native coronary artery of native heart without angina pectoris  I25.10          PLAN    1. Neuropathy  We are going to reinstitute his Lyrica at 75 mg twice daily. We will escalate this dose based upon his response to the medication. This will be taken in conjunction with his Cymbalta  - pregabalin (LYRICA) 75 MG capsule; Take 1 capsule by mouth 2 times daily for 30 days. Dispense: 60 capsule; Refill: 0    2. Costochondritis  Hopefully the combination of Lyrica and Cymbalta will be helpful in curtailing his costochondritis symptoms. He can also use anti-inflammatories as tolerated    3. Hospital discharge follow-up  Hospital records were reviewed in detail.   Medications were reconciled. No further modifications needed made other than that noted above    4. Coronary artery disease involving native coronary artery of native heart without angina pectoris  He does have known coronary artery disease with stents in place. I do not believe that his chest pain was related to his heart at all. His chest pain was most likely his costochondritis      No orders of the defined types were placed in this encounter. Return if symptoms worsen or fail to improve, for and as scheduled.        This was an in-house visit

## 2021-03-09 NOTE — PATIENT INSTRUCTIONS
Patient Education        Costochondritis: Care Instructions  Your Care Instructions  You have chest pain because the cartilage of your rib cage is inflamed. This problem is called costochondritis. This type of chest wall pain may last from days to weeks. It is not a heart problem. Sometimes costochondritis occurs with a cold or the flu, and other times the exact cause is not known. Follow-up care is a key part of your treatment and safety. Be sure to make and go to all appointments, and call your doctor if you are having problems. It's also a good idea to know your test results and keep a list of the medicines you take. How can you care for yourself at home? · Take medicines for pain and inflammation exactly as directed. ? If the doctor gave you a prescription medicine, take it as prescribed. ? If you are not taking a prescription pain medicine, ask your doctor if you can take an over-the-counter medicine. ? Do not take two or more pain medicines at the same time unless the doctor told you to. Many pain medicines have acetaminophen, which is Tylenol. Too much acetaminophen (Tylenol) can be harmful. · It may help to use a warm compress or heating pad (set on low) on your chest. You can also try alternating heat and ice. Put ice or a cold pack on the area for 10 to 20 minutes at a time. Put a thin cloth between the ice and your skin. · Avoid any activity that strains the chest area. As your pain gets better, you can slowly return to your normal activities. · Do not use tape, an elastic bandage, a \"rib belt,\" or anything else that restricts your chest wall motion. When should you call for help? Call 911 anytime you think you may need emergency care. For example, call if:    · You have new or different chest pain or pressure. This may occur with:  ? Sweating. ? Shortness of breath. ? Nausea or vomiting. ? Pain that spreads from the chest to the neck, jaw, or one or both shoulders or arms.   ? Dizziness or lightheadedness. ? A fast or uneven pulse. After calling 911, chew 1 adult-strength aspirin. Wait for an ambulance. Do not try to drive yourself.     · You have severe trouble breathing. Call your doctor now or seek immediate medical care if:    · You have a fever or cough.     · You have any trouble breathing.     · Your chest pain gets worse. Watch closely for changes in your health, and be sure to contact your doctor if:    · Your chest pain continues even though you are taking anti-inflammatory medicine.     · Your chest wall pain has not improved after 5 to 7 days. Where can you learn more? Go to https://[employer]. org and sign in to your [employer] account. Enter Y669 in the [employer] box to learn more about \"Costochondritis: Care Instructions. \"     If you do not have an account, please click on the \"Sign Up Now\" link. Current as of: June 26, 2019               Content Version: 12.6  © 2000-4940 [employer]. Care instructions adapted under license by [facility] ([facility]). If you have questions about a medical condition or this instruction, always ask your healthcare professional. [place] [address] any warranty or liability for your use of this information.

## 2021-03-10 ENCOUNTER — TELEPHONE (OUTPATIENT)
Dept: PRIMARY CARE CLINIC | Age: 86
End: 2021-03-10

## 2021-03-10 NOTE — TELEPHONE ENCOUNTER
----- Message from [address] [facility],[address], DO sent at 3/5/2021  6:45 PM CST -----  Stress test was normal.  Good news

## 2021-03-19 ENCOUNTER — IMMUNIZATION (OUTPATIENT)
Age: 86
End: 2021-03-19
Payer: MEDICARE

## 2021-03-19 PROCEDURE — 91300 COVID-19, PFIZER VACCINE 30MCG/0.3ML DOSE: CPT | Performed by: FAMILY MEDICINE

## 2021-03-19 PROCEDURE — 0001A COVID-19, PFIZER VACCINE 30MCG/0.3ML DOSE: CPT | Performed by: FAMILY MEDICINE

## 2021-04-01 DIAGNOSIS — E78.00 HYPERCHOLESTEROLEMIA: ICD-10-CM

## 2021-04-01 RX ORDER — SIMVASTATIN 20 MG
20 TABLET ORAL NIGHTLY
Qty: 90 TABLET | Refills: 3 | Status: SHIPPED | OUTPATIENT
Start: 2021-04-01 | End: 2022-03-28

## 2021-04-01 NOTE — TELEPHONE ENCOUNTER
Received fax from pharmacy requesting refill on pts medication(s). Pt was last seen in office on 3/9/2021  and has a follow up scheduled for Visit date not found. Will send request to  Dr. Gama Reyes  for patient.      Requested Prescriptions     Pending Prescriptions Disp Refills    simvastatin (ZOCOR) 20 MG tablet [Pharmacy Med Name: Simvastatin 20 MG Oral Tablet] 90 tablet 0     Sig: Take 1 tablet by mouth nightly

## 2021-04-06 DIAGNOSIS — G62.9 NEUROPATHY: ICD-10-CM

## 2021-04-06 RX ORDER — PREGABALIN 75 MG/1
75 CAPSULE ORAL 2 TIMES DAILY
Qty: 180 CAPSULE | Refills: 1 | Status: SHIPPED | OUTPATIENT
Start: 2021-04-06 | End: 2021-10-04

## 2021-04-06 NOTE — TELEPHONE ENCOUNTER
Received fax from pharmacy requesting refill on pts medication(s). Pt was last seen in office on 3/9/2021  and has a follow up scheduled for Visit date not found. Will send request to  Dr. Jannette Snow  for patient.      Requested Prescriptions     Pending Prescriptions Disp Refills    pregabalin (LYRICA) 75 MG capsule [Pharmacy Med Name: Pregabalin 75 MG Oral Capsule] 60 capsule 0     Sig: Take 1 capsule by mouth twice daily

## 2021-04-09 ENCOUNTER — IMMUNIZATION (OUTPATIENT)
Age: 86
End: 2021-04-09
Payer: MEDICARE

## 2021-04-09 PROCEDURE — 0002A COVID-19, PFIZER VACCINE 30MCG/0.3ML DOSE: CPT | Performed by: FAMILY MEDICINE

## 2021-04-09 PROCEDURE — 91300 COVID-19, PFIZER VACCINE 30MCG/0.3ML DOSE: CPT | Performed by: FAMILY MEDICINE

## 2021-05-10 RX ORDER — APIXABAN 2.5 MG/1
TABLET, FILM COATED ORAL
Qty: 180 TABLET | Refills: 3 | Status: SHIPPED | OUTPATIENT
Start: 2021-05-10 | End: 2022-05-09

## 2021-05-18 RX ORDER — METOPROLOL TARTRATE 50 MG/1
TABLET, FILM COATED ORAL
Qty: 180 TABLET | Refills: 3 | Status: SHIPPED | OUTPATIENT
Start: 2021-05-18 | End: 2022-05-20

## 2021-06-09 RX ORDER — ISOSORBIDE MONONITRATE 30 MG/1
30 TABLET, EXTENDED RELEASE ORAL DAILY
Qty: 90 TABLET | Refills: 3 | Status: SHIPPED | OUTPATIENT
Start: 2021-06-09 | End: 2022-05-16

## 2021-06-15 NOTE — PROGRESS NOTES
Chief Complaint:   Chief Complaint   Patient presents with   • Med Refill     Pt presents today for yearly OV for GERD-Needs refills on BID Pantoprazole   • Diarrhea     Pt c/o loose stool occasionally-has been going on for about 6 months   • Abdominal Pain     Pt also c/o abdominal pain often-states it happens with and without a BM         Patient ID: Nicolas Nieves is a 86 y.o. male     History of Present Illness: This is a very pleasant 86-year-old male who is here for his yearly office visit for GERD.    The patient carries a history of GERD and peptic ulcer disease.  The patient has been treated with Protonix 40 mg twice daily.  The patient's last EGD was performed on 8/11/2020 with findings of healed gastric ulcers. The patient states the medication is working well. The patient denies any nausea, vomiting, epigastric pain, dysphagia, pyrosis or hematemesis.  The patient denies any fever or chills.  Denies any melena or hematochezia.  Denies any unintentional weight loss or loss of appetite.    The patient does complain of loose stool today which he states has been going on occasionally for about 6 months.  He states he has abdominal pain at times as well.  I reviewed with him the work-up we had done 11/2020 for the same symptoms with gastrointestinal panel negative along with negative C. difficile.  CT of abdomen and pelvis was normal.  Lab work was normal.  We again discussed colonoscopy and the risks due to his advanced age and other comorbidities.  He agreed he did not really want to proceed to that.    Past Medical History:   Diagnosis Date   • Arthritis    • Atrial fibrillation (CMS/HCC)    • Harmon's esophagus    • CAD (coronary artery disease)    • COPD (chronic obstructive pulmonary disease) (CMS/HCC)    • Gastritis    • GERD (gastroesophageal reflux disease)    • Hemorrhoids    • History of colon cancer    • History of colon polyps    • Hypertension    • Peptic ulcer    • Renal calculi        Past  Surgical History:   Procedure Laterality Date   • CARDIAC CATHETERIZATION      stents x 4   • CATARACT EXTRACTION, BILATERAL     • CHOLECYSTECTOMY     • COLONOSCOPY  04/25/2014    One 3mm hyperplastic rectal polyp; Repeat 3 years    • COLONOSCOPY N/A 5/17/2017    The examined portion of the ileum was normal; Patent end-to-side ileo-colonic anastomosis, characterized by healthy appearing mucosa; The entire examined colon is normal; No specimens collected; No plans to repeat due to age    • COLONOSCOPY  04/17/2013    Mass in ascending colon-biopsied-marked with Malorie ink; Internal hemorrhoids    • COLONOSCOPY  02/16/2010    Dr. Sanchez-Internal hermorrhoids; Repeat 3-5 years    • COLONOSCOPY  02/26/2007    Dr. White-Internal hemorrhoids; Diminuitive hyperplastic rectal polyp; AVM right colon; Repeat 4 years   • COLONOSCOPY  04/22/2003    Dr. White-Normal colonoscopy with internal hemorrhoids   • ENDOSCOPY N/A 4/10/2019    No endoscopic esophageal abnormality to explain patient's dysphagia-esophagus dilated; A few gastric polyps-biopsied; Normal examined duodenum   • ENDOSCOPY  04/17/2013    Duodenitis; LA grade A esophagitis    • ENDOSCOPY  8/11/2020    Healed gastric ulcers    • ENDOSCOPY  02/16/2010    Gastric ulcers    • ENDOSCOPY  09/15/2009    Normal    • ENDOSCOPY  05/15/2006    Dr. WhiteDbrba-Umnbwbovg-exdpsxen; Small HH    • ENDOSCOPY  01/24/2005    Dr. White-Diffuse esophageal spasm-dilated; Gastritis-biopsied   • ENDOSCOPY  04/25/2003    Dr. White-Stage II reflux esophagitis-rule out short-segment Harmon's esophagus; Schatzki's ring-dilated; Gastritis-biopsied   • HEMICOLECTOMY Right 05/2013   • LUNG REMOVAL, PARTIAL           Current Outpatient Medications:   •  Cholecalciferol (VITAMIN D3) 5000 units tablet tablet, Take 5,000 Units by mouth Daily., Disp: , Rfl:   •  cyclobenzaprine (FLEXERIL) 5 MG tablet, Take 5 mg by mouth 3 (Three) Times a Day As Needed for Muscle Spasms., Disp: , Rfl:   •  docusate sodium  (COLACE) 100 MG capsule, Take 100 mg by mouth 2 (Two) Times a Day., Disp: , Rfl:   •  DULoxetine (CYMBALTA) 60 MG capsule, Take 1 capsule by mouth Daily., Disp: , Rfl:   •  ELIQUIS 2.5 MG tablet tablet, Take 1 tablet by mouth 2 (Two) Times a Day., Disp: , Rfl:   •  glucosamine-chondroitin 500-400 MG capsule capsule, Take 1 capsule by mouth Daily., Disp: , Rfl:   •  isosorbide mononitrate (IMDUR) 30 MG 24 hr tablet, Take 30 mg by mouth Daily., Disp: , Rfl:   •  metoprolol tartrate (LOPRESSOR) 50 MG tablet, Take 25 mg by mouth 2 (Two) Times a Day., Disp: , Rfl:   •  pantoprazole (PROTONIX) 40 MG EC tablet, Take 1 tablet by mouth 2 (Two) Times a Day., Disp: 180 tablet, Rfl: 4  •  simvastatin (ZOCOR) 20 MG tablet, Take 20 mg by mouth Daily., Disp: , Rfl:   •  tamsulosin (FLOMAX) 0.4 MG capsule 24 hr capsule, Take 1 capsule by mouth Daily., Disp: , Rfl:   •  vitamin B-12 (CYANOCOBALAMIN) 100 MCG tablet, Take 1 tablet by mouth Daily., Disp: , Rfl:     Allergies   Allergen Reactions   • Lortab [Hydrocodone-Acetaminophen] Nausea And Vomiting   • Morphine And Related Nausea And Vomiting       Social History     Socioeconomic History   • Marital status:      Spouse name: Not on file   • Number of children: Not on file   • Years of education: Not on file   • Highest education level: Not on file   Tobacco Use   • Smoking status: Former Smoker   • Smokeless tobacco: Never Used   Vaping Use   • Vaping Use: Never used   Substance and Sexual Activity   • Alcohol use: Not Currently   • Drug use: No   • Sexual activity: Defer       Family History   Problem Relation Age of Onset   • Colon cancer Neg Hx    • Colon polyps Neg Hx    • Esophageal cancer Neg Hx    • Liver cancer Neg Hx    • Liver disease Neg Hx    • Stomach cancer Neg Hx    • Rectal cancer Neg Hx        Vitals:    06/16/21 0959   BP: 110/62   BP Location: Left arm   Patient Position: Sitting   Cuff Size: Adult   Pulse: 60   Temp: 97.4 °F (36.3 °C)   TempSrc:  "Infrared   SpO2: 98%   Weight: 86.6 kg (191 lb)   Height: 180.3 cm (71\")       Review of Systems:    General:    Present -feeling well   Skin:    Not Present-Rash   HEENT:     Not Present-Acute visual changes or Acute hearing changes   Neck :    Not Present- swollen glands   Genitourinary:      Not Present- burning, frequency, urgency hematuria, dysuria,   Cardiovascular:   Not Present-chest pain, palpitations, or pressure   Respiratory:   Not Present- shortness of breath or cough   Gastrointestinal:  Musculoskeletal:  Neurological:  Psychiatric:   Present as mentioned in the HP    Not Present. Recent gait disturbances.    Not Present-Seizures and weakness in extremities.    Not Present- Anxiety or Depression.       Physical Exam:    General Appearance:    Alert, cooperative, in no acute distress   Psych:    Mood appropriate    Eyes:          conjunctivae and sclerae normal, no   icterus, no pallor   ENMT:    Ears appear intact with no abnormalities noted oral mucosa moist   Neck:   No adenopathy, supple, trachea midline, no thyromegaly, no   carotid bruit, no JVD    Cardiovascular:    Regular rhythm and normal rate, normal S1 and S2, no            murmur, no gallop, no rub, no click   Gastrointestinal:     Inspection normal.  Normal bowel sounds, no masses, no organomegaly, soft round non-tender, non-distended, no guarding, no rebound or tenderness. No hepatosplenomegaly.   Skin:   No bleeding, bruising or rash   Neurologic:   nonfocal       Lab Results - Last 18 Months   Lab Units 02/09/21  1231 11/19/20  0949 11/03/20  1107 01/22/20  1424 01/04/20  0506   GLUCOSE mg/dL 107  --  81 110* 145*   BUN mg/dL 18  --  17 14 18   CREATININE mg/dL 1.1 1.20 1.19 1.1 1   SODIUM mmol/L 142  --  142 139 139   POTASSIUM mmol/L 4.9  --  4.4 4.5 4.6   CHLORIDE mmol/L 104  --  106 102 103   TOTAL CO2 mmol/L 29  --   --  21* 24   CO2 mmol/L  --   --  28.7  --   --    TOTAL PROTEIN g/dL 7.2  --  6.8 7.3  --    ALBUMIN g/dL 4.3  --  " 4.30 3.9  --    ALT (SGPT) U/L 17  --  17 19  --    AST (SGOT) U/L 20  --  19 25  --    ALK PHOS U/L 74  --  67 92  --    BILIRUBIN mg/dL 0.6  --  0.6 0.8  --    GLOBULIN gm/dL  --   --  2.5  --   --    CRP mg/dL  --   --  2.59*  --   --        Lab Results - Last 18 Months   Lab Units 02/09/21  1153 11/03/20  1107 03/10/20  1158 01/22/20  1424 01/04/20  0506   HEMOGLOBIN g/dL 13.6* 12.9* 13.3* 12.2* 11.3*   HEMATOCRIT % 42.4 37.8 41.9* 41.2* 35.2*   MCV fL 92.2 89.6 91.7 99.8*  --    WBC K/uL 9.3 8.36 6.5 9.3  --    RDW % 15.3* 15.8* 14.0 15.2*  --    MPV fL 10.6 10.7 10.6 10.9  --    PLATELETS K/uL 171 173 172 300  --    INR  1.14  --   --   --   --        Lab Results - Last 18 Months   Lab Units 11/03/20  1107 01/22/20  1424   IRON ug/dL  --  63   FERRITIN ng/mL  --  232.3   TSH uIU/mL 3.570 1.680        Lab Results - Last 18 Months   Lab Units 01/22/20  1424   FERRITIN ng/mL 232.3           Assessment and Plan:  Assessment/Plan   Diagnoses and all orders for this visit:    1. Gastroesophageal reflux disease, unspecified whether esophagitis present (Primary)  -     pantoprazole (PROTONIX) 40 MG EC tablet; Take 1 tablet by mouth 2 (Two) Times a Day.  Dispense: 180 tablet; Refill: 4    2. History of peptic ulcer disease    3. Loose stools    4. Abdominal cramping    Refill for Protonix 40 mg daily sent to patient's pharmacy.  As noted above due to patient's advanced age and other comorbidities I do not see that we should proceed to a colonoscopy for his complaints as he was worked up in November for the same problems.  Labs and imaging along with stools were found to be normal.  I discussed with him using over-the-counter Metamucil.  Follow-up 1 year GERD     There are no Patient Instructions on file for this visit.    Next follow-up appointment        EMR Dragon/Transcription disclaimer:  Much of this encounter note is an electronic transcription/translation of spoken language to printed text. The electronic  translation of spoken language may permit erroneous, or at times, nonsensical words or phrases to be inadvertently transcribed; although I have reviewed the note for such errors, some may still exist.

## 2021-06-16 ENCOUNTER — OFFICE VISIT (OUTPATIENT)
Dept: GASTROENTEROLOGY | Facility: CLINIC | Age: 86
End: 2021-06-16

## 2021-06-16 VITALS
WEIGHT: 191 LBS | BODY MASS INDEX: 26.74 KG/M2 | HEIGHT: 71 IN | SYSTOLIC BLOOD PRESSURE: 110 MMHG | HEART RATE: 60 BPM | OXYGEN SATURATION: 98 % | TEMPERATURE: 97.4 F | DIASTOLIC BLOOD PRESSURE: 62 MMHG

## 2021-06-16 DIAGNOSIS — K21.9 GASTROESOPHAGEAL REFLUX DISEASE, UNSPECIFIED WHETHER ESOPHAGITIS PRESENT: Primary | ICD-10-CM

## 2021-06-16 DIAGNOSIS — Z87.11 HISTORY OF PEPTIC ULCER DISEASE: ICD-10-CM

## 2021-06-16 DIAGNOSIS — R19.5 LOOSE STOOLS: ICD-10-CM

## 2021-06-16 DIAGNOSIS — R10.9 ABDOMINAL CRAMPING: ICD-10-CM

## 2021-06-16 PROCEDURE — 99214 OFFICE O/P EST MOD 30 MIN: CPT | Performed by: NURSE PRACTITIONER

## 2021-06-16 RX ORDER — CYCLOBENZAPRINE HCL 5 MG
5 TABLET ORAL 3 TIMES DAILY PRN
COMMUNITY
End: 2021-10-13

## 2021-06-16 RX ORDER — PANTOPRAZOLE SODIUM 40 MG/1
40 TABLET, DELAYED RELEASE ORAL 2 TIMES DAILY
Qty: 180 TABLET | Refills: 4 | Status: SHIPPED | OUTPATIENT
Start: 2021-06-16 | End: 2021-07-20

## 2021-06-16 RX ORDER — DOCUSATE SODIUM 100 MG/1
100 CAPSULE, LIQUID FILLED ORAL 2 TIMES DAILY
COMMUNITY

## 2021-06-30 ENCOUNTER — TELEPHONE (OUTPATIENT)
Dept: VASCULAR SURGERY | Age: 86
End: 2021-06-30

## 2021-06-30 ENCOUNTER — OFFICE VISIT (OUTPATIENT)
Dept: VASCULAR SURGERY | Age: 86
End: 2021-06-30
Payer: MEDICARE

## 2021-06-30 ENCOUNTER — HOSPITAL ENCOUNTER (OUTPATIENT)
Dept: CT IMAGING | Age: 86
Discharge: HOME | End: 2021-06-30
Payer: MEDICARE

## 2021-06-30 VITALS
RESPIRATION RATE: 18 BRPM | WEIGHT: 190 LBS | HEART RATE: 57 BPM | BODY MASS INDEX: 26.6 KG/M2 | OXYGEN SATURATION: 99 % | DIASTOLIC BLOOD PRESSURE: 62 MMHG | HEIGHT: 71 IN | SYSTOLIC BLOOD PRESSURE: 128 MMHG

## 2021-06-30 DIAGNOSIS — I71.40 ABDOMINAL AORTIC ANEURYSM (AAA) WITHOUT RUPTURE: ICD-10-CM

## 2021-06-30 DIAGNOSIS — I71.40 AAA (ABDOMINAL AORTIC ANEURYSM) WITHOUT RUPTURE: Primary | ICD-10-CM

## 2021-06-30 PROCEDURE — 99213 OFFICE O/P EST LOW 20 MIN: CPT | Performed by: PHYSICIAN ASSISTANT

## 2021-06-30 PROCEDURE — G8428 CUR MEDS NOT DOCUMENT: HCPCS | Performed by: PHYSICIAN ASSISTANT

## 2021-06-30 PROCEDURE — 74176 CT ABD & PELVIS W/O CONTRAST: CPT

## 2021-06-30 PROCEDURE — 4040F PNEUMOC VAC/ADMIN/RCVD: CPT | Performed by: PHYSICIAN ASSISTANT

## 2021-06-30 PROCEDURE — G8417 CALC BMI ABV UP PARAM F/U: HCPCS | Performed by: PHYSICIAN ASSISTANT

## 2021-06-30 PROCEDURE — 1036F TOBACCO NON-USER: CPT | Performed by: PHYSICIAN ASSISTANT

## 2021-06-30 PROCEDURE — 1123F ACP DISCUSS/DSCN MKR DOCD: CPT | Performed by: PHYSICIAN ASSISTANT

## 2021-06-30 RX ORDER — CYCLOBENZAPRINE HCL 5 MG
5 TABLET ORAL 3 TIMES DAILY PRN
COMMUNITY
End: 2021-10-07 | Stop reason: ALTCHOICE

## 2021-06-30 NOTE — TELEPHONE ENCOUNTER
I left a vm asking the pt to return my call to discuss the findings of his CT. The pt and his spouse returned my call. I let them know per [facility] the CT showed his abdominal aneurysm not to be saccular. At this time there is no significant changes in the pt's CT. We will plan on a one year follow up with a repeat study. If the pt has out of the ordinary lower abd/back pain he is to call 911 right away or report to the nearest ER. Mr and Mrs Areli Cerda voiced understanding and are aware.

## 2021-06-30 NOTE — PROGRESS NOTES
Patient Care Team:  [id number] [facility],[address], DO as PCP - General   William Iglesias DO as PCP [facility]  Erin Lewis (Nurse Practitioner)  Logan Fritz MD (Gastroenterology)  Justine Lazcano MD as Consulting Physician (Oncology)  Williams Wright MD as Consulting Physician (Cardiology)      History and Physical:    Yogesh Leon is a 82 y.o. yo male 82-year-old male who has a history of a AAA. He presents today for follow-up. He denies any symptoms significant back or abdominal pain. He denies any claudication. He has no nonhealing wounds on his lower extremities. He does report some bilateral lower extremity swelling. He is on Eliquis 2.5 mg twice daily. He is on a statin daily.     Yogesh Leon is a 82 y.o. male with the following history reviewed and recorded in [facility] [employer]:  Patient Active Problem List    Diagnosis Date Noted    CAD (coronary artery disease), native coronary artery      Priority: High     Stents in the LAD, LCX, RCA  12/14/2007  Cath  Patent stents, normal LVFX  12/26/2008  SE  negative for myocardial ischemia  7/13/2009  Cath  Patent stents, normal LVFX  2/15/2010  SE  negative for myocardial ischemia  10/4/2011  Cath  Patent stents, normal LVFX  8/8/12  SE  negative for myocardial ischemia, DTS 0.5 [facility])  5/3/2013  SE  negative for myocardial ischemia, DTS 5  7/7/17 SE negative for myocardial ischemia  5/27/19 Adeline possible ishcemia vs diaphragmatic artifact EF 72%, -14% ischemic burden      Primary osteoarthritis of right hip 01/03/2020    Paroxysmal atrial fibrillation ([address] 12/11/2017    Colon polyps 04/11/2017    History of colon cancer 04/11/2017    Abdominal aortic aneurysm (AAA) without rupture ([address] 11/12/2013    Hypercholesterolemia     Weight loss     Burgos's esophagus     Ulcer, gastric, acute 03/14/2012    Chronic chest wall pain 12/20/2011    Hypertension     Gastroesophageal reflux disease     Arthritis     Renal calculi     Costochondritis      Current Outpatient Medications   Medication Sig Dispense Refill    cyclobenzaprine (FLEXERIL) 5 MG tablet Take 5 mg by mouth 3 times daily as needed      isosorbide mononitrate (IMDUR) 30 MG extended release tablet Take 1 tablet by mouth daily 90 tablet 3    metoprolol tartrate (LOPRESSOR) 50 MG tablet Take 1 tablet by mouth twice daily 180 tablet 3    ELIQUIS 2.5 MG TABS tablet Take 1 tablet by mouth twice daily 180 tablet 3    pregabalin (LYRICA) 75 MG capsule Take 1 capsule by mouth 2 times daily for 180 days. 180 capsule 1    simvastatin (ZOCOR) 20 MG tablet Take 1 tablet by mouth nightly 90 tablet 3    DULoxetine (CYMBALTA) 60 MG extended release capsule Take 1 capsule by mouth daily 90 capsule 3    tamsulosin (FLOMAX) 0.4 MG capsule Take 1 capsule by mouth daily 90 capsule 3    docusate sodium (COLACE) 100 MG capsule Take 1 capsule by mouth 2 times daily 60 capsule 1    pantoprazole (PROTONIX) 40 MG tablet Take 40 mg by mouth 2 times daily      Cholecalciferol (VITAMIN D3) 5000 units TABS Take 5,000 Units by mouth daily       metoprolol tartrate (LOPRESSOR) 25 MG tablet Take 1 tablet by mouth 2 times daily Patient will fill next month (Patient taking differently: Take 12.5 mg by mouth 2 times daily Patient will fill next month) 180 tablet 3    vitamin B-12 (CYANOCOBALAMIN) 100 MCG tablet Take 50 mcg by mouth daily. No current facility-administered medications for this visit.      Allergies: Lortab [hydrocodone-acetaminophen] and Morphine and related  Past Medical History:   Diagnosis Date    Adenocarcinoma ([facility]     colon    Aneurysm ([facility] aortic    Anxiety     Arthritis     Polyarticular    Atrial fibrillation ([facility]     Burgos's esophagus 2010    H/o    CAD (coronary artery disease)     CAD (coronary artery disease), native coronary artery     stent; sees dr. Chastity Mejia    Chronic respiratory failure ([facility]     Costochondritis     Erectile dysfunction     Gastroesophageal reflux disease     GERD (gastroesophageal reflux disease)     High cholesterol     Hip pain     Hypercholesterolemia 2010    Hypertension     Hypertension     MI (mitral incompetence)     x4 stents    Nephrolithiasis     Non-cardiac chest pain 12/20/2011    Renal calculi     Right upper lobe consolidation ([address]     Tobacco abuse 2010    Ulcer, gastric, acute 3/14/2012     Past Surgical History:   Procedure Laterality Date    ANKLE SURGERY      left    APPENDECTOMY      CARDIAC CATHETERIZATION  12/12/07,07/13/09    Left Heart Cath    CHOLECYSTECTOMY      right    COLON SURGERY      GALLBLADDER SURGERY      LEG SURGERY      right    LUNG REMOVAL, PARTIAL      MANDIBLE FRACTURE SURGERY      ORTHOPEDIC SURGERY      legs, hand and ankles    SKIN CANCER EXCISION  2016    TOTAL HIP ARTHROPLASTY Right 1/3/2020    RIGHT TOTAL HIP REPLACEMENT performed by Viet Loera [facility] at [facility]     Family History   Problem Relation Age of Onset    Diabetes Mother     Coronary Art Dis Father     High Blood Pressure Father     High Cholesterol Father     Heart Attack Father     Stroke Father     Kidney Disease Son     Kidney Disease Son      Social History     Tobacco Use    Smoking status: Former Smoker    Smokeless tobacco: Never Used    Tobacco comment: quit in 1983   Substance Use Topics    Alcohol use: No       Review of Systems    Constitutional -   No fever or chills   HENT - no HA's, tinnitus, rhinorrhea, sore throat  Eyes - no sudden vision change or amaurosis. Respiratory - SOB or chest pain  Cardiovascular - no chest pain, syncope, or significant dizziness. No palpitations (see HPI)  Gastrointestinal - no significant abdominal pain. No blood in stool. No diarrhea, nausea, or vomiting. Genitourinary - No difficulty urinating, dysuria, frequency, or urgency. No flank pain or hematuria. Musculoskeletal - no back pain or myalgia.   Skin - no rashes or wounds   Neurologic - no dizziness, facial asymmetry, or light headedness. No seizures. No new onset of partial or complete loss of vision affecting only one eye, speech difficulty or lateralizing weakness, numbness/tingling   Hematologic - no excessive bleeding. Psychiatric - no severe anxiety or nervousness. No confusion. All other review of systems are negative. Physical Exam    /62 (Site: Left Upper Arm)   Pulse 57   Resp 18   Ht 5' 11\" (1.803 m)   Wt 190 lb (86.2 kg)   SpO2 99%   BMI 26.50 kg/m²     Constitutional - No acute distress. HENT - head normocephalic. Hearing is intact   Eyes - conjunctiva normal.  EOMS normal.  No exudate. No icterus. Neck- ROM appears normal, no tracheal deviation. No bruit noted bilaterally  Cardiovascular - Regular rate and rhythm. Heart sounds are normal.  No murmur, rub, or gallop. Carotid pulses bilaterally without bruit. Extremities - Radial and ulnar pulses are 2+ to palpation bilaterally. Femoral pulses palpable bilaterally. No cyanosis, clubbing, or significant edema. No signs atheroembolic event. Pulmonary - effort appears normal.  No respiratory distress. Lungs - Breath sounds normal.   GI - Abdomen - No distension or palpable mass. Genitourinary - deferred. Musculoskeletal - ROM appears normal.  No significant edema. Neurologic - alert and oriented X 3. Face symmetric. No lateralizing weakness noted. Skin - warm, dry, and intact. No rash, erythema, or pallor. Psychiatric - mood, affect, and behavior appear normal.  Judgment and thought processes appear normal.    Risk factors for aneurysmal disease including tobacco abuse, hyperlipidemia, male gender, age >57, emphysema, obesity, HTN, atherosclerosis, family history, and diabetes mellitus were discussed with the patient. CT A/P: 6/30/21  Examination. CT ABDOMEN PELVIS WO CONTRAST 6/30/2021 10:36 AM   History: Abdominal aortic aneurysm. DLP: 1389 mGycm.    The CT scan of the abdomen and pelvis transcription may have occurred.

## 2021-06-30 NOTE — TELEPHONE ENCOUNTER
----- Message from Romayne La, PA-C sent at 6/30/2021  1:22 PM CDT -----  Please call and tell him that I got his CT report and that his aneurysm is not saccular. No other significant findings on his CT scan. We will plan to follow-up in 1 year with a repeat CAT scan. If he has any significant back or abdominal pain out of the ordinary he should call 911 or go to the nearest ER.

## 2021-07-20 DIAGNOSIS — K21.9 GASTROESOPHAGEAL REFLUX DISEASE, UNSPECIFIED WHETHER ESOPHAGITIS PRESENT: ICD-10-CM

## 2021-07-20 RX ORDER — PANTOPRAZOLE SODIUM 40 MG/1
40 TABLET, DELAYED RELEASE ORAL 2 TIMES DAILY
Qty: 180 TABLET | Refills: 3 | Status: ON HOLD | OUTPATIENT
Start: 2021-07-20 | End: 2022-08-08 | Stop reason: SDUPTHER

## 2021-08-04 ENCOUNTER — NURSE TRIAGE (OUTPATIENT)
Dept: OTHER | Facility: CLINIC | Age: 86
End: 2021-08-04

## 2021-08-04 ENCOUNTER — VIRTUAL VISIT (OUTPATIENT)
Dept: PRIMARY CARE CLINIC | Age: 86
End: 2021-08-04
Payer: MEDICARE

## 2021-08-04 DIAGNOSIS — R06.02 SOB (SHORTNESS OF BREATH): ICD-10-CM

## 2021-08-04 DIAGNOSIS — R05.9 COUGH: Primary | ICD-10-CM

## 2021-08-04 LAB — SARS-COV-2, PCR: NOT DETECTED

## 2021-08-04 PROCEDURE — 99442 PR PHYS/QHP TELEPHONE EVALUATION 11-20 MIN: CPT | Performed by: NURSE PRACTITIONER

## 2021-08-04 ASSESSMENT — ENCOUNTER SYMPTOMS
RHINORRHEA: 0
SHORTNESS OF BREATH: 1
COUGH: 1

## 2021-08-04 NOTE — PROGRESS NOTES
Bj Stack (:  1935) is a 82 y.o. male,Established patient, here for evaluation of the following chief complaint(s): Breathing Problem and Shortness of Breath    TELE-HEALTH PHONE CALL     ASSESSMENT/PLAN:  1. Cough  -     COVID-19  - Self isolation until COVID 19 results return  - Supportive care  2. SOB (shortness of breath)  -     COVID-19      Return if symptoms worsen or fail to improve. SUBJECTIVE/OBJECTIVE:  HPI     He is not feeling well. \"I am a tad short of breath. \"  Denies a fever. Mild cough. He has been vaccinated. Denies headache  Denies nasal congestion or drainage. He can smell and taste  \"They say [facility] is a hot spot and I want to be tested. \"    Review of Systems   Constitutional: Negative for fever. HENT: Negative for congestion and rhinorrhea. Denies loss of smell or taste   Respiratory: Positive for cough and shortness of breath (mild). Neurological: Positive for headaches. Negative for dizziness and light-headedness. No flowsheet data found. Physical Exam   N/a due to telephone call    On this date 2021 I have spent 11 minutes reviewing previous notes, test results and face to face (virtual) with the patient discussing the diagnosis and importance of compliance with the treatment plan as well as documenting on the day of the visit. Bj Stack, was evaluated through a synchronous (real-time) audio-video encounter. The patient (or guardian if applicable) is aware that this is a billable service. Verbal consent to proceed has been obtained within the past 12 months. The visit was conducted pursuant to the emergency declaration under the [facility], [address] [place] [address] authority and the [employer] Act. Patient identification was verified, and a caregiver was present when appropriate.  The patient was located in a state where the provider was credentialed to provide care. An electronic signature was used to authenticate this note.     --DAVIE Knutson

## 2021-08-04 NOTE — TELEPHONE ENCOUNTER
Received call from Angeles Ceballos at [facility] with [employer] Complaint. Brief description of triage: shortness of breath and not feeling well, able to speak in full sentences. Triage indicates for patient to go to ED. Caller states patient declines to go to ED and just wants a COVID test. [facility] office and spoke with Emil Wilburn who agreed to further assist caller. Warm transfer provided. Care advice provided, patient verbalizes understanding; denies any other questions or concerns; instructed to call back for any new or worsening symptoms. Attention Provider: Thank you for allowing me to participate in the care of your patient. The patient was connected to triage in response to information provided to the [facility]. Please do not respond through this encounter as the response is not directed to a shared pool. Reason for Disposition   Any history of prior \"blood clot\" in leg or lungs    Answer Assessment - Initial Assessment Questions  1. RESPIRATORY STATUS: \"Describe your breathing? \" (e.g., wheezing, shortness of breath, unable to speak, severe coughing)       Doesn't feel right, wants a test    2. ONSET: \"When did this breathing problem begin? \"       Yesterday    3. PATTERN \"Does the difficult breathing come and go, or has it been constant since it started? \"       Comes and goes    4. SEVERITY: \"How bad is your breathing? \" (e.g., mild, moderate, severe)     - MILD: No SOB at rest, mild SOB with walking, speaks normally in sentences, can lay down, no retractions, pulse < 100.     - MODERATE: SOB at rest, SOB with minimal exertion and prefers to sit, cannot lie down flat, speaks in phrases, mild retractions, audible wheezing, pulse 100-120.     - SEVERE: Very SOB at rest, speaks in single words, struggling to breathe, sitting hunched forward, retractions, pulse > 120       Moderate    5. RECURRENT SYMPTOM: \"Have you had difficulty breathing before? \" If so, ask: \"When was the last time? \" and \"What happened that time? \"       Yes     6. CARDIAC HISTORY: \"Do you have any history of heart disease? \" (e.g., heart attack, angina, bypass surgery, angioplasty)       Heart stents    7. LUNG HISTORY: \"Do you have any history of lung disease? \"  (e.g., pulmonary embolus, asthma, emphysema)      No    8. CAUSE: \"What do you think is causing the breathing problem? \"       Unsure    9. OTHER SYMPTOMS: \"Do you have any other symptoms? (e.g., dizziness, runny nose, cough, chest pain, fever)      No    10. PREGNANCY: \"Is there any chance you are pregnant? \" \"When was your last menstrual period? \"        N/A    11. TRAVEL: \"Have you traveled out of the country in the last month? \" (e.g., travel history, exposures)        No    Protocols used: BREATHING DIFFICULTY-ADULT-[place]

## 2021-08-05 ENCOUNTER — TELEPHONE (OUTPATIENT)
Dept: PRIMARY CARE CLINIC | Age: 86
End: 2021-08-05

## 2021-08-17 RX ORDER — TAMSULOSIN HYDROCHLORIDE 0.4 MG/1
CAPSULE ORAL
Qty: 90 CAPSULE | Refills: 3 | Status: SHIPPED | OUTPATIENT
Start: 2021-08-17 | End: 2022-08-15

## 2021-08-17 NOTE — TELEPHONE ENCOUNTER
Requested Prescriptions     Pending Prescriptions Disp Refills    tamsulosin (FLOMAX) 0.4 MG capsule [Pharmacy Med Name: Tamsulosin HCl 0.4 MG Oral Capsule] 90 capsule 3     Sig: Take 1 capsule by mouth once daily

## 2021-08-27 ENCOUNTER — HOSPITAL ENCOUNTER (OUTPATIENT)
Dept: GENERAL RADIOLOGY | Age: 86
Discharge: HOME | End: 2021-08-27
Payer: MEDICARE

## 2021-08-27 ENCOUNTER — OFFICE VISIT (OUTPATIENT)
Dept: PRIMARY CARE CLINIC | Age: 86
End: 2021-08-27
Payer: MEDICARE

## 2021-08-27 VITALS
DIASTOLIC BLOOD PRESSURE: 68 MMHG | SYSTOLIC BLOOD PRESSURE: 130 MMHG | OXYGEN SATURATION: 95 % | TEMPERATURE: 98.6 F | WEIGHT: 196 LBS | BODY MASS INDEX: 27.34 KG/M2 | HEART RATE: 80 BPM

## 2021-08-27 DIAGNOSIS — M25.562 ACUTE PAIN OF LEFT KNEE: Primary | ICD-10-CM

## 2021-08-27 DIAGNOSIS — M25.562 ACUTE PAIN OF LEFT KNEE: ICD-10-CM

## 2021-08-27 PROCEDURE — 99213 OFFICE O/P EST LOW 20 MIN: CPT | Performed by: NURSE PRACTITIONER

## 2021-08-27 PROCEDURE — G8417 CALC BMI ABV UP PARAM F/U: HCPCS | Performed by: NURSE PRACTITIONER

## 2021-08-27 PROCEDURE — 1036F TOBACCO NON-USER: CPT | Performed by: NURSE PRACTITIONER

## 2021-08-27 PROCEDURE — 4040F PNEUMOC VAC/ADMIN/RCVD: CPT | Performed by: NURSE PRACTITIONER

## 2021-08-27 PROCEDURE — G8427 DOCREV CUR MEDS BY ELIG CLIN: HCPCS | Performed by: NURSE PRACTITIONER

## 2021-08-27 PROCEDURE — 73562 X-RAY EXAM OF KNEE 3: CPT

## 2021-08-27 PROCEDURE — 1123F ACP DISCUSS/DSCN MKR DOCD: CPT | Performed by: NURSE PRACTITIONER

## 2021-08-27 RX ORDER — PREDNISONE 20 MG/1
20 TABLET ORAL 2 TIMES DAILY
Qty: 10 TABLET | Refills: 0 | Status: SHIPPED | OUTPATIENT
Start: 2021-08-27 | End: 2021-09-01

## 2021-08-27 ASSESSMENT — ENCOUNTER SYMPTOMS
CONSTIPATION: 0
DIARRHEA: 0
COUGH: 0
TROUBLE SWALLOWING: 0
VOMITING: 0
RHINORRHEA: 0
SHORTNESS OF BREATH: 0
ABDOMINAL PAIN: 0
SORE THROAT: 0
NAUSEA: 0

## 2021-08-27 NOTE — PROGRESS NOTES
Right knee: Normal.      Left knee: Effusion present. No crepitus. Normal range of motion. No tenderness. Normal alignment and normal meniscus. Skin:     General: Skin is warm. Neurological:      Mental Status: He is alert and oriented to person, place, and time. An electronic signature was used to authenticate this note.     --Lin Galeazzi, APRN

## 2021-08-30 ENCOUNTER — TELEPHONE (OUTPATIENT)
Dept: PRIMARY CARE CLINIC | Age: 86
End: 2021-08-30

## 2021-08-30 NOTE — TELEPHONE ENCOUNTER
----- Message from DAVIE Warren sent at 8/27/2021  4:42 PM CDT -----  Please call patient and let them know results. Xray shows arthritis with a small joint effusion as we suspected. Follow up if no improvement or any concerns.

## 2021-09-08 ENCOUNTER — TELEPHONE (OUTPATIENT)
Dept: PRIMARY CARE CLINIC | Age: 86
End: 2021-09-08

## 2021-09-08 DIAGNOSIS — M25.562 ACUTE PAIN OF LEFT KNEE: Primary | ICD-10-CM

## 2021-09-08 RX ORDER — METHYLPREDNISOLONE 4 MG/1
TABLET ORAL
Qty: 1 KIT | Refills: 0 | Status: SHIPPED | OUTPATIENT
Start: 2021-09-08 | End: 2021-09-14

## 2021-09-08 NOTE — TELEPHONE ENCOUNTER
----- Message from Theresa Barrow sent at 9/8/2021  8:06 AM CDT -----  Subject: Message to Provider    QUESTIONS  Information for Provider? PT's wife stated that Roosevelt Hamman needs more steroid   medicine to help him walk. She is requesting someone to call her back. ---------------------------------------------------------------------------  --------------  [facility] INFO  What is the best way for the office to contact you? OK to leave message on   voicemail  Preferred Call Back Phone Number? [phone]  ---------------------------------------------------------------------------  --------------  SCRIPT ANSWERS  Relationship to Patient? Other  Representative Name? Christopher Potter - Wife  Is the Representative on the appropriate HIPAA document in Epic?  Yes

## 2021-10-03 DIAGNOSIS — G62.9 NEUROPATHY: ICD-10-CM

## 2021-10-04 RX ORDER — PREGABALIN 75 MG/1
CAPSULE ORAL
Qty: 180 CAPSULE | Refills: 0 | Status: SHIPPED | OUTPATIENT
Start: 2021-10-04 | End: 2022-01-03

## 2021-10-04 NOTE — TELEPHONE ENCOUNTER
Received fax from pharmacy requesting refill on pts medication(s). Pt was last seen in office on 8/27/2021  and has a follow up scheduled for Visit date not found. Will send request to  Dr. Anabelle Meléndez  for authorization.      Requested Prescriptions     Pending Prescriptions Disp Refills    pregabalin (LYRICA) 75 MG capsule [Pharmacy Med Name: Pregabalin 75 MG Oral Capsule] 180 capsule 0     Sig: Take 1 capsule by mouth twice daily

## 2021-10-07 ENCOUNTER — TELEPHONE (OUTPATIENT)
Dept: PRIMARY CARE CLINIC | Age: 86
End: 2021-10-07

## 2021-10-07 ENCOUNTER — HOSPITAL ENCOUNTER (OUTPATIENT)
Dept: GENERAL RADIOLOGY | Age: 86
Discharge: HOME | End: 2021-10-07
Payer: MEDICARE

## 2021-10-07 ENCOUNTER — OFFICE VISIT (OUTPATIENT)
Dept: PRIMARY CARE CLINIC | Age: 86
End: 2021-10-07
Payer: MEDICARE

## 2021-10-07 VITALS
BODY MASS INDEX: 27.35 KG/M2 | HEART RATE: 54 BPM | HEIGHT: 71 IN | DIASTOLIC BLOOD PRESSURE: 70 MMHG | SYSTOLIC BLOOD PRESSURE: 140 MMHG | WEIGHT: 195.38 LBS | OXYGEN SATURATION: 94 % | TEMPERATURE: 97.1 F

## 2021-10-07 DIAGNOSIS — R05.9 COUGH: ICD-10-CM

## 2021-10-07 DIAGNOSIS — J44.1 COPD EXACERBATION (HCC): Primary | ICD-10-CM

## 2021-10-07 PROCEDURE — 1123F ACP DISCUSS/DSCN MKR DOCD: CPT | Performed by: NURSE PRACTITIONER

## 2021-10-07 PROCEDURE — 3023F SPIROM DOC REV: CPT | Performed by: NURSE PRACTITIONER

## 2021-10-07 PROCEDURE — G8484 FLU IMMUNIZE NO ADMIN: HCPCS | Performed by: NURSE PRACTITIONER

## 2021-10-07 PROCEDURE — 71046 X-RAY EXAM CHEST 2 VIEWS: CPT

## 2021-10-07 PROCEDURE — G8926 SPIRO NO PERF OR DOC: HCPCS | Performed by: NURSE PRACTITIONER

## 2021-10-07 PROCEDURE — 1036F TOBACCO NON-USER: CPT | Performed by: NURSE PRACTITIONER

## 2021-10-07 PROCEDURE — 99214 OFFICE O/P EST MOD 30 MIN: CPT | Performed by: NURSE PRACTITIONER

## 2021-10-07 PROCEDURE — G8417 CALC BMI ABV UP PARAM F/U: HCPCS | Performed by: NURSE PRACTITIONER

## 2021-10-07 PROCEDURE — G8427 DOCREV CUR MEDS BY ELIG CLIN: HCPCS | Performed by: NURSE PRACTITIONER

## 2021-10-07 PROCEDURE — 4040F PNEUMOC VAC/ADMIN/RCVD: CPT | Performed by: NURSE PRACTITIONER

## 2021-10-07 RX ORDER — CEFDINIR 300 MG/1
300 CAPSULE ORAL 2 TIMES DAILY
Qty: 20 CAPSULE | Refills: 0 | Status: SHIPPED | OUTPATIENT
Start: 2021-10-07 | End: 2021-10-17

## 2021-10-07 RX ORDER — ALBUTEROL SULFATE 90 UG/1
2 AEROSOL, METERED RESPIRATORY (INHALATION) 4 TIMES DAILY PRN
Qty: 18 G | Refills: 5 | Status: SHIPPED | OUTPATIENT
Start: 2021-10-07

## 2021-10-07 ASSESSMENT — ENCOUNTER SYMPTOMS
CHOKING: 0
SORE THROAT: 0
DIARRHEA: 0
CONSTIPATION: 0
COUGH: 1
BLOOD IN STOOL: 0
RHINORRHEA: 0
WHEEZING: 1
EYE DISCHARGE: 0
EYE REDNESS: 0

## 2021-10-07 NOTE — PROGRESS NOTES
Sherif Rios (:  1935) is a 82 y.o. male,Established patient, here for evaluation of the following chief complaint(s):  Cough, Shortness of Breath, and Wheezing      ASSESSMENT/PLAN:    ICD-10-CM    1. COPD exacerbation (HCC)  J44.1 cefdinir (OMNICEF) 300 MG capsule     albuterol sulfate HFA (VENTOLIN HFA) 108 (90 Base) MCG/ACT inhaler   2. Cough  R05.9 XR CHEST (2 VW)       Return if symptoms worsen or fail to improve. SUBJECTIVE/OBJECTIVE:  HPI  Cough, Shortness of Breath, and Wheezing  Patients wife states these have worsened in the past 2 weeks. No fever    Review of Systems   Constitutional: Negative for appetite change and unexpected weight change. HENT: Positive for congestion. Negative for ear pain, rhinorrhea and sore throat. Eyes: Negative for discharge and redness. Respiratory: Positive for cough and wheezing. Negative for choking. Cardiovascular: Negative for chest pain. Gastrointestinal: Negative for blood in stool, constipation and diarrhea. Genitourinary: Negative for decreased urine volume and dysuria. Skin: Negative for rash. Neurological: Negative for weakness. Hematological: Negative for adenopathy. Psychiatric/Behavioral: Negative for suicidal ideas. BP (!) 140/70   Pulse 54   Temp 97.1 °F (36.2 °C) (Temporal)   Ht 5' 11\" (1.803 m)   Wt 195 lb 6 oz (88.6 kg)   SpO2 94%   BMI 27.25 kg/m²    Physical Exam  Vitals reviewed. Constitutional:       Appearance: He is well-developed. HENT:      Head: Normocephalic. Eyes:      Conjunctiva/sclera: Conjunctivae normal.   Cardiovascular:      Rate and Rhythm: Normal rate and regular rhythm. Heart sounds: Normal heart sounds. Pulmonary:      Effort: Pulmonary effort is normal.      Breath sounds: Normal breath sounds. Comments: Tight cough  Abdominal:      General: Bowel sounds are normal.      Palpations: Abdomen is soft. Tenderness: There is no abdominal tenderness.    Musculoskeletal: Cervical back: Normal range of motion and neck supple. Skin:     General: Skin is warm and dry. Neurological:      Mental Status: He is alert and oriented to person, place, and time. Psychiatric:         Behavior: Behavior normal.                 An electronic signature was used to authenticate this note.     --DAVIE Angel

## 2021-10-07 NOTE — TELEPHONE ENCOUNTER
Called patient, spoke with: Patient regarding the results of the patients most recent xrays. I advised Patient of Anayeli Morley recommendations.    Patient did voice understanding

## 2021-10-13 ENCOUNTER — LAB (OUTPATIENT)
Dept: LAB | Facility: HOSPITAL | Age: 86
End: 2021-10-13

## 2021-10-13 ENCOUNTER — OFFICE VISIT (OUTPATIENT)
Dept: GASTROENTEROLOGY | Facility: CLINIC | Age: 86
End: 2021-10-13

## 2021-10-13 VITALS
OXYGEN SATURATION: 96 % | BODY MASS INDEX: 27.16 KG/M2 | WEIGHT: 194 LBS | TEMPERATURE: 97.7 F | SYSTOLIC BLOOD PRESSURE: 124 MMHG | DIASTOLIC BLOOD PRESSURE: 76 MMHG | HEIGHT: 71 IN | HEART RATE: 61 BPM

## 2021-10-13 DIAGNOSIS — R15.9 INCONTINENCE OF FECES, UNSPECIFIED FECAL INCONTINENCE TYPE: ICD-10-CM

## 2021-10-13 DIAGNOSIS — R19.7 DIARRHEA, UNSPECIFIED TYPE: Primary | ICD-10-CM

## 2021-10-13 DIAGNOSIS — Z85.038 HISTORY OF COLON CANCER: ICD-10-CM

## 2021-10-13 DIAGNOSIS — R10.12 LUQ ABDOMINAL PAIN: ICD-10-CM

## 2021-10-13 DIAGNOSIS — R53.83 MALAISE AND FATIGUE: ICD-10-CM

## 2021-10-13 DIAGNOSIS — R19.7 DIARRHEA, UNSPECIFIED TYPE: ICD-10-CM

## 2021-10-13 DIAGNOSIS — R10.32 LLQ ABDOMINAL PAIN: ICD-10-CM

## 2021-10-13 DIAGNOSIS — R53.81 MALAISE AND FATIGUE: ICD-10-CM

## 2021-10-13 LAB
ALBUMIN SERPL-MCNC: 4.4 G/DL (ref 3.5–5.2)
ALBUMIN/GLOB SERPL: 1.7 G/DL
ALP SERPL-CCNC: 65 U/L (ref 39–117)
ALT SERPL W P-5'-P-CCNC: 16 U/L (ref 1–41)
AMYLASE SERPL-CCNC: 69 U/L (ref 28–100)
ANION GAP SERPL CALCULATED.3IONS-SCNC: 9.3 MMOL/L (ref 5–15)
AST SERPL-CCNC: 20 U/L (ref 1–40)
BASOPHILS # BLD AUTO: 0.03 10*3/MM3 (ref 0–0.2)
BASOPHILS NFR BLD AUTO: 0.4 % (ref 0–1.5)
BILIRUB SERPL-MCNC: 0.6 MG/DL (ref 0–1.2)
BUN SERPL-MCNC: 15 MG/DL (ref 8–23)
BUN/CREAT SERPL: 12.4 (ref 7–25)
CALCIUM SPEC-SCNC: 9.1 MG/DL (ref 8.6–10.5)
CHLORIDE SERPL-SCNC: 105 MMOL/L (ref 98–107)
CO2 SERPL-SCNC: 26.7 MMOL/L (ref 22–29)
CREAT SERPL-MCNC: 1.21 MG/DL (ref 0.76–1.27)
CRP SERPL-MCNC: 0.49 MG/DL (ref 0–0.5)
DEPRECATED RDW RBC AUTO: 50.6 FL (ref 37–54)
EOSINOPHIL # BLD AUTO: 0.5 10*3/MM3 (ref 0–0.4)
EOSINOPHIL NFR BLD AUTO: 6.1 % (ref 0.3–6.2)
ERYTHROCYTE [DISTWIDTH] IN BLOOD BY AUTOMATED COUNT: 15.8 % (ref 12.3–15.4)
GFR SERPL CREATININE-BSD FRML MDRD: 57 ML/MIN/1.73
GLOBULIN UR ELPH-MCNC: 2.6 GM/DL
GLUCOSE SERPL-MCNC: 86 MG/DL (ref 65–99)
HCT VFR BLD AUTO: 40.6 % (ref 37.5–51)
HGB BLD-MCNC: 13.1 G/DL (ref 13–17.7)
IMM GRANULOCYTES # BLD AUTO: 0.05 10*3/MM3 (ref 0–0.05)
IMM GRANULOCYTES NFR BLD AUTO: 0.6 % (ref 0–0.5)
LIPASE SERPL-CCNC: 32 U/L (ref 13–60)
LYMPHOCYTES # BLD AUTO: 2.22 10*3/MM3 (ref 0.7–3.1)
LYMPHOCYTES NFR BLD AUTO: 27 % (ref 19.6–45.3)
MCH RBC QN AUTO: 29 PG (ref 26.6–33)
MCHC RBC AUTO-ENTMCNC: 32.3 G/DL (ref 31.5–35.7)
MCV RBC AUTO: 90 FL (ref 79–97)
MONOCYTES # BLD AUTO: 1.06 10*3/MM3 (ref 0.1–0.9)
MONOCYTES NFR BLD AUTO: 12.9 % (ref 5–12)
NEUTROPHILS NFR BLD AUTO: 4.37 10*3/MM3 (ref 1.7–7)
NEUTROPHILS NFR BLD AUTO: 53 % (ref 42.7–76)
NRBC BLD AUTO-RTO: 0.2 /100 WBC (ref 0–0.2)
PLATELET # BLD AUTO: 155 10*3/MM3 (ref 140–450)
PMV BLD AUTO: 11 FL (ref 6–12)
POTASSIUM SERPL-SCNC: 4.7 MMOL/L (ref 3.5–5.2)
PROT SERPL-MCNC: 7 G/DL (ref 6–8.5)
RBC # BLD AUTO: 4.51 10*6/MM3 (ref 4.14–5.8)
SODIUM SERPL-SCNC: 141 MMOL/L (ref 136–145)
TSH SERPL DL<=0.05 MIU/L-ACNC: 4.15 UIU/ML (ref 0.27–4.2)
WBC # BLD AUTO: 8.23 10*3/MM3 (ref 3.4–10.8)

## 2021-10-13 PROCEDURE — 84443 ASSAY THYROID STIM HORMONE: CPT

## 2021-10-13 PROCEDURE — 36415 COLL VENOUS BLD VENIPUNCTURE: CPT

## 2021-10-13 PROCEDURE — 82150 ASSAY OF AMYLASE: CPT

## 2021-10-13 PROCEDURE — 86140 C-REACTIVE PROTEIN: CPT

## 2021-10-13 PROCEDURE — 85025 COMPLETE CBC W/AUTO DIFF WBC: CPT

## 2021-10-13 PROCEDURE — 99214 OFFICE O/P EST MOD 30 MIN: CPT | Performed by: NURSE PRACTITIONER

## 2021-10-13 PROCEDURE — 80053 COMPREHEN METABOLIC PANEL: CPT

## 2021-10-13 PROCEDURE — 83690 ASSAY OF LIPASE: CPT

## 2021-10-13 RX ORDER — PREGABALIN 75 MG/1
1 CAPSULE ORAL 2 TIMES DAILY
COMMUNITY
Start: 2021-10-04 | End: 2021-12-03

## 2021-10-13 NOTE — PROGRESS NOTES
"Chief Complaint:   Chief Complaint   Patient presents with   • Diarrhea     Pt c/o diarrhea and incontinence for the last 3-4 months-states it \"comes and goes\" but when he a BM it's very urgent    • Abdominal Pain     Pt has been having some left-sided abdominal pain          Patient ID: Nicolas Nieves is a 86 y.o. male     History of Present Illness: This is a very pleasant 86-year-old male who is here today with complaints of diarrhea, incontinence of stool and left-sided abdominal pain.    The patient tells me that over the past 3 to 4 months he has been having episodes of incontinence of stools. He states he is having watery diarrhea once a day. He denies any recent antibiotic use. He states that he has both left upper quadrant and left lower quadrant abdominal pain that started about the time with the change of stools.  He states he is also having quite a bit of flatulence.The patient denies any nausea, vomiting, epigastric pain, dysphagia, pyrosis or hematemesis.  The patient denies any fever or chills.  Denies any melena or hematochezia.  Denies any unintentional weight loss or loss of appetite.      Past Medical History:   Diagnosis Date   • Arthritis    • Atrial fibrillation (HCC)    • Harmon's esophagus    • CAD (coronary artery disease)    • COPD (chronic obstructive pulmonary disease) (HCC)    • Costochondritis    • Gastritis    • GERD (gastroesophageal reflux disease)    • Hemorrhoids    • History of colon cancer    • History of colon polyps    • Hypertension    • Peptic ulcer    • Renal calculi        Past Surgical History:   Procedure Laterality Date   • CARDIAC CATHETERIZATION      stents x 4   • CATARACT EXTRACTION, BILATERAL     • CHOLECYSTECTOMY     • COLONOSCOPY  04/25/2014    One 3mm hyperplastic rectal polyp; Repeat 3 years    • COLONOSCOPY N/A 5/17/2017    The examined portion of the ileum was normal; Patent end-to-side ileo-colonic anastomosis, characterized by healthy appearing mucosa; The " entire examined colon is normal; No specimens collected; No plans to repeat due to age    • COLONOSCOPY  04/17/2013    Mass in ascending colon-biopsied-marked with Malorie ink; Internal hemorrhoids    • COLONOSCOPY  02/16/2010    Dr. Sanchez-Internal hermorrhoids; Repeat 3-5 years    • COLONOSCOPY  02/26/2007    Dr. White-Internal hemorrhoids; Diminuitive hyperplastic rectal polyp; AVM right colon; Repeat 4 years   • COLONOSCOPY  04/22/2003    Dr. White-Normal colonoscopy with internal hemorrhoids   • ENDOSCOPY N/A 4/10/2019    No endoscopic esophageal abnormality to explain patient's dysphagia-esophagus dilated; A few gastric polyps-biopsied; Normal examined duodenum   • ENDOSCOPY  04/17/2013    Duodenitis; LA grade A esophagitis    • ENDOSCOPY  8/11/2020    Healed gastric ulcers    • ENDOSCOPY  02/16/2010    Gastric ulcers    • ENDOSCOPY  09/15/2009    Normal    • ENDOSCOPY  05/15/2006    Dr. WhiteLgalb-Suuxrccsg-lygzomsp; Small HH    • ENDOSCOPY  01/24/2005    Dr. White-Diffuse esophageal spasm-dilated; Gastritis-biopsied   • ENDOSCOPY  04/25/2003    Dr. White-Stage II reflux esophagitis-rule out short-segment Harmon's esophagus; Schatzki's ring-dilated; Gastritis-biopsied   • HEMICOLECTOMY Right 05/2013   • LUNG REMOVAL, PARTIAL           Current Outpatient Medications:   •  Cholecalciferol (VITAMIN D3) 5000 units tablet tablet, Take 5,000 Units by mouth Daily., Disp: , Rfl:   •  docusate sodium (COLACE) 100 MG capsule, Take 100 mg by mouth 2 (Two) Times a Day., Disp: , Rfl:   •  DULoxetine (CYMBALTA) 60 MG capsule, Take 1 capsule by mouth Daily., Disp: , Rfl:   •  ELIQUIS 2.5 MG tablet tablet, Take 1 tablet by mouth 2 (Two) Times a Day., Disp: , Rfl:   •  glucosamine-chondroitin 500-400 MG capsule capsule, Take 1 capsule by mouth Daily., Disp: , Rfl:   •  isosorbide mononitrate (IMDUR) 30 MG 24 hr tablet, Take 30 mg by mouth Daily., Disp: , Rfl:   •  metoprolol tartrate (LOPRESSOR) 50 MG tablet, Take 25 mg by mouth 2  "(Two) Times a Day., Disp: , Rfl:   •  pantoprazole (PROTONIX) 40 MG EC tablet, Take 1 tablet by mouth 2 (two) times a day., Disp: 180 tablet, Rfl: 3  •  pregabalin (LYRICA) 75 MG capsule, Take 1 capsule by mouth 2 (Two) Times a Day., Disp: , Rfl:   •  simvastatin (ZOCOR) 20 MG tablet, Take 20 mg by mouth Daily., Disp: , Rfl:   •  tamsulosin (FLOMAX) 0.4 MG capsule 24 hr capsule, Take 1 capsule by mouth Daily., Disp: , Rfl:   •  vitamin B-12 (CYANOCOBALAMIN) 100 MCG tablet, Take 1 tablet by mouth Daily., Disp: , Rfl:     Allergies   Allergen Reactions   • Lortab [Hydrocodone-Acetaminophen] Nausea And Vomiting   • Morphine And Related Nausea And Vomiting       Social History     Socioeconomic History   • Marital status:    Tobacco Use   • Smoking status: Former Smoker   • Smokeless tobacco: Never Used   Vaping Use   • Vaping Use: Never used   Substance and Sexual Activity   • Alcohol use: Not Currently   • Drug use: No   • Sexual activity: Defer       Family History   Problem Relation Age of Onset   • Colon cancer Neg Hx    • Colon polyps Neg Hx    • Esophageal cancer Neg Hx    • Liver cancer Neg Hx    • Liver disease Neg Hx    • Stomach cancer Neg Hx    • Rectal cancer Neg Hx        Vitals:    10/13/21 1013   BP: 124/76   BP Location: Left arm   Patient Position: Sitting   Cuff Size: Adult   Pulse: 61   Temp: 97.7 °F (36.5 °C)   TempSrc: Infrared   SpO2: 96%   Weight: 88 kg (194 lb)   Height: 180.3 cm (71\")       Review of Systems:    General:    Present -feeling well   Skin:    Not Present-Rash   HEENT:     Not Present-Acute visual changes or Acute hearing changes   Neck :    Not Present- swollen glands   Genitourinary:      Not Present- burning, frequency, urgency hematuria, dysuria,   Cardiovascular:   Not Present-chest pain, palpitations, or pressure   Respiratory:   Not Present- shortness of breath or cough   Gastrointestinal:  Musculoskeletal:  Neurological:  Psychiatric:   Present as mentioned in the " HP    Not Present. Recent gait disturbances.    Not Present-Seizures and weakness in extremities.    Not Present- Anxiety or Depression.       Physical Exam:    General Appearance:    Alert, cooperative, in no acute distress   Psych:    Mood appropriate    Eyes:          conjunctivae and sclerae normal, no   icterus, no pallor   ENMT:    Ears appear intact with no abnormalities noted oral mucosa moist   Neck:   No adenopathy, supple, trachea midline, no thyromegaly, no   carotid bruit, no JVD    Cardiovascular:    Regular rhythm and normal rate, normal S1 and S2, no            murmur, no gallop, no rub, no click   Gastrointestinal:     Inspection normal.  Normal bowel sounds, no masses, no organomegaly, soft round non-tender, non-distended, no guarding, no rebound or tenderness. No hepatosplenomegaly.   Skin:   No bleeding, bruising or rash   Neurologic:   nonfocal       Lab Results - Last 18 Months   Lab Units 02/09/21  1231 11/19/20  0949 11/03/20  1107   GLUCOSE mg/dL 107  --  81   BUN mg/dL 18  --  17   CREATININE mg/dL 1.1 1.20 1.19   SODIUM mmol/L 142  --  142   POTASSIUM mmol/L 4.9  --  4.4   CHLORIDE mmol/L 104  --  106   TOTAL CO2 mmol/L 29  --   --    CO2 mmol/L  --   --  28.7   TOTAL PROTEIN g/dL 7.2  --  6.8   ALBUMIN g/dL 4.3  --  4.30   ALT (SGPT) U/L 17  --  17   AST (SGOT) U/L 20  --  19   ALK PHOS U/L 74  --  67   BILIRUBIN mg/dL 0.6  --  0.6   GLOBULIN gm/dL  --   --  2.5   CRP mg/dL  --   --  2.59*       Lab Results - Last 18 Months   Lab Units 02/09/21  1153 11/03/20  1107   HEMOGLOBIN g/dL 13.6* 12.9*   HEMATOCRIT % 42.4 37.8   MCV fL 92.2 89.6   WBC K/uL 9.3 8.36   RDW % 15.3* 15.8*   MPV fL 10.6 10.7   PLATELETS K/uL 171 173   INR  1.14  --        Lab Results - Last 18 Months   Lab Units 11/03/20  1107   TSH uIU/mL 3.570        Assessment and Plan:  Assessment/Plan   Diagnoses and all orders for this visit:    1. Diarrhea, unspecified type (Primary)  -     CBC & Differential; Future  -      Comprehensive Metabolic Panel; Future  -     Amylase; Future  -     Lipase; Future  -     TSH; Future  -     C-reactive Protein; Future  -     CT Abdomen Pelvis With & Without Contrast; Future  -     Gastrointestinal Panel, PCR - Stool, Per Rectum; Future  -     Fecal Lactoferrin - Stool, Per Rectum; Future  -     Clostridium Difficile Toxin - , Per Rectum; Future    2. Incontinence of feces, unspecified fecal incontinence type  -     CBC & Differential; Future  -     Comprehensive Metabolic Panel; Future  -     Amylase; Future  -     Lipase; Future  -     TSH; Future  -     C-reactive Protein; Future  -     CT Abdomen Pelvis With & Without Contrast; Future    3. LUQ abdominal pain  -     CBC & Differential; Future  -     Comprehensive Metabolic Panel; Future  -     Amylase; Future  -     Lipase; Future  -     TSH; Future  -     C-reactive Protein; Future  -     CT Abdomen Pelvis With & Without Contrast; Future    4. LLQ abdominal pain  -     CBC & Differential; Future  -     Comprehensive Metabolic Panel; Future  -     Amylase; Future  -     Lipase; Future  -     TSH; Future  -     C-reactive Protein; Future  -     CT Abdomen Pelvis With & Without Contrast; Future    5. Malaise and fatigue  -     CBC & Differential; Future  -     Comprehensive Metabolic Panel; Future  -     Amylase; Future  -     Lipase; Future  -     TSH; Future  -     C-reactive Protein; Future  -     CT Abdomen Pelvis With & Without Contrast; Future    6. History of colon cancer  -     CBC & Differential; Future  -     Comprehensive Metabolic Panel; Future  -     Amylase; Future  -     Lipase; Future  -     TSH; Future  -     C-reactive Protein; Future  -     CT Abdomen Pelvis With & Without Contrast; Future      Due to the patient's advanced age and other comorbidities we discussed not proceeding to a colonoscopy at this time.  I will however check labs as noted above along with stool cultures and CT of abdomen pelvis.  Will call patient with  results and how to further follow-up depending on findings.       There are no Patient Instructions on file for this visit.    Next follow-up appointment        EMR Dragon/Transcription disclaimer:  Much of this encounter note is an electronic transcription/translation of spoken language to printed text. The electronic translation of spoken language may permit erroneous, or at times, nonsensical words or phrases to be inadvertently transcribed; although I have reviewed the note for such errors, some may still exist.

## 2021-10-14 ENCOUNTER — TELEPHONE (OUTPATIENT)
Dept: GASTROENTEROLOGY | Facility: CLINIC | Age: 86
End: 2021-10-14

## 2021-10-14 NOTE — TELEPHONE ENCOUNTER
----- Message from MARITZA Lincoln sent at 10/14/2021  8:25 AM CDT -----  Please notify the patient and his wife that his labs were all unremarkable.  This is very reassuring.  Thyroid is normal, electrolytes and kidney function are all normal.  Blood count is normal.  No signs of inflammation.  Will await stool results and imaging as ordered.

## 2021-10-15 NOTE — TELEPHONE ENCOUNTER
Tried to call pt again today re: results-was unable to reach him so I left another VM asking him to call me back to discuss.

## 2021-10-18 NOTE — TELEPHONE ENCOUNTER
Pt's wife, Jennifer, returned my call and I spoke to her re: results-she VU. Pt is scheduled for CT later this week and will be turning in stool studies soon. Pt/wife advised to call me back with any further questions/problems, otherwise I will be calling them when the rest of his testing is back.

## 2021-10-21 ENCOUNTER — LAB (OUTPATIENT)
Dept: LAB | Facility: HOSPITAL | Age: 86
End: 2021-10-21

## 2021-10-21 ENCOUNTER — APPOINTMENT (OUTPATIENT)
Dept: CT IMAGING | Facility: HOSPITAL | Age: 86
End: 2021-10-21

## 2021-10-21 DIAGNOSIS — R19.7 DIARRHEA, UNSPECIFIED TYPE: ICD-10-CM

## 2021-10-21 LAB
ADV 40+41 DNA STL QL NAA+NON-PROBE: NOT DETECTED
ASTRO TYP 1-8 RNA STL QL NAA+NON-PROBE: NOT DETECTED
C CAYETANENSIS DNA STL QL NAA+NON-PROBE: NOT DETECTED
C COLI+JEJ+UPSA DNA STL QL NAA+NON-PROBE: NOT DETECTED
C DIFF TOX GENS STL QL NAA+PROBE: NEGATIVE
CRYPTOSP DNA STL QL NAA+NON-PROBE: NOT DETECTED
E HISTOLYT DNA STL QL NAA+NON-PROBE: NOT DETECTED
EAEC PAA PLAS AGGR+AATA ST NAA+NON-PRB: NOT DETECTED
EC STX1+STX2 GENES STL QL NAA+NON-PROBE: NOT DETECTED
EPEC EAE GENE STL QL NAA+NON-PROBE: NOT DETECTED
ETEC LTA+ST1A+ST1B TOX ST NAA+NON-PROBE: NOT DETECTED
G LAMBLIA DNA STL QL NAA+NON-PROBE: NOT DETECTED
NOROVIRUS GI+II RNA STL QL NAA+NON-PROBE: NOT DETECTED
P SHIGELLOIDES DNA STL QL NAA+NON-PROBE: NOT DETECTED
RVA RNA STL QL NAA+NON-PROBE: NOT DETECTED
S ENT+BONG DNA STL QL NAA+NON-PROBE: NOT DETECTED
SAPO I+II+IV+V RNA STL QL NAA+NON-PROBE: NOT DETECTED
SHIGELLA SP+EIEC IPAH ST NAA+NON-PROBE: NOT DETECTED
V CHOL+PARA+VUL DNA STL QL NAA+NON-PROBE: NOT DETECTED
V CHOLERAE DNA STL QL NAA+NON-PROBE: NOT DETECTED
Y ENTEROCOL DNA STL QL NAA+NON-PROBE: NOT DETECTED

## 2021-10-21 PROCEDURE — 83630 LACTOFERRIN FECAL (QUAL): CPT

## 2021-10-21 PROCEDURE — 87493 C DIFF AMPLIFIED PROBE: CPT

## 2021-10-21 PROCEDURE — 0097U HC BIOFIRE FILMARRAY GI PANEL: CPT

## 2021-10-22 ENCOUNTER — HOSPITAL ENCOUNTER (OUTPATIENT)
Dept: CT IMAGING | Facility: HOSPITAL | Age: 86
Discharge: HOME | End: 2021-10-22
Admitting: NURSE PRACTITIONER

## 2021-10-22 DIAGNOSIS — Z85.038 HISTORY OF COLON CANCER: ICD-10-CM

## 2021-10-22 DIAGNOSIS — R53.81 MALAISE AND FATIGUE: ICD-10-CM

## 2021-10-22 DIAGNOSIS — R10.12 LUQ ABDOMINAL PAIN: ICD-10-CM

## 2021-10-22 DIAGNOSIS — R19.7 DIARRHEA, UNSPECIFIED TYPE: ICD-10-CM

## 2021-10-22 DIAGNOSIS — R10.32 LLQ ABDOMINAL PAIN: ICD-10-CM

## 2021-10-22 DIAGNOSIS — R53.83 MALAISE AND FATIGUE: ICD-10-CM

## 2021-10-22 DIAGNOSIS — R15.9 INCONTINENCE OF FECES, UNSPECIFIED FECAL INCONTINENCE TYPE: ICD-10-CM

## 2021-10-22 LAB — LACTOFERRIN STL QL LA: POSITIVE

## 2021-10-22 PROCEDURE — 74178 CT ABD&PLV WO CNTR FLWD CNTR: CPT

## 2021-10-22 PROCEDURE — 25010000002 IOPAMIDOL 61 % SOLUTION: Performed by: NURSE PRACTITIONER

## 2021-10-22 RX ADMIN — IOPAMIDOL 100 ML: 612 INJECTION, SOLUTION INTRAVENOUS at 11:17

## 2021-10-25 ENCOUNTER — TELEPHONE (OUTPATIENT)
Dept: GASTROENTEROLOGY | Facility: CLINIC | Age: 86
End: 2021-10-25

## 2021-10-25 NOTE — TELEPHONE ENCOUNTER
----- Message from MARITZA Lincoln sent at 10/25/2021  9:21 AM CDT -----  Please notify patient and his wife that gastrointestinal panel was negative for pathogens. Negative C. difficile. Please let him know I could start him on some Colestid to help with his diarrhea that is intermittent. Also I would recommend an over-the-counter probiotic.

## 2021-10-25 NOTE — TELEPHONE ENCOUNTER
Called and spoke to pt's wife re: results-she VU. She states pt is willing to try anything to get rid of diarrhea. [facility] hadn't actually sent in script for Colestid so I called [facility]-left  on prescriber line-authorized them to fill Colestid 1gm, 1 po BID, #60, 3 refills. [facility] wanted pt to f/u in 6 weeks if he started Colestid-I tried to transfer his wife to [facility] but she hung up. Isa is going to reach out to them to schedule f/u and pt/wife advised to call me back with further questions or problems.

## 2021-10-25 NOTE — TELEPHONE ENCOUNTER
----- Message from MARITZA Lincoln sent at 10/25/2021  9:18 AM CDT -----  Please notify the patient and his wife that CT of abdomen pelvis found no acute findings in the bowel. There is a stable nonobstructing left renal stone. Stable aortic aneurysm both used in comparison on CT imaging November 19, 2020

## 2021-11-08 ENCOUNTER — NURSE ONLY (OUTPATIENT)
Dept: PRIMARY CARE CLINIC | Age: 86
End: 2021-11-08

## 2021-11-08 DIAGNOSIS — Z23 NEED FOR COVID-19 VACCINE: Primary | ICD-10-CM

## 2021-11-08 PROCEDURE — 0004A COVID-19, PFIZER VACCINE 30MCG/0.3ML DOSE: CPT | Performed by: PEDIATRICS

## 2021-11-08 PROCEDURE — 91300 COVID-19, PFIZER VACCINE 30MCG/0.3ML DOSE: CPT | Performed by: PEDIATRICS

## 2021-11-08 NOTE — PROGRESS NOTES
After obtaining consent, and per orders of Dr. Emily Chaney, injection of Pfizer Covid Booster 0.3 mL was given in the Right deltoid . Patient tolerated it well. Patient instructed to report any adverse reaction to me immediately. COVID-19, Pfizer, PF, 30mcg/0.3mL      Current Status      Updated on: 11/8/2021 10:22 AM    Name Date Status Dose VIS Date Route Site  Lot# Given By Verified By   COVID-19Vladimir PF, 30mcg/0.3mL 11/8/2021 Given 0.3 mL 8/23/2021 [address]  [id number] Radha Gonzales --   Exp. Date [address] # Product Time Location External Comment   11/30/2021 [address]-3 DIY COVID-19 Vacc -- -- -- --   Question Answer Comment   VIS/EUA reviewed with patient and questions answered? Yes    VIS/EUA Given Date 11/8/2021    COVID-19 Vaccine Dose Booster    Pandemic Funds used for this vaccine?  Yes    [employer] Population Age 12+

## 2022-01-03 DIAGNOSIS — G62.9 NEUROPATHY: ICD-10-CM

## 2022-01-03 RX ORDER — PREGABALIN 75 MG/1
CAPSULE ORAL
Qty: 180 CAPSULE | Refills: 0 | Status: SHIPPED | OUTPATIENT
Start: 2022-01-03 | End: 2022-04-05

## 2022-01-03 NOTE — TELEPHONE ENCOUNTER
Received fax from pharmacy requesting refill on pts medication(s). Pt was last seen in office on 10/7/2021  and has a follow up scheduled for Visit date not found. Will send request to  Dr. Chepe Perez  for authorization.      Requested Prescriptions     Pending Prescriptions Disp Refills    pregabalin (LYRICA) 75 MG capsule [Pharmacy Med Name: Pregabalin 75 MG Oral Capsule] 180 capsule 0     Sig: Take 1 capsule by mouth twice daily

## 2022-01-18 RX ORDER — DULOXETIN HYDROCHLORIDE 60 MG/1
60 CAPSULE, DELAYED RELEASE ORAL DAILY
Qty: 90 CAPSULE | Refills: 2 | Status: SHIPPED | OUTPATIENT
Start: 2022-01-18 | End: 2022-07-27 | Stop reason: SDUPTHER

## 2022-01-18 NOTE — TELEPHONE ENCOUNTER
Received fax from pharmacy requesting refill on pts medication(s). Pt was last seen in office on 10/7/2021  and has a follow up scheduled for Visit date not found. Will send request to  Dr. Caden Gandhi  for authorization.      Requested Prescriptions     Pending Prescriptions Disp Refills    DULoxetine (CYMBALTA) 60 MG extended release capsule [Pharmacy Med Name: DULoxetine HCl 60 MG Oral Capsule Delayed Release Particles] 90 capsule 2     Sig: Take 1 capsule by mouth daily

## 2022-01-24 ENCOUNTER — HOSPITAL ENCOUNTER (EMERGENCY)
Age: 87
Discharge: HOME | End: 2022-01-24
Payer: MEDICARE

## 2022-01-24 ENCOUNTER — TELEPHONE (OUTPATIENT)
Dept: UROLOGY | Facility: CLINIC | Age: 87
End: 2022-01-24

## 2022-01-24 ENCOUNTER — APPOINTMENT (OUTPATIENT)
Dept: CT IMAGING | Age: 87
End: 2022-01-24
Payer: MEDICARE

## 2022-01-24 VITALS
BODY MASS INDEX: 25.9 KG/M2 | HEART RATE: 60 BPM | SYSTOLIC BLOOD PRESSURE: 169 MMHG | OXYGEN SATURATION: 94 % | RESPIRATION RATE: 16 BRPM | TEMPERATURE: 97.4 F | HEIGHT: 71 IN | WEIGHT: 185 LBS | DIASTOLIC BLOOD PRESSURE: 76 MMHG

## 2022-01-24 DIAGNOSIS — N30.01 HEMATURIA DUE TO ACUTE CYSTITIS: Primary | ICD-10-CM

## 2022-01-24 DIAGNOSIS — N20.0 KIDNEY STONE: Primary | ICD-10-CM

## 2022-01-24 LAB
ABO/RH: NORMAL
ALBUMIN SERPL-MCNC: 3.7 G/DL (ref 3.5–5.2)
ALP BLD-CCNC: 72 U/L (ref 40–130)
ALT SERPL-CCNC: 17 U/L (ref 5–41)
ANION GAP SERPL CALCULATED.3IONS-SCNC: 9 MMOL/L (ref 7–19)
ANTIBODY SCREEN: NORMAL
APTT: 35 SEC (ref 26–36.2)
AST SERPL-CCNC: 23 U/L (ref 5–40)
BACTERIA: ABNORMAL /HPF
BASOPHILS ABSOLUTE: 0 K/UL (ref 0–0.2)
BASOPHILS RELATIVE PERCENT: 0.3 % (ref 0–1)
BILIRUB SERPL-MCNC: 0.5 MG/DL (ref 0.2–1.2)
BILIRUBIN URINE: ABNORMAL
BLOOD, URINE: ABNORMAL
BUN BLDV-MCNC: 14 MG/DL (ref 8–23)
CALCIUM SERPL-MCNC: 9.1 MG/DL (ref 8.8–10.2)
CHLORIDE BLD-SCNC: 107 MMOL/L (ref 98–111)
CLARITY: ABNORMAL
CO2: 25 MMOL/L (ref 22–29)
COLOR: ABNORMAL
CREAT SERPL-MCNC: 1.1 MG/DL (ref 0.5–1.2)
EOSINOPHILS ABSOLUTE: 0.4 K/UL (ref 0–0.6)
EOSINOPHILS RELATIVE PERCENT: 5.7 % (ref 0–5)
EPITHELIAL CELLS, UA: ABNORMAL /HPF
GFR AFRICAN AMERICAN: >59
GFR NON-AFRICAN AMERICAN: >60
GLUCOSE BLD-MCNC: 119 MG/DL (ref 74–109)
GLUCOSE URINE: NEGATIVE MG/DL
HCT VFR BLD CALC: 39.5 % (ref 42–52)
HEMOGLOBIN: 12 G/DL (ref 14–18)
IMMATURE GRANULOCYTES #: 0 K/UL
INR BLD: 1.18 (ref 0.88–1.18)
KETONES, URINE: NEGATIVE MG/DL
LEUKOCYTE ESTERASE, URINE: ABNORMAL
LYMPHOCYTES ABSOLUTE: 1.7 K/UL (ref 1.1–4.5)
LYMPHOCYTES RELATIVE PERCENT: 25.5 % (ref 20–40)
MCH RBC QN AUTO: 28.1 PG (ref 27–31)
MCHC RBC AUTO-ENTMCNC: 30.4 G/DL (ref 33–37)
MCV RBC AUTO: 92.5 FL (ref 80–94)
MONOCYTES ABSOLUTE: 0.9 K/UL (ref 0–0.9)
MONOCYTES RELATIVE PERCENT: 12.8 % (ref 0–10)
NEUTROPHILS ABSOLUTE: 3.7 K/UL (ref 1.5–7.5)
NEUTROPHILS RELATIVE PERCENT: 55.2 % (ref 50–65)
NITRITE, URINE: POSITIVE
PDW BLD-RTO: 15.9 % (ref 11.5–14.5)
PH UA: 5 (ref 5–8)
PLATELET # BLD: 150 K/UL (ref 130–400)
PMV BLD AUTO: 10.9 FL (ref 9.4–12.4)
POTASSIUM REFLEX MAGNESIUM: 4.2 MMOL/L (ref 3.5–5)
PROTEIN UA: 100 MG/DL
PROTHROMBIN TIME: 15.2 SEC (ref 12–14.6)
RBC # BLD: 4.27 M/UL (ref 4.7–6.1)
RBC UA: ABNORMAL /HPF (ref 0–2)
SODIUM BLD-SCNC: 141 MMOL/L (ref 136–145)
SPECIFIC GRAVITY UA: 1.02 (ref 1–1.03)
TOTAL PROTEIN: 6.6 G/DL (ref 6.6–8.7)
UROBILINOGEN, URINE: 0.2 E.U./DL
WBC # BLD: 6.6 K/UL (ref 4.8–10.8)
WBC UA: ABNORMAL /HPF (ref 0–5)
YEAST: PRESENT /HPF

## 2022-01-24 PROCEDURE — 85025 COMPLETE CBC W/AUTO DIFF WBC: CPT

## 2022-01-24 PROCEDURE — 86900 BLOOD TYPING SEROLOGIC ABO: CPT

## 2022-01-24 PROCEDURE — 74177 CT ABD & PELVIS W/CONTRAST: CPT

## 2022-01-24 PROCEDURE — 87086 URINE CULTURE/COLONY COUNT: CPT

## 2022-01-24 PROCEDURE — 6360000004 HC RX CONTRAST MEDICATION: Performed by: PHYSICIAN ASSISTANT

## 2022-01-24 PROCEDURE — 99282 EMERGENCY DEPT VISIT SF MDM: CPT

## 2022-01-24 PROCEDURE — 85610 PROTHROMBIN TIME: CPT

## 2022-01-24 PROCEDURE — 86850 RBC ANTIBODY SCREEN: CPT

## 2022-01-24 PROCEDURE — 36415 COLL VENOUS BLD VENIPUNCTURE: CPT

## 2022-01-24 PROCEDURE — 85730 THROMBOPLASTIN TIME PARTIAL: CPT

## 2022-01-24 PROCEDURE — 81001 URINALYSIS AUTO W/SCOPE: CPT

## 2022-01-24 PROCEDURE — 80053 COMPREHEN METABOLIC PANEL: CPT

## 2022-01-24 PROCEDURE — 86901 BLOOD TYPING SEROLOGIC RH(D): CPT

## 2022-01-24 RX ORDER — MONTELUKAST SODIUM 4 MG/1
1 TABLET, CHEWABLE ORAL 2 TIMES DAILY PRN
COMMUNITY

## 2022-01-24 RX ORDER — CEPHALEXIN 500 MG/1
500 CAPSULE ORAL 2 TIMES DAILY
Qty: 14 CAPSULE | Refills: 0 | Status: SHIPPED | OUTPATIENT
Start: 2022-01-24 | End: 2022-01-31

## 2022-01-24 RX ADMIN — IOPAMIDOL 90 ML: 755 INJECTION, SOLUTION INTRAVENOUS at 13:27

## 2022-01-24 ASSESSMENT — ENCOUNTER SYMPTOMS
COLOR CHANGE: 0
BACK PAIN: 0
PHOTOPHOBIA: 0
COUGH: 0
EYE DISCHARGE: 0
SHORTNESS OF BREATH: 0
EYE ITCHING: 0
APNEA: 0

## 2022-01-24 NOTE — ED PROVIDER NOTES
[facility] - [facility] EMERGENCY DEPT  eMERGENCYdEPARTMENT eNCOUnter      Pt Name: Parker Timmons  MRN: [id number]  [place] 1935  Date of evaluation: 1/24/2022  Provider:STEVAN Ohara    CHIEF COMPLAINT       Chief Complaint   Patient presents with    Hematuria     Patient states he has been passing blood for approx 6weeks. Has tried to get into urologist without success. Is now passing clots, and \"material.\"         HISTORY OF PRESENT ILLNESS  (Location/Symptom, Timing/Onset, Context/Setting, Quality, Duration, Modifying Factors, Severity.)   Parker Timmons is a 82 y.o. male who presents to the emergency department with complaints of passing blood clots and hematuria he tells me for a month. It is painless and he tells me occasional sediment he has had kidney stones before endorses mild lower back pain but no severe pain including flank. He is unsure of prostate history no prior cancers he is on blood thinners Eliquis for prior stent history denies any chest pain shortness of breath. Concern at this time is of course for cancer which will rule out today he tells me he used to see a urologist dating back to 2016 since retired at [employer] and is here today for potential referral. The patient denies voiding difficulty or dysuria. He states he feels the urge to go and can without issue. HPI    Nursing Notes were reviewed and I agree. REVIEW OF SYSTEMS    (2-9 systems for level 4, 10 or more for level 5)     Review of Systems   Constitutional: Negative for activity change, appetite change, chills and fever. HENT: Negative for congestion and dental problem. Eyes: Negative for photophobia, discharge and itching. Respiratory: Negative for apnea, cough and shortness of breath. Cardiovascular: Negative for chest pain. Genitourinary: Positive for hematuria. Musculoskeletal: Negative for arthralgias, back pain, gait problem, myalgias and neck pain. Skin: Negative for color change, pallor and rash.    Neurological: Negative for dizziness, seizures and syncope. Psychiatric/Behavioral: Negative for agitation. The patient is not nervous/anxious. Except as noted above the remainder of the review of systems was reviewed and negative.        PAST MEDICAL HISTORY     Past Medical History:   Diagnosis Date    Adenocarcinoma ([facility]     colon    Aneurysm ([facility] aortic    Anxiety     Arthritis     Polyarticular    Atrial fibrillation ([facility]     Burgos's esophagus 2010    H/o    CAD (coronary artery disease)     CAD (coronary artery disease), native coronary artery     stent; sees dr. Juhi Driver    Chronic respiratory failure ([facility]     Costochondritis     Erectile dysfunction     Gastroesophageal reflux disease     GERD (gastroesophageal reflux disease)     High cholesterol     Hip pain     Hypercholesterolemia 2010    Hypertension     Hypertension     MI (mitral incompetence)     x4 stents    Nephrolithiasis     Non-cardiac chest pain 12/20/2011    Renal calculi     Right upper lobe consolidation ([facility]     Tobacco abuse 2010    Ulcer, gastric, acute 3/14/2012         SURGICAL HISTORY       Past Surgical History:   Procedure Laterality Date    ANKLE SURGERY      left    APPENDECTOMY      CARDIAC CATHETERIZATION  12/12/07,07/13/09    Left Heart Cath    CHOLECYSTECTOMY      right    COLON SURGERY      GALLBLADDER SURGERY      LEG SURGERY      right    LUNG REMOVAL, PARTIAL      MANDIBLE FRACTURE SURGERY      ORTHOPEDIC SURGERY      legs, hand and ankles    SKIN CANCER EXCISION  2016    TOTAL HIP ARTHROPLASTY Right 1/3/2020    RIGHT TOTAL HIP REPLACEMENT performed by Nicki Conte MD at [address] [facility]       Discharge Medication List as of 1/24/2022  2:54 PM      CONTINUE these medications which have NOT CHANGED    Details   colestipol (COLESTID) 1 g tablet Take 1 g by mouth 2 times dailyHistorical Med      DULoxetine (CYMBALTA) 60 MG extended release capsule Take 1 capsule by mouth daily, Disp-90 capsule, R-2Normal      pregabalin (LYRICA) 75 MG capsule Take 1 capsule by mouth twice daily, Disp-180 capsule, R-0Normal      albuterol sulfate HFA (VENTOLIN HFA) 108 (90 Base) MCG/ACT inhaler Inhale 2 puffs into the lungs 4 times daily as needed for Wheezing, Disp-18 g, R-5Normal      tamsulosin (FLOMAX) 0.4 MG capsule Take 1 capsule by mouth once daily, Disp-90 capsule, R-3Normal      isosorbide mononitrate (IMDUR) 30 MG extended release tablet Take 1 tablet by mouth daily, Disp-90 tablet, R-3Normal      metoprolol tartrate (LOPRESSOR) 50 MG tablet Take 1 tablet by mouth twice daily, Disp-180 tablet, R-3Normal      ELIQUIS 2.5 MG TABS tablet Take 1 tablet by mouth twice daily, Disp-180 tablet, R-3Normal      simvastatin (ZOCOR) 20 MG tablet Take 1 tablet by mouth nightly, Disp-90 tablet, R-3Normal      pantoprazole (PROTONIX) 40 MG tablet Take 40 mg by mouth 2 times dailyHistorical Med      Cholecalciferol (VITAMIN D3) 5000 units TABS Take 5,000 Units by mouth daily Historical Med             ALLERGIES     Lortab [hydrocodone-acetaminophen] and Morphine and related    FAMILY HISTORY       Family History   Problem Relation Age of Onset    Diabetes Mother     Coronary Art Dis Father     High Blood Pressure Father     High Cholesterol Father     Heart Attack Father     Stroke Father     Kidney Disease Son     Kidney Disease Son           SOCIAL HISTORY       Social History     Socioeconomic History    Marital status:      Spouse name: Not on file    Number of children: Not on file    Years of education: Not on file    Highest education level: Not on file   Occupational History    Not on file   Tobacco Use    Smoking status: Former Smoker    Smokeless tobacco: Never Used    Tobacco comment: quit in 1983   Vaping Use    Vaping Use: Never used   Substance and Sexual Activity    Alcohol use: No    Drug use: No    Sexual activity: Not on file   Other Topics Concern    Not on file   Social History Narrative    Not on file     Social Determinants of Health     Financial Resource Strain:     Difficulty of Paying Living Expenses: Not on file   Food Insecurity:     Worried About Running Out of Food in the Last Year: Not on file    Rosas of Food in the Last Year: Not on file   Transportation Needs:     Lack of Transportation (Medical): Not on file    Lack of Transportation (Non-Medical): Not on file   Physical Activity:     Days of Exercise per Week: Not on file    Minutes of Exercise per Session: Not on file   Stress:     Feeling of Stress : Not on file   Social Connections:     Frequency of Communication with Friends and Family: Not on file    Frequency of Social Gatherings with Friends and Family: Not on file    Attends Druze Services: Not on file    Active Member of [address] [place] [address] [employer] or Organizations: Not on file    Attends Club or Organization Meetings: Not on file    Marital Status: Not on file   Intimate Partner Violence:     Fear of Current or Ex-Partner: Not on file    Emotionally Abused: Not on file    Physically Abused: Not on file    Sexually Abused: Not on file   Housing Stability:     Unable to Pay for Housing in the Last Year: Not on file    Number of [place] in the Last Year: Not on file    Unstable Housing in the Last Year: Not on file       SCREENINGS           PHYSICAL EXAM    (up to 7 forlevel 4, 8 or more for level 5)     ED Triage Vitals [01/24/22 1123]   BP Temp Temp Source Pulse Resp SpO2 Height Weight   (!) 169/76 97.4 °F (36.3 °C) Oral 60 16 94 % 5' 11\" (1.803 m) 185 lb (83.9 kg)       Physical Exam  Vitals and nursing note reviewed. Constitutional:       General: He is not in acute distress. Appearance: Normal appearance. He is well-developed. He is not diaphoretic. HENT:      Head: Normocephalic and atraumatic.       Right Ear: Tympanic membrane, ear canal and external ear normal.      Left Ear: Tympanic membrane, ear canal and external ear normal.      Nose: Nose normal.      Mouth/Throat:      Mouth: Mucous membranes are moist.   Eyes:      Pupils: Pupils are equal, round, and reactive to light. Neck:      Trachea: No tracheal deviation. Cardiovascular:      Rate and Rhythm: Normal rate and regular rhythm. Pulses: Normal pulses. Heart sounds: Normal heart sounds. No murmur heard. Pulmonary:      Effort: Pulmonary effort is normal.      Breath sounds: Normal breath sounds. No stridor. No wheezing. Chest:      Chest wall: No tenderness. Abdominal:      General: Abdomen is flat. Bowel sounds are normal. There is no distension. Palpations: Abdomen is soft. Tenderness: There is no abdominal tenderness. Musculoskeletal:         General: Normal range of motion. Cervical back: Normal range of motion and neck supple. Skin:     General: Skin is warm and dry. Capillary Refill: Capillary refill takes less than 2 seconds. Neurological:      General: No focal deficit present. Mental Status: He is alert and oriented to person, place, and time. Mental status is at baseline. Psychiatric:         Mood and Affect: Mood normal.         Behavior: Behavior normal.         Thought Content: Thought content normal.         Judgment: Judgment normal.           DIAGNOSTIC RESULTS     RADIOLOGY:   Non-plain film images such as CT, Ultrasound and MRI are read by the radiologist. Plain radiographic images are visualized and preliminarilyinterpreted by No att. providers found with the below findings:      Interpretation per the Radiologist below, if available at the time of this note:    CT ABDOMEN PELVIS W IV CONTRAST Additional Contrast? None   Final Result   1. Nonobstructing calculus mid pole calyx left kidney. There is no   evidence of obstructive uropathy. Delayed images demonstrate no   evidence of stricture or discrete filling defect within the upper   tracts of either kidney or the ureters.    2. There is a filling defect within the posterior urinary bladder. This does appear to be contiguous with the posterior bladder mucosa   between the ureteral orifice. This could represent some clot within   the bladder. However, given the history direct visualization of the   posterior wall of the urinary bladder is recommended to exclude a   mucosal lesion. There is mild diffuse thickening of the urinary   bladder wall. The prostate gland is moderately enlarged. 3. Infrarenal abdominal aortic aneurysm. It does have a small saccular   component. It is essentially unchanged from the previous exam of   6/30/2021. No evidence of retroperitoneal hematoma. 4. Cortical cyst lower pole right kidney. No additional discrete renal   mass is identified. The adrenals are unremarkable. 5. Status post previous right hemicolectomy. There is diverticulosis   of the descending and sigmoid colon with no evidence of acute   diverticulitis. .    Signed by Dr Wall Spotted:  Michael Myrick - Abnormal; Notable for the following components:       Result Value    Protime 15.2 (*)     All other components within normal limits   CBC WITH AUTO DIFFERENTIAL - Abnormal; Notable for the following components:    RBC 4.27 (*)     Hemoglobin 12.0 (*)     Hematocrit 39.5 (*)     MCHC 30.4 (*)     RDW 15.9 (*)     Monocytes % 12.8 (*)     Eosinophils % 5.7 (*)     All other components within normal limits   COMPREHENSIVE METABOLIC PANEL W/ REFLEX TO MG FOR LOW K - Abnormal; Notable for the following components:    Glucose 119 (*)     All other components within normal limits   URINE RT REFLEX TO CULTURE - Abnormal; Notable for the following components:    Color, UA RED (*)     Clarity, UA TURBID (*)     Bilirubin Urine SMALL (*)     Blood, Urine MODERATE (*)     Protein,  (*)     Nitrite, Urine POSITIVE (*)     Leukocyte Esterase, Urine MODERATE (*)     All other components within normal limits   MICROSCOPIC URINALYSIS - Abnormal; Notable for the following components:    WBC, UA 16-20 (*)     RBC, UA TNTC (*)     Bacteria, UA None Seen (*)     Yeast, UA Present (*)     All other components within normal limits   CULTURE, URINE    Narrative:     ORDER#: [id number]                          ORDERED BY: ALEXI ORTEGA  SOURCE: Urine Clean Catch                  COLLECTED:  01/24/22 11:30  ANTIBIOTICS AT [facility]OTT.:                      RECEIVED :  01/24/22 11:47   APTT   TYPE AND SCREEN       All other labs were within normal range or notreturned as of this dictation. RE-ASSESSMENT        EMERGENCY DEPARTMENT COURSE and DIFFERENTIAL DIAGNOSIS/MDM:   Vitals:    Vitals:    01/24/22 1123   BP: (!) 169/76   Pulse: 60   Resp: 16   Temp: 97.4 °F (36.3 °C)   TempSrc: Oral   SpO2: 94%   Weight: 185 lb (83.9 kg)   Height: 5' 11\" (1.803 m)       MDM  Plan will be for antibiotic treatment for this cystitis close follow-up with Dr. Robbie Joshi there is concern based on the CT that this might be a lesion/mass that needs to have cystoscopy. It may help treat some of the bleeding he has no difficulty voiding and no indication to be cathed at this time his blood work is unremarkable including his kidney function plan will be for discharge. PROCEDURES:    Procedures      FINAL IMPRESSION      1.  Hematuria due to acute cystitis          DISPOSITION/PLAN   DISPOSITION Decision To Discharge 01/24/2022 02:41:59 PM      PATIENT REFERRED TO:  [facility] EMERGENCY DEPT  [facility]  [phone]    If symptoms worsen    Darren Myers MD  [address] [place] [address]  [phone]    Schedule an appointment as soon as possible for a visit         DISCHARGE MEDICATIONS:  Discharge Medication List as of 1/24/2022  2:54 PM      START taking these medications    Details   cephALEXin (KEFLEX) 500 MG capsule Take 1 capsule by mouth 2 times daily for 7 days, Disp-14 capsule, R-0Normal             (Please note that portions of this note were completed with a voice recognition program.  Efforts were made to edit the dictations but occasionallywords are mis-transcribed.)    Alfred Lund [phone], [phone]  01/25/22 9247

## 2022-01-24 NOTE — ED TRIAGE NOTES
Patient states has been passing blood in his urine intermittently for approx a month. Patient is now passing clots and was instructed by wife to come for evaluation. Patient is alert, and appropriate, without orthostatic signs/symptoms.

## 2022-01-24 NOTE — TELEPHONE ENCOUNTER
Pt called to make an appointment for hematuria and clots in his urine. He has not been seen since 2016 with Dr. Mcclendon.     Let pt and pt spouse know that our office would need his PCP Dr. Mcdonald to send us a referral and after it has been reviewed we could call him to make an appointment.     Instructed pt to see PCP or go to ED if he experiences any severe pain, can not urinate, fever, or any other severe symptoms.     Pt spouse verified that they did understand.

## 2022-01-26 LAB — URINE CULTURE, ROUTINE: NORMAL

## 2022-01-27 ENCOUNTER — TELEPHONE (OUTPATIENT)
Dept: PRIMARY CARE CLINIC | Age: 87
End: 2022-01-27

## 2022-01-27 NOTE — TELEPHONE ENCOUNTER
Attempted to schedule AWV for Medicare. No answer at phone listed. Message left for pt to call the office to schedule. HEME/ONC FLOW:  1. R/to us in July 2019 for increased risk for breast cancer  a. First mentruation @ 14 years of age  b. 2 pregnancies, first at age of 28  c. Mother had a breast cancer at age of 46, presumably ER+. Maternal aunt had breast cancer in her 60s    CHIEF COMPLAINT:  Consultation (High Risk Breast Cancer)    SUBJECTIVE:  Ms. Velázquez does not have any complaints. Denies fever, weight loss, dyspnea, chest pain, nipple discharge. No h/o cancer or abdominal surgery, breast biopsies. She complains of numbness and tingling over R breast @ upper outer quadrant x 2 weeks.     Her mom was d/w breast cancer at 46. Maternal aunt had it at her 60s.     I have reviewed the patient's medications and allergies, past medical, surgical, social and family history, updating these as appropriate. See Histories section of the EMR for a display of this information.     REVIEW OF SYSTEMS  Constitutional:  There is no history of fevers, weight loss or night sweats.  No history of weakness, fatigue or unusual pain.    Eyes:  No history of headaches,  visual changes or tearing of eyes.    ENT: No hearing changes or ear discharge or pain, no mouth sores or sore throat or difficulty in swallowing.  Cardiac:  No history of chest pain, palpitations, SOB, PND or orthopnea.    Respiratory:  No history of cough, sputum production or hemoptysis.    Gastrointestinal:  No history of nausea, vomiting, diarrhea, abdominal pains or changes in bowel habits.    Genitourinary:  No history of dysuria, hematuria or incontinence.    Musculoskeletal:  No history of joint pain, immobility or loss of function.    Skin:: The patient denies rash, itching, easy bruisability, masses or skin lesions.    Neurological:  No history of dizziness, localized weakness, sensory disturbance or loss of consciousness.    Psychiatric:: No mood changes or depression.  Allergic/Immunologic: No seasonal or other allergies.  Hematology:  No history of unusual bleeding  from any orifices.  No lumps in lymph node regions.      PHYSICAL EXAM        Oncology Encounter Vitals [07/02/19 1459]   ONC OP Encounter Vitals Group      /62      Pulse 62      Resp       Temp 96.8 °F (36 °C)      Temp src Temporal      SpO2 99 %      Weight 177 lb 11.2 oz (80.6 kg)      Height 5' 7\" (1.702 m)      Pain Score  0      Pain Location       Pain Education?       BSA (Calculated - m2) - Larry Juarez 1.92      BMI (Calculated) 27.83       ECOG Performance Status         Eyes::  PERRL.  Normal eye movements.  No scleral icterus or conjunctival pallor.    ENT:  No lesions, masses or discharge in ears or nose. Oropharynx reveals no exudate, thrush or ulcers.    Neck:  Trachea central.  No masses.  No lymphadenopathy.    Respiratory:  Normal chest expansion and percussion noted.  Auscultation reveals normal breath sounds bilaterally.  Breast: No masses or axillary lymphadenopathy bilaterally.  Cardiovascular:  Normal rhythm and rate.  No JVD. No murmurs, rubs or gallops.    Abdomen:  Soft, nontender.  No masses felt.  No hepatosplenomegaly.    Musculoskeletal:  Gait normal.  No muscle or joint tenderness.  No limitation of movements.    Neurologic:  Alert, oriented x 3. Cranial nerves II-XII intact.  Muscle power and tone grossly normal and equal bilaterally.No gross sensory deficit.  No cerebellar signs.    Skin:  No rashes, lesions or ulcers.  No petechiae or hemorrhages.    Bilateral upper and lower extremities:  No clubbing, cyanosis or edema.  Lymphatics: No lymphadenopathy detected in any of  the lymph node regions.    LABS:  No results for input(s): WBC, RBC, HGB, HCT, MCV, PLT, ANEUT, ALYMS, ZACK, AEOS, ABASO in the last 8765 hours.    Invalid input(s): ANC  Recent Labs   Lab 11/26/18  0733   Glucose 97   Sodium 142   Potassium 4.0   Chloride 103   BUN 16   Creatinine 0.82   CALCIUM 9.1     Recent Labs   Lab 11/26/18  0733   Anion Gap 13       RADIOLOGY:      ASSESSMENT/PLAN:  1. Family  history of breast cancer: As per NCI breast cancer risk assessment tool, her risk of developing breast cancer is 2.6% in 5 years. Lifetime risk is 14.6%. With that in mind and having a first-degree relative with a breast cancer prior to 50, she may benefit from genetic testing. Will refer her to our genetic counselor  2. Breast tenderness/numbness: She had had it x 2 weeks. Examination does not reveal any suspicious findings. Last MMG was 18 mos ago. We will order a screening MMG.     The patient's questions were answered satisfactorily  ===============================================================    Past Medical History:   Diagnosis Date   • Abnormal Pap smear of cervix    • Asthma    • Rheumatic fever      Past Surgical History:   Procedure Laterality Date   • Breast cyst excision      benign   • Colonoscopy  4/27/15    repeat 10 years   • Nasal septum surgery     • Tonsillectomy and adenoidectomy       Social History     Tobacco Use   • Smoking status: Former Smoker   • Smokeless tobacco: Never Used   Substance Use Topics   • Alcohol use: Yes     Alcohol/week: 1.2 oz     Types: 2 Standard drinks or equivalent per week     Comment: occasional   • Drug use: No     Family History   Problem Relation Age of Onset   • Cancer, Breast Mother 45   • Cancer Mother         breast cancer   • Cancer Father 60        lung, ? colon also   • Hypertension Father    • Heart disease Sister         heart valve replacement at 54yrs   • Heart disease Brother         heart attack at 62   • Schizophrenia Brother    • Multiple Sclerosis Other    • Systemic Lupus Erythematosus Other      Allergies as of 07/02/2019   • (No Known Allergies)     Current Outpatient Medications   Medication Sig Dispense Refill   • ADVAIR DISKUS 100-50 MCG/DOSE inhaler Inhale 1 puff into the lungs two times daily. 3 each 0   • VENTOLIN  (90 Base) MCG/ACT inhaler Inhale 2 puffs into the lungs every 4 hours as needed for Wheezing. 54 g 0   •  cholecalciferol (VITAMIN D3) 1000 UNITS tablet Take 5,000 Units by mouth daily.       No current facility-administered medications for this visit.

## 2022-01-31 ENCOUNTER — HOSPITAL ENCOUNTER (OUTPATIENT)
Dept: GENERAL RADIOLOGY | Age: 87
Discharge: HOME | End: 2022-01-31
Payer: MEDICARE

## 2022-01-31 ENCOUNTER — OFFICE VISIT (OUTPATIENT)
Dept: UROLOGY | Age: 87
End: 2022-01-31
Payer: MEDICARE

## 2022-01-31 VITALS — WEIGHT: 201.2 LBS | BODY MASS INDEX: 28.17 KG/M2 | HEIGHT: 71 IN | TEMPERATURE: 98.4 F

## 2022-01-31 DIAGNOSIS — N28.1 RENAL CYST, RIGHT: ICD-10-CM

## 2022-01-31 DIAGNOSIS — N20.0 KIDNEY STONE: ICD-10-CM

## 2022-01-31 DIAGNOSIS — N20.0 LEFT RENAL STONE: Primary | ICD-10-CM

## 2022-01-31 DIAGNOSIS — R31.0 GROSS HEMATURIA: ICD-10-CM

## 2022-01-31 LAB
BACTERIA URINE, POC: 0
BILIRUBIN URINE: 0 MG/DL
BLOOD, URINE: POSITIVE
CASTS URINE, POC: 0
CLARITY: CLEAR
COLOR: YELLOW
CRYSTALS URINE, POC: 0
EPI CELLS URINE, POC: 0
GLUCOSE URINE: ABNORMAL
KETONES, URINE: NEGATIVE
LEUKOCYTE EST, POC: ABNORMAL
NITRITE, URINE: NEGATIVE
PH UA: 5.5 (ref 4.5–8)
PROTEIN UA: POSITIVE
RBC URINE, POC: ABNORMAL
SPECIFIC GRAVITY UA: 1.03 (ref 1–1.03)
UROBILINOGEN, URINE: NORMAL
WBC URINE, POC: 2
YEAST URINE, POC: 0

## 2022-01-31 PROCEDURE — G8427 DOCREV CUR MEDS BY ELIG CLIN: HCPCS | Performed by: NURSE PRACTITIONER

## 2022-01-31 PROCEDURE — 1036F TOBACCO NON-USER: CPT | Performed by: NURSE PRACTITIONER

## 2022-01-31 PROCEDURE — 4040F PNEUMOC VAC/ADMIN/RCVD: CPT | Performed by: NURSE PRACTITIONER

## 2022-01-31 PROCEDURE — 74018 RADEX ABDOMEN 1 VIEW: CPT

## 2022-01-31 PROCEDURE — 1123F ACP DISCUSS/DSCN MKR DOCD: CPT | Performed by: NURSE PRACTITIONER

## 2022-01-31 PROCEDURE — 81001 URINALYSIS AUTO W/SCOPE: CPT | Performed by: NURSE PRACTITIONER

## 2022-01-31 PROCEDURE — G8417 CALC BMI ABV UP PARAM F/U: HCPCS | Performed by: NURSE PRACTITIONER

## 2022-01-31 PROCEDURE — 99203 OFFICE O/P NEW LOW 30 MIN: CPT | Performed by: NURSE PRACTITIONER

## 2022-01-31 PROCEDURE — G8484 FLU IMMUNIZE NO ADMIN: HCPCS | Performed by: NURSE PRACTITIONER

## 2022-01-31 ASSESSMENT — ENCOUNTER SYMPTOMS
VOMITING: 0
NAUSEA: 0
ABDOMINAL PAIN: 0
ABDOMINAL DISTENTION: 0
BACK PAIN: 0

## 2022-01-31 NOTE — PROGRESS NOTES
Ryan Palma is a 82 y.o. male who presents today   Chief Complaint   Patient presents with    New Patient     I am here for an ED FU for stones       Patient is an 44-year-old male who presents the clinic today with complaints of gross hematuria. He presented to the ED on 1/24/2022 with complaints of gross hematuria. He states he has been passing clots for approximately 2 days. He has had bloody urine for approximately 6 weeks. H&H stable at that time with hemoglobin 12.0, hematocrit 39.5. Renal function reveals creatinine 1.1, GFR greater than 60. Negative urine culture. He denies any symptoms whatsoever with episodes of gross hematuria. He states this has cleared within the last several days denies any further episodes. He does have a former history of smoking approximately 35 years 1 to 2 packs/day. 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 He also has a long history of kidney stones the last intervention being approximately 15 years ago. Currently maintained on Flomax no lower urinary tract symptoms. Currently maintained on Eliquis for the past 10+ years has cardiac stents.     Past Medical History:   Diagnosis Date    Adenocarcinoma ([address]     colon    Aneurysm ([address] aortic    Anxiety     Arthritis     Polyarticular    Atrial fibrillation ([address]     Burgos's esophagus 2010    H/o    CAD (coronary artery disease)     CAD (coronary artery disease), native coronary artery     stent; sees dr. Maximino Gong    Chronic respiratory failure ([address]     Costochondritis     Erectile dysfunction     Gastroesophageal reflux disease     GERD (gastroesophageal reflux disease)     High cholesterol     Hip pain     Hypercholesterolemia 2010    Hypertension     Hypertension     MI (mitral incompetence)     x4 stents    Nephrolithiasis     Non-cardiac chest pain 12/20/2011    Renal calculi     Right upper lobe consolidation ([address]     Tobacco abuse 2010    Ulcer, gastric, acute 3/14/2012       Past Surgical History:   Procedure Laterality Date    ANKLE SURGERY      left    APPENDECTOMY      CARDIAC CATHETERIZATION  12/12/07,07/13/09    Left Heart Cath    CHOLECYSTECTOMY      right    COLON SURGERY      GALLBLADDER SURGERY      LEG SURGERY      right    LUNG REMOVAL, PARTIAL      MANDIBLE FRACTURE SURGERY      ORTHOPEDIC SURGERY      legs, hand and ankles    SKIN CANCER EXCISION  2016    TOTAL HIP ARTHROPLASTY Right 1/3/2020    RIGHT TOTAL HIP REPLACEMENT performed by Khalida Jimenez MD at [address] [place] [address] OR       Current Outpatient Medications   Medication Sig Dispense Refill    colestipol (COLESTID) 1 g tablet Take 1 g by mouth 2 times daily      cephALEXin (KEFLEX) 500 MG capsule Take 1 capsule by mouth 2 times daily for 7 days 14 capsule 0    DULoxetine (CYMBALTA) 60 MG extended release capsule Take 1 capsule by mouth daily 90 capsule 2    pregabalin (LYRICA) 75 MG capsule Take 1 capsule by mouth twice daily 180 capsule 0    albuterol sulfate HFA (VENTOLIN HFA) 108 (90 Base) MCG/ACT inhaler Inhale 2 puffs into the lungs 4 times daily as needed for Wheezing 18 g 5    tamsulosin (FLOMAX) 0.4 MG capsule Take 1 capsule by mouth once daily 90 capsule 3    isosorbide mononitrate (IMDUR) 30 MG extended release tablet Take 1 tablet by mouth daily 90 tablet 3    metoprolol tartrate (LOPRESSOR) 50 MG tablet Take 1 tablet by mouth twice daily 180 tablet 3    ELIQUIS 2.5 MG TABS tablet Take 1 tablet by mouth twice daily 180 tablet 3    simvastatin (ZOCOR) 20 MG tablet Take 1 tablet by mouth nightly 90 tablet 3    pantoprazole (PROTONIX) 40 MG tablet Take 40 mg by mouth 2 times daily      Cholecalciferol (VITAMIN D3) 5000 units TABS Take 5,000 Units by mouth daily        No current facility-administered medications for this visit.        Allergies   Allergen Reactions    Lortab [Hydrocodone-Acetaminophen] Nausea And Vomiting    Morphine And Related Nausea And Vomiting Social History     Socioeconomic History    Marital status:      Spouse name: None    Number of children: None    Years of education: None    Highest education level: None   Occupational History    None   Tobacco Use    Smoking status: Former Smoker    Smokeless tobacco: Never Used    Tobacco comment: quit in 1983   Vaping Use    Vaping Use: Never used   Substance and Sexual Activity    Alcohol use: No    Drug use: No    Sexual activity: None   Other Topics Concern    None   Social History Narrative    None     Social Determinants of Health     Financial Resource Strain:     Difficulty of Paying Living Expenses: Not on file   Food Insecurity:     Worried About Running Out of Food in the Last Year: Not on file    Rosas of Food in the Last Year: Not on file   Transportation Needs:     Lack of Transportation (Medical): Not on file    Lack of Transportation (Non-Medical):  Not on file   Physical Activity:     Days of Exercise per Week: Not on file    Minutes of Exercise per Session: Not on file   Stress:     Feeling of Stress : Not on file   Social Connections:     Frequency of Communication with Friends and Family: Not on file    Frequency of Social Gatherings with Friends and Family: Not on file    Attends Catholic Services: Not on file    Active Member of [address] [place] [address] [employer] or Organizations: Not on file    Attends Club or Organization Meetings: Not on file    Marital Status: Not on file   Intimate Partner Violence:     Fear of Current or Ex-Partner: Not on file    Emotionally Abused: Not on file    Physically Abused: Not on file    Sexually Abused: Not on file   Housing Stability:     Unable to Pay for Housing in the Last Year: Not on file    Number of [place] in the Last Year: Not on file    Unstable Housing in the Last Year: Not on file       Family History   Problem Relation Age of Onset    Diabetes Mother     Coronary Art Dis Father     High Blood Pressure Father     High Cholesterol Father     Heart Attack Father     Stroke Father     Kidney Disease Son     Kidney Disease Son        REVIEW OF SYSTEMS:  Review of Systems   Constitutional: Negative for chills and fever. Gastrointestinal: Negative for abdominal distention, abdominal pain, nausea and vomiting. Genitourinary: Positive for hematuria. Negative for difficulty urinating, dysuria, flank pain, frequency and urgency. Musculoskeletal: Negative for back pain and gait problem. Psychiatric/Behavioral: Negative for agitation and confusion. PHYSICAL EXAM:  Temp 98.4 °F (36.9 °C) (Temporal)   Ht 5' 11\" (1.803 m)   Wt 201 lb 3.2 oz (91.3 kg)   BMI 28.06 kg/m²   Physical Exam  Vitals and nursing note reviewed. Constitutional:       General: He is not in acute distress. Appearance: Normal appearance. He is not ill-appearing. Pulmonary:      Effort: Pulmonary effort is normal. No respiratory distress. Abdominal:      General: There is no distension. Tenderness: There is no abdominal tenderness. There is no right CVA tenderness or left CVA tenderness. Neurological:      Mental Status: He is alert and oriented to person, place, and time. Mental status is at baseline.    Psychiatric:         Mood and Affect: Mood normal.         Behavior: Behavior normal.       DATA:    Results for orders placed or performed in visit on 01/31/22   POCT Urinalysis Dipstick w/ Micro (Auto)   Result Value Ref Range    Color, UA Yellow     Clarity, UA Clear Clear    Glucose, Ur neg     Bilirubin Urine 0 mg/dL    Ketones, Urine Negative     Specific Gravity, UA 1.030 1.005 - 1.030    Blood, Urine Positive     pH, UA 5.5 4.5 - 8.0    Protein, UA Positive (A) Negative    Nitrite, Urine Negative     Leukocytes, UA small     Urobilinogen, Urine Normal     rbc urine, poc tntc     wbc urine, poc 2     bacteria urine, poc 0     yeast urine, poc 0     casts urine, poc 0     Epi Cells Urine, POC 0     crystals urine, poc 0 Lab Results   Component Value Date    PSA 1.93 05/03/2019    PSA 1.67 05/30/2014       IMAGING:  CT obtained on 1/24/2022 does reveal a nonobstructing stone in the left lower pole. No filling defects within either ureter or kidney. There is a filling defect within the bladder. Unsure if this was residual clot or possible neoplasm. He does have an enlarged prostate with several calcifications. Cortical cyst noted in the right lower pole. KUB obtained today does indicate several nephrolithiasis in the left lower pole largest approximately 4 mm. 1. Left renal stone  Left renal calculi nonobstructing noted on CT as well as KUB today. At this time patient is asymptomatic and does not want to undergo any treatment for nephrolithiasis unless obstructing. We did discuss ESWL. At this time patient would like to currently monitor.  - POCT Urinalysis Dipstick w/ Micro (Auto)    2. Gross hematuria  Several episodes of gross hematuria as well as passing clots. Hemoglobin hematocrit stable. UA does appear to have microscopic hematuria today. Patient states urine has cleared. Given his risk of personal and occupational exposure, age, abnormal CT we will go ahead and have patient follow-up for cystoscopy. Unsure if filling defect in bladder is either a bladder neoplasm or residual clot. Discussed this with patient. He will need to follow-up within the next 2 weeks for cystoscopy. 3. Renal cyst, right  Right renal cyst present for the last several years. This is stable, no intervention no further work-up. Orders Placed This Encounter   Procedures    POCT Urinalysis Dipstick w/ Micro (Auto)        Return in about 2 weeks (around 2/14/2022) for with Dr. Orlando Pinon, cystoscopy.     All information inputted into the note by the MA to include chief complaint, past medical history, past surgical history, medications, allergies, social and family history and review of systems has been reviewed and updated as needed by me. EMR Dragon/transcription disclaimer: Much of this documentt is electronic  transcription/translation of spoken language to printed text. The  electronic translation of spoken language may be erroneous, or at times,  nonsensical words or phrases may be inadvertently transcribed.  Although I  have reviewed the document for such errors, some may still exist.

## 2022-02-07 ENCOUNTER — PROCEDURE VISIT (OUTPATIENT)
Dept: UROLOGY | Age: 87
End: 2022-02-07
Payer: MEDICARE

## 2022-02-07 VITALS — HEIGHT: 71 IN | BODY MASS INDEX: 27.72 KG/M2 | WEIGHT: 198 LBS | TEMPERATURE: 97.9 F

## 2022-02-07 DIAGNOSIS — R31.0 GROSS HEMATURIA: Primary | ICD-10-CM

## 2022-02-07 DIAGNOSIS — N20.0 RENAL CALCULUS, LEFT: ICD-10-CM

## 2022-02-07 LAB
BACTERIA URINE, POC: 0
BILIRUBIN URINE: 2 MG/DL
BLOOD, URINE: POSITIVE
CASTS URINE, POC: 0
CLARITY: CLEAR
COLOR: YELLOW
CRYSTALS URINE, POC: 0
EPI CELLS URINE, POC: 0
GLUCOSE URINE: ABNORMAL
KETONES, URINE: POSITIVE
LEUKOCYTE EST, POC: ABNORMAL
NITRITE, URINE: NEGATIVE
PH UA: 5.5 (ref 4.5–8)
PROTEIN UA: POSITIVE
RBC URINE, POC: 6
SPECIFIC GRAVITY UA: 1.03 (ref 1–1.03)
UROBILINOGEN, URINE: NORMAL
WBC URINE, POC: 0
YEAST URINE, POC: 0

## 2022-02-07 PROCEDURE — 52000 CYSTOURETHROSCOPY: CPT | Performed by: UROLOGY

## 2022-02-07 PROCEDURE — 81001 URINALYSIS AUTO W/SCOPE: CPT | Performed by: UROLOGY

## 2022-02-07 PROCEDURE — 99999 PR OFFICE/OUTPT VISIT,PROCEDURE ONLY: CPT | Performed by: UROLOGY

## 2022-02-07 NOTE — PROGRESS NOTES
Patient is an 72-year-old male who presents the clinic today with complaints of gross hematuria. He presented to the ED on 1/24/2022 with complaints of gross hematuria. He states he has been passing clots for approximately 2 days. He has had bloody urine for approximately 6 weeks. H&H stable at that time with hemoglobin 12.0, hematocrit 39.5. Renal function reveals creatinine 1.1, GFR greater than 60. Negative urine culture. He denies any symptoms whatsoever with episodes of gross hematuria. He states this has cleared within the last several days denies any further episodes. He does have a former history of smoking approximately 35 years 1 to 2 packs/day. 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 He also has a long history of kidney stones the last intervention being approximately 15 years ago. Currently maintained on Flomax no lower urinary tract symptoms. Currently maintained on Eliquis for the past 10+ years has cardiac stents.     Cystoscopy Procedure Note    Indications: Diagnosis    Pre-operative Diagnosis: Hematuria    Post-operative Diagnosis: Hematuria    Surgeon: Adama Gil MD     Assistants: staff    Anesthesia: Local anesthesia topical 2% lidocaine gel    Procedure Details   The risks, benefits, complications, treatment options, and expected outcomes were discussed with the patient. The patient concurred with the proposed plan, giving informed consent. Cystoscopy was performed today under local anesthesia, using sterile technique. The patient was placed in the supine position, prepped with Hibiclens, and draped in the usual sterile fashion. A 17 Maltese sheath flexible cystoscope was used to inspect both the urethra and bladder using the flexible scope. Findings:  Anterior urethra: normal without strictures and without scarring. Prostate:  Prostatic urethra: 3+ large bilobar prostatic hyperplasia. median lobe present? yes.  Size of median lobe: Small the whole prostate has some intravesical protrusion  Bladder: Moderate trabeculation, without lesions there is old organized blood clot floating in the base of the bladder that accounts for the filling defect seen on CT. I broke this up with the scope. \  Ureteral orifice(s) was/were seen both. Ureteral orifice(s) both were in the normal location and both ureteral orifices were effluxing clear urine. No papillary tumors were seen. Complications:  None; patient tolerated the procedure well. Disposition: To home after observation. Condition: stable      DATA:  CBC:   Lab Results   Component Value Date    WBC 6.6 01/24/2022    RBC 4.27 01/24/2022    RBC 4.59 10/03/2011    HGB 12.0 01/24/2022    HCT 39.5 01/24/2022    HCT 44.7 10/06/2011    MCV 92.5 01/24/2022    MCH 28.1 01/24/2022    MCHC 30.4 01/24/2022    RDW 15.9 01/24/2022     01/24/2022     10/06/2011    MPV 10.9 01/24/2022     CMP:    Lab Results   Component Value Date     01/24/2022     10/06/2011    K 4.2 01/24/2022    K 4.5 10/06/2011     01/24/2022     10/06/2011    CO2 25 01/24/2022    BUN 14 01/24/2022    CREATININE 1.1 01/24/2022    CREATININE 1.3 10/06/2011    GFRAA >59 01/24/2022    LABGLOM >60 01/24/2022    GLUCOSE 119 01/24/2022    PROT 6.6 01/24/2022    PROT 7.6 06/11/2012    LABALBU 3.7 01/24/2022    LABALBU 4.0 10/06/2011    CALCIUM 9.1 01/24/2022    BILITOT 0.5 01/24/2022    ALKPHOS 72 01/24/2022    ALKPHOS 81 10/06/2011    AST 23 01/24/2022    ALT 17 01/24/2022     PSA:   Lab Results   Component Value Date    PSA 1.93 05/03/2019         IMAGING:  I reviewed the CT urogram shows nonobstructing stone about 5 mm in the lower pole of the left kidney. There are some hypodense renal cysts bilaterally. The collecting system within normal limit. Can see some irregular debris in the posterior aspect of the bladder could be blood clot.     1. Gross hematuria  Cystoscopy showed a old organized blood clot in the base of bladder this is what was causing the filling defect on the CT urogram otherwise normal-appearing bladder mucosa without lesion. Suspect the bleeding was either from the kidney stone or from the prostate confounded by being on blood thinner.  - Cystoscopy    2. Renal calculus, left  He is asymptomatic he currently does not want to have treatment with shockwave we will have him follow-up in 6 months with KUB  - XR ABDOMEN (KUB) (SINGLE AP VIEW); Future        Orders Placed This Encounter   Procedures    XR ABDOMEN (KUB) (SINGLE AP VIEW)     Standing Status:   Future     Standing Expiration Date:   2/7/2023     Order Specific Question:   Reason for exam:     Answer: Follow-up left renal calculus    Cystoscopy        Return in about 6 months (around 8/7/2022) for office visit after xray study, Follow-up with Buzz BRODERICK at  next visit.

## 2022-02-17 ENCOUNTER — TELEPHONE (OUTPATIENT)
Dept: PRIMARY CARE CLINIC | Age: 87
End: 2022-02-17

## 2022-02-17 NOTE — TELEPHONE ENCOUNTER
Attempted to make the AWV for Medicare. Pt declined the visit stating it is not necessary. Will call back in 1 year.

## 2022-03-18 ENCOUNTER — OFFICE VISIT (OUTPATIENT)
Dept: CARDIOLOGY CLINIC | Age: 87
End: 2022-03-18
Payer: MEDICARE

## 2022-03-18 VITALS
SYSTOLIC BLOOD PRESSURE: 118 MMHG | WEIGHT: 199 LBS | HEIGHT: 71 IN | BODY MASS INDEX: 27.86 KG/M2 | DIASTOLIC BLOOD PRESSURE: 70 MMHG | HEART RATE: 61 BPM

## 2022-03-18 DIAGNOSIS — I10 ESSENTIAL HYPERTENSION: ICD-10-CM

## 2022-03-18 DIAGNOSIS — E78.00 HYPERCHOLESTEROLEMIA: ICD-10-CM

## 2022-03-18 DIAGNOSIS — I48.0 PAROXYSMAL ATRIAL FIBRILLATION (HCC): ICD-10-CM

## 2022-03-18 DIAGNOSIS — I25.10 CORONARY ARTERY DISEASE INVOLVING NATIVE CORONARY ARTERY OF NATIVE HEART WITHOUT ANGINA PECTORIS: Primary | ICD-10-CM

## 2022-03-18 PROCEDURE — 1036F TOBACCO NON-USER: CPT | Performed by: CLINICAL NURSE SPECIALIST

## 2022-03-18 PROCEDURE — 99214 OFFICE O/P EST MOD 30 MIN: CPT | Performed by: CLINICAL NURSE SPECIALIST

## 2022-03-18 PROCEDURE — G8417 CALC BMI ABV UP PARAM F/U: HCPCS | Performed by: CLINICAL NURSE SPECIALIST

## 2022-03-18 PROCEDURE — 4040F PNEUMOC VAC/ADMIN/RCVD: CPT | Performed by: CLINICAL NURSE SPECIALIST

## 2022-03-18 PROCEDURE — 93000 ELECTROCARDIOGRAM COMPLETE: CPT | Performed by: CLINICAL NURSE SPECIALIST

## 2022-03-18 PROCEDURE — G8484 FLU IMMUNIZE NO ADMIN: HCPCS | Performed by: CLINICAL NURSE SPECIALIST

## 2022-03-18 PROCEDURE — 1123F ACP DISCUSS/DSCN MKR DOCD: CPT | Performed by: CLINICAL NURSE SPECIALIST

## 2022-03-18 PROCEDURE — G8427 DOCREV CUR MEDS BY ELIG CLIN: HCPCS | Performed by: CLINICAL NURSE SPECIALIST

## 2022-03-18 NOTE — PATIENT INSTRUCTIONS
Follow up in 6-9 mos With Dr. Xiao Goss  Call with any questions or concerns  Follow up with [facility], DO for non cardiac problems  Report any new problems  Cardiovascular Fitness-Exercise as tolerated. Cardiac / Healthy Diet  Continue current medications as directed  Continue plan of treatment  It is always recommended that you bring your medications bottles with you to each visit - this is for your safety!

## 2022-03-18 NOTE — PROGRESS NOTES
[facility]  [facility] [address]  [address]  Phone: [phone]  Fax: [phone]    OFFICE VISIT:  3/18/2022    Dalila Zhu - : 1935    Reason For Visit:  Jose D Abarca is a 82 y.o. male who is here for 6 Month Follow-Up (no cardiac symptoms), Coronary Artery Disease, and Atrial Fibrillation  History of retention, atrial fibrillation, hyperlipidemia and coronary disease with remote stenting to LAD, circumflex and RCA. 2021 patient had nuclear stress test that showed no evidence of ischemia or infarct with preserved LV function. He is been maintained on medical management. He also follows with vascular surgery for AAA  Patient developed hematuria earlier this year and had cystoscopy. No further active bleeding. Does have left renal calculus that is stable    Returns today for routine follow-up. He is accompanied with his wife. He states overall is doing about the same. He has had no more hematuria. He has JANE that is stable. He attributes that to being older and legs hurting. Cyril Puller denies exertional chest pain, resting shortness of breath, orthopnea, paroxysmal nocturnal dyspnea, syncope, presyncope, arrhythmia, edema and fatigue. The patient denies numbness or weakness to suggest cerebrovascular accident or transient ischemic attack. Edinson Paulson DO is PCP and follows labs .   Dallia Zhu has the following history as recorded in [facility]:    Patient Active Problem List    Diagnosis Date Noted    CAD (coronary artery disease), native coronary artery     Primary osteoarthritis of right hip 2020    Paroxysmal atrial fibrillation ([facility] 2017    Colon polyps 2017    History of colon cancer 2017    Abdominal aortic aneurysm (AAA) without rupture ([facility] 2013    Hypercholesterolemia     Weight loss     Burgos's esophagus     Ulcer, gastric, acute 2012    Chronic chest wall pain 2011    Hypertension  Gastroesophageal reflux disease     Arthritis     Renal calculi     Costochondritis      Past Medical History:   Diagnosis Date    Adenocarcinoma ([facility]     colon    Aneurysm (HCC) aortic    Anxiety     Arthritis     Polyarticular    Atrial fibrillation (HCC)     Burgos's esophagus 2010    H/o    CAD (coronary artery disease)     CAD (coronary artery disease), native coronary artery     stent; sees dr. Juhi Driver    Chronic respiratory failure ([facility]     Costochondritis     Erectile dysfunction     Gastroesophageal reflux disease     GERD (gastroesophageal reflux disease)     High cholesterol     Hip pain     Hypercholesterolemia 2010    Hypertension     Hypertension     MI (mitral incompetence)     x4 stents    Nephrolithiasis     Non-cardiac chest pain 12/20/2011    Renal calculi     Right upper lobe consolidation ([facility]     Tobacco abuse 2010    Ulcer, gastric, acute 3/14/2012     Past Surgical History:   Procedure Laterality Date    ANKLE SURGERY      left    APPENDECTOMY      CARDIAC CATHETERIZATION  12/12/07,07/13/09    Left Heart Cath    CHOLECYSTECTOMY      right    COLON SURGERY      GALLBLADDER SURGERY      LEG SURGERY      right    LUNG REMOVAL, PARTIAL      MANDIBLE FRACTURE SURGERY      ORTHOPEDIC SURGERY      legs, hand and ankles    SKIN CANCER EXCISION  2016    TOTAL HIP ARTHROPLASTY Right 1/3/2020    RIGHT TOTAL HIP REPLACEMENT performed by [facility]i Con[facility] OR     Family History   Problem Relation Age of Onset    Diabetes Mother     Coronary Art Dis Father     High Blood Pressure Father     High Cholesterol Father     Heart Attack Father     Stroke Father     Kidney Disease Son     Kidney Disease Son      Social History     Tobacco Use    Smoking status: Former Smoker    Smokeless tobacco: Never Used    Tobacco comment: quit in 1983   Substance Use Topics    Alcohol use: No      Current Outpatient Medications   Medication Sig Dispense Refill difficulty urinating, dysuria, frequency, or urgency. No flank pain or hematuria. Musculoskeletal  no back pain, gait disturbance, or myalgia. Skin  no color change or rash. No pallor. No new surgical incision. Neurologic  no speech difficulty, facial asymmetry or lateralizing weakness. No seizures, presyncope, syncope, or significant dizziness. Hematologic  no easy bruising or excessive bleeding. Psychiatric  no severe anxiety or insomnia. No confusion. All other review of systems are negative. Objective  Vital Signs - /70   Pulse 61   Ht 5' 11\" (1.803 m)   Wt 199 lb (90.3 kg) Comment: 199 lbs  BMI 27.75 kg/m²   General - Janelle Mazariegos is alert, cooperative, and pleasant. Well groomed. No acute distress. Body habitus is normal.  HEENT  The head is normocephalic. No circumoral cyanosis. Dentition is normal.   EYES -  No Xanthelasma, no arcus senilis, no conjunctival hemorrhages or discharge. Neck - Supple, without increased jugular venous pressures. No carotid bruits. No mass. Respiratory - Lungs are clear bilaterally. No wheezes or rales. Normal effort without use of accessory muscles. Cardiovascular  Heart has regular rhythm and rate. No murmurs, rubs or gallops. + pedal pulses and no varicosities. Abdominal -  Soft, nontender, nondistended. Bowel sounds are intact. Extremities - No clubbing, cyanosis, or  edema. Musculoskeletal -  No clubbing . No Osler's nodes. Gait normal   No kyphosis or scoliosis. Skin -  no statis ulcers or dermatitis. Neurological - No focal signs are identified. Oriented to person, place and time. Psychiatric -  Appropriate affect and mood. Assessment:     Diagnosis Orders   1. Paroxysmal atrial fibrillation (HCC)  EKG 12 lead   2. Coronary artery disease involving native coronary artery of native heart without angina pectoris     3. Essential hypertension  EKG 12 lead   4.  Hypercholesterolemia       Data:  BP Readings from Last 3 Encounters:   03/18/22 118/70   01/24/22 (!) 169/76   10/07/21 (!) 140/70    Pulse Readings from Last 3 Encounters:   03/18/22 61   01/24/22 60   10/07/21 54        Wt Readings from Last 3 Encounters:   03/18/22 199 lb (90.3 kg)   02/07/22 198 lb (89.8 kg)   01/31/22 201 lb 3.2 oz (91.3 kg)   EKG today shows normal sinus rhythm with rate of 61. PAC noted  Negative nuclear stress test March 2021  Blood pressure and heart rate controlled. Medical management includes beta-blocker and long-acting nitrate. Remains anticoagulated on low-dose Eliquis   Reviewed recent notes   Reviewed recent labs     States taking medications as prescribed  Stable cardiovascular status. No evidence of overt heart failure, angina or dysrhythmia. 30 minutes were spent preparing, reviewing and seeing patient. All questions answered    Plan    Follow up in 6-9 mos With Dr. Karen Merrill  Call with any questions or concerns  Follow up with Dmitri Xie, DO for non cardiac problems and labs  Report any new problems  Cardiovascular Fitness-Exercise as tolerated. Cardiac / Healthy Diet  Continue current medications as directed  Continue plan of treatment  It is always recommended that you bring your medications bottles with you to each visit - this is for your safety! DAVIE Kaufman dragon/transcription disclaimer: Much of this encounter note is electronic transcription/translation of spoken language to printed tach. Electronic translation of spoken language may be erroneous, or at times, nonsensical words or phrases may be inadvertently transcribed.  Although, I have reviewed the note for such errors, some may still exist.

## 2022-03-23 ENCOUNTER — OFFICE VISIT (OUTPATIENT)
Dept: PRIMARY CARE CLINIC | Age: 87
End: 2022-03-23
Payer: MEDICARE

## 2022-03-23 VITALS
HEART RATE: 74 BPM | WEIGHT: 197 LBS | BODY MASS INDEX: 27.48 KG/M2 | DIASTOLIC BLOOD PRESSURE: 70 MMHG | TEMPERATURE: 96.5 F | SYSTOLIC BLOOD PRESSURE: 128 MMHG | OXYGEN SATURATION: 96 %

## 2022-03-23 DIAGNOSIS — J44.1 COPD EXACERBATION (HCC): ICD-10-CM

## 2022-03-23 DIAGNOSIS — I48.0 PAROXYSMAL ATRIAL FIBRILLATION (HCC): ICD-10-CM

## 2022-03-23 DIAGNOSIS — H61.23 BILATERAL IMPACTED CERUMEN: Primary | ICD-10-CM

## 2022-03-23 DIAGNOSIS — I71.40 ABDOMINAL AORTIC ANEURYSM (AAA) WITHOUT RUPTURE: ICD-10-CM

## 2022-03-23 PROCEDURE — G8484 FLU IMMUNIZE NO ADMIN: HCPCS | Performed by: NURSE PRACTITIONER

## 2022-03-23 PROCEDURE — 99213 OFFICE O/P EST LOW 20 MIN: CPT | Performed by: NURSE PRACTITIONER

## 2022-03-23 PROCEDURE — 69210 REMOVE IMPACTED EAR WAX UNI: CPT | Performed by: NURSE PRACTITIONER

## 2022-03-23 PROCEDURE — 1123F ACP DISCUSS/DSCN MKR DOCD: CPT | Performed by: NURSE PRACTITIONER

## 2022-03-23 PROCEDURE — 4040F PNEUMOC VAC/ADMIN/RCVD: CPT | Performed by: NURSE PRACTITIONER

## 2022-03-23 PROCEDURE — 3023F SPIROM DOC REV: CPT | Performed by: NURSE PRACTITIONER

## 2022-03-23 PROCEDURE — 1036F TOBACCO NON-USER: CPT | Performed by: NURSE PRACTITIONER

## 2022-03-23 PROCEDURE — G8417 CALC BMI ABV UP PARAM F/U: HCPCS | Performed by: NURSE PRACTITIONER

## 2022-03-23 PROCEDURE — G8427 DOCREV CUR MEDS BY ELIG CLIN: HCPCS | Performed by: NURSE PRACTITIONER

## 2022-03-23 SDOH — ECONOMIC STABILITY: FOOD INSECURITY: WITHIN THE PAST 12 MONTHS, YOU WORRIED THAT YOUR FOOD WOULD RUN OUT BEFORE YOU GOT MONEY TO BUY MORE.: NEVER TRUE

## 2022-03-23 SDOH — ECONOMIC STABILITY: FOOD INSECURITY: WITHIN THE PAST 12 MONTHS, THE FOOD YOU BOUGHT JUST DIDN'T LAST AND YOU DIDN'T HAVE MONEY TO GET MORE.: NEVER TRUE

## 2022-03-23 ASSESSMENT — SOCIAL DETERMINANTS OF HEALTH (SDOH): HOW HARD IS IT FOR YOU TO PAY FOR THE VERY BASICS LIKE FOOD, HOUSING, MEDICAL CARE, AND HEATING?: NOT HARD AT ALL

## 2022-03-23 ASSESSMENT — PATIENT HEALTH QUESTIONNAIRE - PHQ9
8. MOVING OR SPEAKING SO SLOWLY THAT OTHER PEOPLE COULD HAVE NOTICED. OR THE OPPOSITE, BEING SO FIGETY OR RESTLESS THAT YOU HAVE BEEN MOVING AROUND A LOT MORE THAN USUAL: 0
SUM OF ALL RESPONSES TO PHQ QUESTIONS 1-9: 0
10. IF YOU CHECKED OFF ANY PROBLEMS, HOW DIFFICULT HAVE THESE PROBLEMS MADE IT FOR YOU TO DO YOUR WORK, TAKE CARE OF THINGS AT HOME, OR GET ALONG WITH OTHER PEOPLE: 0
SUM OF ALL RESPONSES TO PHQ QUESTIONS 1-9: 0
3. TROUBLE FALLING OR STAYING ASLEEP: 0
2. FEELING DOWN, DEPRESSED OR HOPELESS: 0
5. POOR APPETITE OR OVEREATING: 0
SUM OF ALL RESPONSES TO PHQ9 QUESTIONS 1 & 2: 0
SUM OF ALL RESPONSES TO PHQ QUESTIONS 1-9: 0
9. THOUGHTS THAT YOU WOULD BE BETTER OFF DEAD, OR OF HURTING YOURSELF: 0
4. FEELING TIRED OR HAVING LITTLE ENERGY: 0
7. TROUBLE CONCENTRATING ON THINGS, SUCH AS READING THE NEWSPAPER OR WATCHING TELEVISION: 0
6. FEELING BAD ABOUT YOURSELF - OR THAT YOU ARE A FAILURE OR HAVE LET YOURSELF OR YOUR FAMILY DOWN: 0
SUM OF ALL RESPONSES TO PHQ QUESTIONS 1-9: 0
1. LITTLE INTEREST OR PLEASURE IN DOING THINGS: 0

## 2022-03-23 ASSESSMENT — ENCOUNTER SYMPTOMS
VOMITING: 0
DIARRHEA: 0
CONSTIPATION: 0
RHINORRHEA: 0
SORE THROAT: 0
ABDOMINAL PAIN: 0
COUGH: 0
TROUBLE SWALLOWING: 0
SHORTNESS OF BREATH: 0
NAUSEA: 0

## 2022-03-23 NOTE — PROGRESS NOTES
Harley Ervin (:  1935) is a 82 y.o. male,Established patient, here for evaluation of the following chief complaint(s):  Ear Problem (bilat ears feel full and have some pain. )      ASSESSMENT/PLAN:    ICD-10-CM    1. Bilateral impacted cerumen  H61.23 53078 - CO REMOVE IMPACTED EAR WAX   2. Paroxysmal atrial fibrillation (HCC)  I48.0    3. COPD exacerbation ([address]  J44.1    4. Abdominal aortic aneurysm (AAA) without rupture (HCC)  I71.4        Return if symptoms worsen or fail to improve. Procedures  Bilateral ear canals irrigated with warm water irrigation and cotton tip applicator to remove ceruminous debris without complications. SUBJECTIVE/OBJECTIVE:  HPI  Here for bilateral ear fullness  Have to have them cleaned out about every 6 months  Have had some hearing loss recently  No fever  Tried peroxide and didn't help    Copd  No concerns, denies shortness of breath    CKD  Stable - review of labs from 2022  Lab Results   Component Value Date     2022    K 4.2 2022     2022    CO2 25 2022    BUN 14 2022    CREATININE 1.1 2022    GLUCOSE 119 (H) 2022    CALCIUM 9.1 2022    PROT 6.6 2022    LABALBU 3.7 2022    BILITOT 0.5 2022    ALKPHOS 72 2022    AST 23 2022    ALT 17 2022    LABGLOM >60 2022    GFRAA >59 2022    GLOB 3.1 2016     AAA  Followed by vascular  Had CT done 2022 at ER  Relatively unchanged from previous scan 2021    Ct abd/pelvis w iv contrast 2022  Impression   1. Nonobstructing calculus mid pole calyx left kidney. There is no   evidence of obstructive uropathy. Delayed images demonstrate no   evidence of stricture or discrete filling defect within the upper   tracts of either kidney or the ureters. 2. There is a filling defect within the posterior urinary bladder.    This does appear to be contiguous with the posterior bladder mucosa   between the ureteral orifice. This could represent some clot within   the bladder. However, given the history direct visualization of the   posterior wall of the urinary bladder is recommended to exclude a   mucosal lesion. There is mild diffuse thickening of the urinary   bladder wall. The prostate gland is moderately enlarged. 3. Infrarenal abdominal aortic aneurysm. It does have a small saccular   component. It is essentially unchanged from the previous exam of   6/30/2021. No evidence of retroperitoneal hematoma. 4. Cortical cyst lower pole right kidney. No additional discrete renal   mass is identified. The adrenals are unremarkable. 5. Status post previous right hemicolectomy. There is diverticulosis   of the descending and sigmoid colon with no evidence of acute   diverticulitis. .    Signed by Dr Manjula Rocha           /70   Pulse 74   Temp 96.5 °F (35.8 °C) (Temporal)   Wt 197 lb (89.4 kg)   SpO2 96%   BMI 27.48 kg/m²     Review of Systems   Constitutional: Negative for activity change, appetite change, fatigue, fever and unexpected weight change. HENT: Positive for ear discharge, ear pain and hearing loss. Negative for rhinorrhea, sore throat and trouble swallowing. Eyes: Negative for visual disturbance. Respiratory: Negative for cough and shortness of breath. Cardiovascular: Negative for chest pain, palpitations and leg swelling. Gastrointestinal: Negative for abdominal pain, constipation, diarrhea, nausea and vomiting. Genitourinary: Negative for flank pain. Musculoskeletal: Negative for arthralgias, myalgias, neck pain and neck stiffness. Neurological: Negative for headaches. Psychiatric/Behavioral: Negative for decreased concentration and sleep disturbance. The patient is not nervous/anxious. Physical Exam  Vitals reviewed. Constitutional:       Appearance: Normal appearance. HENT:      Head: Normocephalic and atraumatic. Right Ear:  There is impacted cerumen. Left Ear: There is impacted cerumen. Nose: Nose normal.      Mouth/Throat:      Mouth: Mucous membranes are moist.      Pharynx: Oropharynx is clear. Eyes:      Conjunctiva/sclera: Conjunctivae normal.   Cardiovascular:      Rate and Rhythm: Normal rate and regular rhythm. Pulses: Normal pulses. Heart sounds: Normal heart sounds. Pulmonary:      Effort: Pulmonary effort is normal.      Breath sounds: Normal breath sounds. Abdominal:      Palpations: Abdomen is soft. Musculoskeletal:      Cervical back: Normal range of motion and neck supple. Skin:     General: Skin is warm. Neurological:      General: No focal deficit present. Mental Status: He is alert. An electronic signature was used to authenticate this note.     --DAVIE Lim

## 2022-03-23 NOTE — PATIENT INSTRUCTIONS
Patient Education        Earwax Blockage: Care Instructions  Your Care Instructions     Earwax is a natural substance that protects the ear canal. Normally, earwax drains from the ears and does not cause problems. Sometimes earwax builds up and hardens. Earwax blockage (also called cerumen impaction) can cause some loss of hearing and pain. When wax is tightly packed, you will need to have your doctor remove it. Follow-up care is a key part of your treatment and safety. Be sure to make and go to all appointments, and call your doctor if you are having problems. It's also a good idea to know your test results and keep a list of the medicines you take. How can you care for yourself at home? · Do not try to remove earwax with cotton swabs, fingers, or other objects. This can make the blockage worse and damage the eardrum. · If your doctor recommends that you try to remove earwax at home:  ? Soften and loosen the earwax with warm mineral oil. You also can try hydrogen peroxide mixed with an equal amount of room temperature water. Place 2 drops of the fluid, warmed to body temperature, in the ear two times a day for up to 5 days. ? Once the wax is loose and soft, all that is usually needed to remove it from the ear canal is a gentle, warm shower. Direct the water into the ear, then tip your head to let the earwax drain out. Dry your ear thoroughly with a hair dryer set on low. Hold the dryer several inches from your ear. ? If the warm mineral oil and shower do not work, use an over-the-counter wax softener. Read and follow all instructions on the label. After using the wax softener, use an ear syringe to gently flush the ear. Make sure the flushing solution is body temperature. Cool or hot fluids in the ear can cause dizziness. When should you call for help?    Call your doctor now or seek immediate medical care if:    · Pus or blood drains from your ear.     · Your ears are ringing or feel full.     · You have a loss of hearing. Watch closely for changes in your health, and be sure to contact your doctor if:    · You have pain or reduced hearing after 1 week of home treatment.     · You have any new symptoms, such as nausea or balance problems. Where can you learn more? Go to [email or web address] org and sign in to your [employer] account. Enter T154 in the [employer] box to learn more about \"Earwax Blockage: Care Instructions. \"     If you do not have an account, please click on the \"Sign Up Now\" link. Current as of: July 1, 2021               Content Version: 13.1  © 3092-6970 Healthwise, [employer]. Care instructions adapted under license by [facility] ([facility]). If you have questions about a medical condition or this instruction, always ask your healthcare professional. [address] any warranty or liability for your use of this information.

## 2022-03-28 DIAGNOSIS — E78.00 HYPERCHOLESTEROLEMIA: ICD-10-CM

## 2022-03-28 RX ORDER — SIMVASTATIN 20 MG
20 TABLET ORAL NIGHTLY
Qty: 90 TABLET | Refills: 3 | Status: SHIPPED | OUTPATIENT
Start: 2022-03-28

## 2022-04-05 DIAGNOSIS — G62.9 NEUROPATHY: ICD-10-CM

## 2022-04-05 RX ORDER — PREGABALIN 75 MG/1
75 CAPSULE ORAL 2 TIMES DAILY
Qty: 180 CAPSULE | Refills: 0 | Status: SHIPPED | OUTPATIENT
Start: 2022-04-05 | End: 2022-07-07

## 2022-04-05 NOTE — TELEPHONE ENCOUNTER
Received fax from pharmacy requesting refill on pts medication(s). Pt was last seen in office on 3/23/2022  and has a follow up scheduled for Visit date not found. Will send request to  Dr. Keron Puente  for patient.      Requested Prescriptions     Pending Prescriptions Disp Refills    pregabalin (LYRICA) 75 MG capsule [Pharmacy Med Name: Pregabalin 75 MG Oral Capsule] 180 capsule 0     Sig: Take 1 capsule by mouth twice daily

## 2022-05-02 ENCOUNTER — TELEPHONE (OUTPATIENT)
Dept: CARDIOLOGY CLINIC | Age: 87
End: 2022-05-02

## 2022-05-03 NOTE — TELEPHONE ENCOUNTER
Spoke with the Pt's wife, Dinora Orozco stated that she would prefer the Pt come back into the office sooner due to him becoming fatigue and some shortness of air faster then usual. I instructed Dinora Orozco that if the PT is having SOA or chest pains the PT will need to go to the ER, Dinora Orozco voiced her understanding but she stated that she and the Pt didn't feel that was necessary right now, but wanted to come in sooner and if the PT developed worsening Sx he would go to the ER. Pt is genny to see Demetri Denise on 6/2/22 per Pt's wife request. Dinora Orozco voiced her understanding.

## 2022-05-09 RX ORDER — APIXABAN 2.5 MG/1
TABLET, FILM COATED ORAL
Qty: 180 TABLET | Refills: 3 | Status: SHIPPED | OUTPATIENT
Start: 2022-05-09

## 2022-05-16 RX ORDER — ISOSORBIDE MONONITRATE 30 MG/1
TABLET, EXTENDED RELEASE ORAL
Qty: 90 TABLET | Refills: 0 | Status: SHIPPED | OUTPATIENT
Start: 2022-05-16 | End: 2022-08-15

## 2022-05-20 RX ORDER — METOPROLOL TARTRATE 50 MG/1
TABLET, FILM COATED ORAL
Qty: 180 TABLET | Refills: 0 | Status: SHIPPED | OUTPATIENT
Start: 2022-05-20 | End: 2022-08-15

## 2022-06-02 ENCOUNTER — TELEPHONE (OUTPATIENT)
Dept: PRIMARY CARE CLINIC | Age: 87
End: 2022-06-02

## 2022-06-02 ENCOUNTER — OFFICE VISIT (OUTPATIENT)
Dept: CARDIOLOGY CLINIC | Age: 87
End: 2022-06-02
Payer: MEDICARE

## 2022-06-02 VITALS
BODY MASS INDEX: 27.3 KG/M2 | HEIGHT: 71 IN | DIASTOLIC BLOOD PRESSURE: 70 MMHG | HEART RATE: 55 BPM | OXYGEN SATURATION: 95 % | SYSTOLIC BLOOD PRESSURE: 110 MMHG | WEIGHT: 195 LBS

## 2022-06-02 DIAGNOSIS — I25.10 CORONARY ARTERY DISEASE INVOLVING NATIVE CORONARY ARTERY OF NATIVE HEART WITHOUT ANGINA PECTORIS: Primary | ICD-10-CM

## 2022-06-02 DIAGNOSIS — I10 ESSENTIAL HYPERTENSION: ICD-10-CM

## 2022-06-02 DIAGNOSIS — E78.00 HYPERCHOLESTEROLEMIA: ICD-10-CM

## 2022-06-02 DIAGNOSIS — I48.0 PAROXYSMAL ATRIAL FIBRILLATION (HCC): ICD-10-CM

## 2022-06-02 PROCEDURE — G8427 DOCREV CUR MEDS BY ELIG CLIN: HCPCS | Performed by: CLINICAL NURSE SPECIALIST

## 2022-06-02 PROCEDURE — 1036F TOBACCO NON-USER: CPT | Performed by: CLINICAL NURSE SPECIALIST

## 2022-06-02 PROCEDURE — G8417 CALC BMI ABV UP PARAM F/U: HCPCS | Performed by: CLINICAL NURSE SPECIALIST

## 2022-06-02 PROCEDURE — 99214 OFFICE O/P EST MOD 30 MIN: CPT | Performed by: CLINICAL NURSE SPECIALIST

## 2022-06-02 PROCEDURE — 1123F ACP DISCUSS/DSCN MKR DOCD: CPT | Performed by: CLINICAL NURSE SPECIALIST

## 2022-06-02 RX ORDER — NITROGLYCERIN 0.4 MG/1
0.4 TABLET SUBLINGUAL EVERY 5 MIN PRN
Qty: 25 TABLET | Refills: 3 | Status: SHIPPED | OUTPATIENT
Start: 2022-06-02

## 2022-06-02 NOTE — PROGRESS NOTES
[facility]  [facility] [address]  [address]  Phone: [phone]  Fax: [phone]    OFFICE VISIT:  2022    Oscar Liu - : 1935    Reason For Visit:  Stella Jack is a 82 y.o. male who is here for Follow-up (Coronary artery disease involving native coronary artery of native heart without angina pectoris)  Stenting to LAD. 2021 nuclear stress test showed no evidence of ischemia or infarct with preserved LV function. He continues on medical management. He follows with vascular surgery for AAA    Here today in follow-up. His wife thought he needed to be seen. Overall he is doing well. Has occasional chest discomfort but no different from his baseline. He is not needed any nitroglycerin. Notices occasional skipped and flutter but no sustained arrhythmias. Jacqualine Dy denies exertional chest pain, shortness of breath, orthopnea, paroxysmal nocturnal dyspnea, syncope, presyncope, arrhythmia, edema and fatigue. The patient denies numbness or weakness to suggest cerebrovascular accident or transient ischemic attack. Anne Acuna DO is PCP. Follows with urology.   He has had no hematuria  Oscar Liu has the following history as recorded in [facility]:    Patient Active Problem List    Diagnosis Date Noted    CAD (coronary artery disease), native coronary artery     Primary osteoarthritis of right hip 2020    Paroxysmal atrial fibrillation ([address] 2017    Colon polyps 2017    History of colon cancer 2017    Abdominal aortic aneurysm (AAA) without rupture ([address] 2013    Hypercholesterolemia     Weight loss     Burgos's esophagus     Ulcer, gastric, acute 2012    Chronic chest wall pain 2011    Hypertension     Gastroesophageal reflux disease     Arthritis     Renal calculi     Costochondritis      Past Medical History:   Diagnosis Date    Adenocarcinoma ([address]     colon    Aneurysm ([address] aortic    Anxiety     Arthritis     Polyarticular    [facility]     Burgos's esophagus 2010    H/o    CAD (coronary artery disease)     CAD (coronary artery disease), native coronary artery     stent; sees dr. Anand Huff    Chronic respiratory failure ([facility]     Costochondritis     Erectile dysfunction     Gastroesophageal reflux disease     GERD (gastroesophageal reflux disease)     High cholesterol     Hip pain     Hypercholesterolemia 2010    Hypertension     Hypertension     MI (mitral incompetence)     x4 stents    Nephrolithiasis     Non-cardiac chest pain 12/20/2011    Renal calculi     Right upper lobe consolidation [facility]     Tobacco abuse 2010    Ulcer, gastric, acute 3/14/2012     Past Surgical History:   Procedure Laterality Date    ANKLE SURGERY      left    APPENDECTOMY      CARDIAC CATHETERIZATION  12/12/07,07/13/09    Left Heart Cath    CHOLECYSTECTOMY      right    COLON SURGERY      GALLBLADDER SURGERY      LEG SURGERY      right    LUNG REMOVAL, PARTIAL      MANDIBLE FRACTURE SURGERY      ORTHOPEDIC SURGERY      legs, hand and ankles    SKIN CANCER EXCISION  2016    TOTAL HIP ARTHROPLASTY Right 1/3/2020    RIGHT TOTAL HIP REPLACEMENT performed by India Buenrostro MD at [facility] OR     Family History   Problem Relation Age of Onset    Diabetes Mother     Coronary Art Dis Father     High Blood Pressure Father     High Cholesterol Father     Heart Attack Father     Stroke Father     Kidney Disease Son     Kidney Disease Son      Social History     Tobacco Use    Smoking status: Former Smoker    Smokeless tobacco: Never Used    Tobacco comment: quit in 1983   Substance Use Topics    Alcohol use: No      Current Outpatient Medications   Medication Sig Dispense Refill    metoprolol tartrate (LOPRESSOR) 50 MG tablet Take 1 tablet by mouth twice daily 180 tablet 0    isosorbide mononitrate (IMDUR) 30 MG extended release tablet Take 1 tablet by mouth once daily 90 tablet 0    ELIQUIS 2.5 MG TABS tablet Take 1 tablet by mouth twice daily 180 tablet 3    pregabalin (LYRICA) 75 MG capsule Take 1 capsule by mouth 2 times daily for 90 days. 180 capsule 0    simvastatin (ZOCOR) 20 MG tablet Take 1 tablet by mouth nightly 90 tablet 3    colestipol (COLESTID) 1 g tablet Take 1 g by mouth 2 times daily as needed       DULoxetine (CYMBALTA) 60 MG extended release capsule Take 1 capsule by mouth daily 90 capsule 2    albuterol sulfate HFA (VENTOLIN HFA) 108 (90 Base) MCG/ACT inhaler Inhale 2 puffs into the lungs 4 times daily as needed for Wheezing 18 g 5    tamsulosin (FLOMAX) 0.4 MG capsule Take 1 capsule by mouth once daily 90 capsule 3    pantoprazole (PROTONIX) 40 MG tablet Take 40 mg by mouth 2 times daily      Cholecalciferol (VITAMIN D3) 5000 units TABS Take 5,000 Units by mouth daily        No current facility-administered medications for this visit. Allergies: Lortab [hydrocodone-acetaminophen] and Morphine and related    Review of Systems  Constitutional - no significant activity change, appetite change, or unexpected weight change. No fever, chills or diaphoresis. No fatigue. HEENT - no significant rhinorrhea or epistaxis. No tinnitus or significant hearing loss. Eyes - no sudden vision change or amaurosis. Respiratory - no significant wheezing, stridor, apnea or cough. No dyspnea on exertion or shortness of breath. Cardiovascular - no exertional chest pain, orthopnea or PND. No sensation of arrhythmia or slow heart rate. No claudication or leg edema. Gastrointestinal - no abdominal swelling or pain. No blood in stool. No severe constipation, diarrhea, nausea, or vomiting. Genitourinary - no difficulty urinating, dysuria, frequency, or urgency. No flank pain or hematuria. Musculoskeletal - no back pain, gait disturbance, or myalgia. Skin - no color change or rash. No pallor. No new surgical incision.   Neurologic - no speech difficulty, facial asymmetry or lateralizing weakness. No seizures, presyncope, syncope, or significant dizziness. Hematologic - no easy bruising or excessive bleeding. Psychiatric - no severe anxiety or insomnia. No confusion. All other review of systems are negative. Objective  Vital Signs - /70 (Site: Right Upper Arm, Position: Sitting)   Pulse 55   Ht 5' 11\" (1.803 m)   Wt 195 lb (88.5 kg)   SpO2 95%   BMI 27.20 kg/m²   General - Juan Kiser is alert, cooperative, and pleasant. Well groomed. No acute distress. Body habitus is normal.  HEENT - The head is normocephalic. No circumoral cyanosis. Dentition is normal.   EYES -  No Xanthelasma, no arcus senilis, no conjunctival hemorrhages or discharge. Neck - Supple, without increased jugular venous pressures. No carotid bruits. No mass. Respiratory - Lungs are clear bilaterally. No wheezes or rales. Normal effort without use of accessory muscles. Cardiovascular - Heart has regular rhythm and rate. No murmurs, rubs or gallops. + pedal pulses and no varicosities. Abdominal -  Soft, nontender, nondistended. Bowel sounds are intact. Extremities - No clubbing, cyanosis, or  edema. Musculoskeletal -  No clubbing . No Osler's nodes. Gait normal .  No kyphosis or scoliosis. + joint pain   Skin -  no statis ulcers or dermatitis. Neurological - No focal signs are identified. Oriented to person, place and time. Psychiatric -  Appropriate affect and mood. Assessment:     Diagnosis Orders   1. Coronary artery disease involving native coronary artery of native heart without angina pectoris     2. Paroxysmal atrial fibrillation (HCC)     3. Essential hypertension     4.  Hypercholesterolemia       Data:  BP Readings from Last 3 Encounters:   06/02/22 110/70   03/23/22 128/70   03/18/22 118/70    Pulse Readings from Last 3 Encounters:   06/02/22 55   03/23/22 74   03/18/22 61        Wt Readings from Last 3 Encounters:   06/02/22 195 lb (88.5 kg)   03/23/22 197 lb (89.4 kg)   03/18/22 199 lb (90.3 kg)   Blood pressure and heart rate well controlled. Medical management includes beta-blocker and long-acting nitrate. Remains on statin. Anticoagulated on low-dose Eliquis. Abnormal bleeding. Following with urology due to history of hematuria    Negative nuclear stress test last year. No change in activity tolerance. We discussed signs and symptoms report. Coming appointment vascular surgery to monitor his AAA  No recent lipids     States taking medications as prescribed  Stable cardiovascular status. No evidence of overt heart failure, angina or dysrhythmia. 30 minutes were spent preparing, reviewing and seeing patient. All questions answered    Plan    Fasting labs soon   Follow up in 6 mos   Call with any questions or concerns  Follow up with [facility], DO for non cardiac problems  Report any new problems  Cardiovascular Fitness-Exercise as tolerated. Strive for 30 minutes of exercise most days of the week. Cardiac / Healthy Diet  Continue current medications as directed  Continue plan of treatment  It is always recommended that you bring your medications bottles with you to each visit - this is for your safety! DAVIE Chamberlain    EMR dragon/transcription disclaimer: Much of this encounter note is electronic transcription/translation of spoken language to printed tach. Electronic translation of spoken language may be erroneous, or at times, nonsensical words or phrases may be inadvertently transcribed.  Although, I have reviewed the note for such errors, some may still exist.

## 2022-06-02 NOTE — PATIENT INSTRUCTIONS
Plan  Fasting labs soon   Follow up in 6 mos   Call with any questions or concerns  Follow up with [facility], DO for non cardiac problems  Report any new problems  Cardiovascular Fitness-Exercise as tolerated. Strive for 30 minutes of exercise most days of the week. Cardiac / Healthy Diet  Continue current medications as directed  Continue plan of treatment  It is always recommended that you bring your medications bottles with you to each visit - this is for your safety!

## 2022-06-09 ENCOUNTER — TELEPHONE (OUTPATIENT)
Dept: PRIMARY CARE CLINIC | Age: 87
End: 2022-06-09

## 2022-06-23 ENCOUNTER — TELEPHONE (OUTPATIENT)
Dept: VASCULAR SURGERY | Age: 87
End: 2022-06-23

## 2022-06-23 NOTE — TELEPHONE ENCOUNTER
Contacted the patient to change his vascular testing and follow up lindsay as the provider was not going to be here the day it was originally scheduled. Reminded the patient nothing to eat or drink 4 hours prior to the testing. Left our office number in case he has questions or needs to change the lindsay.

## 2022-07-07 DIAGNOSIS — G62.9 NEUROPATHY: ICD-10-CM

## 2022-07-07 RX ORDER — PREGABALIN 75 MG/1
75 CAPSULE ORAL 2 TIMES DAILY
Qty: 180 CAPSULE | Refills: 0 | Status: SHIPPED | OUTPATIENT
Start: 2022-07-07 | End: 2022-10-04

## 2022-07-26 ENCOUNTER — TELEPHONE (OUTPATIENT)
Dept: GASTROENTEROLOGY | Facility: CLINIC | Age: 87
End: 2022-07-26

## 2022-07-26 ENCOUNTER — OFFICE VISIT (OUTPATIENT)
Dept: GASTROENTEROLOGY | Facility: CLINIC | Age: 87
End: 2022-07-26

## 2022-07-26 VITALS
HEIGHT: 71 IN | OXYGEN SATURATION: 98 % | SYSTOLIC BLOOD PRESSURE: 162 MMHG | BODY MASS INDEX: 27.02 KG/M2 | HEART RATE: 67 BPM | WEIGHT: 193 LBS | DIASTOLIC BLOOD PRESSURE: 82 MMHG | TEMPERATURE: 96.6 F

## 2022-07-26 DIAGNOSIS — Z79.01 ANTICOAGULATED: ICD-10-CM

## 2022-07-26 DIAGNOSIS — K21.9 GASTROESOPHAGEAL REFLUX DISEASE, UNSPECIFIED WHETHER ESOPHAGITIS PRESENT: ICD-10-CM

## 2022-07-26 DIAGNOSIS — R10.13 ABDOMINAL PAIN, EPIGASTRIC: Primary | ICD-10-CM

## 2022-07-26 DIAGNOSIS — K22.70 BARRETT'S ESOPHAGUS WITHOUT DYSPLASIA: ICD-10-CM

## 2022-07-26 DIAGNOSIS — R13.19 ESOPHAGEAL DYSPHAGIA: ICD-10-CM

## 2022-07-26 PROCEDURE — 99214 OFFICE O/P EST MOD 30 MIN: CPT | Performed by: CLINICAL NURSE SPECIALIST

## 2022-07-26 NOTE — TELEPHONE ENCOUNTER
Pt was seen in office earlier today and scheduled an endo for 8/4/22. His wife called back and left a VM for Jenni stating that day wouldn't work as he has tests for another doc already scheduled. I have rescheduled him to Mon 8/8/22 @ 10:00am-his last dose of Eliquis will now be 8/5/22. I will have Jenni email scheduling and covid schedulers tomorrow to update them. Pt/wife advised to call me back with any further questions/problems.

## 2022-07-26 NOTE — PROGRESS NOTES
Nicolas Nieves  1935 7/26/2022  Chief Complaint   Patient presents with   • GI Problem     Epigastric pain     Subjective   HPI  Nicolas Nieves is a 87 y.o. male who presents with a complaint of epigastric abdominal pain that is an ache. It is progressive worsening over the past few months. No certain triggers. No alleviating factors. No nausea or vomiting. He says he has ongoing dysphagia with solid foods. He says anything with bulk mid esophogeal region. No wt loss. No fever chills or sweats.  He has a hx of Barretts esophagus determined by bx course constant ongoing. Fairly stable he manages on Protonix for this. He has been on this for years. He says it typically works for his indigestion.     Past Medical History:   Diagnosis Date   • Arthritis    • Atrial fibrillation (HCC)    • Harmon's esophagus    • CAD (coronary artery disease)    • COPD (chronic obstructive pulmonary disease) (HCC)    • Costochondritis    • Gastritis    • GERD (gastroesophageal reflux disease)    • Hemorrhoids    • History of colon cancer    • History of colon polyps    • Hypertension    • Peptic ulcer    • Renal calculi      Past Surgical History:   Procedure Laterality Date   • CARDIAC CATHETERIZATION      stents x 4   • CATARACT EXTRACTION, BILATERAL     • CHOLECYSTECTOMY     • COLONOSCOPY  04/25/2014    One 3mm hyperplastic rectal polyp; Repeat 3 years    • COLONOSCOPY N/A 5/17/2017    The examined portion of the ileum was normal; Patent end-to-side ileo-colonic anastomosis, characterized by healthy appearing mucosa; The entire examined colon is normal; No specimens collected; No plans to repeat due to age    • COLONOSCOPY  04/17/2013    Mass in ascending colon-biopsied-marked with Malorie ink; Internal hemorrhoids    • COLONOSCOPY  02/16/2010    Dr. Sanchez-Internal hermorrhoids; Repeat 3-5 years    • COLONOSCOPY  02/26/2007    Dr. White-Internal hemorrhoids; Diminuitive hyperplastic rectal polyp; AVM right colon; Repeat 4 years   •  COLONOSCOPY  04/22/2003    Dr. White-Normal colonoscopy with internal hemorrhoids   • ENDOSCOPY N/A 4/10/2019    No endoscopic esophageal abnormality to explain patient's dysphagia-esophagus dilated; A few gastric polyps-biopsied; Normal examined duodenum   • ENDOSCOPY  04/17/2013    Duodenitis; LA grade A esophagitis    • ENDOSCOPY  8/11/2020    Healed gastric ulcers    • ENDOSCOPY  02/16/2010    Gastric ulcers    • ENDOSCOPY  09/15/2009    Normal    • ENDOSCOPY  05/15/2006    Dr. WhiteZbrws-Wnsbptkno-faorncbd; [facility]    • ENDOSCOPY  01/24/2005    Dr. White-Diffuse esophageal spasm-dilated; Gastritis-biopsied   • ENDOSCOPY  04/25/2003    Dr. White-Stage II reflux esophagitis-rule out short-segment Harmon's esophagus; Schatzki's ring-dilated; Gastritis-biopsied   • HEMICOLECTOMY Right 05/2013   • LUNG REMOVAL, PARTIAL         Outpatient Medications Marked as Taking for the 7/26/22 encounter (Office Visit) with Nia العلي APRN   Medication Sig Dispense Refill   • Cholecalciferol (VITAMIN D3) 5000 units tablet tablet Take 5,000 Units by mouth Daily.     • docusate sodium (COLACE) 100 MG capsule Take 100 mg by mouth 2 (Two) Times a Day.     • DULoxetine (CYMBALTA) 60 MG capsule Take 1 capsule by mouth Daily.     • ELIQUIS 2.5 MG tablet tablet Take 1 tablet by mouth 2 (Two) Times a Day.     • glucosamine-chondroitin 500-400 MG capsule capsule Take 1 capsule by mouth Daily.     • isosorbide mononitrate (IMDUR) 30 MG 24 hr tablet Take 30 mg by mouth Daily.     • metoprolol tartrate (LOPRESSOR) 50 MG tablet Take 25 mg by mouth 2 (Two) Times a Day.     • pantoprazole (PROTONIX) 40 MG EC tablet Take 1 tablet by mouth 2 (two) times a day. 180 tablet 3   • simvastatin (ZOCOR) 20 MG tablet Take 20 mg by mouth Daily.     • tamsulosin (FLOMAX) 0.4 MG capsule 24 hr capsule Take 1 capsule by mouth Daily.     • vitamin B-12 (CYANOCOBALAMIN) 100 MCG tablet Take 1 tablet by mouth Daily.       Allergies   Allergen Reactions    • Lortab [Hydrocodone-Acetaminophen] Nausea And Vomiting   • Morphine And Related Nausea And Vomiting     Social History     Socioeconomic History   • Marital status:    Tobacco Use   • Smoking status: Former Smoker   • Smokeless tobacco: Never Used   Vaping Use   • Vaping Use: Never used   Substance and Sexual Activity   • Alcohol use: Not Currently   • Drug use: No   • Sexual activity: Defer     Family History   Problem Relation Age of Onset   • Colon cancer Neg Hx    • Colon polyps Neg Hx    • Esophageal cancer Neg Hx    • Liver cancer Neg Hx    • Liver disease Neg Hx    • Stomach cancer Neg Hx    • Rectal cancer Neg Hx      Health Maintenance   Topic Date Due   • TDAP/TD VACCINES (1 - Tdap) Never done   • ZOSTER VACCINE (1 of 2) Never done   • Pneumococcal Vaccine 65+ (1 - PCV) Never done   • ANNUAL WELLNESS VISIT  Never done   • LIPID PANEL  05/03/2020   • COVID-19 Vaccine (4 - Booster for Pfizer series) 03/08/2022   • INFLUENZA VACCINE  10/01/2022     Review of Systems   Constitutional: Negative for activity change, appetite change, chills, diaphoresis, fatigue, fever and unexpected weight change.   HENT: Positive for trouble swallowing. Negative for ear pain, hearing loss, mouth sores, sore throat and voice change.    Eyes: Negative.    Respiratory: Negative for cough, choking, shortness of breath and wheezing.    Cardiovascular: Negative for chest pain and palpitations.   Gastrointestinal: Negative for abdominal pain, blood in stool, constipation, diarrhea, nausea and vomiting.   Endocrine: Negative for cold intolerance and heat intolerance.   Genitourinary: Negative for decreased urine volume, dysuria, frequency, hematuria and urgency.   Musculoskeletal: Negative for back pain, gait problem and myalgias.   Skin: Negative for color change, pallor and rash.   Allergic/Immunologic: Negative for food allergies and immunocompromised state.   Neurological: Negative for dizziness, tremors, seizures,  "syncope, weakness, light-headedness, numbness and headaches.   Hematological: Negative for adenopathy. Does not bruise/bleed easily.   Psychiatric/Behavioral: Negative for agitation and confusion. The patient is not nervous/anxious.    All other systems reviewed and are negative.    Objective   Vitals:    07/26/22 1253   BP: 162/82   Pulse: 67   Temp: 96.6 °F (35.9 °C)   SpO2: 98%   Weight: 87.5 kg (193 lb)   Height: 180.3 cm (71\")     Body mass index is 26.92 kg/m².  Physical Exam  Constitutional:       Appearance: He is well-developed.   HENT:      Head: Normocephalic and atraumatic.   Eyes:      Pupils: Pupils are equal, round, and reactive to light.   Neck:      Trachea: No tracheal deviation.   Cardiovascular:      Rate and Rhythm: Normal rate and regular rhythm.      Heart sounds: Normal heart sounds. No murmur heard.    No friction rub. No gallop.   Pulmonary:      Effort: Pulmonary effort is normal. No respiratory distress.      Breath sounds: Normal breath sounds. No wheezing or rales.   Chest:      Chest wall: No tenderness.   Abdominal:      General: Bowel sounds are normal. There is no distension.      Palpations: Abdomen is soft. Abdomen is not rigid.      Tenderness: There is no abdominal tenderness. There is no guarding or rebound.   Musculoskeletal:         General: No tenderness or deformity. Normal range of motion.      Cervical back: Normal range of motion and neck supple.   Skin:     General: Skin is warm and dry.      Coloration: Skin is not pale.      Findings: No rash.   Neurological:      Mental Status: He is alert and oriented to person, place, and time.      Deep Tendon Reflexes: Reflexes are normal and symmetric.   Psychiatric:         Behavior: Behavior normal.         Thought Content: Thought content normal.         Judgment: Judgment normal.       Assessment & Plan   Diagnoses and all orders for this visit:    1. Abdominal pain, epigastric (Primary)  -     Case Request; Standing  -     " COVID PRE-OP / PRE-PROCEDURE SCREENING ORDER (NO ISOLATION) - Swab, Nasopharynx; Future  -     Case Request    2. Gastroesophageal reflux disease, unspecified whether esophagitis present    3. Harmon's esophagus without dysplasia    4. Esophageal dysphagia    5. Anticoagulated  Comments:  To hold per [facility] cardiology he take due to afib    Other orders  -     Follow Anesthesia Guidelines / Standing Orders; Future  -     Obtain Informed Consent; Future    continue PPI   I discussed non pharmaceutical treatment of gerd.  This includes gradually losing weight to achieve ideal body wt., elevation of the head of bed by 4-6 inches, nothing to eat or drink 3 hours prior to lying down, avoiding tight clothing, stress reduction, tobacco cessation, reduction of alcohol intake, and dietary restrictions (avoiding caffeine, coffee, fatty foods, mints, chocolate, spicy foods and tomato based sauces as much as possible).    Gaviscon as needed    ESOPHAGOGASTRODUODENOSCOPY WITH ANESTHESIA (N/A)  Part of this note may be an electronic transcription/translation of spoken language to printed text using the Dragon Dictation System.  Body mass index is 26.92 kg/m².  No follow-ups on file.    BMI is >= 25 and <30. (Overweight) The following options were offered after discussion;: weight loss educational material (shared in after visit summary)      All risks, benefits, alternatives, and indications of colonoscopy and/or Endoscopy procedure have been discussed with the patient. Risks to include perforation of the colon requiring possible surgery or colostomy, risk of bleeding from biopsies or removal of colon tissue, possibility of missing a colon polyp or cancer, or adverse drug reaction.  Benefits to include the diagnosis and management of disease of the colon and rectum. Alternatives to include barium enema, radiographic evaluation, lab testing or no intervention. Pt verbalizes understanding and agrees.     Nia العلي,  APRN  7/26/2022  13:07 CDT          If you smoke or use tobacco, 4 minutes reading provided  Steps to Quit Smoking  Smoking tobacco can be harmful to your health and can affect almost every organ in your body. Smoking puts you, and those around you, at risk for developing many serious chronic diseases. Quitting smoking is difficult, but it is one of the best things that you can do for your health. It is never too late to quit.  What are the benefits of quitting smoking?  When you quit smoking, you lower your risk of developing serious diseases and conditions, such as:  · Lung cancer or lung disease, such as COPD.  · Heart disease.  · Stroke.  · Heart attack.  · Infertility.  · Osteoporosis and bone fractures.  Additionally, symptoms such as coughing, wheezing, and shortness of breath may get better when you quit. You may also find that you get sick less often because your body is stronger at fighting off colds and infections. If you are pregnant, quitting smoking can help to reduce your chances of having a baby of low birth weight.  How do I get ready to quit?  When you decide to quit smoking, create a plan to make sure that you are successful. Before you quit:  · Pick a date to quit. Set a date within the next two weeks to give you time to prepare.  · Write down the reasons why you are quitting. Keep this list in places where you will see it often, such as on your bathroom mirror or in your car or wallet.  · Identify the people, places, things, and activities that make you want to smoke (triggers) and avoid them. Make sure to take these actions:  ¨ Throw away all cigarettes at home, at work, and in your car.  ¨ Throw away smoking accessories, such as ashtrays and lighters.  ¨ Clean your car and make sure to empty the ashtray.  ¨ Clean your home, including curtains and carpets.  · Tell your family, friends, and coworkers that you are quitting. Support from your loved ones can make quitting easier.  · Talk with your  health care provider about your options for quitting smoking.  · Find out what treatment options are covered by your health insurance.  What strategies can I use to quit smoking?  Talk with your healthcare provider about different strategies to quit smoking. Some strategies include:  · Quitting smoking altogether instead of gradually lessening how much you smoke over a period of time. Research shows that quitting “cold turkey” is more successful than gradually quitting.  · Attending in-person counseling to help you build problem-solving skills. You are more likely to have success in quitting if you attend several counseling sessions. Even short sessions of 10 minutes can be effective.  · Finding resources and support systems that can help you to quit smoking and remain smoke-free after you quit. These resources are most helpful when you use them often. They can include:  ¨ Online chats with a counselor.  ¨ Telephone quitlines.  ¨ Printed self-help materials.  ¨ Support groups or group counseling.  ¨ Text messaging programs.  ¨ Mobile phone applications.  · Taking medicines to help you quit smoking. (If you are pregnant or breastfeeding, talk with your health care provider first.) Some medicines contain nicotine and some do not. Both types of medicines help with cravings, but the medicines that include nicotine help to relieve withdrawal symptoms. Your health care provider may recommend:  ¨ Nicotine patches, gum, or lozenges.  ¨ Nicotine inhalers or sprays.  ¨ Non-nicotine medicine that is taken by mouth.  Talk with your health care provider about combining strategies, such as taking medicines while you are also receiving in-person counseling. Using these two strategies together makes you more likely to succeed in quitting than if you used either strategy on its own.  If you are pregnant or breastfeeding, talk with your health care provider about finding counseling or other support strategies to quit smoking. Do not  take medicine to help you quit smoking unless told to do so by your health care provider.  What things can I do to make it easier to quit?  Quitting smoking might feel overwhelming at first, but there is a lot that you can do to make it easier. Take these important actions:  · Reach out to your family and friends and ask that they support and encourage you during this time. Call telephone quitlines, reach out to support groups, or work with a counselor for support.  · Ask people who smoke to avoid smoking around you.  · Avoid places that trigger you to smoke, such as bars, parties, or smoke-break areas at work.  · Spend time around people who do not smoke.  · Lessen stress in your life, because stress can be a smoking trigger for some people. To lessen stress, try:  ¨ Exercising regularly.  ¨ Deep-breathing exercises.  ¨ Yoga.  ¨ Meditating.  ¨ Performing a body scan. This involves closing your eyes, scanning your body from head to toe, and noticing which parts of your body are particularly tense. Purposefully relax the muscles in those areas.  · Download or purchase mobile phone or tablet apps (applications) that can help you stick to your quit plan by providing reminders, tips, and encouragement. There are many free apps, such as QuitGuide from the CDC (Centers for Disease Control and Prevention). You can find other support for quitting smoking (smoking cessation) through smokefree.gov and other websites.  How will I feel when I quit smoking?  Within the first 24 hours of quitting smoking, you may start to feel some withdrawal symptoms. These symptoms are usually most noticeable 2-3 days after quitting, but they usually do not last beyond 2-3 weeks. Changes or symptoms that you might experience include:  · Mood swings.  · Restlessness, anxiety, or irritation.  · Difficulty concentrating.  · Dizziness.  · Strong cravings for sugary foods in addition to nicotine.  · Mild weight  gain.  · Constipation.  · Nausea.  · Coughing or a sore throat.  · Changes in how your medicines work in your body.  · A depressed mood.  · Difficulty sleeping (insomnia).  After the first 2-3 weeks of quitting, you may start to notice more positive results, such as:  · Improved sense of smell and taste.  · Decreased coughing and sore throat.  · Slower heart rate.  · Lower blood pressure.  · Clearer skin.  · The ability to breathe more easily.  · Fewer sick days.  Quitting smoking is very challenging for most people. Do not get discouraged if you are not successful the first time. Some people need to make many attempts to quit before they achieve long-term success. Do your best to stick to your quit plan, and talk with your health care provider if you have any questions or concerns.  This information is not intended to replace advice given to you by your health care provider. Make sure you discuss any questions you have with your health care provider.  Document Released: 12/12/2002 Document Revised: 08/15/2017 Document Reviewed: 05/03/2016  [employer]vier Interactive Patient Education © 2017 [employer]

## 2022-07-27 DIAGNOSIS — K21.00 GASTROESOPHAGEAL REFLUX DISEASE WITH ESOPHAGITIS, UNSPECIFIED WHETHER HEMORRHAGE: Primary | ICD-10-CM

## 2022-07-27 RX ORDER — DULOXETIN HYDROCHLORIDE 60 MG/1
60 CAPSULE, DELAYED RELEASE ORAL DAILY
Qty: 90 CAPSULE | Refills: 3 | Status: SHIPPED | OUTPATIENT
Start: 2022-07-27

## 2022-07-27 RX ORDER — PANTOPRAZOLE SODIUM 40 MG/1
40 TABLET, DELAYED RELEASE ORAL 2 TIMES DAILY
Qty: 90 TABLET | Refills: 3 | Status: SHIPPED | OUTPATIENT
Start: 2022-07-27

## 2022-07-27 NOTE — TELEPHONE ENCOUNTER
Received call/My Chart Message from patient requesting refill on medication(s). Pt was last seen in office on 3/23/2022  and has a follow up scheduled for Visit date not found. Will send request to provider for authorization. Requested Prescriptions     Pending Prescriptions Disp Refills    DULoxetine (CYMBALTA) 60 MG extended release capsule 90 capsule 3     Sig: Take 1 capsule by mouth in the morning. pantoprazole (PROTONIX) 40 MG tablet 90 tablet 3     Sig: Take 1 tablet by mouth in the morning and 1 tablet before bedtime.

## 2022-08-04 ENCOUNTER — OFFICE VISIT (OUTPATIENT)
Dept: VASCULAR SURGERY | Age: 87
End: 2022-08-04
Payer: MEDICARE

## 2022-08-04 ENCOUNTER — TELEPHONE (OUTPATIENT)
Dept: CARDIOLOGY CLINIC | Age: 87
End: 2022-08-04

## 2022-08-04 ENCOUNTER — HOSPITAL ENCOUNTER (OUTPATIENT)
Dept: CT IMAGING | Age: 87
Discharge: HOME | End: 2022-08-04
Payer: MEDICARE

## 2022-08-04 VITALS
TEMPERATURE: 97 F | OXYGEN SATURATION: 96 % | DIASTOLIC BLOOD PRESSURE: 62 MMHG | RESPIRATION RATE: 18 BRPM | SYSTOLIC BLOOD PRESSURE: 120 MMHG | HEART RATE: 53 BPM

## 2022-08-04 DIAGNOSIS — I71.40 AAA (ABDOMINAL AORTIC ANEURYSM) WITHOUT RUPTURE: ICD-10-CM

## 2022-08-04 DIAGNOSIS — I71.40 AAA (ABDOMINAL AORTIC ANEURYSM) WITHOUT RUPTURE: Primary | ICD-10-CM

## 2022-08-04 PROCEDURE — 74176 CT ABD & PELVIS W/O CONTRAST: CPT

## 2022-08-04 PROCEDURE — 1036F TOBACCO NON-USER: CPT | Performed by: PHYSICIAN ASSISTANT

## 2022-08-04 PROCEDURE — G8417 CALC BMI ABV UP PARAM F/U: HCPCS | Performed by: PHYSICIAN ASSISTANT

## 2022-08-04 PROCEDURE — 99213 OFFICE O/P EST LOW 20 MIN: CPT | Performed by: PHYSICIAN ASSISTANT

## 2022-08-04 PROCEDURE — G8427 DOCREV CUR MEDS BY ELIG CLIN: HCPCS | Performed by: PHYSICIAN ASSISTANT

## 2022-08-04 PROCEDURE — 74176 CT ABD & PELVIS W/O CONTRAST: CPT | Performed by: RADIOLOGY

## 2022-08-04 PROCEDURE — 1123F ACP DISCUSS/DSCN MKR DOCD: CPT | Performed by: PHYSICIAN ASSISTANT

## 2022-08-04 PROCEDURE — G1010 CDSM STANSON: HCPCS | Performed by: RADIOLOGY

## 2022-08-04 NOTE — PROGRESS NOTES
Patient Care Team:  Gurpreet Jimenez DO as PCP - General  B Jersey Lorenzana DO as PCP - [facility] Empaneled Provider  Tony Rousseau (Nurse Practitioner)  Tamela Cruz MD (Gastroenterology)  Andrew Webb MD as Consulting Physician (Oncology)  Sarah Munoz MD as Consulting Physician (Cardiology)      History and Physical:    Dominique Rg is a 82 y.o. yo male who has a past medical history that includes colon cancer, A-fib, CAD, COPD, GERD, hyperlipidemia, past tobacco abuse and AAA. He presents today for follow-up AAA. Currently he is on Zocor 20 mg daily. Eliquis 2.5 mg twice daily. He denies any back pain. He reports that he is having some pain over his sternum and epigastric area. This is worse with manual pressure. He reports that he is scheduled for an endoscopy with Dr. Brian Glass on 8/8/2022.      Dominique Rg is a 82 y.o. male with the following history reviewed and recorded in [employer]:  Patient Active Problem List    Diagnosis Date Noted    CAD (coronary artery disease), native coronary artery      Priority: High     Stents in the LAD, LCX, RCA  12/14/2007  Cath  Patent stents, normal LVFX  12/26/2008  SE  negative for myocardial ischemia  7/13/2009  Cath  Patent stents, normal LVFX  2/15/2010  SE  negative for myocardial ischemia  10/4/2011  Cath  Patent stents, normal LVFX  8/8/12  SE  negative for myocardial ischemia, DTS 0.5 [facility])  5/3/2013  SE  negative for myocardial ischemia, DTS 5  7/7/17 SE negative for myocardial ischemia  5/27/19 Adeline possible ishcemia vs diaphragmatic artifact EF 72%, -14% ischemic burden      Primary osteoarthritis of right hip 01/03/2020    Paroxysmal atrial fibrillation ([facility] [address] 12/11/2017    Colon polyps 04/11/2017    History of colon cancer 04/11/2017    Abdominal aortic aneurysm (AAA) without rupture ([facility] [address] 11/12/2013    Hypercholesterolemia     Weight loss     Burgos's esophagus     Ulcer, gastric, acute 03/14/2012    Chronic chest wall pain 12/20/2011 Hypertension     Gastroesophageal reflux disease     Arthritis     Renal calculi     Costochondritis      Current Outpatient Medications   Medication Sig Dispense Refill    DULoxetine (CYMBALTA) 60 MG extended release capsule Take 1 capsule by mouth in the morning. 90 capsule 3    pantoprazole (PROTONIX) 40 MG tablet Take 1 tablet by mouth in the morning and 1 tablet before bedtime. 90 tablet 3    pregabalin (LYRICA) 75 MG capsule Take 1 capsule by mouth 2 times daily for 90 days. 180 capsule 0    nitroGLYCERIN (NITROSTAT) 0.4 MG SL tablet Place 1 tablet under the tongue every 5 minutes as needed for Chest pain 25 tablet 3    metoprolol tartrate (LOPRESSOR) 50 MG tablet Take 1 tablet by mouth twice daily 180 tablet 0    isosorbide mononitrate (IMDUR) 30 MG extended release tablet Take 1 tablet by mouth once daily 90 tablet 0    ELIQUIS 2.5 MG TABS tablet Take 1 tablet by mouth twice daily 180 tablet 3    simvastatin (ZOCOR) 20 MG tablet Take 1 tablet by mouth nightly 90 tablet 3    colestipol (COLESTID) 1 g tablet Take 1 g by mouth 2 times daily as needed       albuterol sulfate HFA (VENTOLIN HFA) 108 (90 Base) MCG/ACT inhaler Inhale 2 puffs into the lungs 4 times daily as needed for Wheezing 18 g 5    tamsulosin (FLOMAX) 0.4 MG capsule Take 1 capsule by mouth once daily 90 capsule 3    Cholecalciferol (VITAMIN D3) 5000 units TABS Take 5,000 Units by mouth daily        No current facility-administered medications for this visit.      Allergies: Lortab [hydrocodone-acetaminophen] and Morphine and related  Past Medical History:   Diagnosis Date    Adenocarcinoma ([facility] [address]     colon    Aneurysm ([facility] [address] aortic    Anxiety     Arthritis     Polyarticular    Atrial fibrillation ([facility] [address]     Burgos's esophagus 2010    H/o    CAD (coronary artery disease)     CAD (coronary artery disease), native coronary artery     stent; sees dr. Bienvenido Loza    Chronic respiratory failure [facility])     Costochondritis     Erectile dysfunction Gastroesophageal reflux disease     GERD (gastroesophageal reflux disease)     High cholesterol     Hip pain     Hypercholesterolemia 2010    Hypertension     Hypertension     MI (mitral incompetence)     x4 stents    Nephrolithiasis     Non-cardiac chest pain 12/20/2011    Renal calculi     Right upper lobe consolidation ([address]     Tobacco abuse 2010    Ulcer, gastric, acute 3/14/2012     Past Surgical History:   Procedure Laterality Date    ANKLE SURGERY      left    APPENDECTOMY      CARDIAC CATHETERIZATION  12/12/07,07/13/09    Left Heart Cath    CHOLECYSTECTOMY      right    COLON SURGERY      GALLBLADDER SURGERY      LEG SURGERY      right    LUNG REMOVAL, PARTIAL      MANDIBLE FRACTURE SURGERY      ORTHOPEDIC SURGERY      legs, hand and ankles    SKIN CANCER EXCISION  2016    TOTAL HIP ARTHROPLASTY Right 1/3/2020    RIGHT TOTAL HIP REPLACEMENT performed by Ely Carter MD at [facility] OR     Family History   Problem Relation Age of Onset    Diabetes Mother     Coronary Art Dis Father     High Blood Pressure Father     High Cholesterol Father     Heart Attack Father     Stroke Father     Kidney Disease Son     Kidney Disease Son      Social History     Tobacco Use    Smoking status: Former    Smokeless tobacco: Never    Tobacco comments:     quit in 1983   Substance Use Topics    Alcohol use: No         Review of Systems    Constitutional - no significant activity change, appetite change, or unexpected weight change. No fever or chills. No diaphoresis or significant fatigue. HENT - no significant rhinorrhea or epistaxis. No tinnitus or significant hearing loss. Eyes - no sudden vision change or amaurosis. Respiratory - no significant shortness of breath, wheezing, or stridor. No apnea, cough, or chest tightness associated with shortness of breath. Cardiovascular - no chest pain, syncope, or significant dizziness. No palpitations or significant leg swelling. No claudication.    Gastrointestinal - (See HPI) No blood in stool. No severe constipation, diarrhea, nausea, or vomiting. Genitourinary - No difficulty urinating, dysuria, frequency, or urgency. No flank pain or hematuria. Musculoskeletal - he has not had back pain, gait disturbance, or myalgia. Skin - no color change, rash, pallor, or new wound. Neurologic - no dizziness, facial asymmetry, or light headedness. No seizures. No speech difficulty or lateralizing weakness. Hematologic - no easy bruising or excessive bleeding. Psychiatric - no severe anxiety or nervousness. No confusion. All other review of systems are negative. Physical Exam    /62 (Site: Right Upper Arm, Position: Sitting, Cuff Size: Medium Adult)   Pulse 53   Temp 97 °F (36.1 °C) (Oral)   Resp 18   SpO2 96%     Constitutional - well developed, well nourished. No diaphoresis or acute distress. HENT - head normocephalic. Right external ear canal appears normal.  Left external ear canal appears normal.  Septum appears midline. Eyes - conjunctiva normal.  EOMS normal.  No exudate. No icterus. Neck- ROM appears normal, no tracheal deviation. Cardiovascular - Regular rate and rhythm. Heart sounds are normal.  No murmur, rub, or gallop. Carotid pulses are 2+ to palpation bilaterally without bruit. Extremities - Radial and ulnar pulses are 2+ to palpation bilaterally. Femoral pulses are palpable. DP and PT pulses are palpable. No cyanosis, clubbing, or significant edema. No signs atheroembolic event. Pulmonary - effort appears normal.  No respiratory distress. Lungs - Breath sounds normal. No wheezes or rales. GI - Abdomen - soft, bowel sounds X 4 quadrants. No distension or palpable mass. He has pain upon palpation of the sternum and upper epigastric area. Genitourinary - deferred. Musculoskeletal - ROM appears normal.  No significant edema. Neurologic - alert and oriented X 3. Physiologic. Skin - warm, dry, and intact.   No rash, erythema, or pallor. Psychiatric - mood, affect, and behavior appear normal.  Judgment and thought processes appear normal.    Risk factors for aneurysmal disease including tobacco abuse, hyperlipidemia, male gender, age >57, emphysema, obesity, HTN, atherosclerosis, family history, and diabetes mellitus were discussed with the patient. CT A/P: 8/4/22 report not dictated and images unavailable for my viewing      Assessment      1. AAA (abdominal aortic aneurysm) without rupture ([address]          Plan      Recommend he continue Zocor 20 mg daily  Recommend he continue Eliquis as prescribed  Recommend no smoking  We will review the imaging of the CT when imaging is available and also review the final dictated report. We will call the patient with results and further recommendations. Please note that parts of the chart were generated using Dragon dictation software. Although every effort was made to ensure the accuracy of this automated transcription, some errors in transcription may have occurred.

## 2022-08-04 NOTE — TELEPHONE ENCOUNTER
Date: 8-8-22    Cardiologist: Dr. Antonio Warren    Procedure: EGD    Surgeon: Dr. Donovan Blank    Last Office Visit: 6-2-22    Reason for office visit and medical concerns addressed at this office visit: CAD, HTN, MI, PAF    Testing Performed and Date of Service:  EKG 3-18-22  Stress test 3-5-21    Does the patient have a stent? If so, what type? Not in last year     Current Medications: cymbalta, protonix, lyrica, nitro, lopressor, imdur, eliquis, zocor, colestid, albuterol, flomax, vit d3    Is the patient currently taking an anticoagulant?  If so, what is the diagnosis the patient has been given to warrant the need for the anticoagulant? eliquis    Additional Notes: med hold on eliquis

## 2022-08-05 ENCOUNTER — LAB (OUTPATIENT)
Dept: LAB | Facility: HOSPITAL | Age: 87
End: 2022-08-05

## 2022-08-05 ENCOUNTER — TELEPHONE (OUTPATIENT)
Dept: GASTROENTEROLOGY | Facility: CLINIC | Age: 87
End: 2022-08-05

## 2022-08-05 DIAGNOSIS — R10.13 ABDOMINAL PAIN, EPIGASTRIC: ICD-10-CM

## 2022-08-05 LAB — SARS-COV-2 ORF1AB RESP QL NAA+PROBE: NOT DETECTED

## 2022-08-05 PROCEDURE — U0004 COV-19 TEST NON-CDC HGH THRU: HCPCS

## 2022-08-05 PROCEDURE — C9803 HOPD COVID-19 SPEC COLLECT: HCPCS

## 2022-08-05 PROCEDURE — U0005 INFEC AGEN DETEC AMPLI PROBE: HCPCS

## 2022-08-05 NOTE — TELEPHONE ENCOUNTER
We rec'd clearance from [facility]/MARITZA Galvin for pt to hold Eliquis X 2 days before procedure. He is scheduled for Mon 8/8/22. I called and spoke to him about it-he knows he can take it today but to hold after today and that Dr. Guerra will advise when to resume after his procedure-he VU.

## 2022-08-08 ENCOUNTER — ANESTHESIA (OUTPATIENT)
Dept: GASTROENTEROLOGY | Facility: HOSPITAL | Age: 87
End: 2022-08-08

## 2022-08-08 ENCOUNTER — ANESTHESIA EVENT (OUTPATIENT)
Dept: GASTROENTEROLOGY | Facility: HOSPITAL | Age: 87
End: 2022-08-08

## 2022-08-08 ENCOUNTER — HOSPITAL ENCOUNTER (OUTPATIENT)
Facility: HOSPITAL | Age: 87
Setting detail: HOSPITAL OUTPATIENT SURGERY
Discharge: HOME | End: 2022-08-08
Attending: INTERNAL MEDICINE | Admitting: INTERNAL MEDICINE

## 2022-08-08 VITALS
DIASTOLIC BLOOD PRESSURE: 80 MMHG | WEIGHT: 193 LBS | BODY MASS INDEX: 27.02 KG/M2 | RESPIRATION RATE: 20 BRPM | HEART RATE: 62 BPM | TEMPERATURE: 98.2 F | HEIGHT: 71 IN | OXYGEN SATURATION: 94 % | SYSTOLIC BLOOD PRESSURE: 157 MMHG

## 2022-08-08 DIAGNOSIS — K21.9 GASTROESOPHAGEAL REFLUX DISEASE, UNSPECIFIED WHETHER ESOPHAGITIS PRESENT: ICD-10-CM

## 2022-08-08 PROBLEM — Z87.11 HISTORY OF PEPTIC ULCER DISEASE: Status: RESOLVED | Noted: 2019-03-25 | Resolved: 2022-08-08

## 2022-08-08 PROCEDURE — 25010000002 PROPOFOL 10 MG/ML EMULSION: Performed by: NURSE ANESTHETIST, CERTIFIED REGISTERED

## 2022-08-08 PROCEDURE — 43235 EGD DIAGNOSTIC BRUSH WASH: CPT | Performed by: INTERNAL MEDICINE

## 2022-08-08 RX ORDER — LIDOCAINE HYDROCHLORIDE 10 MG/ML
0.5 INJECTION, SOLUTION EPIDURAL; INFILTRATION; INTRACAUDAL; PERINEURAL ONCE AS NEEDED
Status: DISCONTINUED | OUTPATIENT
Start: 2022-08-08 | End: 2022-08-08 | Stop reason: HOSPADM

## 2022-08-08 RX ORDER — PANTOPRAZOLE SODIUM 40 MG/1
40 TABLET, DELAYED RELEASE ORAL DAILY
Qty: 180 TABLET | Refills: 3
Start: 2022-08-08

## 2022-08-08 RX ORDER — SODIUM CHLORIDE 0.9 % (FLUSH) 0.9 %
10 SYRINGE (ML) INJECTION AS NEEDED
Status: DISCONTINUED | OUTPATIENT
Start: 2022-08-08 | End: 2022-08-08 | Stop reason: HOSPADM

## 2022-08-08 RX ORDER — SODIUM CHLORIDE 9 MG/ML
500 INJECTION, SOLUTION INTRAVENOUS CONTINUOUS PRN
Status: DISCONTINUED | OUTPATIENT
Start: 2022-08-08 | End: 2022-08-08 | Stop reason: HOSPADM

## 2022-08-08 RX ORDER — PROPOFOL 10 MG/ML
VIAL (ML) INTRAVENOUS AS NEEDED
Status: DISCONTINUED | OUTPATIENT
Start: 2022-08-08 | End: 2022-08-08 | Stop reason: SURG

## 2022-08-08 RX ORDER — LIDOCAINE HYDROCHLORIDE 20 MG/ML
INJECTION, SOLUTION EPIDURAL; INFILTRATION; INTRACAUDAL; PERINEURAL AS NEEDED
Status: DISCONTINUED | OUTPATIENT
Start: 2022-08-08 | End: 2022-08-08 | Stop reason: SURG

## 2022-08-08 RX ADMIN — PROPOFOL 50 MG: 10 INJECTION, EMULSION INTRAVENOUS at 12:07

## 2022-08-08 RX ADMIN — SODIUM CHLORIDE 500 ML: 9 INJECTION, SOLUTION INTRAVENOUS at 10:19

## 2022-08-08 RX ADMIN — PROPOFOL 30 MG: 10 INJECTION, EMULSION INTRAVENOUS at 12:08

## 2022-08-08 RX ADMIN — LIDOCAINE HYDROCHLORIDE 5 ML: 20 INJECTION, SOLUTION EPIDURAL; INFILTRATION; INTRACAUDAL; PERINEURAL at 12:07

## 2022-08-08 NOTE — ANESTHESIA PREPROCEDURE EVALUATION
Anesthesia Evaluation     Patient summary reviewed and Nursing notes reviewed   no history of anesthetic complications:  NPO Solid Status: > 8 hours  NPO Liquid Status: > 8 hours           Airway   Mallampati: I  TM distance: >3 FB  Neck ROM: full  No difficulty expected  Dental      Pulmonary    (+) a smoker Former, COPD,   (-) sleep apnea  Cardiovascular   Exercise tolerance: good (4-7 METS)    (+) hypertension, CAD, cardiac stents (4 stents most recent several years ago) more than 12 months ago dysrhythmias Atrial Fib, hyperlipidemia,       Neuro/Psych  (+) seizures (once, 50 years ago),    (-) neuromuscular disease, CVA  GI/Hepatic/Renal/Endo    (+)  GERD, PUD,  renal disease stones,   (-) diabetes    Musculoskeletal     Abdominal    Substance History      OB/GYN          Other   arthritis,                      Anesthesia Plan    ASA 3     MAC     intravenous induction     Anesthetic plan, risks, benefits, and alternatives have been provided, discussed and informed consent has been obtained with: patient.        CODE STATUS:

## 2022-08-08 NOTE — ANESTHESIA POSTPROCEDURE EVALUATION
Patient: Nicolas ELISE    Procedure Summary     Date: 08/08/22 Room / Location: [facility] ENDOSCOPY 6 / [facility] ENDOSCOPY    Anesthesia Start: 1202 Anesthesia Stop: 1215    Procedure: ESOPHAGOGASTRODUODENOSCOPY WITH ANESTHESIA (N/A ) Diagnosis:       Abdominal pain, epigastric      (Abdominal pain, epigastric [R10.13])    Surgeons: Sarah Beth Guerra MD Provider: Magdalena Byrd CRNA    Anesthesia Type: MAC ASA Status: 3          Anesthesia Type: MAC    Vitals  Vitals Value Taken Time   BP     Temp     Pulse 63 08/08/22 1215   Resp     SpO2 94 % 08/08/22 1215   Vitals shown include unvalidated device data.        Post Anesthesia Care and Evaluation    Patient location during evaluation: PACU  Patient participation: complete - patient participated  Level of consciousness: sleepy but conscious  Pain score: 0  Pain management: adequate    Airway patency: patent  Anesthetic complications: No anesthetic complications  PONV Status: none  Cardiovascular status: acceptable  Respiratory status: acceptable  Hydration status: acceptable

## 2022-08-15 RX ORDER — METOPROLOL TARTRATE 50 MG/1
TABLET, FILM COATED ORAL
Qty: 180 TABLET | Refills: 3 | Status: SHIPPED | OUTPATIENT
Start: 2022-08-15

## 2022-08-15 RX ORDER — ISOSORBIDE MONONITRATE 30 MG/1
30 TABLET, EXTENDED RELEASE ORAL DAILY
Qty: 90 TABLET | Refills: 0 | Status: SHIPPED | OUTPATIENT
Start: 2022-08-15 | End: 2022-08-18

## 2022-08-15 RX ORDER — TAMSULOSIN HYDROCHLORIDE 0.4 MG/1
0.4 CAPSULE ORAL DAILY
Qty: 90 CAPSULE | Refills: 0 | Status: SHIPPED | OUTPATIENT
Start: 2022-08-15

## 2022-08-15 NOTE — TELEPHONE ENCOUNTER
Received fax from pharmacy requesting refill on pts medication(s). Pt was last seen in office on 3/23/2022  and has a follow up scheduled for Visit date not found. Will send request to  Dr. Gisele Martins  for patient. Requested Prescriptions     Pending Prescriptions Disp Refills    tamsulosin (FLOMAX) 0.4 MG capsule [Pharmacy Med Name: Tamsulosin HCl 0.4 MG Oral Capsule] 90 capsule 0     Sig: Take 1 capsule by mouth in the morning. isosorbide mononitrate (IMDUR) 30 MG extended release tablet [Pharmacy Med Name: Isosorbide Mononitrate ER 30 MG Oral Tablet Extended Release 24 Hour] 90 tablet 0     Sig: Take 1 tablet by mouth in the morning.

## 2022-08-18 RX ORDER — ISOSORBIDE MONONITRATE 30 MG/1
30 TABLET, EXTENDED RELEASE ORAL DAILY
Qty: 90 TABLET | Refills: 0 | Status: SHIPPED | OUTPATIENT
Start: 2022-08-18

## 2022-08-18 NOTE — TELEPHONE ENCOUNTER
Received fax from pharmacy requesting refill on pts medication(s). Pt was last seen in office on 3/23/2022  and has a follow up scheduled for Visit date not found. Will send request to  Dr. Rea King  for patient.      Requested Prescriptions     Pending Prescriptions Disp Refills    isosorbide mononitrate (IMDUR) 30 MG extended release tablet [Pharmacy Med Name: Isosorbide Mononitrate ER 30 MG Oral Tablet Extended Release 24 Hour] 90 tablet 0     Sig: Take 1 tablet by mouth daily

## 2022-08-22 ENCOUNTER — TELEPHONE (OUTPATIENT)
Dept: VASCULAR SURGERY | Age: 87
End: 2022-08-22

## 2022-08-22 NOTE — TELEPHONE ENCOUNTER
I spoke with the pt gave all below information, pt voice understanding, and is aware.          ----- Message from Tashi Chen PA-C sent at 8/18/2022  2:53 PM CDT -----  Please call Mr. Jeff Delgadillo and let him know that I did discuss his case with Dr. [place]. She reviewed his CT scan and would like to know if he would be agreeable to come in to talk with her regarding possible surgery. If he is agreeable please make him an appointment on her clinic day to discuss possible AAA repair.

## 2022-08-25 ENCOUNTER — OFFICE VISIT (OUTPATIENT)
Dept: VASCULAR SURGERY | Age: 87
End: 2022-08-25
Payer: MEDICARE

## 2022-08-25 VITALS
BODY MASS INDEX: 27.2 KG/M2 | SYSTOLIC BLOOD PRESSURE: 100 MMHG | HEART RATE: 50 BPM | DIASTOLIC BLOOD PRESSURE: 63 MMHG | OXYGEN SATURATION: 94 % | HEIGHT: 71 IN | TEMPERATURE: 97.7 F

## 2022-08-25 DIAGNOSIS — I71.40 AAA (ABDOMINAL AORTIC ANEURYSM) WITHOUT RUPTURE: Primary | ICD-10-CM

## 2022-08-25 PROCEDURE — 1123F ACP DISCUSS/DSCN MKR DOCD: CPT | Performed by: PHYSICIAN ASSISTANT

## 2022-08-25 PROCEDURE — 99213 OFFICE O/P EST LOW 20 MIN: CPT | Performed by: PHYSICIAN ASSISTANT

## 2022-08-25 PROCEDURE — G8427 DOCREV CUR MEDS BY ELIG CLIN: HCPCS | Performed by: PHYSICIAN ASSISTANT

## 2022-08-25 PROCEDURE — G8417 CALC BMI ABV UP PARAM F/U: HCPCS | Performed by: PHYSICIAN ASSISTANT

## 2022-08-25 PROCEDURE — 1036F TOBACCO NON-USER: CPT | Performed by: PHYSICIAN ASSISTANT

## 2022-08-25 NOTE — PROGRESS NOTES
Patient Care Team:  Haroon Comer DO as PCP - General  B Keke Portillo DO as PCP - [address] [place] [address] Dr Pizarro Provider  Rm Carson (Nurse Practitioner)  Kumar Montez MD (Gastroenterology)  Wily Trevino MD as Consulting Physician (Oncology)  Delio Tobar MD as Consulting Physician (Cardiology)      History and Physical:    Jimmy Choe is a 82 y.o. yo male who has a past medical history that includes colon cancer, A-fib, CAD, COPD, GERD, hyperlipidemia, past tobacco abuse and AAA. He presents today for follow-up AAA and to discuss imaging and possible options with Dr. Zehra Alfaro. He is currently he is on Zocor 20 mg daily. Eliquis 2.5 mg twice daily. He denies any back pain. He reports that he is having some pain over his sternum and epigastric area. This is worse with manual pressure. He reports that he is scheduled for an endoscopy with Dr. Loreta Gautam on 8/8/2022.      Jimmy Choe is a 82 y.o. male with the following history reviewed and recorded in [facility]:  Patient Active Problem List    Diagnosis Date Noted    CAD (coronary artery disease), native coronary artery      Priority: High     Stents in the LAD, LCX, RCA  12/14/2007  Cath  Patent stents, normal LVFX  12/26/2008  SE  negative for myocardial ischemia  7/13/2009  Cath  Patent stents, normal LVFX  2/15/2010  SE  negative for myocardial ischemia  10/4/2011  Cath  Patent stents, normal LVFX  8/8/12  SE  negative for myocardial ischemia, DTS 0.5 [facility])  5/3/2013  SE  negative for myocardial ischemia, DTS 5  7/7/17 SE negative for myocardial ischemia  5/27/19 Adeline possible ishcemia vs diaphragmatic artifact EF 72%, -14% ischemic burden      Primary osteoarthritis of right hip 01/03/2020    Paroxysmal atrial fibrillation ([facility] [address] 12/11/2017    Colon polyps 04/11/2017    History of colon cancer 04/11/2017    Abdominal aortic aneurysm (AAA) without rupture ([facility] [address] 11/12/2013    Hypercholesterolemia     Weight loss     Burgos's esophagus     Ulcer, gastric, acute 03/14/2012    Chronic chest wall pain 12/20/2011    Hypertension     Gastroesophageal reflux disease     Arthritis     Renal calculi     Costochondritis      Current Outpatient Medications   Medication Sig Dispense Refill    isosorbide mononitrate (IMDUR) 30 MG extended release tablet Take 1 tablet by mouth daily 90 tablet 0    tamsulosin (FLOMAX) 0.4 MG capsule Take 1 capsule by mouth in the morning. 90 capsule 0    metoprolol tartrate (LOPRESSOR) 50 MG tablet Take 1 tablet by mouth twice daily 180 tablet 3    DULoxetine (CYMBALTA) 60 MG extended release capsule Take 1 capsule by mouth in the morning. 90 capsule 3    pantoprazole (PROTONIX) 40 MG tablet Take 1 tablet by mouth in the morning and 1 tablet before bedtime. 90 tablet 3    pregabalin (LYRICA) 75 MG capsule Take 1 capsule by mouth 2 times daily for 90 days. 180 capsule 0    nitroGLYCERIN (NITROSTAT) 0.4 MG SL tablet Place 1 tablet under the tongue every 5 minutes as needed for Chest pain 25 tablet 3    ELIQUIS 2.5 MG TABS tablet Take 1 tablet by mouth twice daily 180 tablet 3    simvastatin (ZOCOR) 20 MG tablet Take 1 tablet by mouth nightly 90 tablet 3    colestipol (COLESTID) 1 g tablet Take 1 g by mouth 2 times daily as needed       albuterol sulfate HFA (VENTOLIN HFA) 108 (90 Base) MCG/ACT inhaler Inhale 2 puffs into the lungs 4 times daily as needed for Wheezing 18 g 5    Cholecalciferol (VITAMIN D3) 5000 units TABS Take 5,000 Units by mouth daily        No current facility-administered medications for this visit.      Allergies: Lortab [hydrocodone-acetaminophen] and Morphine and related  Past Medical History:   Diagnosis Date    Adenocarcinoma ([facility] [address]     colon    Aneurysm ([facility] [address] aortic    Anxiety     Arthritis     Polyarticular    Atrial fibrillation ([facility] [address]     Burgos's esophagus 2010    H/o    CAD (coronary artery disease)     CAD (coronary artery disease), native coronary artery     stent; sees dr. Radha Centeno    Chronic respiratory failure (HCC)     Costochondritis     Erectile dysfunction     Gastroesophageal reflux disease     GERD (gastroesophageal reflux disease)     High cholesterol     Hip pain     Hypercholesterolemia 2010    Hypertension     Hypertension     MI (mitral incompetence)     x4 stents    Nephrolithiasis     Non-cardiac chest pain 12/20/2011    Renal calculi     Right upper lobe consolidation ([facility] [address]     Tobacco abuse 2010    Ulcer, gastric, acute 3/14/2012     Past Surgical History:   Procedure Laterality Date    ANKLE SURGERY      left    APPENDECTOMY      CARDIAC CATHETERIZATION  12/12/07,07/13/09    Left Heart Cath    CHOLECYSTECTOMY      right    COLON SURGERY      GALLBLADDER SURGERY      LEG SURGERY      right    LUNG REMOVAL, PARTIAL      MANDIBLE FRACTURE SURGERY      ORTHOPEDIC SURGERY      legs, hand and ankles    SKIN CANCER EXCISION  2016    TOTAL HIP ARTHROPLASTY Right 1/3/2020    RIGHT TOTAL HIP REPLACEMENT performed by Michael Best MD at [facility] OR     Family History   Problem Relation Age of Onset    Diabetes Mother     Coronary Art Dis Father     High Blood Pressure Father     High Cholesterol Father     Heart Attack Father     Stroke Father     Kidney Disease Son     Kidney Disease Son      Social History     Tobacco Use    Smoking status: Former    Smokeless tobacco: Never    Tobacco comments:     quit in 1983   Substance Use Topics    Alcohol use: No         Review of Systems    Constitutional - no significant activity change, appetite change, or unexpected weight change. No fever or chills. No diaphoresis or significant fatigue. HENT - no significant rhinorrhea or epistaxis. No tinnitus or significant hearing loss. Eyes - no sudden vision change or amaurosis. Respiratory - no significant shortness of breath, wheezing, or stridor. No apnea, cough, or chest tightness associated with shortness of breath. Cardiovascular - no chest pain, syncope, or significant dizziness.   No palpitations or significant leg swelling. No claudication. Gastrointestinal - (See HPI) No blood in stool. No severe constipation, diarrhea, nausea, or vomiting. Genitourinary - No difficulty urinating, dysuria, frequency, or urgency. No flank pain or hematuria. Musculoskeletal - he has not had back pain, gait disturbance, or myalgia. Skin - no color change, rash, pallor, or new wound. Neurologic - no dizziness, facial asymmetry, or light headedness. No seizures. No speech difficulty or lateralizing weakness. Hematologic - no easy bruising or excessive bleeding. Psychiatric - no severe anxiety or nervousness. No confusion. All other review of systems are negative. Physical Exam    /63 (Site: Right Upper Arm, Position: Sitting)   Pulse 50   Temp 97.7 °F (36.5 °C)   Ht 5' 11\" (1.803 m)   SpO2 94%   BMI 27.20 kg/m²     Constitutional - well developed, well nourished. No diaphoresis or acute distress. HENT - head normocephalic. Right external ear canal appears normal.  Left external ear canal appears normal.  Septum appears midline. Eyes - conjunctiva normal.  EOMS normal.  No exudate. No icterus. Neck- ROM appears normal, no tracheal deviation. Cardiovascular - Regular rate and rhythm. Heart sounds are normal.  No murmur, rub, or gallop. Carotid pulses are 2+ to palpation bilaterally without bruit. Extremities - Radial and ulnar pulses are 2+ to palpation bilaterally. Femoral pulses are palpable. DP and PT pulses are palpable. No cyanosis, clubbing, or significant edema. No signs atheroembolic event. Pulmonary - effort appears normal.  No respiratory distress. Lungs - Breath sounds normal. No wheezes or rales. GI - Abdomen - soft, bowel sounds X 4 quadrants. No distension or palpable mass. He has pain upon palpation of the sternum and upper epigastric area. Genitourinary - deferred. Musculoskeletal - ROM appears normal.  No significant edema. Neurologic - alert and oriented X 3. Physiologic. Skin - warm, dry, and intact. No rash, erythema, or pallor. Psychiatric - mood, affect, and behavior appear normal.  Judgment and thought processes appear normal.    Risk factors for aneurysmal disease including tobacco abuse, hyperlipidemia, male gender, age >57, emphysema, obesity, HTN, atherosclerosis, family history, and diabetes mellitus were discussed with the patient. CT A/P: 8/4/22 report not dictated and images unavailable for my viewing      Assessment      1. AAA (abdominal aortic aneurysm) without rupture ([address]            Plan      Recommend he continue Zocor 20 mg daily  Recommend he continue Eliquis as prescribed  Recommend no smoking  We will review the imaging of the CT when imaging is available and also review the final dictated report. We will call the patient with results and further recommendations. Addendum:  CT A/P-reviewed by Dr. Benja Faith on 8/25/2022. Her measurement of the abdominal aortic aneurysm was 4.7 which was unchanged from previous CAT scan. She does not feel that intervention is needed at this time. NO PRIOR REPORT AVAILABLE   Exam: CT OF THE ABDOMEN/PELVIS WITHOUT CONTRAST    Clinical data: Abdominal aortic aneurysm without rupture. Technique: Direct contiguous axial CT images were acquired through the abdomen and pelvis without contrast using soft tissue and bone algorithms. Reformatted/MPR images were performed. Radiation dose: CTDIvol = 13.47 mGy, DLP = 776.71 mGy x cm. Limitations: Lack of intravenous contrast limits evaluation of solid viscera. Lack of oral contrast limits evaluation of the bowel loops. Prior Studies: Radiographs of the KUB dated 1/31/2022 images. CT scan of the abdomen and pelvis dated 1/24/2022. Radiograph of the pelvis dated 1/3/2020. Findings: Lung bases:  Scattered mild atelectasis/scarring at the bases. Stable tiny right subpleural pulmonary nodule. Liver:   Mild fatty liver.  Small liver cysts, as before. Gallbladder Fossa:  Cholecystectomy. Spleen:  Grossly unremarkable. Pancreas:  Grossly unremarkable. Adrenal glands:   Grossly unremarkable size, contour and density. Kidneys:   No calcified obstructing ureteral stones or hydronephrosis appreciated bilaterally. Right renal cyst and left-sided nonobstructive nephrolithiasis again noted. Retroperitoneum: No retroperitoneal lymphadenopathy. Unremarkable abdominal aorta. Marginally enlarged infrarenal AAA, from 5.1 x 4 cm on the prior CT, to 5.3 x 4.1 cm on the current exam. No acute retroperitoneal hemorrhage. Peritoneal cavity:  No evidence of free air or ascites. Gastrointestinal tract: Nondistended small bowel and colon. No evidence of obstruction. Status post right hemicolectomy. Moderate fecal retention. Diverticulosis, without definite diverticulitis. Appendix:  Surgically absent. Pelvis:  Solid and hollow viscera grossly unremarkable. Bladder is moderately distended. Osseous structures:  Grossly stable. Impression   1. Marginally enlarged infrarenal AAA, as above. No acute retroperitoneal hemorrhage. 2. No acute obstructive or inflammatory process. 3.  Other findings, as above. Please see comments above. Recommendation: Follow up as clinically indicated. All CT scans at this facility utilize dose modulation, iterative reconstruction, and/or weight based dosing when appropriate to reduce radiation dose to as low as reasonably achievable. Electronically Signed by Rula Sharp MD at 04-Aug-2022 10:29:30 PM                We recommend following up in 12 months with a CTA A/P. Symptoms of rupture reviewed with the patient including sudden onset severe back pain or abdominal pain. This pain can sometimes radiate into the groin or leg. The patient may experience a feeling of impending doom or death.   If this occurs the patient  has been instructed to call 911 and get to the emergency room telling them you have an aneurysm. Patient has voiced understanding. Please note that parts of the chart were generated using Dragon dictation software. Although every effort was made to ensure the accuracy of this automated transcription, some errors in transcription may have occurred.

## 2022-10-04 DIAGNOSIS — G62.9 NEUROPATHY: ICD-10-CM

## 2022-10-04 RX ORDER — PREGABALIN 75 MG/1
75 CAPSULE ORAL 2 TIMES DAILY
Qty: 180 CAPSULE | Refills: 0 | Status: SHIPPED | OUTPATIENT
Start: 2022-10-04 | End: 2022-10-06

## 2022-10-04 NOTE — TELEPHONE ENCOUNTER
Received fax from pharmacy requesting refill on pts medication(s). Pt was last seen in office on 3/23/2022  and has a follow up scheduled for Visit date not found. Will send request to  [employer]  for authorization. Requested Prescriptions     Pending Prescriptions Disp Refills    pregabalin (LYRICA) 75 MG capsule [Pharmacy Med Name: Pregabalin 75 MG Oral Capsule] 180 capsule 0     Sig: Take 1 capsule by mouth 2 times daily for 30 days.

## 2022-10-06 DIAGNOSIS — G62.9 NEUROPATHY: ICD-10-CM

## 2022-10-06 RX ORDER — PREGABALIN 75 MG/1
CAPSULE ORAL
Qty: 180 CAPSULE | Refills: 0 | Status: SHIPPED | OUTPATIENT
Start: 2022-10-06 | End: 2023-01-04

## 2022-11-04 ENCOUNTER — OFFICE VISIT (OUTPATIENT)
Dept: PRIMARY CARE CLINIC | Age: 87
End: 2022-11-04
Payer: MEDICARE

## 2022-11-04 VITALS
HEART RATE: 78 BPM | TEMPERATURE: 98.4 F | OXYGEN SATURATION: 94 % | WEIGHT: 191 LBS | DIASTOLIC BLOOD PRESSURE: 74 MMHG | BODY MASS INDEX: 26.64 KG/M2 | SYSTOLIC BLOOD PRESSURE: 130 MMHG

## 2022-11-04 DIAGNOSIS — J06.9 UPPER RESPIRATORY TRACT INFECTION, UNSPECIFIED TYPE: Primary | ICD-10-CM

## 2022-11-04 DIAGNOSIS — R05.9 COUGH, UNSPECIFIED TYPE: ICD-10-CM

## 2022-11-04 LAB
INFLUENZA A ANTIBODY: NORMAL
INFLUENZA B ANTIBODY: NORMAL

## 2022-11-04 PROCEDURE — 99213 OFFICE O/P EST LOW 20 MIN: CPT | Performed by: NURSE PRACTITIONER

## 2022-11-04 PROCEDURE — 1036F TOBACCO NON-USER: CPT | Performed by: NURSE PRACTITIONER

## 2022-11-04 PROCEDURE — 3078F DIAST BP <80 MM HG: CPT | Performed by: NURSE PRACTITIONER

## 2022-11-04 PROCEDURE — G8484 FLU IMMUNIZE NO ADMIN: HCPCS | Performed by: NURSE PRACTITIONER

## 2022-11-04 PROCEDURE — 87804 INFLUENZA ASSAY W/OPTIC: CPT | Performed by: NURSE PRACTITIONER

## 2022-11-04 PROCEDURE — G8417 CALC BMI ABV UP PARAM F/U: HCPCS | Performed by: NURSE PRACTITIONER

## 2022-11-04 PROCEDURE — G8428 CUR MEDS NOT DOCUMENT: HCPCS | Performed by: NURSE PRACTITIONER

## 2022-11-04 PROCEDURE — 3074F SYST BP LT 130 MM HG: CPT | Performed by: NURSE PRACTITIONER

## 2022-11-04 PROCEDURE — 1123F ACP DISCUSS/DSCN MKR DOCD: CPT | Performed by: NURSE PRACTITIONER

## 2022-11-04 RX ORDER — AMOXICILLIN 500 MG/1
500 CAPSULE ORAL 3 TIMES DAILY
Qty: 30 CAPSULE | Refills: 0 | Status: SHIPPED | OUTPATIENT
Start: 2022-11-04 | End: 2022-11-14

## 2022-11-04 ASSESSMENT — ENCOUNTER SYMPTOMS
COUGH: 1
VOMITING: 0
TROUBLE SWALLOWING: 0
CONSTIPATION: 0
SORE THROAT: 1
DIARRHEA: 0
ABDOMINAL PAIN: 0
NAUSEA: 0
SHORTNESS OF BREATH: 0
RHINORRHEA: 0

## 2022-11-04 NOTE — PROGRESS NOTES
Jojo Riuz (:  1935) is a 82 y.o. male,Established patient, here for evaluation of the following chief complaint(s):  Cough (Congestion )      ASSESSMENT/PLAN:    ICD-10-CM    1. Upper respiratory tract infection, unspecified type  J06.9 amoxicillin (AMOXIL) 500 MG capsule      2. Cough, unspecified type  R05.9 POCT Influenza A/B          Return if symptoms worsen or fail to improve. SUBJECTIVE/OBJECTIVE:  HPI  Here for cough and congestion  Onset 2 days  Been taking mucinex  No shortness of breath  Cough is mildly productive. Throat is a little scratchy. /74   Pulse 78   Temp 98.4 °F (36.9 °C) (Temporal)   Wt 191 lb (86.6 kg)   SpO2 94%   BMI 26.64 kg/m²     Review of Systems   Constitutional:  Negative for activity change, appetite change, fatigue, fever and unexpected weight change. HENT:  Positive for congestion and sore throat. Negative for ear pain, rhinorrhea and trouble swallowing. Eyes:  Negative for visual disturbance. Respiratory:  Positive for cough. Negative for shortness of breath. Cardiovascular:  Negative for chest pain, palpitations and leg swelling. Gastrointestinal:  Negative for abdominal pain, constipation, diarrhea, nausea and vomiting. Genitourinary:  Negative for flank pain. Musculoskeletal:  Negative for arthralgias, myalgias, neck pain and neck stiffness. Neurological:  Negative for headaches. Psychiatric/Behavioral:  Negative for decreased concentration and sleep disturbance. The patient is not nervous/anxious. Physical Exam  Vitals reviewed. Constitutional:       Appearance: Normal appearance. HENT:      Head: Normocephalic and atraumatic. Right Ear: Tympanic membrane, ear canal and external ear normal.      Left Ear: Tympanic membrane, ear canal and external ear normal.      Nose: Nose normal.      Mouth/Throat:      Mouth: Mucous membranes are moist.      Pharynx: Oropharynx is clear.    Eyes:      Conjunctiva/sclera: Conjunctivae normal.   Cardiovascular:      Rate and Rhythm: Normal rate and regular rhythm. Pulses: Normal pulses. Heart sounds: Normal heart sounds. Pulmonary:      Effort: Pulmonary effort is normal.      Breath sounds: Normal breath sounds. Abdominal:      General: Bowel sounds are normal. There is no distension. Palpations: Abdomen is soft. Tenderness: There is no abdominal tenderness. There is no guarding. Musculoskeletal:      Cervical back: Normal range of motion and neck supple. Skin:     General: Skin is warm. Neurological:      Mental Status: He is alert and oriented to person, place, and time. An electronic signature was used to authenticate this note.     --DAVIE Garrido

## 2022-11-06 DIAGNOSIS — N40.0 PROSTATE ENLARGEMENT: Primary | ICD-10-CM

## 2022-11-07 RX ORDER — TAMSULOSIN HYDROCHLORIDE 0.4 MG/1
0.4 CAPSULE ORAL DAILY
Qty: 90 CAPSULE | Refills: 0 | Status: SHIPPED | OUTPATIENT
Start: 2022-11-07

## 2022-11-07 NOTE — TELEPHONE ENCOUNTER
Received fax from pharmacy requesting refill on pts medication(s). Pt was last seen in office on 11/4/2022  and has a follow up scheduled for Visit date not found. Will send request to  Dr. Denise Bustos  for authorization.      Requested Prescriptions     Pending Prescriptions Disp Refills    tamsulosin (FLOMAX) 0.4 MG capsule [Pharmacy Med Name: Tamsulosin HCl 0.4 MG Oral Capsule] 90 capsule 0     Sig: Take 1 capsule by mouth daily

## 2022-12-01 ENCOUNTER — OFFICE VISIT (OUTPATIENT)
Dept: CARDIOLOGY CLINIC | Age: 87
End: 2022-12-01
Payer: MEDICARE

## 2022-12-01 VITALS
OXYGEN SATURATION: 94 % | WEIGHT: 195 LBS | BODY MASS INDEX: 27.3 KG/M2 | HEART RATE: 63 BPM | DIASTOLIC BLOOD PRESSURE: 58 MMHG | HEIGHT: 71 IN | SYSTOLIC BLOOD PRESSURE: 100 MMHG

## 2022-12-01 DIAGNOSIS — I25.10 CORONARY ARTERY DISEASE INVOLVING NATIVE CORONARY ARTERY OF NATIVE HEART WITHOUT ANGINA PECTORIS: Primary | ICD-10-CM

## 2022-12-01 DIAGNOSIS — G89.29 CHRONIC CHEST WALL PAIN: ICD-10-CM

## 2022-12-01 DIAGNOSIS — I48.0 PAROXYSMAL ATRIAL FIBRILLATION (HCC): ICD-10-CM

## 2022-12-01 DIAGNOSIS — I71.40 ABDOMINAL AORTIC ANEURYSM (AAA) WITHOUT RUPTURE, UNSPECIFIED PART: ICD-10-CM

## 2022-12-01 DIAGNOSIS — R07.89 CHRONIC CHEST WALL PAIN: ICD-10-CM

## 2022-12-01 DIAGNOSIS — I10 ESSENTIAL HYPERTENSION: ICD-10-CM

## 2022-12-01 PROCEDURE — 1036F TOBACCO NON-USER: CPT | Performed by: CLINICAL NURSE SPECIALIST

## 2022-12-01 PROCEDURE — 99214 OFFICE O/P EST MOD 30 MIN: CPT | Performed by: CLINICAL NURSE SPECIALIST

## 2022-12-01 PROCEDURE — G8417 CALC BMI ABV UP PARAM F/U: HCPCS | Performed by: CLINICAL NURSE SPECIALIST

## 2022-12-01 PROCEDURE — 3078F DIAST BP <80 MM HG: CPT | Performed by: CLINICAL NURSE SPECIALIST

## 2022-12-01 PROCEDURE — G8484 FLU IMMUNIZE NO ADMIN: HCPCS | Performed by: CLINICAL NURSE SPECIALIST

## 2022-12-01 PROCEDURE — 3074F SYST BP LT 130 MM HG: CPT | Performed by: CLINICAL NURSE SPECIALIST

## 2022-12-01 PROCEDURE — G8427 DOCREV CUR MEDS BY ELIG CLIN: HCPCS | Performed by: CLINICAL NURSE SPECIALIST

## 2022-12-01 PROCEDURE — 1123F ACP DISCUSS/DSCN MKR DOCD: CPT | Performed by: CLINICAL NURSE SPECIALIST

## 2022-12-01 RX ORDER — NITROGLYCERIN 0.4 MG/1
0.4 TABLET SUBLINGUAL EVERY 5 MIN PRN
Qty: 25 TABLET | Refills: 3 | Status: SHIPPED | OUTPATIENT
Start: 2022-12-01

## 2022-12-01 NOTE — PROGRESS NOTES
[facility] Cardiology  [facility] [address]  [address]  Phone: [phone]  Fax: [phone]    OFFICE VISIT:  2022    Cyrus Zamudio - : 1935    Reason For Visit:  Pattie Woods is a 82 y.o. male who is here for 6 Month Follow-Up (Pt was in ED  1 week ago for palpitations  ), Coronary Artery Disease, Hypertension, and Atrial Fibrillation  Stenting to LAD. 2021 nuclear stress test showed no evidence of ischemia or infarct with preserved LV function. He continues on medical management. He follows with vascular surgery for AAA  He continues to have chest wall pain - he did go to ER at [address] and had CT scan on 22  no acute findings    He states he was worried  because he has a AAA and was afraid the pain was his AAA. He feels a little tired to day. He remains anticoagulated with no bleeding. Annie Mena denies exertional chest pain, shortness of breath, orthopnea, paroxysmal nocturnal dyspnea, syncope, presyncope, arrhythmia, edema and fatigue. The patient denies numbness or weakness to suggest cerebrovascular accident or transient ischemic attack. Joleen Barton DO is PCP and follows labs.   Cyrus Zamudio has the following history as recorded in [facility]:    Patient Active Problem List    Diagnosis Date Noted    CAD (coronary artery disease), native coronary artery     Primary osteoarthritis of right hip 2020    Paroxysmal atrial fibrillation ([address] 2017    Colon polyps 2017    History of colon cancer 2017    Abdominal aortic aneurysm (AAA) without rupture 2013    Hypercholesterolemia     Weight loss     Burgos's esophagus     Ulcer, gastric, acute 2012    Chronic chest wall pain 2011    Hypertension     Gastroesophageal reflux disease     Arthritis     Renal calculi     Costochondritis      Past Medical History:   Diagnosis Date    Adenocarcinoma ([address]     colon    Aneurysm ([address] aortic    Anxiety Arthritis     Polyarticular    Atrial fibrillation [facility]     Burgos's esophagus 2010    H/o    CAD (coronary artery disease)     CAD (coronary artery disease), native coronary artery     stent; sees dr. Eloisa Tiwari    Chronic respiratory failure ([facility]     Costochondritis     Erectile dysfunction     Gastroesophageal reflux disease     GERD (gastroesophageal reflux disease)     High cholesterol     Hip pain     Hypercholesterolemia 2010    Hypertension     Hypertension     MI (mitral incompetence)     x4 stents    Nephrolithiasis     Non-cardiac chest pain 12/20/2011    Renal calculi     Right upper lobe consolidation ([facility]     Tobacco abuse 2010    Ulcer, gastric, acute 3/14/2012     Past Surgical History:   Procedure Laterality Date    ANKLE SURGERY      left    APPENDECTOMY      CARDIAC CATHETERIZATION  12/12/07,07/13/09    Left Heart Cath    CHOLECYSTECTOMY      right    COLON SURGERY      GALLBLADDER SURGERY      LEG SURGERY      right    LUNG REMOVAL, PARTIAL      MANDIBLE FRACTURE SURGERY      ORTHOPEDIC SURGERY      legs, hand and ankles    SKIN CANCER EXCISION  2016    TOTAL HIP ARTHROPLASTY Right 1/3/2020    RIGHT TOTAL HIP REPLACEMENT performed by Karen Palomino MD at [facility] OR     Family History   Problem Relation Age of Onset    Diabetes Mother     Coronary Art Dis Father     High Blood Pressure Father     High Cholesterol Father     Heart Attack Father     Stroke Father     Kidney Disease Son     Kidney Disease Son      Social History     Tobacco Use    Smoking status: Former    Smokeless tobacco: Never    Tobacco comments:     quit in 1983   Substance Use Topics    Alcohol use: No      Current Outpatient Medications   Medication Sig Dispense Refill    tamsulosin (FLOMAX) 0.4 MG capsule Take 1 capsule by mouth daily 90 capsule 0    isosorbide mononitrate (IMDUR) 30 MG extended release tablet Take 1 tablet by mouth daily 90 tablet 0    metoprolol tartrate (LOPRESSOR) 50 MG tablet Take 1 tablet by mouth twice daily 180 tablet 3    DULoxetine (CYMBALTA) 60 MG extended release capsule Take 1 capsule by mouth in the morning. 90 capsule 3    pantoprazole (PROTONIX) 40 MG tablet Take 1 tablet by mouth in the morning and 1 tablet before bedtime. 90 tablet 3    nitroGLYCERIN (NITROSTAT) 0.4 MG SL tablet Place 1 tablet under the tongue every 5 minutes as needed for Chest pain 25 tablet 3    ELIQUIS 2.5 MG TABS tablet Take 1 tablet by mouth twice daily 180 tablet 3    simvastatin (ZOCOR) 20 MG tablet Take 1 tablet by mouth nightly 90 tablet 3    albuterol sulfate HFA (VENTOLIN HFA) 108 (90 Base) MCG/ACT inhaler Inhale 2 puffs into the lungs 4 times daily as needed for Wheezing 18 g 5    Cholecalciferol (VITAMIN D3) 5000 units TABS Take 5,000 Units by mouth daily       pregabalin (LYRICA) 75 MG capsule Take 1 capsule by mouth twice daily (Patient not taking: Reported on 12/1/2022) 180 capsule 0    colestipol (COLESTID) 1 g tablet Take 1 g by mouth 2 times daily as needed  (Patient not taking: Reported on 12/1/2022)       No current facility-administered medications for this visit. Allergies: Lortab [hydrocodone-acetaminophen] and Morphine and related    Review of Systems  Constitutional - no significant activity change, appetite change, or unexpected weight change. No fever, chills or diaphoresis. No fatigue. HEENT - no significant rhinorrhea or epistaxis. No tinnitus or significant hearing loss. Eyes - no sudden vision change or amaurosis. Respiratory - no significant wheezing, stridor, apnea or cough. No dyspnea on exertion or shortness of breath. Cardiovascular - no exertional chest pain, orthopnea or PND. No sensation of arrhythmia or slow heart rate. No claudication or leg edema. Gastrointestinal - no abdominal swelling or pain. No blood in stool. No severe constipation, diarrhea, nausea, or vomiting. Genitourinary - no difficulty urinating, dysuria, frequency, or urgency. No flank pain or hematuria. Musculoskeletal - no back pain, gait disturbance, or myalgia. Skin - no color change or rash. No pallor. No new surgical incision. Neurologic - no speech difficulty, facial asymmetry or lateralizing weakness. No seizures, presyncope, syncope, or significant dizziness. Hematologic - no easy bruising or excessive bleeding. Psychiatric - no severe anxiety or insomnia. No confusion. All other review of systems are negative. Objective  Vital Signs - BP (!) 100/58   Pulse 63   Ht 5' 11\" (1.803 m)   Wt 195 lb (88.5 kg)   SpO2 94%   BMI 27.20 kg/m²   General - Prentis Speedy is alert, cooperative, and pleasant. Well groomed. No acute distress. Body habitus is normal.  HEENT - The head is normocephalic. No circumoral cyanosis. Dentition is normal.   EYES -  No Xanthelasma, no arcus senilis, no conjunctival hemorrhages or discharge. Neck - Supple, without increased jugular venous pressures. No carotid bruits. No mass. Respiratory - Lungs are clear bilaterally. No wheezes or rales. Normal effort without use of accessory muscles. Cardiovascular - Heart has regular rhythm and rate. No murmurs, rubs or gallops. + pedal pulses and no varicosities. Abdominal -  Soft, nontender, nondistended. Bowel sounds are intact. Extremities - No clubbing, cyanosis, or  edema. Musculoskeletal -  No clubbing . No Osler's nodes. Gait normal .  No kyphosis or scoliosis. Skin -  no statis ulcers or dermatitis. Neurological - No focal signs are identified. Oriented to person, place and time. Psychiatric -  Appropriate affect and mood. Assessment:     Diagnosis Orders   1. Coronary artery disease involving native coronary artery of native heart without angina pectoris        2. Paroxysmal atrial fibrillation (HCC)        3. Essential hypertension        4. Abdominal aortic aneurysm (AAA) without rupture, unspecified part        5.  Chronic chest wall pain          Data:  BP Readings from Last 3 Encounters:   12/01/22 (!) 100/58   11/04/22 130/74   08/25/22 100/63    Pulse Readings from Last 3 Encounters:   12/01/22 63   11/04/22 78   08/25/22 50        Wt Readings from Last 3 Encounters:   12/01/22 195 lb (88.5 kg)   11/04/22 191 lb (86.6 kg)   06/02/22 195 lb (88.5 kg)       Blood pressure low end of normal.  Heart rate controlled. Medical management includes beta-blocker, long-acting nitrate and low-dose anticoagulation with Eliquis 2.5 mg twice a day. Remains on statin     Reviewed recent [facility] along with testing   Reviewed recent labs- stable    He left before CTA results available  Patient was sent to [facility] for CT of chest.  Received report:  \"No evidence of PE, infiltrates or nodules. Normal aorta caliber with no evidence of aneurysm or dissection. No mediastinal or hilar lymphadenopathy. \"    He has had no further acute problems. He has stable chest soreness that is unchanged    Not sure if he is taking lyrica or cymbalta    Vasc following AAA- discussed signs/symptoms of rupture and importance of keeping BP controlled. States taking medications as prescribed  Stable cardiovascular status. No evidence of overt heart failure, angina or dysrhythmia. 30 minutes were spent preparing, reviewing and seeing patient. All questions answered    Plan    Maintain good blood pressure control-goal<130/80 at rest  Maintain good cholesterol control LDL goal<70 with arterial disease  If you are diabetic work to keep/obtain hemoglobin A1c< 7    Follow up in 6 mos   Call with any questions or concerns  Follow up with [facility], DO for non cardiac problems  Report any new problems  Cardiovascular Fitness-Exercise as tolerated. Strive for 30 minutes of exercise most days of the week.     Cardiac / Healthy Diet- Avoid processed high fat foods, maintain low sodium/salt   Continue current medications as directed  Continue plan of treatment  It is always recommended that you bring your medications bottles with you to each visit - this is for your safety! DAVIE Villalobos    EMR dragon/transcription disclaimer: Much of this encounter note is electronic transcription/translation of spoken language to printed tach. Electronic translation of spoken language may be erroneous, or at times, nonsensical words or phrases may be inadvertently transcribed.  Although, I have reviewed the note for such errors, some may still exist.

## 2022-12-08 ENCOUNTER — TELEPHONE (OUTPATIENT)
Dept: CARDIOLOGY CLINIC | Age: 87
End: 2022-12-08

## 2022-12-08 ENCOUNTER — TELEPHONE (OUTPATIENT)
Dept: PRIMARY CARE CLINIC | Age: 87
End: 2022-12-08

## 2022-12-08 NOTE — TELEPHONE ENCOUNTER
Date: 1-9-23    Cardiologist: only seen Forrest Robertson     Procedure: Excision of BC CA x 2     Surgeon: Dr. Jessica Omer    Last Office Visit: 12-1-22    Reason for office visit and medical concerns addressed at this office visit: CAD, PAF, HTN, AAA    Testing Performed and Date of Service:  EKG 3-18-22  Echo 3-5-21    Does the patient have a stent? If so, what type? Not in last year     Current Medications: nitro, flomax, lyrica, imdur, lopressor, cymbalta, protonix, eliquis, zocor, colestid, albuterol, vit D3    Is the patient currently taking an anticoagulant? If so, what is the diagnosis the patient has been given to warrant the need for the anticoagulant?  Eliquis     Additional Notes: med hold on eliquis

## 2022-12-19 RX ORDER — ISOSORBIDE MONONITRATE 30 MG/1
TABLET, EXTENDED RELEASE ORAL
Qty: 90 TABLET | Refills: 0 | Status: SHIPPED | OUTPATIENT
Start: 2022-12-19

## 2023-02-06 DIAGNOSIS — N40.0 PROSTATE ENLARGEMENT: ICD-10-CM

## 2023-02-06 RX ORDER — TAMSULOSIN HYDROCHLORIDE 0.4 MG/1
CAPSULE ORAL
Qty: 90 CAPSULE | Refills: 0 | Status: SHIPPED | OUTPATIENT
Start: 2023-02-06

## 2023-02-07 DIAGNOSIS — N40.0 PROSTATE ENLARGEMENT: ICD-10-CM

## 2023-02-07 RX ORDER — TAMSULOSIN HYDROCHLORIDE 0.4 MG/1
CAPSULE ORAL
Qty: 90 CAPSULE | Refills: 0 | OUTPATIENT
Start: 2023-02-07

## 2023-03-13 DIAGNOSIS — K21.00 GASTROESOPHAGEAL REFLUX DISEASE WITH ESOPHAGITIS, UNSPECIFIED WHETHER HEMORRHAGE: ICD-10-CM

## 2023-03-14 RX ORDER — PANTOPRAZOLE SODIUM 40 MG/1
40 TABLET, DELAYED RELEASE ORAL DAILY
Qty: 180 TABLET | Refills: 0 | Status: SHIPPED | OUTPATIENT
Start: 2023-03-14

## 2023-03-14 NOTE — TELEPHONE ENCOUNTER
Received fax from pharmacy requesting refill on pts medication(s). Pt was last seen in office on 03/28/2022  and has a follow up scheduled for Visit date not found. Will send request to  Dr. Campbell Nguyễn  for authorization.      Requested Prescriptions     Pending Prescriptions Disp Refills    pantoprazole (PROTONIX) 40 MG tablet [Pharmacy Med Name: Pantoprazole Sodium 40 MG Oral Tablet Delayed Release] 180 tablet 0     Sig: Take 1 tablet by mouth daily

## 2023-03-14 NOTE — TELEPHONE ENCOUNTER
Sent rx to pharmacy for pt     Requested Prescriptions     Signed Prescriptions Disp Refills    pantoprazole (PROTONIX) 40 MG tablet 180 tablet 0     Sig: Take 1 tablet by mouth daily     Authorizing Provider: Eloisa Savage     Ordering User: Checo Harper

## 2023-03-15 DIAGNOSIS — K21.00 GASTROESOPHAGEAL REFLUX DISEASE WITH ESOPHAGITIS, UNSPECIFIED WHETHER HEMORRHAGE: ICD-10-CM

## 2023-03-15 RX ORDER — PANTOPRAZOLE SODIUM 40 MG/1
TABLET, DELAYED RELEASE ORAL
Qty: 180 TABLET | Refills: 0 | OUTPATIENT
Start: 2023-03-15

## 2023-03-30 DIAGNOSIS — E78.00 HYPERCHOLESTEROLEMIA: ICD-10-CM

## 2023-03-30 RX ORDER — SIMVASTATIN 20 MG
20 TABLET ORAL NIGHTLY
Qty: 90 TABLET | Refills: 0 | Status: SHIPPED | OUTPATIENT
Start: 2023-03-30

## 2023-03-30 RX ORDER — ISOSORBIDE MONONITRATE 30 MG/1
30 TABLET, EXTENDED RELEASE ORAL DAILY
Qty: 90 TABLET | Refills: 3 | Status: SHIPPED | OUTPATIENT
Start: 2023-03-30

## 2023-03-30 NOTE — TELEPHONE ENCOUNTER
Received fax from pharmacy requesting refill on pts medication(s). Pt was last seen in office on 03/23/2022 and has a follow up scheduled for Visit date not found. Will send request to  Jennifer Donahue  for authorization.      Requested Prescriptions     Pending Prescriptions Disp Refills    isosorbide mononitrate (IMDUR) 30 MG extended release tablet [Pharmacy Med Name: Isosorbide Mononitrate ER 30 MG Oral Tablet Extended Release 24 Hour] 90 tablet 3     Sig: Take 1 tablet by mouth daily

## 2023-03-30 NOTE — TELEPHONE ENCOUNTER
Received fax from pharmacy requesting refill on pts medication(s). Pt was last seen in office on 03/23/2022 and has a follow up scheduled for Visit date not found. Will send request to  Dr. Levon Connell  for authorization.      Requested Prescriptions     Pending Prescriptions Disp Refills    simvastatin (ZOCOR) 20 MG tablet [Pharmacy Med Name: Simvastatin 20 MG Oral Tablet] 90 tablet 0     Sig: Take 1 tablet by mouth nightly

## 2023-04-17 DIAGNOSIS — G62.9 NEUROPATHY: ICD-10-CM

## 2023-04-17 RX ORDER — PREGABALIN 75 MG/1
75 CAPSULE ORAL 2 TIMES DAILY
Qty: 60 CAPSULE | Refills: 0 | Status: SHIPPED | OUTPATIENT
Start: 2023-04-17 | End: 2023-07-16

## 2023-04-17 NOTE — TELEPHONE ENCOUNTER
Received fax from pharmacy requesting refill on pts medication(s). Pt was last seen in office on Visit date not found  and has a follow up scheduled for Visit date not found. Will send request to  Lizzie Shea  for authorization. Requested Prescriptions     Pending Prescriptions Disp Refills    pregabalin (LYRICA) 75 MG capsule 180 capsule 0     Sig: Take 1 capsule by mouth 2 times daily for 90 days.

## 2023-04-24 ENCOUNTER — OFFICE VISIT (OUTPATIENT)
Dept: FAMILY MEDICINE CLINIC | Age: 88
End: 2023-04-24
Payer: MEDICARE

## 2023-04-24 VITALS
HEIGHT: 71 IN | BODY MASS INDEX: 25.9 KG/M2 | TEMPERATURE: 97.5 F | WEIGHT: 185 LBS | OXYGEN SATURATION: 93 % | DIASTOLIC BLOOD PRESSURE: 80 MMHG | SYSTOLIC BLOOD PRESSURE: 134 MMHG | HEART RATE: 70 BPM

## 2023-04-24 DIAGNOSIS — I48.0 PAROXYSMAL ATRIAL FIBRILLATION (HCC): ICD-10-CM

## 2023-04-24 DIAGNOSIS — J44.1 COPD EXACERBATION (HCC): ICD-10-CM

## 2023-04-24 DIAGNOSIS — Z00.00 MEDICARE ANNUAL WELLNESS VISIT, SUBSEQUENT: Primary | ICD-10-CM

## 2023-04-24 DIAGNOSIS — K21.00 GASTROESOPHAGEAL REFLUX DISEASE WITH ESOPHAGITIS, UNSPECIFIED WHETHER HEMORRHAGE: ICD-10-CM

## 2023-04-24 DIAGNOSIS — I71.40 ABDOMINAL AORTIC ANEURYSM (AAA) WITHOUT RUPTURE, UNSPECIFIED PART (HCC): ICD-10-CM

## 2023-04-24 DIAGNOSIS — G62.9 NEUROPATHY: ICD-10-CM

## 2023-04-24 DIAGNOSIS — E78.00 HYPERCHOLESTEROLEMIA: ICD-10-CM

## 2023-04-24 DIAGNOSIS — N40.0 PROSTATE ENLARGEMENT: ICD-10-CM

## 2023-04-24 PROCEDURE — G0439 PPPS, SUBSEQ VISIT: HCPCS | Performed by: NURSE PRACTITIONER

## 2023-04-24 PROCEDURE — 1123F ACP DISCUSS/DSCN MKR DOCD: CPT | Performed by: NURSE PRACTITIONER

## 2023-04-24 RX ORDER — SIMVASTATIN 20 MG
20 TABLET ORAL NIGHTLY
Qty: 90 TABLET | Refills: 3 | Status: SHIPPED | OUTPATIENT
Start: 2023-04-24

## 2023-04-24 RX ORDER — PREGABALIN 75 MG/1
75 CAPSULE ORAL 2 TIMES DAILY
Qty: 180 CAPSULE | Refills: 1 | Status: SHIPPED | OUTPATIENT
Start: 2023-04-24 | End: 2023-07-23

## 2023-04-24 RX ORDER — TAMSULOSIN HYDROCHLORIDE 0.4 MG/1
0.4 CAPSULE ORAL DAILY
Qty: 90 CAPSULE | Refills: 3 | Status: SHIPPED | OUTPATIENT
Start: 2023-04-24

## 2023-04-24 RX ORDER — PANTOPRAZOLE SODIUM 40 MG/1
40 TABLET, DELAYED RELEASE ORAL DAILY
Qty: 90 TABLET | Refills: 3 | Status: SHIPPED | OUTPATIENT
Start: 2023-04-24

## 2023-04-24 SDOH — ECONOMIC STABILITY: INCOME INSECURITY: HOW HARD IS IT FOR YOU TO PAY FOR THE VERY BASICS LIKE FOOD, HOUSING, MEDICAL CARE, AND HEATING?: NOT HARD AT ALL

## 2023-04-24 SDOH — ECONOMIC STABILITY: HOUSING INSECURITY
IN THE LAST 12 MONTHS, WAS THERE A TIME WHEN YOU DID NOT HAVE A STEADY PLACE TO SLEEP OR SLEPT IN A SHELTER (INCLUDING NOW)?: NO

## 2023-04-24 SDOH — ECONOMIC STABILITY: FOOD INSECURITY: WITHIN THE PAST 12 MONTHS, YOU WORRIED THAT YOUR FOOD WOULD RUN OUT BEFORE YOU GOT MONEY TO BUY MORE.: NEVER TRUE

## 2023-04-24 SDOH — ECONOMIC STABILITY: FOOD INSECURITY: WITHIN THE PAST 12 MONTHS, THE FOOD YOU BOUGHT JUST DIDN'T LAST AND YOU DIDN'T HAVE MONEY TO GET MORE.: NEVER TRUE

## 2023-04-24 ASSESSMENT — PATIENT HEALTH QUESTIONNAIRE - PHQ9
10. IF YOU CHECKED OFF ANY PROBLEMS, HOW DIFFICULT HAVE THESE PROBLEMS MADE IT FOR YOU TO DO YOUR WORK, TAKE CARE OF THINGS AT HOME, OR GET ALONG WITH OTHER PEOPLE: 0
1. LITTLE INTEREST OR PLEASURE IN DOING THINGS: 0
SUM OF ALL RESPONSES TO PHQ QUESTIONS 1-9: 1
SUM OF ALL RESPONSES TO PHQ9 QUESTIONS 1 & 2: 0
4. FEELING TIRED OR HAVING LITTLE ENERGY: 0
3. TROUBLE FALLING OR STAYING ASLEEP: 1
SUM OF ALL RESPONSES TO PHQ QUESTIONS 1-9: 1
6. FEELING BAD ABOUT YOURSELF - OR THAT YOU ARE A FAILURE OR HAVE LET YOURSELF OR YOUR FAMILY DOWN: 0
SUM OF ALL RESPONSES TO PHQ QUESTIONS 1-9: 1
8. MOVING OR SPEAKING SO SLOWLY THAT OTHER PEOPLE COULD HAVE NOTICED. OR THE OPPOSITE, BEING SO FIGETY OR RESTLESS THAT YOU HAVE BEEN MOVING AROUND A LOT MORE THAN USUAL: 0
2. FEELING DOWN, DEPRESSED OR HOPELESS: 0
SUM OF ALL RESPONSES TO PHQ QUESTIONS 1-9: 1
9. THOUGHTS THAT YOU WOULD BE BETTER OFF DEAD, OR OF HURTING YOURSELF: 0
5. POOR APPETITE OR OVEREATING: 0
7. TROUBLE CONCENTRATING ON THINGS, SUCH AS READING THE NEWSPAPER OR WATCHING TELEVISION: 0

## 2023-04-24 NOTE — PROGRESS NOTES
Medicare Annual Wellness Visit    Venkat Arcos is here for Medicare AWV (Having increase pain. Also not sleeping well. )    Assessment & Plan   Medicare annual wellness visit, subsequent  Hypercholesterolemia  -     simvastatin (ZOCOR) 20 MG tablet; Take 1 tablet by mouth nightly, Disp-90 tablet, R-3Normal  COPD exacerbation (HCC)  Abdominal aortic aneurysm (AAA) without rupture, unspecified part ([facility]  Paroxysmal atrial fibrillation ([facility]  Samples of eliquis given to patient today. Lot #[id number] exp 09/23 and lot# [id number] Exp 04/24  Gastroesophageal reflux disease with esophagitis, unspecified whether hemorrhage  -     pantoprazole (PROTONIX) 40 MG tablet; Take 1 tablet by mouth daily, Disp-90 tablet, R-3Normal  Neuropathy  -     pregabalin (LYRICA) 75 MG capsule; Take 1 capsule by mouth 2 times daily for 90 days. , Disp-180 capsule, R-1Normal  Prostate enlargement  -     tamsulosin (FLOMAX) 0.4 MG capsule; Take 1 capsule by mouth daily, Disp-90 capsule, R-3Normal    ADVISED PATIENT [facility] and [facility]ICE. Reportedly done last week. Recommendations for Preventive Services Due: see orders and patient instructions/AVS.  Recommended screening schedule for the next 5-10 years is provided to the patient in written form: see Patient Instructions/AVS.     Return in about 6 months (around 10/24/2023). Subjective   The following acute and/or chronic problems were also addressed today:    CAD/PAF  Followed by cardiology  On imdur, simvastatin, eliquis and metoprolol  Chest pain - has costrochoniditis. Worse when rake or use arms a lot. On lyrica and cymbalta  Denies having to use nitroglycerin    GERD  On protonix  Denies any dysphagia or reflux symptoms. AAA  He is followed by Vascular. Due for repeat scan in August.   Per vascular note 08/25/2022:  CT A/P-reviewed by Dr. True Talamantes on 8/25/2022.   Her measurement of the abdominal aortic aneurysm was 4.7 which was unchanged from

## 2023-04-24 NOTE — PATIENT INSTRUCTIONS
aren't able to make decisions for yourself. If you don't fill out the legal form and name a health care agent, the decisions your family can make may be limited. A health care agent may be called something else in your state. Who will make decisions for you if you don't have a health care agent? If you don't have a health care agent or a living will, you may not get the care you want. Decisions may be made by family members who disagree about your medical care. Or decisions may be made by a medical professional who doesn't know you well. In some cases, a  makes the decisions. When you name a health care agent, it is very clear who has the power to make health decisions for you. How do you name a health care agent? You name your health care agent on a legal form. This form is usually called a medical power of . Ask your hospital, state bar association, or office on aging where to find these forms. You must sign the form to make it legal. Some states require you to get the form notarized. This means that a person called a  watches you sign the form and then the notary signs the form. Some states also require that two or more witnesses sign the form. Be sure to tell your family members and doctors who your health care agent is. Where can you learn more? Go to http://www.woods.com/ and enter P737 to learn more about \"Learning About [address] \"  Current as of: June 6, 2022               Content Version: 13.6  © 2006-2023 [employer]. Care instructions adapted under license by [facility] ([facility]). If you have questions about a medical condition or this instruction, always ask your healthcare professional. [place] [address] any warranty or liability for your use of this information.

## 2023-06-21 DIAGNOSIS — G62.9 NEUROPATHY: ICD-10-CM

## 2023-06-21 DIAGNOSIS — K21.00 GASTROESOPHAGEAL REFLUX DISEASE WITH ESOPHAGITIS, UNSPECIFIED WHETHER HEMORRHAGE: ICD-10-CM

## 2023-06-21 RX ORDER — PREGABALIN 75 MG/1
75 CAPSULE ORAL 2 TIMES DAILY
Qty: 180 CAPSULE | Refills: 1 | Status: CANCELLED | OUTPATIENT
Start: 2023-06-21 | End: 2023-09-19

## 2023-06-22 RX ORDER — PREGABALIN 75 MG/1
75 CAPSULE ORAL 2 TIMES DAILY
Qty: 180 CAPSULE | Refills: 1 | Status: SHIPPED | OUTPATIENT
Start: 2023-06-22 | End: 2023-12-19

## 2023-06-22 RX ORDER — PANTOPRAZOLE SODIUM 40 MG/1
40 TABLET, DELAYED RELEASE ORAL DAILY
Qty: 90 TABLET | Refills: 3 | Status: SHIPPED | OUTPATIENT
Start: 2023-06-22

## 2023-06-29 RX ORDER — APIXABAN 2.5 MG/1
TABLET, FILM COATED ORAL
Qty: 180 TABLET | Refills: 3 | Status: SHIPPED | OUTPATIENT
Start: 2023-06-29

## 2023-09-18 RX ORDER — METOPROLOL TARTRATE 50 MG/1
TABLET, FILM COATED ORAL
Qty: 180 TABLET | Refills: 3 | Status: SHIPPED | OUTPATIENT
Start: 2023-09-18

## 2023-09-25 ENCOUNTER — TELEPHONE (OUTPATIENT)
Dept: FAMILY MEDICINE CLINIC | Age: 88
End: 2023-09-25

## 2023-09-25 NOTE — TELEPHONE ENCOUNTER
----- Message from Arnoldo Gerardo sent at 9/25/2023  4:10 PM CDT -----  Subject: Referral Request    Reason for referral request? Pt and spouse are hoping to get referral to   urology for his kidneys (used to be Dr Mraio Alaniz), at [facility]. Please call to aadvise if he needs an appt or if Dr can just send   referral.  Provider patient wants to be referred to(if known):     Provider Phone Number(if known):     Additional Information for Provider?   ---------------------------------------------------------------------------  --------------  [address]    [phone]; OK to leave message on voicemail  ---------------------------------------------------------------------------  --------------

## 2023-09-26 ENCOUNTER — OFFICE VISIT (OUTPATIENT)
Dept: FAMILY MEDICINE CLINIC | Age: 88
End: 2023-09-26
Payer: MEDICARE

## 2023-09-26 VITALS
SYSTOLIC BLOOD PRESSURE: 132 MMHG | BODY MASS INDEX: 25.24 KG/M2 | WEIGHT: 181 LBS | DIASTOLIC BLOOD PRESSURE: 76 MMHG | OXYGEN SATURATION: 93 % | HEART RATE: 68 BPM | TEMPERATURE: 98.4 F

## 2023-09-26 DIAGNOSIS — Z79.01 CHRONIC ANTICOAGULATION: ICD-10-CM

## 2023-09-26 DIAGNOSIS — R31.0 GROSS HEMATURIA: ICD-10-CM

## 2023-09-26 DIAGNOSIS — N30.90 CYSTITIS: Primary | ICD-10-CM

## 2023-09-26 DIAGNOSIS — N30.90 CYSTITIS: ICD-10-CM

## 2023-09-26 DIAGNOSIS — I48.0 PAF (PAROXYSMAL ATRIAL FIBRILLATION) (HCC): ICD-10-CM

## 2023-09-26 LAB
ALBUMIN SERPL-MCNC: 4.1 G/DL (ref 3.5–5.2)
ALP SERPL-CCNC: 70 U/L (ref 40–130)
ALT SERPL-CCNC: 16 U/L (ref 5–41)
ANION GAP SERPL CALCULATED.3IONS-SCNC: 8 MMOL/L (ref 7–19)
APPEARANCE FLUID: ABNORMAL
AST SERPL-CCNC: 21 U/L (ref 5–40)
BACTERIA #/AREA URNS HPF: ABNORMAL /HPF
BASOPHILS # BLD: 0 K/UL (ref 0–0.2)
BASOPHILS NFR BLD: 0.4 % (ref 0–1)
BILIRUB SERPL-MCNC: 0.7 MG/DL (ref 0.2–1.2)
BILIRUB UR QL STRIP: ABNORMAL
BILIRUBIN, POC: ABNORMAL
BLOOD URINE, POC: ABNORMAL
BUN SERPL-MCNC: 13 MG/DL (ref 8–23)
CALCIUM SERPL-MCNC: 8.9 MG/DL (ref 8.8–10.2)
CHARACTER UR: ABNORMAL
CHLORIDE SERPL-SCNC: 107 MMOL/L (ref 98–111)
CLARITY UR: ABNORMAL
CLARITY, POC: ABNORMAL
CO2 SERPL-SCNC: 28 MMOL/L (ref 22–29)
COLOR UR: ABNORMAL
COLOR, POC: ABNORMAL
CREAT SERPL-MCNC: 1.1 MG/DL (ref 0.5–1.2)
EOSINOPHIL # BLD: 0.4 K/UL (ref 0–0.6)
EOSINOPHIL NFR BLD: 4.9 % (ref 0–5)
ERYTHROCYTE [DISTWIDTH] IN BLOOD BY AUTOMATED COUNT: 17.5 % (ref 11.5–14.5)
GLUCOSE SERPL-MCNC: 99 MG/DL (ref 74–109)
GLUCOSE UR STRIP.AUTO-MCNC: NEGATIVE MG/DL
GLUCOSE URINE, POC: 100
HCT VFR BLD AUTO: 39.7 % (ref 42–52)
HGB BLD-MCNC: 12.4 G/DL (ref 14–18)
HGB UR STRIP.AUTO-MCNC: ABNORMAL MG/L
IMM GRANULOCYTES # BLD: 0 K/UL
KETONES UR STRIP.AUTO-MCNC: NEGATIVE MG/DL
KETONES, POC: ABNORMAL
LEUKOCYTE EST, POC: ABNORMAL
LEUKOCYTE ESTERASE UR QL STRIP.AUTO: ABNORMAL
LYMPHOCYTES # BLD: 2.1 K/UL (ref 1.1–4.5)
LYMPHOCYTES NFR BLD: 26.8 % (ref 20–40)
MCH RBC QN AUTO: 29.5 PG (ref 27–31)
MCHC RBC AUTO-ENTMCNC: 31.2 G/DL (ref 33–37)
MCV RBC AUTO: 94.3 FL (ref 80–94)
MONOCYTES # BLD: 0.9 K/UL (ref 0–0.9)
MONOCYTES NFR BLD: 11.7 % (ref 0–10)
NEUTROPHILS # BLD: 4.3 K/UL (ref 1.5–7.5)
NEUTS SEG NFR BLD: 55.7 % (ref 50–65)
NITRITE UR QL STRIP.AUTO: POSITIVE
NITRITE, POC: ABNORMAL
PH UR STRIP.AUTO: 5 [PH] (ref 5–8)
PH, POC: 0.5
PLATELET # BLD AUTO: 181 K/UL (ref 130–400)
PMV BLD AUTO: 11.6 FL (ref 9.4–12.4)
POTASSIUM SERPL-SCNC: 4.7 MMOL/L (ref 3.5–5)
PROT SERPL-MCNC: 6.9 G/DL (ref 6.6–8.7)
PROT UR STRIP.AUTO-MCNC: 100 MG/DL
PROTEIN, POC: >300
RBC # BLD AUTO: 4.21 M/UL (ref 4.7–6.1)
RBC #/AREA URNS HPF: ABNORMAL /HPF (ref 0–2)
SODIUM SERPL-SCNC: 143 MMOL/L (ref 136–145)
SP GR UR STRIP.AUTO: 1.01 (ref 1–1.03)
SPECIFIC GRAVITY, POC: 1.02
SQUAMOUS #/AREA URNS HPF: ABNORMAL /HPF
UROBILINOGEN UR STRIP.AUTO-MCNC: 0.2 E.U./DL
UROBILINOGEN, POC: 1
WBC # BLD AUTO: 7.8 K/UL (ref 4.8–10.8)
WBC #/AREA URNS HPF: ABNORMAL /HPF (ref 0–5)

## 2023-09-26 PROCEDURE — 1036F TOBACCO NON-USER: CPT | Performed by: NURSE PRACTITIONER

## 2023-09-26 PROCEDURE — 1123F ACP DISCUSS/DSCN MKR DOCD: CPT | Performed by: NURSE PRACTITIONER

## 2023-09-26 PROCEDURE — G8427 DOCREV CUR MEDS BY ELIG CLIN: HCPCS | Performed by: NURSE PRACTITIONER

## 2023-09-26 PROCEDURE — 99213 OFFICE O/P EST LOW 20 MIN: CPT | Performed by: NURSE PRACTITIONER

## 2023-09-26 PROCEDURE — G8417 CALC BMI ABV UP PARAM F/U: HCPCS | Performed by: NURSE PRACTITIONER

## 2023-09-26 PROCEDURE — 96372 THER/PROPH/DIAG INJ SC/IM: CPT | Performed by: NURSE PRACTITIONER

## 2023-09-26 PROCEDURE — 81002 URINALYSIS NONAUTO W/O SCOPE: CPT | Performed by: NURSE PRACTITIONER

## 2023-09-26 RX ORDER — CEPHALEXIN 500 MG/1
500 CAPSULE ORAL 3 TIMES DAILY
Qty: 10 CAPSULE | Refills: 0 | Status: SHIPPED | OUTPATIENT
Start: 2023-09-26 | End: 2023-10-06

## 2023-09-26 RX ORDER — CEFTRIAXONE 500 MG/1
1000 INJECTION, POWDER, FOR SOLUTION INTRAMUSCULAR; INTRAVENOUS ONCE
Status: COMPLETED | OUTPATIENT
Start: 2023-09-26 | End: 2023-09-26

## 2023-09-26 RX ADMIN — CEFTRIAXONE 1000 MG: 500 INJECTION, POWDER, FOR SOLUTION INTRAMUSCULAR; INTRAVENOUS at 13:32

## 2023-09-26 ASSESSMENT — ENCOUNTER SYMPTOMS
DIARRHEA: 0
COUGH: 0
ABDOMINAL PAIN: 0
CONSTIPATION: 0
SHORTNESS OF BREATH: 0
SORE THROAT: 0
RHINORRHEA: 0
TROUBLE SWALLOWING: 0
NAUSEA: 0
VOMITING: 0

## 2023-09-26 NOTE — PROGRESS NOTES
After obtaining consent and per order of SID BRODERICK, gave patient rocephin 1 gm injection in Right upper quad. gluteus, patient tolerated well. Medication was not supplied by the patient.

## 2023-09-28 ENCOUNTER — TELEPHONE (OUTPATIENT)
Dept: FAMILY MEDICINE CLINIC | Age: 88
End: 2023-09-28

## 2023-09-28 LAB
BACTERIA UR CULT: ABNORMAL
BACTERIA UR CULT: ABNORMAL
ORGANISM: ABNORMAL

## 2023-09-28 NOTE — TELEPHONE ENCOUNTER
----- Message from DAVIE Vu sent at 9/28/2023  7:30 AM CDT -----  Please call patient and let them know results. Urine culture showed bacteria that needs different antibiotic. Discontinue keflex and start cipro 500mg bid for 10 days.

## 2023-09-28 NOTE — TELEPHONE ENCOUNTER
Tried calling pt with results. No answer and No vm. It said enter remote access code. Will try again later.

## 2023-09-28 NOTE — TELEPHONE ENCOUNTER
----- Message from DAVIE Gonzalez sent at 9/27/2023  7:19 AM CDT -----  Please call patient and let them know results.    Mild anemia but blood counts have improved from previous check  Metabolic panel is normal including kidney function

## 2023-09-29 NOTE — TELEPHONE ENCOUNTER
Called patient, spoke with: Patient regarding the results of the patients most recent labs. I advised Patient of Jefry Stevenson recommendations. Patient did voice understanding      Pt stated we can send in a new antibiotic but the Keflex has cleared all of his symptoms up did you still want to send in something new?      Please route back so we can make pt aware if medication is advised

## 2023-10-10 RX ORDER — DULOXETIN HYDROCHLORIDE 60 MG/1
60 CAPSULE, DELAYED RELEASE ORAL DAILY
Qty: 90 CAPSULE | Refills: 3 | Status: SHIPPED | OUTPATIENT
Start: 2023-10-10

## 2023-10-10 NOTE — TELEPHONE ENCOUNTER
Received fax from pharmacy requesting refill on pts medication(s). Pt was last seen in office on 9/26/2023  and has a follow up scheduled for Visit date not found. Will send request to  Valerie Reason  for patient.      Requested Prescriptions     Pending Prescriptions Disp Refills    DULoxetine (CYMBALTA) 60 MG extended release capsule [Pharmacy Med Name: DULoxetine HCl 60 MG Oral Capsule Delayed Release Particles] 90 capsule 3     Sig: Take 1 capsule by mouth daily

## 2023-10-13 NOTE — PLAN OF CARE
Problem: Cardiac Output - Decreased:  Goal: Hemodynamic stability will improve  Hemodynamic stability will improve  Outcome: Met This Shift Family

## 2023-11-16 ENCOUNTER — TELEPHONE (OUTPATIENT)
Dept: UROLOGY | Age: 88
End: 2023-11-16

## 2023-11-16 NOTE — TELEPHONE ENCOUNTER
I have attempted to contact patient to answer their questions, no VM and phone just kept ringing and ringing. Patient has appt on Monday with our office.

## 2023-11-16 NOTE — TELEPHONE ENCOUNTER
Pt's spouse, Kaz Said, called to schedule appt with office for blood in urine. Pt okay with being scheduled on 11/20/23, however would still like to speak to nurse about symptoms. Thank you.

## 2023-12-08 ENCOUNTER — OFFICE VISIT (OUTPATIENT)
Dept: FAMILY MEDICINE CLINIC | Age: 88
End: 2023-12-08
Payer: MEDICARE

## 2023-12-08 VITALS
OXYGEN SATURATION: 95 % | TEMPERATURE: 97.8 F | BODY MASS INDEX: 26.08 KG/M2 | WEIGHT: 187 LBS | SYSTOLIC BLOOD PRESSURE: 123 MMHG | DIASTOLIC BLOOD PRESSURE: 85 MMHG | HEART RATE: 54 BPM

## 2023-12-08 DIAGNOSIS — I71.40 ABDOMINAL AORTIC ANEURYSM (AAA) WITHOUT RUPTURE, UNSPECIFIED PART (HCC): Primary | ICD-10-CM

## 2023-12-08 DIAGNOSIS — N30.00 ACUTE CYSTITIS WITHOUT HEMATURIA: ICD-10-CM

## 2023-12-08 DIAGNOSIS — N20.0 RENAL CALCULI: ICD-10-CM

## 2023-12-08 PROCEDURE — 99214 OFFICE O/P EST MOD 30 MIN: CPT | Performed by: NURSE PRACTITIONER

## 2023-12-08 PROCEDURE — G8427 DOCREV CUR MEDS BY ELIG CLIN: HCPCS | Performed by: NURSE PRACTITIONER

## 2023-12-08 PROCEDURE — G8482 FLU IMMUNIZE ORDER/ADMIN: HCPCS | Performed by: NURSE PRACTITIONER

## 2023-12-08 PROCEDURE — 1123F ACP DISCUSS/DSCN MKR DOCD: CPT | Performed by: NURSE PRACTITIONER

## 2023-12-08 PROCEDURE — 1036F TOBACCO NON-USER: CPT | Performed by: NURSE PRACTITIONER

## 2023-12-08 PROCEDURE — G8417 CALC BMI ABV UP PARAM F/U: HCPCS | Performed by: NURSE PRACTITIONER

## 2023-12-08 RX ORDER — CEPHALEXIN 500 MG/1
500 CAPSULE ORAL 2 TIMES DAILY
COMMUNITY
Start: 2023-12-07

## 2023-12-08 ASSESSMENT — ENCOUNTER SYMPTOMS
CONSTIPATION: 0
ABDOMINAL DISTENTION: 1
CHOKING: 0
SORE THROAT: 0
WHEEZING: 0
DIARRHEA: 0
EYE REDNESS: 0
BACK PAIN: 1
EYE DISCHARGE: 0
BLOOD IN STOOL: 0
COUGH: 0
RHINORRHEA: 0

## 2023-12-08 NOTE — PROGRESS NOTES
Mat Mcclellan (:  1935) is a 82 y.o. male,Established patient, here for evaluation of the following chief complaint(s):  Follow-up (Pts was seen in the ED yesterday for abdominal pain. It was found that the pts has a 5.5 cm Abdominal aortic aneurysm. He also has a 10mm renal stone and a UTI. Pts would like to discuss next steps. )      ASSESSMENT/PLAN:    ICD-10-CM    1. Abdominal aortic aneurysm (AAA) without rupture, unspecified part ([address])  I71.40 External Referral To Vascular Surgery     CANCELED: External Referral To Vascular Surgery      2. Renal calculi  N20.0       3. Acute cystitis without hematuria  N30.00       Will put in a referral to Dr. Chelsey Valdez office and asked that the patient bring a disc of his exam from [facility] to that appt with him. Continue keflex    Return if symptoms worsen or fail to improve. SUBJECTIVE/OBJECTIVE:  HPI  ER note reviewed. Follow-up (Pts was seen in the ED yesterday for abdominal pain. It was found that the pts has a 5.5 cm Abdominal aortic aneurysm. He also has a 10mm renal stone and a UTI. Pts would like to discuss next steps. Looking back through the chart, he has been following with [facility] on a yearly basis for this. It measured 5.3 x 4.1 cm  on his scan in . According to Susan's note, Dr. Keke Pathak reviewed this and measured it at 4.7 which was unchanged from the previous study. So intervention was not flet that it was needed at that time. Today patient is requesting to see someone else and would like to see someone at [facility] [place].     Review of Systems   Constitutional:  Negative for appetite change and unexpected weight change. HENT:  Negative for congestion, ear pain, rhinorrhea and sore throat. Eyes:  Negative for discharge and redness. Respiratory:  Negative for cough, choking and wheezing. Cardiovascular:  Negative for chest pain. Gastrointestinal:  Positive for abdominal distention.  Negative for blood in stool, constipation and

## 2023-12-11 ENCOUNTER — TELEPHONE (OUTPATIENT)
Dept: PODIATRY | Facility: CLINIC | Age: 88
End: 2023-12-11
Payer: MEDICARE

## 2023-12-11 NOTE — PROGRESS NOTES
12/12/2023      Rosalba Moody APRN  [address] [facility] [place] [address]    Nicolas Nieves  1935    Chief Complaint   Patient presents with    Establish Care     URGENT REFERRAL FROM [facility] TO [facility] FOR AAA 5.5 WITHOUT RUPTURE UNSPECIFIED PART. imaging released to nucleus  Former smoker- quit 40 years ago started in 1949 pack /day 2       Dear MARITZA Smith    HPI  I had the pleasure of seeing your patient Nicolas Nieves in the office today.  Thank you kindly for this consultation.  As you recall, Nicolas Nieves is a 88 y.o.  male who you are currently following for routine health maintenance.  He was previously followed at [facility], last seen in August 2022, where they recommended annual follow up. He was seen in [facility] ED and had a CT abdomen and pelvis, which he did bring in for review.        Past Medical History:   Diagnosis Date    Arthritis     Atrial fibrillation     Harmon's esophagus     CAD (coronary artery disease)     COPD (chronic obstructive pulmonary disease)     Costochondritis     Elevated cholesterol     Gastritis     GERD (gastroesophageal reflux disease)     Hemorrhoids     History of colon cancer     History of colon polyps     Hypertension     Peptic ulcer     Renal calculi        Past Surgical History:   Procedure Laterality Date    CARDIAC CATHETERIZATION      stents x 4    CATARACT EXTRACTION, BILATERAL      CHOLECYSTECTOMY      COLONOSCOPY  04/25/2014    One 3mm hyperplastic rectal polyp; Repeat 3 years     COLONOSCOPY N/A 05/17/2017    The examined portion of the ileum was normal; Patent end-to-side ileo-colonic anastomosis, characterized by healthy appearing mucosa; The entire examined colon is normal; No specimens collected; No plans to repeat due to age     COLONOSCOPY  04/17/2013    Mass in ascending colon-biopsied-marked with Malorie ink; Internal hemorrhoids     COLONOSCOPY  02/16/2010    Dr. Sanchez-Internal hermorrhoids; Repeat 3-5 years      COLONOSCOPY  02/26/2007    Dr. White-Internal hemorrhoids; Diminuitive hyperplastic rectal polyp; AVM right colon; Repeat 4 years    COLONOSCOPY  04/22/2003    Dr. White-Normal colonoscopy with internal hemorrhoids    ENDOSCOPY N/A 04/10/2019    No endoscopic esophageal abnormality to explain patient's dysphagia-esophagus dilated; A few gastric polyps-biopsied; Normal examined duodenum    ENDOSCOPY  04/17/2013    Duodenitis; LA grade A esophagitis     ENDOSCOPY  8/11/2020    Healed gastric ulcers     ENDOSCOPY  02/16/2010    Gastric ulcers     ENDOSCOPY  09/15/2009    Normal     ENDOSCOPY  05/15/2006    Dr. WhiteHjcxj-Opzodpdrs-wzeqpslr; Small HH     ENDOSCOPY  01/24/2005    Dr. White-Diffuse esophageal spasm-dilated; Gastritis-biopsied    ENDOSCOPY  04/25/2003    Dr. White-Stage II reflux esophagitis-rule out short-segment Harmon's esophagus; Schatzki's ring-dilated; Gastritis-biopsied    ENDOSCOPY N/A 08/08/2022    No endoscopic esophageal abnormality to explain patient's dysphagia; Normal stomach; Normal examined duodenum; No specimens collected    HEMICOLECTOMY Right 05/2013    LUNG REMOVAL, PARTIAL         Family History   Problem Relation Age of Onset    Alzheimer's disease Mother     Heart disease Father     Ulcers Father     Colon cancer Neg Hx     Colon polyps Neg Hx     Esophageal cancer Neg Hx     Liver cancer Neg Hx     Liver disease Neg Hx     Stomach cancer Neg Hx     Rectal cancer Neg Hx        Social History     Socioeconomic History    Marital status:    Tobacco Use    Smoking status: Former     Types: Cigarettes    Smokeless tobacco: Never   Vaping Use    Vaping Use: Never used   Substance and Sexual Activity    Alcohol use: Not Currently    Drug use: No    Sexual activity: Defer       Allergies   Allergen Reactions    Lortab [Hydrocodone-Acetaminophen] Nausea And Vomiting    Morphine And Related Nausea And Vomiting         Current Outpatient Medications:     cephalexin (KEFLEX) 500 MG capsule,  "Take 1 capsule by mouth., Disp: , Rfl:     Cholecalciferol (VITAMIN D3) 5000 units tablet tablet, Take 1 tablet by mouth Daily., Disp: , Rfl:     DULoxetine (CYMBALTA) 60 MG capsule, Take 1 capsule by mouth Daily., Disp: , Rfl:     ELIQUIS 2.5 MG tablet tablet, Take 1 tablet by mouth 2 (Two) Times a Day., Disp: , Rfl:     glucosamine-chondroitin 500-400 MG capsule capsule, Take 1 capsule by mouth Daily., Disp: , Rfl:     isosorbide mononitrate (IMDUR) 30 MG 24 hr tablet, Take 1 tablet by mouth Daily., Disp: , Rfl:     metoprolol tartrate (LOPRESSOR) 50 MG tablet, Take 0.5 tablets by mouth 2 (Two) Times a Day., Disp: , Rfl:     pantoprazole (PROTONIX) 40 MG EC tablet, Take 1 tablet by mouth Daily., Disp: 180 tablet, Rfl: 3    pregabalin (LYRICA) 75 MG capsule, Take 1 capsule by mouth Every 12 (Twelve) Hours., Disp: , Rfl:     tamsulosin (FLOMAX) 0.4 MG capsule 24 hr capsule, Take 1 capsule by mouth Daily., Disp: , Rfl:     vitamin B-12 (CYANOCOBALAMIN) 100 MCG tablet, Take 1 tablet by mouth Daily., Disp: , Rfl:     docusate sodium (COLACE) 100 MG capsule, Take 100 mg by mouth 2 (Two) Times a Day. (Patient not taking: Reported on 12/12/2023), Disp: , Rfl:     simvastatin (ZOCOR) 20 MG tablet, Take 20 mg by mouth Daily. (Patient not taking: Reported on 12/12/2023), Disp: , Rfl:     Review of Systems   Constitutional: Negative.    HENT: Negative.     Eyes: Negative.    Respiratory: Negative.     Cardiovascular: Negative.    Gastrointestinal: Negative.    Endocrine: Negative.    Genitourinary: Negative.    Musculoskeletal: Negative.    Skin: Negative.    Allergic/Immunologic: Negative.    Neurological: Negative.    Hematological: Negative.    Psychiatric/Behavioral: Negative.     All other systems reviewed and are negative.    /70 (BP Location: Right arm, Patient Position: Sitting, Cuff Size: Adult)   Pulse 56   Ht 180.3 cm (70.98\")   Wt 86.4 kg (190 lb 6.4 oz)   SpO2 96%   BMI 26.57 kg/m²     Physical " Exam  Vitals and nursing note reviewed.   Constitutional:       Appearance: He is well-developed.   HENT:      Head: Normocephalic and atraumatic.   Eyes:      General: No scleral icterus.     Pupils: Pupils are equal, round, and reactive to light.   Neck:      Thyroid: No thyromegaly.      Vascular: No carotid bruit or JVD.   Cardiovascular:      Rate and Rhythm: Normal rate and regular rhythm.      Pulses:           Carotid pulses are 2+ on the right side and 2+ on the left side.       Femoral pulses are 2+ on the right side and 2+ on the left side.       Popliteal pulses are 2+ on the right side and 2+ on the left side.        Dorsalis pedis pulses are 2+ on the right side and 2+ on the left side.        Posterior tibial pulses are 2+ on the right side and 2+ on the left side.      Heart sounds: Normal heart sounds.   Pulmonary:      Effort: Pulmonary effort is normal.      Breath sounds: Normal breath sounds.   Abdominal:      General: Bowel sounds are normal. There is no distension or abdominal bruit.      Palpations: Abdomen is soft. There is pulsatile mass. There is no mass.      Tenderness: There is no abdominal tenderness.   Musculoskeletal:         General: Normal range of motion.      Cervical back: Neck supple.   Lymphadenopathy:      Cervical: No cervical adenopathy.   Skin:     General: Skin is warm and dry.   Neurological:      Mental Status: He is alert and oriented to person, place, and time.      Cranial Nerves: No cranial nerve deficit.      Sensory: No sensory deficit.       CT of the abdomen and pelvis personally reviewed revealing saccular 5.6 cm abdominal aortic aneurysm    Patient Active Problem List   Diagnosis    Colon polyps    History of colon cancer    Other dysphagia    Gastroesophageal reflux disease without esophagitis    Abdominal pain, epigastric    Atrial fibrillation    Elevated cholesterol    Hypertension        Diagnosis Plan   1. Infrarenal abdominal aortic aneurysm (AAA)  without rupture        2. Elevated cholesterol        3. Primary hypertension            Plan: After thoroughly evaluating Nicolas Nieves, I believe the best course of action is to proceed with endovascular abdominal aortic aneurysm repair.  I did review his testing which shows a saccular 5.6 cm infrarenal abdominal aortic aneurysm.  Risks/benefits were explained at great length to the patient which include but are not limited to bleeding, infection, vessel rupture, bowel damage, renal failure, MI, stroke, and death.   I will contact [facility] cardiology for clearance.  Once I receive clearance, I will schedule him appropriately.  He will need to hold his Xarelto for 2 days prior to the procedure. The patient is to continue taking their medications as previously discussed.   This was all discussed in full with complete understanding.  Thank you for allowing me to participate in the care of your patient.  Please do not hesitate to call with any questions or concerns.  We will keep you aware of any further encounters with Nicolas Nieves.        Sincerely yours,         Shai Burgos, Malik Palomo, DO

## 2023-12-11 NOTE — TELEPHONE ENCOUNTER
Called patient to remind him of his appointment. Patient states he will be at his appointment on 12/12/23 at 2:15 p.m.

## 2023-12-12 ENCOUNTER — OFFICE VISIT (OUTPATIENT)
Dept: VASCULAR SURGERY | Facility: CLINIC | Age: 88
End: 2023-12-12
Payer: MEDICARE

## 2023-12-12 VITALS
HEART RATE: 56 BPM | HEIGHT: 71 IN | BODY MASS INDEX: 26.65 KG/M2 | SYSTOLIC BLOOD PRESSURE: 120 MMHG | WEIGHT: 190.4 LBS | OXYGEN SATURATION: 96 % | DIASTOLIC BLOOD PRESSURE: 70 MMHG

## 2023-12-12 DIAGNOSIS — I10 PRIMARY HYPERTENSION: ICD-10-CM

## 2023-12-12 DIAGNOSIS — I71.43 INFRARENAL ABDOMINAL AORTIC ANEURYSM (AAA) WITHOUT RUPTURE: Primary | ICD-10-CM

## 2023-12-12 DIAGNOSIS — E78.00 ELEVATED CHOLESTEROL: ICD-10-CM

## 2023-12-12 PROBLEM — I48.91 ATRIAL FIBRILLATION: Status: ACTIVE | Noted: 2023-12-12

## 2023-12-12 PROCEDURE — 99204 OFFICE O/P NEW MOD 45 MIN: CPT | Performed by: SURGERY

## 2023-12-12 PROCEDURE — 1160F RVW MEDS BY RX/DR IN RCRD: CPT | Performed by: SURGERY

## 2023-12-12 PROCEDURE — 1159F MED LIST DOCD IN RCRD: CPT | Performed by: SURGERY

## 2023-12-12 RX ORDER — PREGABALIN 75 MG/1
1 CAPSULE ORAL EVERY 12 HOURS SCHEDULED
COMMUNITY
Start: 2023-11-30

## 2023-12-12 RX ORDER — CEPHALEXIN 500 MG/1
500 CAPSULE ORAL
COMMUNITY
Start: 2023-12-07

## 2023-12-12 NOTE — LETTER
December 12, 2023     MARITZA Smith  [address] [place] [address]    Patient: Nicolas Nieves   YOB: 1935   Date of Visit: 12/12/2023     Dear MARITZA Smith:       Thank you for referring Nicolas Nieves to me for evaluation. Below are the relevant portions of my assessment and plan of care.    If you have questions, please do not hesitate to call me. I look forward to following Nicoals along with you.         Sincerely,        Shai Petty,         CC: DO Aubrey Wilcox Griffin K, DO  12/12/23 1603  Sign when Signing Visit  12/12/2023      Rosalba Moody APRN  [address] [place] [address]    Nicolas Nieves  1935    Chief Complaint   Patient presents with   • Establish Care     URGENT REFERRAL FROM [facility] TO [facility] FOR AAA 5.5 WITHOUT RUPTURE UNSPECIFIED PART. imaging released to nucleus  Former smoker- quit 40 years ago started in 1949 pack /day 2       Dear MARITZA Smith    HPI  I had the pleasure of seeing your patient Nicolas Nieves in the office today.  Thank you kindly for this consultation.  As you recall, Nicolas Nieves is a 88 y.o.  male who you are currently following for routine health maintenance.  He was previously followed at [facility], last seen in August 2022, where they recommended annual follow up. He was seen in [facility] ED and had a CT abdomen and pelvis, which he did bring in for review.        Past Medical History:   Diagnosis Date   • Arthritis    • Atrial fibrillation    • Harmon's esophagus    • CAD (coronary artery disease)    • COPD (chronic obstructive pulmonary disease)    • Costochondritis    • Elevated cholesterol    • Gastritis    • GERD (gastroesophageal reflux disease)    • Hemorrhoids    • History of colon cancer    • History of colon polyps    • Hypertension    • Peptic ulcer    • Renal calculi        Past Surgical History:   Procedure Laterality Date   • CARDIAC CATHETERIZATION      stents x 4    • CATARACT EXTRACTION, BILATERAL     • CHOLECYSTECTOMY     • COLONOSCOPY  04/25/2014    One 3mm hyperplastic rectal polyp; Repeat 3 years    • COLONOSCOPY N/A 05/17/2017    The examined portion of the ileum was normal; Patent end-to-side ileo-colonic anastomosis, characterized by healthy appearing mucosa; The entire examined colon is normal; No specimens collected; No plans to repeat due to age    • COLONOSCOPY  04/17/2013    Mass in ascending colon-biopsied-marked with Malorie ink; Internal hemorrhoids    • COLONOSCOPY  02/16/2010    Dr. Sanchez-Internal hermorrhoids; Repeat 3-5 years    • COLONOSCOPY  02/26/2007    Dr. White-Internal hemorrhoids; Diminuitive hyperplastic rectal polyp; AVM right colon; Repeat 4 years   • COLONOSCOPY  04/22/2003    Dr. White-Normal colonoscopy with internal hemorrhoids   • ENDOSCOPY N/A 04/10/2019    No endoscopic esophageal abnormality to explain patient's dysphagia-esophagus dilated; A few gastric polyps-biopsied; Normal examined duodenum   • ENDOSCOPY  04/17/2013    Duodenitis; LA grade A esophagitis    • ENDOSCOPY  8/11/2020    Healed gastric ulcers    • ENDOSCOPY  02/16/2010    Gastric ulcers    • ENDOSCOPY  09/15/2009    Normal    • ENDOSCOPY  05/15/2006    Dr. WhiteEefno-Ihhawdxwh-dmtplapn; Small HH    • ENDOSCOPY  01/24/2005    Dr. White-Diffuse esophageal spasm-dilated; Gastritis-biopsied   • ENDOSCOPY  04/25/2003    Dr. White-Stage II reflux esophagitis-rule out short-segment Harmon's esophagus; Schatzki's ring-dilated; Gastritis-biopsied   • ENDOSCOPY N/A 08/08/2022    No endoscopic esophageal abnormality to explain patient's dysphagia; Normal stomach; Normal examined duodenum; No specimens collected   • HEMICOLECTOMY Right 05/2013   • LUNG REMOVAL, PARTIAL         Family History   Problem Relation Age of Onset   • Alzheimer's disease Mother    • Heart disease Father    • Ulcers Father    • Colon cancer Neg Hx    • Colon polyps Neg Hx    • Esophageal cancer Neg Hx    • Liver  cancer Neg Hx    • Liver disease Neg Hx    • Stomach cancer Neg Hx    • Rectal cancer Neg Hx        Social History     Socioeconomic History   • Marital status:    Tobacco Use   • Smoking status: Former     Types: Cigarettes   • Smokeless tobacco: Never   Vaping Use   • Vaping Use: Never used   Substance and Sexual Activity   • Alcohol use: Not Currently   • Drug use: No   • Sexual activity: Defer       Allergies   Allergen Reactions   • Lortab [Hydrocodone-Acetaminophen] Nausea And Vomiting   • Morphine And Related Nausea And Vomiting         Current Outpatient Medications:   •  cephalexin (KEFLEX) 500 MG capsule, Take 1 capsule by mouth., Disp: , Rfl:   •  Cholecalciferol (VITAMIN D3) 5000 units tablet tablet, Take 1 tablet by mouth Daily., Disp: , Rfl:   •  DULoxetine (CYMBALTA) 60 MG capsule, Take 1 capsule by mouth Daily., Disp: , Rfl:   •  ELIQUIS 2.5 MG tablet tablet, Take 1 tablet by mouth 2 (Two) Times a Day., Disp: , Rfl:   •  glucosamine-chondroitin 500-400 MG capsule capsule, Take 1 capsule by mouth Daily., Disp: , Rfl:   •  isosorbide mononitrate (IMDUR) 30 MG 24 hr tablet, Take 1 tablet by mouth Daily., Disp: , Rfl:   •  metoprolol tartrate (LOPRESSOR) 50 MG tablet, Take 0.5 tablets by mouth 2 (Two) Times a Day., Disp: , Rfl:   •  pantoprazole (PROTONIX) 40 MG EC tablet, Take 1 tablet by mouth Daily., Disp: 180 tablet, Rfl: 3  •  pregabalin (LYRICA) 75 MG capsule, Take 1 capsule by mouth Every 12 (Twelve) Hours., Disp: , Rfl:   •  tamsulosin (FLOMAX) 0.4 MG capsule 24 hr capsule, Take 1 capsule by mouth Daily., Disp: , Rfl:   •  vitamin B-12 (CYANOCOBALAMIN) 100 MCG tablet, Take 1 tablet by mouth Daily., Disp: , Rfl:   •  docusate sodium (COLACE) 100 MG capsule, Take 100 mg by mouth 2 (Two) Times a Day. (Patient not taking: Reported on 12/12/2023), Disp: , Rfl:   •  simvastatin (ZOCOR) 20 MG tablet, Take 20 mg by mouth Daily. (Patient not taking: Reported on 12/12/2023), Disp: , Rfl:     Review  "of Systems   Constitutional: Negative.    HENT: Negative.     Eyes: Negative.    Respiratory: Negative.     Cardiovascular: Negative.    Gastrointestinal: Negative.    Endocrine: Negative.    Genitourinary: Negative.    Musculoskeletal: Negative.    Skin: Negative.    Allergic/Immunologic: Negative.    Neurological: Negative.    Hematological: Negative.    Psychiatric/Behavioral: Negative.     All other systems reviewed and are negative.    /70 (BP Location: Right arm, Patient Position: Sitting, Cuff Size: Adult)   Pulse 56   Ht 180.3 cm (70.98\")   Wt 86.4 kg (190 lb 6.4 oz)   SpO2 96%   BMI 26.57 kg/m²     Physical Exam  Vitals and nursing note reviewed.   Constitutional:       Appearance: He is well-developed.   HENT:      Head: Normocephalic and atraumatic.   Eyes:      General: No scleral icterus.     Pupils: Pupils are equal, round, and reactive to light.   Neck:      Thyroid: No thyromegaly.      Vascular: No carotid bruit or JVD.   Cardiovascular:      Rate and Rhythm: Normal rate and regular rhythm.      Pulses:           Carotid pulses are 2+ on the right side and 2+ on the left side.       Femoral pulses are 2+ on the right side and 2+ on the left side.       Popliteal pulses are 2+ on the right side and 2+ on the left side.        Dorsalis pedis pulses are 2+ on the right side and 2+ on the left side.        Posterior tibial pulses are 2+ on the right side and 2+ on the left side.      Heart sounds: Normal heart sounds.   Pulmonary:      Effort: Pulmonary effort is normal.      Breath sounds: Normal breath sounds.   Abdominal:      General: Bowel sounds are normal. There is no distension or abdominal bruit.      Palpations: Abdomen is soft. There is pulsatile mass. There is no mass.      Tenderness: There is no abdominal tenderness.   Musculoskeletal:         General: Normal range of motion.      Cervical back: Neck supple.   Lymphadenopathy:      Cervical: No cervical adenopathy.   Skin:     " General: Skin is warm and dry.   Neurological:      Mental Status: He is alert and oriented to person, place, and time.      Cranial Nerves: No cranial nerve deficit.      Sensory: No sensory deficit.       CT of the abdomen and pelvis personally reviewed revealing saccular 5.6 cm abdominal aortic aneurysm    Patient Active Problem List   Diagnosis   • Colon polyps   • History of colon cancer   • Other dysphagia   • Gastroesophageal reflux disease without esophagitis   • Abdominal pain, epigastric   • Atrial fibrillation   • Elevated cholesterol   • Hypertension        Diagnosis Plan   1. Infrarenal abdominal aortic aneurysm (AAA) without rupture        2. Elevated cholesterol        3. Primary hypertension            Plan: After thoroughly evaluating Nicolas Nieves, I believe the best course of action is to proceed with endovascular abdominal aortic aneurysm repair.  I did review his testing which shows a saccular 5.6 cm infrarenal abdominal aortic aneurysm.  Risks/benefits were explained at great length to the patient which include but are not limited to bleeding, infection, vessel rupture, bowel damage, renal failure, MI, stroke, and death.   I will contact [facility] cardiology for clearance.  Once I receive clearance, I will schedule him appropriately.  He will need to hold his Xarelto for 2 days prior to the procedure. The patient is to continue taking their medications as previously discussed.   This was all discussed in full with complete understanding.  Thank you for allowing me to participate in the care of your patient.  Please do not hesitate to call with any questions or concerns.  We will keep you aware of any further encounters with Nicolas Nieves.        Sincerely yours,         Shai Burgos, Malik Palomo, DO

## 2023-12-14 ENCOUNTER — TELEPHONE (OUTPATIENT)
Dept: CARDIOLOGY CLINIC | Age: 88
End: 2023-12-14

## 2023-12-14 NOTE — TELEPHONE ENCOUNTER
Okay to restratification.   Okay to hold Eliquis 2 to 3 days prior to procedure and resume thereafter

## 2023-12-14 NOTE — TELEPHONE ENCOUNTER
Date: ASAP    Cardiologist: Dr. Nakul Palma     Procedure: endovascular abdominal aortic aneurysm repair 5.5 CM    Surgeon: Dr. Sanjuana Flowers    Last Office Visit: 7-10-23    Reason for office visit and medical concerns addressed at this office visit: PAF, CAD, HTN    Testing Performed and Date of Service:  EKG 7-10-23    RCRI = 1 pt, low, 0.9%   METs 4    Current Medications: keflex, vit D3, cymbalta, eliquis, imdur, lopressor, nitro, protonix, zocor, flomax    Is the patient currently taking an anticoagulant? If so, what is the diagnosis the patient has been given to warrant the need for the anticoagulant?  Eliquis     Additional Notes: cardiac risk request and med hold on eliquis

## 2023-12-18 ENCOUNTER — PREP FOR SURGERY (OUTPATIENT)
Dept: OTHER | Facility: HOSPITAL | Age: 88
End: 2023-12-18
Payer: MEDICARE

## 2023-12-18 DIAGNOSIS — I71.43 INFRARENAL ABDOMINAL AORTIC ANEURYSM (AAA) WITHOUT RUPTURE: Primary | ICD-10-CM

## 2023-12-18 DIAGNOSIS — Z51.81 ENCOUNTER FOR MONITORING ANTIPLATELET THERAPY: ICD-10-CM

## 2023-12-18 DIAGNOSIS — Z79.02 ENCOUNTER FOR MONITORING ANTIPLATELET THERAPY: ICD-10-CM

## 2023-12-18 DIAGNOSIS — Z01.818 PREOP TESTING: ICD-10-CM

## 2023-12-18 RX ORDER — CEFAZOLIN SODIUM 1 G/50ML
1000 INJECTION, SOLUTION INTRAVENOUS ONCE
OUTPATIENT
Start: 2023-12-18 | End: 2023-12-18

## 2023-12-20 ENCOUNTER — TELEPHONE (OUTPATIENT)
Dept: VASCULAR SURGERY | Facility: CLINIC | Age: 88
End: 2023-12-20
Payer: MEDICARE

## 2023-12-20 PROBLEM — Z01.818 PREOP TESTING: Status: ACTIVE | Noted: 2023-12-18

## 2023-12-20 PROBLEM — I71.43 INFRARENAL ABDOMINAL AORTIC ANEURYSM (AAA) WITHOUT RUPTURE: Status: ACTIVE | Noted: 2023-12-18

## 2023-12-20 NOTE — TELEPHONE ENCOUNTER
Pt expressed understanding of prework on 01/08/204 @ 1:15. Pt expressed understanding of arrival time ( subject to change) for surgery on 01/15/2024 @ 6 AM. Pt expressed holding Eliquis for 2 days prior to surgery.

## 2024-01-08 ENCOUNTER — TELEPHONE (OUTPATIENT)
Dept: CARDIOLOGY CLINIC | Age: 89
End: 2024-01-08

## 2024-01-08 ENCOUNTER — PRE-ADMISSION TESTING (OUTPATIENT)
Dept: PREADMISSION TESTING | Facility: HOSPITAL | Age: 89
End: 2024-01-08
Payer: MEDICARE

## 2024-01-08 ENCOUNTER — HOSPITAL ENCOUNTER (OUTPATIENT)
Dept: GENERAL RADIOLOGY | Facility: HOSPITAL | Age: 89
Discharge: HOME | End: 2024-01-08
Payer: MEDICARE

## 2024-01-08 VITALS
WEIGHT: 187.83 LBS | HEART RATE: 71 BPM | RESPIRATION RATE: 18 BRPM | BODY MASS INDEX: 26.3 KG/M2 | HEIGHT: 71 IN | DIASTOLIC BLOOD PRESSURE: 84 MMHG | SYSTOLIC BLOOD PRESSURE: 117 MMHG | OXYGEN SATURATION: 94 %

## 2024-01-08 DIAGNOSIS — I71.43 INFRARENAL ABDOMINAL AORTIC ANEURYSM (AAA) WITHOUT RUPTURE: ICD-10-CM

## 2024-01-08 DIAGNOSIS — Z01.818 PREOP TESTING: ICD-10-CM

## 2024-01-08 DIAGNOSIS — Z79.02 ENCOUNTER FOR MONITORING ANTIPLATELET THERAPY: ICD-10-CM

## 2024-01-08 DIAGNOSIS — Z51.81 ENCOUNTER FOR MONITORING ANTIPLATELET THERAPY: ICD-10-CM

## 2024-01-08 LAB
ANION GAP SERPL CALCULATED.3IONS-SCNC: 9 MMOL/L (ref 5–15)
APTT PPP: 35.2 SECONDS (ref 24.5–36)
BASOPHILS # BLD AUTO: 0.04 10*3/MM3 (ref 0–0.2)
BASOPHILS NFR BLD AUTO: 0.5 % (ref 0–1.5)
BUN SERPL-MCNC: 15 MG/DL (ref 8–23)
BUN/CREAT SERPL: 11.6 (ref 7–25)
CALCIUM SPEC-SCNC: 9.6 MG/DL (ref 8.6–10.5)
CHLORIDE SERPL-SCNC: 103 MMOL/L (ref 98–107)
CO2 SERPL-SCNC: 29 MMOL/L (ref 22–29)
CREAT SERPL-MCNC: 1.29 MG/DL (ref 0.76–1.27)
DEPRECATED RDW RBC AUTO: 54.7 FL (ref 37–54)
EGFRCR SERPLBLD CKD-EPI 2021: 54 ML/MIN/1.73
EOSINOPHIL # BLD AUTO: 0.44 10*3/MM3 (ref 0–0.4)
EOSINOPHIL NFR BLD AUTO: 5.5 % (ref 0.3–6.2)
ERYTHROCYTE [DISTWIDTH] IN BLOOD BY AUTOMATED COUNT: 16.5 % (ref 12.3–15.4)
GLUCOSE SERPL-MCNC: 103 MG/DL (ref 65–99)
HCT VFR BLD AUTO: 41.9 % (ref 37.5–51)
HGB BLD-MCNC: 13.3 G/DL (ref 13–17.7)
IMM GRANULOCYTES # BLD AUTO: 0.06 10*3/MM3 (ref 0–0.05)
IMM GRANULOCYTES NFR BLD AUTO: 0.7 % (ref 0–0.5)
INR PPP: 1.19 (ref 0.91–1.09)
LYMPHOCYTES # BLD AUTO: 1.65 10*3/MM3 (ref 0.7–3.1)
LYMPHOCYTES NFR BLD AUTO: 20.4 % (ref 19.6–45.3)
MCH RBC QN AUTO: 29 PG (ref 26.6–33)
MCHC RBC AUTO-ENTMCNC: 31.7 G/DL (ref 31.5–35.7)
MCV RBC AUTO: 91.3 FL (ref 79–97)
MONOCYTES # BLD AUTO: 0.8 10*3/MM3 (ref 0.1–0.9)
MONOCYTES NFR BLD AUTO: 9.9 % (ref 5–12)
NEUTROPHILS NFR BLD AUTO: 5.08 10*3/MM3 (ref 1.7–7)
NEUTROPHILS NFR BLD AUTO: 63 % (ref 42.7–76)
NRBC BLD AUTO-RTO: 0 /100 WBC (ref 0–0.2)
PLATELET # BLD AUTO: 207 10*3/MM3 (ref 140–450)
PMV BLD AUTO: 9.4 FL (ref 6–12)
POTASSIUM SERPL-SCNC: 4.4 MMOL/L (ref 3.5–5.2)
PROTHROMBIN TIME: 15.3 SECONDS (ref 11.8–14.8)
RBC # BLD AUTO: 4.59 10*6/MM3 (ref 4.14–5.8)
SODIUM SERPL-SCNC: 141 MMOL/L (ref 136–145)
WBC NRBC COR # BLD AUTO: 8.07 10*3/MM3 (ref 3.4–10.8)

## 2024-01-08 PROCEDURE — 71046 X-RAY EXAM CHEST 2 VIEWS: CPT

## 2024-01-08 PROCEDURE — 80048 BASIC METABOLIC PNL TOTAL CA: CPT

## 2024-01-08 PROCEDURE — 85610 PROTHROMBIN TIME: CPT

## 2024-01-08 PROCEDURE — 85730 THROMBOPLASTIN TIME PARTIAL: CPT

## 2024-01-08 PROCEDURE — 36415 COLL VENOUS BLD VENIPUNCTURE: CPT

## 2024-01-08 PROCEDURE — 93005 ELECTROCARDIOGRAM TRACING: CPT

## 2024-01-08 PROCEDURE — 85025 COMPLETE CBC W/AUTO DIFF WBC: CPT

## 2024-01-08 NOTE — TELEPHONE ENCOUNTER
Called to reschedule 2/13/2024 appt as provider will be out of office that day. If patient calls back please schedule next available with Cindy Gayle in the same time frame.

## 2024-01-08 NOTE — DISCHARGE INSTRUCTIONS
Before you come to the hospital        Arrival time: AS DIRECTED BY OFFICE     YOU MAY TAKE THE FOLLOWING MEDICATION(S) THE MORNING OF SURGERY WITH A SIP OF WATER: metoprolol, pantoprazole, and pregablin  Hold eliquis as instructed per dr garcia           ALL OTHER HOME MEDICATION CHECK WITH YOUR PHYSICIAN (especially if   you are taking diabetes medicines or blood thinners)    Do not take any Erectile Dysfunction medications (EX: CIALIS, VIAGRA) 24 hours prior to surgery.      If you were given and instructed to use a germ- killing soap, use as directed the night before surgery and again the morning of surgery or as directed by your surgeon. (Use one-half of the bottle with each shower.)   See attached information for How to Use Chlorhexidine for Bathing if applicable.            Eating and drinking restrictions prior to scheduled arrival time    2 Hours before arrival time STOP   Drinking Clear liquids (water, black coffee-NO CREAM,  apple juice-no pulp)    Clear Liquids    Water and flavored water                                                                      Clear Fruit juices, such as cranberry juice and apple juice.  Black coffee (NO cream of any kind, including powdered).  Plain tea  Clear bouillon or broth.  Flavored gelatin.  Soda.  Gatorade or Powerade.    8 Hours before arrival time STOP   All food, full liquids, and dairy products  Full liquid examples  Juices that have pulp.  Frozen ice pops that contain fruit pieces.  Coffee with creamer  Milk.  Yogurt.    (It is extremely important that you follow these guidelines to prevent delay or cancelation of your procedure)                       MANAGING PAIN AFTER SURGERY    We know you are probably wondering what your pain will be like after surgery.  Following surgery it is unrealistic to expect you will not have pain.   Pain is how our bodies let us know that something is wrong or cautions us to be careful.  That said, our goal is to make your pain  tolerable.    Methods we may use to treat your pain include (oral or IV medications, PCAs, epidurals, nerve blocks, etc.)   While some procedures require IV pain medications for a short time after surgery, transitioning to pain medications by mouth allows for better management of pain.   Your nurse will encourage you to take oral pain medications whenever possible.  IV medications work almost immediately, but only last a short while.  Taking medications by mouth allows for a more constant level of medication in your blood stream for a longer period of time.      Once your pain is out of control it is harder to get back under control.  It is important you are aware when your next dose of pain medication is due.  If you are admitted, your nurse may write the time of your next dose on the white board in your room to help you remember.      We are interested in your pain and encourage you to inform us about aggravating factors during your visit.   Many times a simple repositioning every few hours can make a big difference.    If your physician says it is okay, do not let your pain prevent you from getting out of bed. Be sure to call your nurse for assistance prior to getting up so you do not fall.      Before surgery, please decide your tolerable pain goal.  These faces help describe the pain ratings we use on a 0-10 scale.   Be prepared to tell us your goal and whether or not you take pain or anxiety medications at home.          Preparing for Surgery  Preparing for surgery is an important part of your care. It can make things go more smoothly and help you avoid complications. The steps leading up to surgery may vary among hospitals. Follow all instructions given to you by your health care providers. Ask questions if you do not understand something. Talk about any concerns that you have.  Here are some questions to consider asking before your surgery:  If my surgery is not an emergency (is elective), when would be the  best time to have the surgery?  What arrangements do I need to make for work, home, or school?  What will my recovery be like? How long will it be before I can return to normal activities?  Will I need to prepare my home? Will I need to arrange care for me or my children?  Should I expect to have pain after surgery? What are my pain management options? Are there nonmedical options that I can try for pain?  Tell a health care provider about:  Any allergies you have.  All medicines you are taking, including vitamins, herbs, eye drops, creams, and over-the-counter medicines.  Any problems you or family members have had with anesthetic medicines.  Any blood disorders you have.  Any surgeries you have had.  Any medical conditions you have.  Whether you are pregnant or may be pregnant.  What are the risks?  The risks and complications of surgery depend on the specific procedure that you have. Discuss all the risks with your health care providers before your surgery. Ask about common surgical complications, which may include:  Infection.  Bleeding or a need for blood replacement (transfusion).  Allergic reactions to medicines.  Damage to surrounding nerves, tissues, or structures.  A blood clot.  Scarring.  Failure of the surgery to correct the problem.  Follow these instructions before the procedure:  Several days or weeks before your procedure  You may have a physical exam by your primary health care provider to make sure it is safe for you to have surgery.  You may have testing. This may include a chest X-ray, blood and urine tests, electrocardiogram (ECG), or other testing.  Ask your health care provider about:  Changing or stopping your regular medicines. This is especially important if you are taking diabetes medicines or blood thinners.  Taking medicines such as aspirin and ibuprofen. These medicines can thin your blood. Do not take these medicines unless your health care provider tells you to take them.  Taking  over-the-counter medicines, vitamins, herbs, and supplements.  Do not use any products that contain nicotine or tobacco, such as cigarettes and e-cigarettes. If you need help quitting, ask your health care provider.  Avoid alcohol.  Ask your health care provider if there are exercises you can do to prepare for surgery.  Eat a healthy diet.   Plan to have someone 18 years of age or older to take you home from the hospital. We will need to verify your ride on the morning of surgery if you are being discharged home on the same day. Tell your ride to be expecting a call from the hospital prior to your procedure.   Plan to have a responsible adult care for you for at least 24 hours after you leave the hospital or clinic. This is important.  The day before your procedure  You may be given antibiotic medicine to take by mouth to help prevent infection. Take it as told by your health care provider.  You may be asked to shower with a germ-killing soap.  Follow instructions from your health care provider about eating and drinking restrictions. This includes gum, mints and hard candy.  Pack comfortable clothes according to your procedure.   The day of your procedure  You may need to take another shower with a germ-killing soap before you leave home in the morning.  With a small sip of water, take only the medicines that you are told to take.  Remove all jewelry including rings.   Leave anything you consider valuable at home except hearing aids if needed.  You do not need to bring your home medications into the hospital.   Do not wear any makeup, nail polish, powder, deodorant, lotion, hair accessories, or anything on your skin or body except your clothes.  If you will be staying in the hospital, bring a case to hold your glasses, contacts, or dentures. You may also want to bring your robe and non-skid footwear.       (Do not use denture adhesives since you will be asked to remove them during  surgery).   If you wear oxygen at  home, bring it with you the day of surgery.  If instructed by your health care provider, bring your sleep apnea device with you on the day of your surgery (if this applies to you).  You may want to leave your suitcase and sleep apnea device in the car until after surgery.   Arrive at the hospital as scheduled.  Bring a friend or family member with you who can help to answer questions and be present while you meet with your health care provider.  At the hospital  When you arrive at the hospital:  Go to registration located at the main entrance of the hospital. You will be registered and given a beeper and a sticker sheet. Take the stickers to the Outpatient nurses desk and place in the black tray. This is to notify staff that you have arrived. Then return to the lobby to wait.   When your beeper lights up and vibrates proceed through the double doors, under the stairs, and a member of the Outpatient Surgery staff will escort you to your preoperative room.  You may have to wear compression sleeves. These help to prevent blood clots and reduce swelling in your legs.  An IV may be inserted into one of your veins.              In the operating room, you may be given one or more of the following:        A medicine to help you relax (sedative).        A medicine to numb the area (local anesthetic).        A medicine to make you fall asleep (general anesthetic).        A medicine that is injected into an area of your body to numb everything below the                      injection site (regional anesthetic).  You may be given an antibiotic through your IV to help prevent infection.  Your surgical site will be marked or identified.    Contact a health care provider if you:  Develop a fever of more than 100.4°F (38°C) or other feelings of illness during the 48 hours before your surgery.  Have symptoms that get worse.  Have questions or concerns about your surgery.  Summary  Preparing for surgery can make the procedure go more  smoothly and lower your risk of complications.  Before surgery, make a list of questions and concerns to discuss with your surgeon. Ask about the risks and possible complications.  In the days or weeks before your surgery, follow all instructions from your health care provider. You may need to stop smoking, avoid alcohol, follow eating restrictions, and change or stop your regular medicines.  Contact your surgeon if you develop a fever or other signs of illness during the few days before your surgery.  This information is not intended to replace advice given to you by your health care provider. Make sure you discuss any questions you have with your health care provider.  Document Revised: 12/21/2018 Document Reviewed: 10/23/2018  [employer] Patient Education © 2021 [employer]         How to Use Chlorhexidine Before Surgery  Chlorhexidine gluconate (CHG) is a germ-killing (antiseptic) solution that is used to clean the skin. It can get rid of the bacteria that normally live on the skin and can keep them away for about 24 hours. To clean your skin with CHG, you may be given:  A CHG solution to use in the shower or as part of a sponge bath.  A prepackaged cloth that contains CHG.  Cleaning your skin with CHG may help lower the risk for infection:  While you are staying in the intensive care unit of the hospital.  If you have a vascular access, such as a central line, to provide short-term or long-term access to your veins.  If you have a catheter to drain urine from your bladder.  If you are on a ventilator. A ventilator is a machine that helps you breathe by moving air in and out of your lungs.  After surgery.  What are the risks?  Risks of using CHG include:  A skin reaction.  Hearing loss, if CHG gets in your ears and you have a perforated eardrum.  Eye injury, if CHG gets in your eyes and is not rinsed out.  The CHG product catching fire.  Make sure that you avoid smoking and flames after applying CHG to your  skin.  Do not use CHG:  If you have a chlorhexidine allergy or have previously reacted to chlorhexidine.  On babies younger than 2 months of age.  How to use CHG solution  Use CHG only as told by your health care provider, and follow the instructions on the label.  Use the full amount of CHG as directed. Usually, this is one bottle.  During a shower    Follow these steps when using CHG solution during a shower (unless your health care provider gives you different instructions):  Start the shower.  Use your normal soap and shampoo to wash your face and hair.  Turn off the shower or move out of the shower stream.  Pour the CHG onto a clean washcloth. Do not use any type of brush or rough-edged sponge.  Starting at your neck, lather your body down to your toes. Make sure you follow these instructions:  If you will be having surgery, pay special attention to the part of your body where you will be having surgery. Scrub this area for at least 1 minute.  Do not use CHG on your head or face. If the solution gets into your ears or eyes, rinse them well with water.  Avoid your genital area.  Avoid any areas of skin that have broken skin, cuts, or scrapes.  Scrub your back and under your arms. Make sure to wash skin folds.  Let the lather sit on your skin for 1-2 minutes or as long as told by your health care provider.  Thoroughly rinse your entire body in the shower. Make sure that all body creases and crevices are rinsed well.  Dry off with a clean towel. Do not put any substances on your body afterward--such as powder, lotion, or perfume--unless you are told to do so by your health care provider. Only use lotions that are recommended by the .  Put on clean clothes or pajamas.  If it is the night before your surgery, sleep in clean sheets.     During a sponge bath  Follow these steps when using CHG solution during a sponge bath (unless your health care provider gives you different instructions):  Use your normal  soap and shampoo to wash your face and hair.  Pour the CHG onto a clean washcloth.  Starting at your neck, lather your body down to your toes. Make sure you follow these instructions:  If you will be having surgery, pay special attention to the part of your body where you will be having surgery. Scrub this area for at least 1 minute.  Do not use CHG on your head or face. If the solution gets into your ears or eyes, rinse them well with water.  Avoid your genital area.  Avoid any areas of skin that have broken skin, cuts, or scrapes.  Scrub your back and under your arms. Make sure to wash skin folds.  Let the lather sit on your skin for 1-2 minutes or as long as told by your health care provider.  Using a different clean, wet washcloth, thoroughly rinse your entire body. Make sure that all body creases and crevices are rinsed well.  Dry off with a clean towel. Do not put any substances on your body afterward--such as powder, lotion, or perfume--unless you are told to do so by your health care provider. Only use lotions that are recommended by the .  Put on clean clothes or pajamas.  If it is the night before your surgery, sleep in clean sheets.  How to use CHG prepackaged cloths  Only use CHG cloths as told by your health care provider, and follow the instructions on the label.  Use the CHG cloth on clean, dry skin.  Do not use the CHG cloth on your head or face unless your health care provider tells you to.  When washing with the CHG cloth:  Avoid your genital area.  Avoid any areas of skin that have broken skin, cuts, or scrapes.  Before surgery    Follow these steps when using a CHG cloth to clean before surgery (unless your health care provider gives you different instructions):  Using the CHG cloth, vigorously scrub the part of your body where you will be having surgery. Scrub using a back-and-forth motion for 3 minutes. The area on your body should be completely wet with CHG when you are done  scrubbing.  Do not rinse. Discard the cloth and let the area air-dry. Do not put any substances on the area afterward, such as powder, lotion, or perfume.  Put on clean clothes or pajamas.  If it is the night before your surgery, sleep in clean sheets.     For general bathing  Follow these steps when using CHG cloths for general bathing (unless your health care provider gives you different instructions).  Use a separate CHG cloth for each area of your body. Make sure you wash between any folds of skin and between your fingers and toes. Wash your body in the following order, switching to a new cloth after each step:  The front of your neck, shoulders, and chest.  Both of your arms, under your arms, and your hands.  Your stomach and groin area, avoiding the genitals.  Your right leg and foot.  Your left leg and foot.  The back of your neck, your back, and your buttocks.  Do not rinse. Discard the cloth and let the area air-dry. Do not put any substances on your body afterward--such as powder, lotion, or perfume--unless you are told to do so by your health care provider. Only use lotions that are recommended by the .  Put on clean clothes or pajamas.  Contact a health care provider if:  Your skin gets irritated after scrubbing.  You have questions about using your solution or cloth.  You swallow any chlorhexidine. Call your local poison control center (1-867.693.4473 in the [place]  Get help right away if:  Your eyes itch badly, or they become very red or swollen.  Your skin itches badly and is red or swollen.  Your hearing changes.  You have trouble seeing.  You have swelling or tingling in your mouth or throat.  You have trouble breathing.  These symptoms may represent a serious problem that is an emergency. Do not wait to see if the symptoms will go away. Get medical help right away. Call your local emergency services (348 in the [place] Do not drive yourself to the hospital.  Summary  Chlorhexidine  gluconate (CHG) is a germ-killing (antiseptic) solution that is used to clean the skin. Cleaning your skin with CHG may help to lower your risk for infection.  You may be given CHG to use for bathing. It may be in a bottle or in a prepackaged cloth to use on your skin. Carefully follow your health care provider's instructions and the instructions on the product label.  Do not use CHG if you have a chlorhexidine allergy.  Contact your health care provider if your skin gets irritated after scrubbing.  This information is not intended to replace advice given to you by your health care provider. Make sure you discuss any questions you have with your health care provider.  Document Revised: 04/17/2023 Document Reviewed: 02/28/2022  [employer] Patient Education © 2023 [employer]

## 2024-01-12 ENCOUNTER — TELEPHONE (OUTPATIENT)
Dept: VASCULAR SURGERY | Facility: CLINIC | Age: 89
End: 2024-01-12
Payer: MEDICARE

## 2024-01-12 NOTE — TELEPHONE ENCOUNTER
Pt expressed understanding of arrival time of 630 am for procedure scheduled with Dr. Burgos on 1/15/24.  Pt advised NPO after midnight.

## 2024-01-14 LAB
QT INTERVAL: 400 MS
QTC INTERVAL: 409 MS

## 2024-01-14 NOTE — H&P
1/14/2024          Rosalba Moody APRN  [address] [facility] [place] [address]     Nicolas Nieves  1935          Chief Complaint   Patient presents with    Establish Care       URGENT REFERRAL FROM [facility] TO [facility] FOR AAA 5.5 WITHOUT RUPTURE UNSPECIFIED PART. imaging released to nucleus  Former smoker- quit 40 years ago started in 1949 pack /day 2         Dear MARITZA Smith     HPI  I had the pleasure of seeing your patient Nicolas Nieves in the office today.  Thank you kindly for this consultation.  As you recall, Nicolas Nieves is a 88 y.o.  male who you are currently following for routine health maintenance.  He was previously followed at [facility], last seen in August 2022, where they recommended annual follow up. He was seen in [facility] ED and had a CT abdomen and pelvis, which he did bring in for review.          Medical History        Past Medical History:   Diagnosis Date    Arthritis      Atrial fibrillation      Harmon's esophagus      CAD (coronary artery disease)      COPD (chronic obstructive pulmonary disease)      Costochondritis      Elevated cholesterol      Gastritis      GERD (gastroesophageal reflux disease)      Hemorrhoids      History of colon cancer      History of colon polyps      Hypertension      Peptic ulcer      Renal calculi              Surgical History         Past Surgical History:   Procedure Laterality Date    CARDIAC CATHETERIZATION         stents x 4    CATARACT EXTRACTION, BILATERAL        CHOLECYSTECTOMY        COLONOSCOPY   04/25/2014     One 3mm hyperplastic rectal polyp; Repeat 3 years     COLONOSCOPY N/A 05/17/2017     The examined portion of the ileum was normal; Patent end-to-side ileo-colonic anastomosis, characterized by healthy appearing mucosa; The entire examined colon is normal; No specimens collected; No plans to repeat due to age     COLONOSCOPY   04/17/2013     Mass in ascending colon-biopsied-marked with Malorie ink; Internal  hemorrhoids     COLONOSCOPY   02/16/2010     Dr. Sanchez-Internal hermorrhoids; Repeat 3-5 years     COLONOSCOPY   02/26/2007     Dr. White-Internal hemorrhoids; Diminuitive hyperplastic rectal polyp; AVM right colon; Repeat 4 years    COLONOSCOPY   04/22/2003     Dr. White-Normal colonoscopy with internal hemorrhoids    ENDOSCOPY N/A 04/10/2019     No endoscopic esophageal abnormality to explain patient's dysphagia-esophagus dilated; A few gastric polyps-biopsied; Normal examined duodenum    ENDOSCOPY   04/17/2013     Duodenitis; LA grade A esophagitis     ENDOSCOPY   8/11/2020     Healed gastric ulcers     ENDOSCOPY   02/16/2010     Gastric ulcers     ENDOSCOPY   09/15/2009     Normal     ENDOSCOPY   05/15/2006     Dr. WhiteKfbnp-Cnfahpvwu-jdpndjye; Small HH     ENDOSCOPY   01/24/2005     Dr. White-Diffuse esophageal spasm-dilated; Gastritis-biopsied    ENDOSCOPY   04/25/2003     Dr. White-Stage II reflux esophagitis-rule out short-segment Harmon's esophagus; Schatzki's ring-dilated; Gastritis-biopsied    ENDOSCOPY N/A 08/08/2022     No endoscopic esophageal abnormality to explain patient's dysphagia; Normal stomach; Normal examined duodenum; No specimens collected    HEMICOLECTOMY Right 05/2013    LUNG REMOVAL, PARTIAL                      Family History   Problem Relation Age of Onset    Alzheimer's disease Mother      Heart disease Father      Ulcers Father      Colon cancer Neg Hx      Colon polyps Neg Hx      Esophageal cancer Neg Hx      Liver cancer Neg Hx      Liver disease Neg Hx      Stomach cancer Neg Hx      Rectal cancer Neg Hx           Social History   Social History            Socioeconomic History    Marital status:    Tobacco Use    Smoking status: Former       Types: Cigarettes    Smokeless tobacco: Never   Vaping Use    Vaping Use: Never used   Substance and Sexual Activity    Alcohol use: Not Currently    Drug use: No    Sexual activity: Defer        Current Outpatient Medications  "  Medication Instructions    DULoxetine (CYMBALTA) 60 MG capsule 1 capsule, Oral, Daily    ELIQUIS 2.5 MG tablet tablet 1 tablet, Oral, 2 Times Daily    glucosamine-chondroitin 500-400 MG capsule capsule 1 capsule, Oral, Daily    isosorbide mononitrate (IMDUR) 30 mg, Oral, Daily    metoprolol tartrate (LOPRESSOR) 25 mg, Oral, 2 Times Daily    pantoprazole (PROTONIX) 40 mg, Oral, Daily    pregabalin (LYRICA) 75 MG capsule 1 capsule, Oral, Every 12 Hours Scheduled    simvastatin (ZOCOR) 20 mg, Oral, Daily                Allergies   Allergen Reactions    Lortab [Hydrocodone-Acetaminophen] Nausea And Vomiting    Morphine And Related Nausea And Vomiting            Review of Systems   Constitutional: Negative.    HENT: Negative.     Eyes: Negative.    Respiratory: Negative.     Cardiovascular: Negative.    Gastrointestinal: Negative.    Endocrine: Negative.    Genitourinary: Negative.    Musculoskeletal: Negative.    Skin: Negative.    Allergic/Immunologic: Negative.    Neurological: Negative.    Hematological: Negative.    Psychiatric/Behavioral: Negative.     All other systems reviewed and are negative.     /70 (BP Location: Right arm, Patient Position: Sitting, Cuff Size: Adult)   Pulse 56   Ht 180.3 cm (70.98\")   Wt 86.4 kg (190 lb 6.4 oz)   SpO2 96%   BMI 26.57 kg/m²      Physical Exam  Vitals and nursing note reviewed.   Constitutional:       Appearance: He is well-developed.   HENT:      Head: Normocephalic and atraumatic.   Eyes:      General: No scleral icterus.     Pupils: Pupils are equal, round, and reactive to light.   Neck:      Thyroid: No thyromegaly.      Vascular: No carotid bruit or JVD.   Cardiovascular:      Rate and Rhythm: Normal rate and regular rhythm.      Pulses:           Carotid pulses are 2+ on the right side and 2+ on the left side.       Femoral pulses are 2+ on the right side and 2+ on the left side.       Popliteal pulses are 2+ on the right side and 2+ on the left side.        " Dorsalis pedis pulses are 2+ on the right side and 2+ on the left side.        Posterior tibial pulses are 2+ on the right side and 2+ on the left side.      Heart sounds: Normal heart sounds.   Pulmonary:      Effort: Pulmonary effort is normal.      Breath sounds: Normal breath sounds.   Abdominal:      General: Bowel sounds are normal. There is no distension or abdominal bruit.      Palpations: Abdomen is soft. There is pulsatile mass. There is no mass.      Tenderness: There is no abdominal tenderness.   Musculoskeletal:         General: Normal range of motion.      Cervical back: Neck supple.   Lymphadenopathy:      Cervical: No cervical adenopathy.   Skin:     General: Skin is warm and dry.   Neurological:      Mental Status: He is alert and oriented to person, place, and time.      Cranial Nerves: No cranial nerve deficit.      Sensory: No sensory deficit.         CT of the abdomen and pelvis personally reviewed revealing saccular 5.6 cm abdominal aortic aneurysm         Patient Active Problem List   Diagnosis    Colon polyps    History of colon cancer    Other dysphagia    Gastroesophageal reflux disease without esophagitis    Abdominal pain, epigastric    Atrial fibrillation    Elevated cholesterol    Hypertension           Diagnosis Plan   1. Infrarenal abdominal aortic aneurysm (AAA) without rupture          2. Elevated cholesterol          3. Primary hypertension                Plan: After thoroughly evaluating Nicolas Nieves, I believe the best course of action is to proceed with endovascular abdominal aortic aneurysm repair.  I did review his testing which shows a saccular 5.6 cm infrarenal abdominal aortic aneurysm.  Risks/benefits were explained at great length to the patient which include but are not limited to bleeding, infection, vessel rupture, bowel damage, renal failure, MI, stroke, and death.   I will contact [facility] cardiology for clearance.  Once I receive clearance, I will schedule him  appropriately.  He will need to hold his Xarelto for 2 days prior to the procedure. The patient is to continue taking their medications as previously discussed.   This was all discussed in full with complete understanding.  Thank you for allowing me to participate in the care of your patient.  Please do not hesitate to call with any questions or concerns.  We will keep you aware of any further encounters with Nicolas Nieves.         Sincerely yours,           Shai Burgos, Malik Palomo, DO

## 2024-01-15 ENCOUNTER — ANESTHESIA (OUTPATIENT)
Dept: PERIOP | Facility: HOSPITAL | Age: 89
End: 2024-01-15
Payer: MEDICARE

## 2024-01-15 ENCOUNTER — HOSPITAL ENCOUNTER (INPATIENT)
Facility: HOSPITAL | Age: 89
LOS: 1 days | Discharge: HOME | End: 2024-01-16
Attending: SURGERY | Admitting: SURGERY
Payer: MEDICARE

## 2024-01-15 ENCOUNTER — APPOINTMENT (OUTPATIENT)
Dept: INTERVENTIONAL RADIOLOGY/VASCULAR | Facility: HOSPITAL | Age: 89
End: 2024-01-15
Payer: MEDICARE

## 2024-01-15 ENCOUNTER — ANESTHESIA EVENT (OUTPATIENT)
Dept: PERIOP | Facility: HOSPITAL | Age: 89
End: 2024-01-15
Payer: MEDICARE

## 2024-01-15 DIAGNOSIS — Z01.818 PREOP TESTING: ICD-10-CM

## 2024-01-15 DIAGNOSIS — I71.43 INFRARENAL ABDOMINAL AORTIC ANEURYSM (AAA) WITHOUT RUPTURE: ICD-10-CM

## 2024-01-15 PROBLEM — I71.9 AORTIC ANEURYSM: Status: ACTIVE | Noted: 2024-01-15

## 2024-01-15 LAB
ABO GROUP BLD: NORMAL
BLD GP AB SCN SERPL QL: NEGATIVE
RH BLD: POSITIVE
T&S EXPIRATION DATE: NORMAL

## 2024-01-15 PROCEDURE — 25010000002 HEPARIN (PORCINE) PER 1000 UNITS: Performed by: NURSE ANESTHETIST, CERTIFIED REGISTERED

## 2024-01-15 PROCEDURE — 25510000001 IOPAMIDOL 61 % SOLUTION: Performed by: SURGERY

## 2024-01-15 PROCEDURE — C1760 CLOSURE DEV, VASC: HCPCS | Performed by: SURGERY

## 2024-01-15 PROCEDURE — 25010000002 SUGAMMADEX 200 MG/2ML SOLUTION: Performed by: NURSE ANESTHETIST, CERTIFIED REGISTERED

## 2024-01-15 PROCEDURE — C1894 INTRO/SHEATH, NON-LASER: HCPCS | Performed by: SURGERY

## 2024-01-15 PROCEDURE — B4101ZZ FLUOROSCOPY OF ABDOMINAL AORTA USING LOW OSMOLAR CONTRAST: ICD-10-PCS | Performed by: SURGERY

## 2024-01-15 PROCEDURE — 86850 RBC ANTIBODY SCREEN: CPT | Performed by: NURSE PRACTITIONER

## 2024-01-15 PROCEDURE — 86900 BLOOD TYPING SEROLOGIC ABO: CPT | Performed by: NURSE PRACTITIONER

## 2024-01-15 PROCEDURE — C1887 CATHETER, GUIDING: HCPCS | Performed by: SURGERY

## 2024-01-15 PROCEDURE — 25810000003 LACTATED RINGERS PER 1000 ML: Performed by: SURGERY

## 2024-01-15 PROCEDURE — 04V03DZ RESTRICTION OF ABDOMINAL AORTA WITH INTRALUMINAL DEVICE, PERCUTANEOUS APPROACH: ICD-10-PCS | Performed by: SURGERY

## 2024-01-15 PROCEDURE — 25010000002 CEFAZOLIN PER 500 MG: Performed by: NURSE PRACTITIONER

## 2024-01-15 PROCEDURE — C1725 CATH, TRANSLUMIN NON-LASER: HCPCS | Performed by: SURGERY

## 2024-01-15 PROCEDURE — 76000 FLUOROSCOPY <1 HR PHYS/QHP: CPT

## 2024-01-15 PROCEDURE — 25810000003 SODIUM CHLORIDE 0.9 % SOLUTION: Performed by: SURGERY

## 2024-01-15 PROCEDURE — C1889 IMPLANT/INSERT DEVICE, NOC: HCPCS | Performed by: SURGERY

## 2024-01-15 PROCEDURE — C1769 GUIDE WIRE: HCPCS | Performed by: SURGERY

## 2024-01-15 PROCEDURE — 25010000002 CEFAZOLIN PER 500 MG: Performed by: SURGERY

## 2024-01-15 PROCEDURE — B41C1ZZ FLUOROSCOPY OF PELVIC ARTERIES USING LOW OSMOLAR CONTRAST: ICD-10-PCS | Performed by: SURGERY

## 2024-01-15 PROCEDURE — 25010000002 PROPOFOL 10 MG/ML EMULSION: Performed by: NURSE ANESTHETIST, CERTIFIED REGISTERED

## 2024-01-15 PROCEDURE — 86901 BLOOD TYPING SEROLOGIC RH(D): CPT | Performed by: NURSE PRACTITIONER

## 2024-01-15 PROCEDURE — 25010000002 FENTANYL CITRATE (PF) 100 MCG/2ML SOLUTION: Performed by: NURSE ANESTHETIST, CERTIFIED REGISTERED

## 2024-01-15 PROCEDURE — 25010000002 BUPIVACAINE 0.5 % SOLUTION: Performed by: SURGERY

## 2024-01-15 PROCEDURE — 34713 PERQ ACCESS & CLSR FEM ART: CPT | Performed by: SURGERY

## 2024-01-15 PROCEDURE — 25010000002 NICARDIPINE 2.5 MG/ML SOLUTION: Performed by: NURSE ANESTHETIST, CERTIFIED REGISTERED

## 2024-01-15 PROCEDURE — 25010000002 HEPARIN (PORCINE) PER 1000 UNITS: Performed by: SURGERY

## 2024-01-15 PROCEDURE — 34705 EVAC RPR A-BIILIAC NDGFT: CPT | Performed by: SURGERY

## 2024-01-15 DEVICE — STENTGR AAA ENDURANT2 BIFUR 28X14X103MM: Type: IMPLANTABLE DEVICE | Site: AORTA | Status: FUNCTIONAL

## 2024-01-15 DEVICE — STENTGR AAA ENDURANT2 LW 14F 16X10X124MM: Type: IMPLANTABLE DEVICE | Site: AORTA | Status: FUNCTIONAL

## 2024-01-15 RX ORDER — HEPARIN SODIUM 1000 [USP'U]/ML
INJECTION, SOLUTION INTRAVENOUS; SUBCUTANEOUS AS NEEDED
Status: DISCONTINUED | OUTPATIENT
Start: 2024-01-15 | End: 2024-01-15 | Stop reason: SURG

## 2024-01-15 RX ORDER — DROPERIDOL 2.5 MG/ML
0.62 INJECTION, SOLUTION INTRAMUSCULAR; INTRAVENOUS ONCE AS NEEDED
Status: DISCONTINUED | OUTPATIENT
Start: 2024-01-15 | End: 2024-01-15 | Stop reason: HOSPADM

## 2024-01-15 RX ORDER — SODIUM CHLORIDE 9 MG/ML
50 INJECTION, SOLUTION INTRAVENOUS CONTINUOUS
Status: DISCONTINUED | OUTPATIENT
Start: 2024-01-15 | End: 2024-01-16 | Stop reason: HOSPADM

## 2024-01-15 RX ORDER — ONDANSETRON 2 MG/ML
4 INJECTION INTRAMUSCULAR; INTRAVENOUS EVERY 6 HOURS PRN
Status: DISCONTINUED | OUTPATIENT
Start: 2024-01-15 | End: 2024-01-16 | Stop reason: HOSPADM

## 2024-01-15 RX ORDER — PREGABALIN 75 MG/1
75 CAPSULE ORAL EVERY 12 HOURS SCHEDULED
Status: DISCONTINUED | OUTPATIENT
Start: 2024-01-15 | End: 2024-01-16 | Stop reason: HOSPADM

## 2024-01-15 RX ORDER — PANTOPRAZOLE SODIUM 40 MG/1
40 TABLET, DELAYED RELEASE ORAL
Status: DISCONTINUED | OUTPATIENT
Start: 2024-01-16 | End: 2024-01-16 | Stop reason: HOSPADM

## 2024-01-15 RX ORDER — SODIUM CHLORIDE, SODIUM LACTATE, POTASSIUM CHLORIDE, CALCIUM CHLORIDE 600; 310; 30; 20 MG/100ML; MG/100ML; MG/100ML; MG/100ML
1000 INJECTION, SOLUTION INTRAVENOUS CONTINUOUS
Status: DISCONTINUED | OUTPATIENT
Start: 2024-01-15 | End: 2024-01-15

## 2024-01-15 RX ORDER — LIDOCAINE HYDROCHLORIDE 10 MG/ML
0.5 INJECTION, SOLUTION EPIDURAL; INFILTRATION; INTRACAUDAL; PERINEURAL ONCE AS NEEDED
Status: DISCONTINUED | OUTPATIENT
Start: 2024-01-15 | End: 2024-01-15 | Stop reason: HOSPADM

## 2024-01-15 RX ORDER — HYDROCODONE BITARTRATE AND ACETAMINOPHEN 5; 325 MG/1; MG/1
1 TABLET ORAL EVERY 4 HOURS PRN
Status: DISCONTINUED | OUTPATIENT
Start: 2024-01-15 | End: 2024-01-16 | Stop reason: HOSPADM

## 2024-01-15 RX ORDER — PROPOFOL 10 MG/ML
VIAL (ML) INTRAVENOUS AS NEEDED
Status: DISCONTINUED | OUTPATIENT
Start: 2024-01-15 | End: 2024-01-15 | Stop reason: SURG

## 2024-01-15 RX ORDER — FENTANYL CITRATE 50 UG/ML
INJECTION, SOLUTION INTRAMUSCULAR; INTRAVENOUS AS NEEDED
Status: DISCONTINUED | OUTPATIENT
Start: 2024-01-15 | End: 2024-01-15 | Stop reason: SURG

## 2024-01-15 RX ORDER — LIDOCAINE HYDROCHLORIDE 20 MG/ML
INJECTION, SOLUTION EPIDURAL; INFILTRATION; INTRACAUDAL; PERINEURAL AS NEEDED
Status: DISCONTINUED | OUTPATIENT
Start: 2024-01-15 | End: 2024-01-15 | Stop reason: SURG

## 2024-01-15 RX ORDER — ONDANSETRON 4 MG/1
4 TABLET, ORALLY DISINTEGRATING ORAL EVERY 6 HOURS PRN
Status: DISCONTINUED | OUTPATIENT
Start: 2024-01-15 | End: 2024-01-16 | Stop reason: HOSPADM

## 2024-01-15 RX ORDER — LABETALOL HYDROCHLORIDE 5 MG/ML
5 INJECTION, SOLUTION INTRAVENOUS
Status: DISCONTINUED | OUTPATIENT
Start: 2024-01-15 | End: 2024-01-15 | Stop reason: HOSPADM

## 2024-01-15 RX ORDER — DULOXETIN HYDROCHLORIDE 30 MG/1
60 CAPSULE, DELAYED RELEASE ORAL DAILY
Status: DISCONTINUED | OUTPATIENT
Start: 2024-01-16 | End: 2024-01-16 | Stop reason: HOSPADM

## 2024-01-15 RX ORDER — SODIUM CHLORIDE 0.9 % (FLUSH) 0.9 %
10 SYRINGE (ML) INJECTION AS NEEDED
Status: DISCONTINUED | OUTPATIENT
Start: 2024-01-15 | End: 2024-01-15 | Stop reason: HOSPADM

## 2024-01-15 RX ORDER — BUPIVACAINE HCL/0.9 % NACL/PF 0.125 %
PLASTIC BAG, INJECTION (ML) EPIDURAL AS NEEDED
Status: DISCONTINUED | OUTPATIENT
Start: 2024-01-15 | End: 2024-01-15 | Stop reason: SURG

## 2024-01-15 RX ORDER — ISOSORBIDE MONONITRATE 30 MG/1
30 TABLET, EXTENDED RELEASE ORAL DAILY
Status: DISCONTINUED | OUTPATIENT
Start: 2024-01-15 | End: 2024-01-16 | Stop reason: HOSPADM

## 2024-01-15 RX ORDER — FENTANYL CITRATE 50 UG/ML
25 INJECTION, SOLUTION INTRAMUSCULAR; INTRAVENOUS
Status: DISCONTINUED | OUTPATIENT
Start: 2024-01-15 | End: 2024-01-15 | Stop reason: HOSPADM

## 2024-01-15 RX ORDER — ATORVASTATIN CALCIUM 10 MG/1
10 TABLET, FILM COATED ORAL DAILY
Status: DISCONTINUED | OUTPATIENT
Start: 2024-01-15 | End: 2024-01-16 | Stop reason: HOSPADM

## 2024-01-15 RX ORDER — HYDROMORPHONE HYDROCHLORIDE 1 MG/ML
0.5 INJECTION, SOLUTION INTRAMUSCULAR; INTRAVENOUS; SUBCUTANEOUS
Status: DISCONTINUED | OUTPATIENT
Start: 2024-01-15 | End: 2024-01-15 | Stop reason: HOSPADM

## 2024-01-15 RX ORDER — EPHEDRINE SULFATE 50 MG/ML
INJECTION, SOLUTION INTRAVENOUS AS NEEDED
Status: DISCONTINUED | OUTPATIENT
Start: 2024-01-15 | End: 2024-01-15 | Stop reason: SURG

## 2024-01-15 RX ORDER — SODIUM CHLORIDE 0.9 % (FLUSH) 0.9 %
3 SYRINGE (ML) INJECTION AS NEEDED
Status: DISCONTINUED | OUTPATIENT
Start: 2024-01-15 | End: 2024-01-15 | Stop reason: HOSPADM

## 2024-01-15 RX ORDER — ROCURONIUM BROMIDE 10 MG/ML
INJECTION, SOLUTION INTRAVENOUS AS NEEDED
Status: DISCONTINUED | OUTPATIENT
Start: 2024-01-15 | End: 2024-01-15 | Stop reason: SURG

## 2024-01-15 RX ORDER — FLUMAZENIL 0.1 MG/ML
0.2 INJECTION INTRAVENOUS AS NEEDED
Status: DISCONTINUED | OUTPATIENT
Start: 2024-01-15 | End: 2024-01-15 | Stop reason: HOSPADM

## 2024-01-15 RX ORDER — SODIUM CHLORIDE 9 MG/ML
40 INJECTION, SOLUTION INTRAVENOUS AS NEEDED
Status: DISCONTINUED | OUTPATIENT
Start: 2024-01-15 | End: 2024-01-16 | Stop reason: HOSPADM

## 2024-01-15 RX ORDER — OXYCODONE AND ACETAMINOPHEN 10; 325 MG/1; MG/1
1 TABLET ORAL ONCE AS NEEDED
Status: DISCONTINUED | OUTPATIENT
Start: 2024-01-15 | End: 2024-01-15 | Stop reason: HOSPADM

## 2024-01-15 RX ORDER — ONDANSETRON 2 MG/ML
4 INJECTION INTRAMUSCULAR; INTRAVENOUS
Status: DISCONTINUED | OUTPATIENT
Start: 2024-01-15 | End: 2024-01-15 | Stop reason: HOSPADM

## 2024-01-15 RX ORDER — ACETAMINOPHEN 325 MG/1
650 TABLET ORAL EVERY 4 HOURS PRN
Status: DISCONTINUED | OUTPATIENT
Start: 2024-01-15 | End: 2024-01-16 | Stop reason: HOSPADM

## 2024-01-15 RX ORDER — NALOXONE HCL 0.4 MG/ML
0.04 VIAL (ML) INJECTION AS NEEDED
Status: DISCONTINUED | OUTPATIENT
Start: 2024-01-15 | End: 2024-01-15 | Stop reason: HOSPADM

## 2024-01-15 RX ORDER — NALOXONE HCL 0.4 MG/ML
0.4 VIAL (ML) INJECTION
Status: DISCONTINUED | OUTPATIENT
Start: 2024-01-15 | End: 2024-01-16 | Stop reason: HOSPADM

## 2024-01-15 RX ORDER — ASPIRIN 81 MG/1
81 TABLET ORAL DAILY
Status: DISCONTINUED | OUTPATIENT
Start: 2024-01-15 | End: 2024-01-16 | Stop reason: HOSPADM

## 2024-01-15 RX ORDER — BUPIVACAINE HYDROCHLORIDE 5 MG/ML
INJECTION, SOLUTION PERINEURAL AS NEEDED
Status: DISCONTINUED | OUTPATIENT
Start: 2024-01-15 | End: 2024-01-15 | Stop reason: HOSPADM

## 2024-01-15 RX ORDER — NICARDIPINE HYDROCHLORIDE 2.5 MG/ML
INJECTION INTRAVENOUS AS NEEDED
Status: DISCONTINUED | OUTPATIENT
Start: 2024-01-15 | End: 2024-01-15 | Stop reason: SURG

## 2024-01-15 RX ORDER — SODIUM CHLORIDE 0.9 % (FLUSH) 0.9 %
10 SYRINGE (ML) INJECTION AS NEEDED
Status: DISCONTINUED | OUTPATIENT
Start: 2024-01-15 | End: 2024-01-16 | Stop reason: HOSPADM

## 2024-01-15 RX ORDER — SODIUM CHLORIDE 0.9 % (FLUSH) 0.9 %
10 SYRINGE (ML) INJECTION EVERY 12 HOURS SCHEDULED
Status: DISCONTINUED | OUTPATIENT
Start: 2024-01-15 | End: 2024-01-16 | Stop reason: HOSPADM

## 2024-01-15 RX ADMIN — ISOSORBIDE MONONITRATE 30 MG: 30 TABLET, EXTENDED RELEASE ORAL at 15:27

## 2024-01-15 RX ADMIN — SUGAMMADEX 200 MG: 100 INJECTION, SOLUTION INTRAVENOUS at 12:59

## 2024-01-15 RX ADMIN — EPHEDRINE SULFATE 10 MG: 50 INJECTION INTRAVENOUS at 12:41

## 2024-01-15 RX ADMIN — ROCURONIUM BROMIDE 10 MG: 10 INJECTION, SOLUTION INTRAVENOUS at 12:51

## 2024-01-15 RX ADMIN — SODIUM CHLORIDE 50 ML/HR: 9 INJECTION, SOLUTION INTRAVENOUS at 15:26

## 2024-01-15 RX ADMIN — HEPARIN SODIUM 4000 UNITS: 1000 INJECTION, SOLUTION INTRAVENOUS; SUBCUTANEOUS at 12:31

## 2024-01-15 RX ADMIN — ATORVASTATIN CALCIUM 10 MG: 10 TABLET, FILM COATED ORAL at 15:27

## 2024-01-15 RX ADMIN — ASPIRIN 81 MG: 81 TABLET, COATED ORAL at 15:27

## 2024-01-15 RX ADMIN — CEFAZOLIN 2000 MG: 2 INJECTION, POWDER, FOR SOLUTION INTRAMUSCULAR; INTRAVENOUS at 21:26

## 2024-01-15 RX ADMIN — PREGABALIN 75 MG: 75 CAPSULE ORAL at 21:25

## 2024-01-15 RX ADMIN — SODIUM CHLORIDE 1000 MG: 900 INJECTION INTRAVENOUS at 11:50

## 2024-01-15 RX ADMIN — HEPARIN SODIUM 1000 UNITS: 1000 INJECTION, SOLUTION INTRAVENOUS; SUBCUTANEOUS at 12:26

## 2024-01-15 RX ADMIN — Medication 100 MCG: at 12:12

## 2024-01-15 RX ADMIN — LIDOCAINE HYDROCHLORIDE 100 MG: 20 INJECTION, SOLUTION EPIDURAL; INFILTRATION; INTRACAUDAL; PERINEURAL at 11:45

## 2024-01-15 RX ADMIN — ROCURONIUM BROMIDE 50 MG: 10 INJECTION, SOLUTION INTRAVENOUS at 11:45

## 2024-01-15 RX ADMIN — FENTANYL CITRATE 50 MCG: 50 INJECTION, SOLUTION INTRAMUSCULAR; INTRAVENOUS at 11:45

## 2024-01-15 RX ADMIN — SODIUM CHLORIDE, POTASSIUM CHLORIDE, SODIUM LACTATE AND CALCIUM CHLORIDE 1000 ML: 600; 310; 30; 20 INJECTION, SOLUTION INTRAVENOUS at 08:40

## 2024-01-15 RX ADMIN — EPHEDRINE SULFATE 10 MG: 50 INJECTION INTRAVENOUS at 12:45

## 2024-01-15 RX ADMIN — METOPROLOL TARTRATE 25 MG: 25 TABLET, FILM COATED ORAL at 21:25

## 2024-01-15 RX ADMIN — FENTANYL CITRATE 50 MCG: 50 INJECTION, SOLUTION INTRAMUSCULAR; INTRAVENOUS at 12:23

## 2024-01-15 RX ADMIN — Medication 100 MCG: at 12:05

## 2024-01-15 RX ADMIN — Medication 100 MCG: at 12:48

## 2024-01-15 RX ADMIN — EPHEDRINE SULFATE 10 MG: 50 INJECTION INTRAVENOUS at 12:48

## 2024-01-15 RX ADMIN — PROPOFOL 100 MG: 10 INJECTION, EMULSION INTRAVENOUS at 11:45

## 2024-01-15 RX ADMIN — NICARDIPINE HYDROCHLORIDE 500 MCG: 2.5 INJECTION INTRAVENOUS at 13:12

## 2024-01-15 RX ADMIN — NICARDIPINE HYDROCHLORIDE 500 MCG: 2.5 INJECTION INTRAVENOUS at 13:14

## 2024-01-15 RX ADMIN — Medication 10 ML: at 21:26

## 2024-01-15 RX ADMIN — ROCURONIUM BROMIDE 10 MG: 10 INJECTION, SOLUTION INTRAVENOUS at 12:49

## 2024-01-15 NOTE — ANESTHESIA POSTPROCEDURE EVALUATION
"Patient: Nicolas Nieves    Procedure Summary       Date: 01/15/24 Room / Location: [facility] OR  /  PAD HYBRID OR 12    Anesthesia Start: 1139 Anesthesia Stop: 1319    Procedure: ABDOMINAL AORTIC ANEURYSM REPAIR WITH ENDOGRAFT (Bilateral: Groin) Diagnosis:       Infrarenal abdominal aortic aneurysm (AAA) without rupture      Preop testing      (Infrarenal abdominal aortic aneurysm (AAA) without rupture [I71.43])      (Preop testing [Z01.818])    Surgeons: Shai Burgos DO Provider: Perry Mullen CRNA    Anesthesia Type: general ASA Status: 4            Anesthesia Type: general    Vitals  Vitals Value Taken Time   /60 01/15/24 1425   Temp 98 °F (36.7 °C) 01/15/24 1430   Pulse 58 01/15/24 1432   Resp 15 01/15/24 1430   SpO2 95 % 01/15/24 1432   Vitals shown include unfiled device data.        Post Anesthesia Care and Evaluation    Patient location during evaluation: PACU  Patient participation: complete - patient participated  Level of consciousness: awake and alert  Pain management: adequate    Airway patency: patent  Anesthetic complications: No anesthetic complications    Cardiovascular status: acceptable  Respiratory status: acceptable  Hydration status: acceptable    Comments: Blood pressure 127/58, pulse 53, temperature 97.7 °F (36.5 °C), temperature source Oral, resp. rate 16, height 181 cm (71.26\"), weight 86.7 kg (191 lb 2.2 oz), SpO2 100%.    Pt discharged from PACU based on dianne score >8    "

## 2024-01-15 NOTE — ANESTHESIA PROCEDURE NOTES
Airway  Date/Time: 1/15/2024 11:47 AM  Airway not difficult    General Information and Staff    Patient location during procedure: OR  CRNA/CAA: Perry Mullen CRNA    Indications and Patient Condition  Indications for airway management: airway protection    Preoxygenated: yes  Mask difficulty assessment: 2 - vent by mask + OA or adjuvant +/- NMBA    Final Airway Details  Final airway type: endotracheal airway      Successful airway: ETT  Cuffed: yes   Successful intubation technique: video laryngoscopy  Blade: Carlson  Blade size: 3  ETT size (mm): 7.5  Cormack-Lehane Classification: grade I - full view of glottis  Placement verified by: chest auscultation and capnometry   Cuff volume (mL): 5  Number of attempts at approach: 1  Assessment: lips, teeth, and gum same as pre-op and atraumatic intubation

## 2024-01-15 NOTE — OP NOTE
Nicolas Nieves  1/15/2024     PREOPERATIVE DIAGNOSIS: Infrarenal abdominal aortic aneurysm (AAA) without rupture [I71.43]  Preop testing [Z01.818]     POSTOPERATIVE DIAGNOSIS: Post-Op Diagnosis Codes:     * Infrarenal abdominal aortic aneurysm (AAA) without rupture [I71.43]     * Preop testing [Z01.818]     PROCEDURE PERFORMED:   1.  Ultrasound-guided cannulation of bilateral common femoral arteries  2.  Introduction of catheter/sheath into the aorta  3.  Aortoiliac angiogram  4.  Placement of a Medtronic Endurant modular bifurcated device with 2 docking limbs measuring 28 x 14 x 103  5.  Placement of a contralateral extension limb in the left common iliac artery measuring 16 x 10 x 124  6.  Placement of an ipsilateral extension limb in the right common iliac artery measuring 16 x 10 x 124  7.  Balloon angioplasty of the proximal and distal endograft using the Reliant balloon  8.  Completion aortoiliac angiogram with radiographic supervision and interpretation  9.  Perclose closure of bilateral common femoral arteries     SURGEON: Shai Burgos DO      ANESTHESIA: General.    PREPARATION: Routine.    STAFF: Circulator: Savanna Christopher RN  Scrub Person: Zaire Sommer; Nia Kee; Emy Reynaga  Assistant: Lee Boss  Vascular Radiology Technician: Nancy Reaves    Estimated Blood Loss: minimal    SPECIMENS: None    COMPLICATIONS: None    INDICATIONS: Nicolas Nieves is a 88 y.o. male who was previously followed at [facility], last seen in August 2022, where they recommended annual follow up. He was seen in [facility] ED and had a CT abdomen and pelvis, which he did bring in for review.    The indications, risks, and possible complications of the procedure were explained to the patient, who voiced understanding and wished to proceed with surgery.     PROCEDURE IN DETAIL:   The patient was taken to the operating room and placed on the operating table in a supine position. After general anesthesia  was obtained, the abdomen and bilateral groins were prepped and draped in a sterile manner.  Under ultrasound guidance and using a micropuncture technique the right common femoral artery was cannulated and a micro-sheath was placed.  A small stab incision was made with 11 blade.  The Advantage Glidewire was advanced up into the aorta under fluoroscopic guidance.  The 7 Polish sheath was placed for predilatation.  The first Perclose device was placed and deployed at the 2 o'clock position.  The second one was placed and deployed at the 10 o'clock position.  The 11 Polish sheath was placed.  Patient was given 1000 units of intravenous heparin.  The same procedure was performed in the left groin.  Under ultrasound guidance and using a micropuncture technique the left common femoral artery was cannulated and a micro-sheath was placed.  A small stab incision was made with 11 blade.  The Advantage Glidewire was advanced into the aorta under fluoroscopic guidance.  The 7 Polish sheath was placed for predilatation.  The first Perclose device was placed and deployed at the 2 o'clock position.  The second was placed and deployed at the 10 o'clock position.  The 11 Polish sheath was placed.  The patient was given 4000 units of intravenous heparin.  Both flush catheters were advanced into the aorta and an aortoiliac angiogram was performed.  The appropriate measurements were taken.  The Medtronic Endurant modular bifurcated device with 2 docking limbs measuring 28 x 14 x 103 was placed from the right side up to the level of the renal arteries.  A magged up view of the renal arteries was performed.  The graft was then successfully deployed.  The suprarenal stent was successfully deployed.  Next, the flush catheter was captured from the left side and the gate was successful cannulated.  The catheter was spun to ensure that we were in true lumen.  With the C-arm at 20° LOPEZ a retrograde angiogram was performed from the left side.   The hypogastric was marked on the screen.  The contralateral limb measuring 16 x 10 x 124 was successfully placed and deployed in the left common iliac artery.  A 12 Tristanian sheath was then placed.  The rest of the main body device was successfully deployed.  The delivery device was captured and a 14 Tristanian sheath was placed.  With the C-arm at 20° Mozambican a retrograde angiogram was performed from the right side.  Hypogastric was marked on the screen.  The ipsilateral extension limb measuring 16 x 10 x 124 was placed successfully.  Lastly, the Reliant balloon was used to balloon angioplasty the entire graft.  Completion angiogram was performed which showed rapid flow down through the graft and out through the iliac arteries without any evidence of stenosis or occlusion.    There was no evidence of type I, type III, or type IV endoleaks.  There was a small type II endoleak in the proximal endograft that was very focal.  At this point I felt no further intervention was warranted.  The sheaths were removed.  The Perclose devices were used to seal down the artery.  Direct pressure was held for an additional 10 minutes of ensure hemostasis.  5 mL of 0.5% Marcaine plain was used to infiltrate each groin for local anesthesia.  The skin was then reapproximated using a 4-0 Monocryl in a subcuticular fashion. Sterile dressings were applied. The patient tolerated the procedure well. Sponge and needle counts were correct. The patient was then awakened and extubated in the operating room and taken to the recovery room in good condition.    Shai Burgos,   Date: 1/15/2024 Time: 13:11 CST    CC:MARITZA Smith

## 2024-01-15 NOTE — ANESTHESIA PREPROCEDURE EVALUATION
Anesthesia Evaluation     Patient summary reviewed and Nursing notes reviewed   no history of anesthetic complications:   NPO Solid Status: > 8 hours  NPO Liquid Status: > 8 hours           Airway   Mallampati: I  TM distance: >3 FB  Neck ROM: full  No difficulty expected  Dental      Pulmonary    (+) a smoker Former, COPD,  (-) sleep apnea  Cardiovascular   Exercise tolerance: good (4-7 METS)    (+) hypertension, CAD, cardiac stents (4 stents most recent several years ago) more than 12 months ago , dysrhythmias Atrial Fib, PVD (AAA), hyperlipidemia      Neuro/Psych  (+) seizures (once, 50 years ago)  (-) neuromuscular disease, CVA  GI/Hepatic/Renal/Endo    (+) GERD, PUD, renal disease- stones  (-) diabetes    Musculoskeletal     Abdominal    Substance History      OB/GYN          Other   arthritis,                       Anesthesia Plan    ASA 4     general     (Good functional status, 'chest pain' comes and goes, could be anginal; discussed. Cards clearance noted )  intravenous induction     Anesthetic plan, risks, benefits, and alternatives have been provided, discussed and informed consent has been obtained with: patient.        CODE STATUS:

## 2024-01-15 NOTE — PLAN OF CARE
Goal Outcome Evaluation:      Pt to room 372 from PACU after surgical procedure: repair of AAA with graft. Gauze and tegaderm to bilateral groin. Clean, dry, and intact. Pt alert and oriented. Denies any pain. Pt to lay flat till 1800 tonight. F/C. SCD. Will continue to monitor pt.

## 2024-01-15 NOTE — ANESTHESIA PROCEDURE NOTES
Arterial Line    Pre-sedation assessment completed: 1/15/2024 11:15 AM    Patient reassessed immediately prior to procedure    Patient location during procedure: pre-op  Start time: 1/15/2024 11:15 AM  Stop Time:1/15/2024 11:15 AM       Line placed for hemodynamic monitoring.  Performed By   Anesthesiologist: Dewayne Lyons MD   Preanesthetic Checklist  Completed: patient identified, IV checked, site marked, risks and benefits discussed, surgical consent, monitors and equipment checked, pre-op evaluation and timeout performed  Arterial Line Prep    Sterile Tech: cap, gloves and mask  Prep: ChloraPrep  Patient monitoring: blood pressure monitoring, continuous pulse oximetry and EKG  Arterial Line Procedure   Laterality:right  Location:  radial artery  Catheter size: 20 G   Guidance: ultrasound guided  PROCEDURE NOTE/ULTRASOUND INTERPRETATION.  Using ultrasound guidance the potential vascular sites for insertion of the catheter were visualized to determine the patency of the vessel to be used for vascular access.  After selecting the appropriate site for insertion, the needle was visualized under ultrasound being inserted into the radial artery, followed by ultrasound confirmation of wire and catheter placement. There were no abnormalities seen on ultrasound; an image was taken; and the patient tolerated the procedure with no complications.   Number of attempts: 1  Successful placement: yes Images: still images not obtained  Post Assessment   Dressing Type: occlusive dressing applied, secured with tape and wrist guard applied.   Complications no  Circ/Move/Sens Assessment: normal.   Patient Tolerance: patient tolerated the procedure well with no apparent complications  Additional Notes  Wire intact. Calibrated/Connections checked, line flushed.  Appropriate waveform. Clear occlusive opsite applied over catheter insertion site. Pt remained hemodynamically stable throughout procedure.

## 2024-01-16 ENCOUNTER — TELEPHONE (OUTPATIENT)
Dept: FAMILY MEDICINE CLINIC | Age: 89
End: 2024-01-16

## 2024-01-16 VITALS
HEART RATE: 68 BPM | RESPIRATION RATE: 16 BRPM | DIASTOLIC BLOOD PRESSURE: 55 MMHG | OXYGEN SATURATION: 91 % | SYSTOLIC BLOOD PRESSURE: 127 MMHG | HEIGHT: 71 IN | BODY MASS INDEX: 26.76 KG/M2 | TEMPERATURE: 98.2 F | WEIGHT: 191.14 LBS

## 2024-01-16 LAB
ANION GAP SERPL CALCULATED.3IONS-SCNC: 9 MMOL/L (ref 5–15)
BUN SERPL-MCNC: 13 MG/DL (ref 8–23)
BUN/CREAT SERPL: 11.6 (ref 7–25)
CALCIUM SPEC-SCNC: 8.2 MG/DL (ref 8.6–10.5)
CHLORIDE SERPL-SCNC: 105 MMOL/L (ref 98–107)
CO2 SERPL-SCNC: 24 MMOL/L (ref 22–29)
CREAT SERPL-MCNC: 1.12 MG/DL (ref 0.76–1.27)
EGFRCR SERPLBLD CKD-EPI 2021: 64 ML/MIN/1.73
GLUCOSE SERPL-MCNC: 120 MG/DL (ref 65–99)
POTASSIUM SERPL-SCNC: 4.2 MMOL/L (ref 3.5–5.2)
SODIUM SERPL-SCNC: 138 MMOL/L (ref 136–145)

## 2024-01-16 PROCEDURE — 25010000002 CEFAZOLIN PER 500 MG: Performed by: SURGERY

## 2024-01-16 PROCEDURE — 99024 POSTOP FOLLOW-UP VISIT: CPT | Performed by: NURSE PRACTITIONER

## 2024-01-16 PROCEDURE — 80048 BASIC METABOLIC PNL TOTAL CA: CPT | Performed by: SURGERY

## 2024-01-16 RX ORDER — ASPIRIN 81 MG/1
81 TABLET ORAL DAILY
Qty: 90 TABLET | Refills: 6 | Status: SHIPPED | OUTPATIENT
Start: 2024-01-16

## 2024-01-16 RX ADMIN — ISOSORBIDE MONONITRATE 30 MG: 30 TABLET, EXTENDED RELEASE ORAL at 08:48

## 2024-01-16 RX ADMIN — CEFAZOLIN 2000 MG: 2 INJECTION, POWDER, FOR SOLUTION INTRAMUSCULAR; INTRAVENOUS at 04:12

## 2024-01-16 RX ADMIN — METOPROLOL TARTRATE 25 MG: 25 TABLET, FILM COATED ORAL at 08:48

## 2024-01-16 RX ADMIN — DULOXETINE HYDROCHLORIDE 60 MG: 30 CAPSULE, DELAYED RELEASE ORAL at 08:48

## 2024-01-16 RX ADMIN — PANTOPRAZOLE SODIUM 40 MG: 40 TABLET, DELAYED RELEASE ORAL at 06:22

## 2024-01-16 RX ADMIN — ATORVASTATIN CALCIUM 10 MG: 10 TABLET, FILM COATED ORAL at 08:48

## 2024-01-16 RX ADMIN — Medication 10 ML: at 08:48

## 2024-01-16 RX ADMIN — APIXABAN 2.5 MG: 2.5 TABLET, FILM COATED ORAL at 08:48

## 2024-01-16 RX ADMIN — PREGABALIN 75 MG: 75 CAPSULE ORAL at 08:48

## 2024-01-16 RX ADMIN — HYDROCODONE BITARTRATE AND ACETAMINOPHEN 1 TABLET: 5; 325 TABLET ORAL at 00:03

## 2024-01-16 RX ADMIN — ASPIRIN 81 MG: 81 TABLET, COATED ORAL at 08:48

## 2024-01-16 NOTE — PLAN OF CARE
Goal Outcome Evaluation:  Plan of Care Reviewed With: patient  Progress: no change         Pt medicated x1 with prn pain meds. IVF and IV abx infusing per orders. G/t violeta c/d/I to maddy bentley. Jessica in place to BSD; will d/c this am. O2 @ 1L, will attempt to wean to room air this am. VSS. Safety maintained.

## 2024-01-16 NOTE — TELEPHONE ENCOUNTER
Diagnoses / Procedures Referred By Contact Referred To Contact     Diagnoses   Infrarenal abdominal aortic aneurysm (AAA) without rupture   Preop testing   Infrarenal abdominal aortic aneurysm (AAA) without rupture [I71.43]   Preop testing [Z01.818]      Procedures   ABDOMINAL AORTIC ANEURYSM REPAIR WITH ENDOGRAFT

## 2024-01-16 NOTE — DISCHARGE SUMMARY
Date of Discharge:  1/16/2024    Discharge Diagnosis: Infrarenal abdominal aortic aneurysm (AAA) without rupture  Aortic aneurysm     Presenting Problem/History of Present Illness  Infrarenal abdominal aortic aneurysm (AAA) without rupture [I71.43]  Preop testing [Z01.818]  Aortic aneurysm [I71.9]       Hospital Course  Patient is a 88 y.o. male was seen in office, presented with CT scan from [facility].  It showed a saccular 5.6 cm infrarenal abdominal aortic aneurysm.  He did undergo endovascular abdominal aortic aneurysm repair without incident.  He was transferred to PACU for continued monitoring.  His groins remained soft, vitals signs stable, and pulses intact.  He was transferred to  for continued monitoring.  He has done well through the night.  Vitals are stable.  His groins are soft without hematoma and pulses intact.  He is stable and ready for discharge.  We will see him back in 2 weeks for post operative follow up.  Written and verbal instructions were given.  This was all discussed in full with complete understanding.           Procedures Performed  Procedure(s):  ABDOMINAL AORTIC ANEURYSM REPAIR WITH ENDOGRAFT       Consults:   Consults       No orders found for last 30 day(s).              Condition on Discharge: Stable    Discharge Medications     Discharge Medications        New Medications        Instructions Start Date   aspirin 81 MG EC tablet   81 mg, Oral, Daily             Continue These Medications        Instructions Start Date   DULoxetine 60 MG capsule  Commonly known as: CYMBALTA   1 capsule, Oral, Daily      Eliquis 2.5 MG tablet tablet  Generic drug: apixaban   1 tablet, Oral, 2 Times Daily      glucosamine-chondroitin 500-400 MG capsule capsule   1 capsule, Oral, Daily      isosorbide mononitrate 30 MG 24 hr tablet  Commonly known as: IMDUR   30 mg, Oral, Daily      metoprolol tartrate 50 MG tablet  Commonly known as: LOPRESSOR   50 mg, Oral, 2 Times Daily       pantoprazole 40 MG EC tablet  Commonly known as: PROTONIX   40 mg, Oral, Daily      pregabalin 75 MG capsule  Commonly known as: LYRICA   1 capsule, Oral, Every 12 Hours Scheduled      simvastatin 20 MG tablet  Commonly known as: ZOCOR   20 mg, Oral, Daily               Discharge Diet:   Diet Instructions       Diet: Regular/House Diet; Regular Texture (IDDSI 7); Thin (IDDSI 0)      Discharge Diet: Regular/House Diet    Texture: Regular Texture (IDDSI 7)    Fluid Consistency: Thin (IDDSI 0)            Activity at Discharge:   Activity Instructions       Bathing Restrictions      Type of Restriction: Bathing    Bathing Restrictions: Other    Explain Bathing Restrictions: May shower tomorrow    Driving Restrictions      Type of Restriction: Driving    Driving Restrictions: No Driving (Time Limited)    Length: 1 Week    Lifting Restrictions      Type of Restriction: Lifting    Lifting Restrictions: Lifting Restriction (Indicate Limit)    Weight Limit (Pounds): 10    Length of Lifting Restriction: 2 weeks    Other Activity Restrictions      Type of Restriction: Other    Explain Other Restrictions: No bending, no stooping, no straining            Follow-up Appointments  No future appointments.  Additional Instructions for the Follow-ups that You Need to Schedule       Discharge Follow-up with Specialty: Post-op AAA; 2 Weeks   As directed      Specialty: Post-op AAA   Follow Up: 2 Weeks                 I did spend more than 30 minutes reviewing the chart, face to face encounter, and organizing discharge.    Tamica Kelley, MARITZA  01/16/24  08:25 CST

## 2024-01-16 NOTE — PLAN OF CARE
Goal Outcome Evaluation:  Plan of Care Reviewed With: patient, family        Progress: improving  Outcome Evaluation: A&OX4, VSS. C/o mild back pain, Up stand by assist, voiding. Bilateral groin incisions CDI, PPP, FINCH,  equal. No neuro changes noted. Eliquis for VTE prevention. D/c home this shift. Call light in reach, safety maintained.

## 2024-01-17 ENCOUNTER — READMISSION MANAGEMENT (OUTPATIENT)
Dept: CALL CENTER | Facility: HOSPITAL | Age: 89
End: 2024-01-17
Payer: MEDICARE

## 2024-01-17 NOTE — TELEPHONE ENCOUNTER
Care Transitions Initial Follow Up Call    Outreach made within 2 business days of discharge: Yes    Patient: Esau Moctezuma Patient : 1935   MRN: [id number]  Reason for Admission: There are no discharge diagnoses documented for the most recent discharge.  Discharge Date: 24       Spoke with: pt    Discharge department/facility: [facility]    TCM Interactive Patient Contact:  Was patient able to fill all prescriptions: Yes  Was patient instructed to bring all medications to the follow-up visit: Yes  Is patient taking all medications as directed in the discharge summary? Yes  Does patient understand their discharge instructions: Yes  Does patient have questions or concerns that need addressed prior to 7-14 day follow up office visit: no. Is doing well, will FU with Vascular at [facility]    Scheduled appointment with PCP within 7-14 days    Follow Up  Future Appointments   Date Time Provider Department Center   2024 11:00 AM Cindy Gayle APRN [facility]       Blanca Xie MA

## 2024-01-17 NOTE — OUTREACH NOTE
Prep Survey      Flowsheet Row Responses   [facility] patient discharged from? [place]   Is LACE score < 7 ? No   Eligibility Readm Mgmt   Discharge diagnosis ABDOMINAL AORTIC ANEURYSM REPAIR WITH ENDOGRAFT   Does the patient have one of the following disease processes/diagnoses(primary or secondary)? General Surgery   Does the patient have Home health ordered? No   Is there a DME ordered? No   Prep survey completed? Yes            Milly TINEO - Registered Nurse

## 2024-01-19 ENCOUNTER — READMISSION MANAGEMENT (OUTPATIENT)
Dept: CALL CENTER | Facility: HOSPITAL | Age: 89
End: 2024-01-19
Payer: MEDICARE

## 2024-01-19 NOTE — OUTREACH NOTE
General Surgery Week 1 Survey      Flowsheet Row Responses   [facility] patient discharged from? [place]   Does the patient have one of the following disease processes/diagnoses(primary or secondary)? General Surgery   Week 1 attempt successful? Yes   Call start time 1648   Call end time 1651   Discharge diagnosis ABDOMINAL AORTIC ANEURYSM REPAIR WITH ENDOGRAFT   Meds reviewed with patient/caregiver? Yes   Is the patient having any side effects they believe may be caused by any medication additions or changes? No   Does the patient have all medications related to this admission filled (includes all antibiotics, pain medications, etc.) Yes   Is the patient taking all medications as directed (includes completed medication regime)? Yes   Does the patient have a follow up appointment scheduled with their surgeon? Yes   Has the patient kept scheduled appointments due by today? N/A   Has home health visited the patient within 72 hours of discharge? N/A   Psychosocial issues? No   Did the patient receive a copy of their discharge instructions? Yes   Nursing interventions Reviewed instructions with patient   What is the patient's perception of their health status since discharge? Improving   Nursing interventions Nurse provided patient education   Is the patient /caregiver able to teach back basic post-op care? No tub bath, swimming, or hot tub until instructed by MD, Take showers only when approved by MD-sponge bathe until then, Keep incision areas clean,dry and protected, Do not remove steri-strips, Lifting as instructed by MD in discharge instructions   Is the patient/caregiver able to teach back signs and symptoms of incisional infection? Increased redness, swelling or pain at the incisonal site, Increased drainage or bleeding, Incisional warmth, Pus or odor from incision, Fever   Is the patient/caregiver able to teach back steps to recovery at home? Set small, achievable goals for return to baseline health, Rest  and rebuild strength, gradually increase activity, Eat a well-balance diet   If the patient is a current smoker, are they able to teach back resources for cessation? Not a smoker   Is the patient/caregiver able to teach back the hierarchy of who to call/visit for symptoms/problems? PCP, Specialist, Home health nurse, Urgent Care, ED, 911 Yes   Week 1 call completed? Yes   Graduated Yes   Is the patient interested in additional calls from an ambulatory ? No   Would this patient benefit from a Referral to [facility] Social Work? No   Graduated/Revoked comments denies signs of infection, has no questions, no more calls needed   Call end time [id number]            Cindy TINEO - Registered Nurse

## 2024-01-23 NOTE — PROGRESS NOTES
Post-Discharge Transitional Care Follow Up      Esau Moctezuma   YOB: 1935    Date of Office Visit:  1/24/2024  Date of Hospital Admission: 01/15/2024  Date of Hospital Discharge: 01/16/2024  Readmission Risk Score (high >=14%. Medium >=10%):No data recorded    Care management risk score Rising risk (score 2-5) and Complex Care (Scores >=6): No Risk Score On File     Non face to face  following discharge, date last encounter closed (first attempt may have been earlier): 01/16/2024     Call initiated 2 business days of discharge: Yes     S/P AAA (abdominal aortic aneurysm) repair  Infrarenal abdominal aortic aneurysm (AAA) without rupture (HCC)  COPD exacerbation (HCC)  PAF (paroxysmal atrial fibrillation) (HCC)      Medical Decision Making: moderate complexity  Return in about 3 months (around 4/24/2024) for AWV.           Subjective:   HPI    Inpatient course: Discharge summary reviewed- see chart.    He had known AAA and was followed by Vascular. Previous CT by Dr Lujan showed 4.7cm and stable.   Per hospital record CT scan from [facility] ER showed a saccular 5.6 cm infrarenal abdominal aortic aneurysm. He did undergo endovascular abdominal aortic aneurysm repair without incident with Dr Smith.      Interval history/Current status: Everything is fine. Bowels are moving. No concerns. Has follow up with Dr Smith on Monday. Back on the eliquis and added aspirin.     PAF  Eliquis 2.5mg bid and metoprolol  Stable no concerns.   Followed by [facility] Cardiology.     COPD  Stable no concerns to warrant treatment changes.     Patient Active Problem List   Diagnosis    Hypertension    CAD (coronary artery disease), native coronary artery    Gastroesophageal reflux disease    Arthritis    Renal calculi    Costochondritis    Chronic chest wall pain    Ulcer, gastric, acute    Hypercholesterolemia    Weight loss    Burgos's esophagus    Abdominal aortic aneurysm (AAA) without rupture (HCC)

## 2024-01-24 ENCOUNTER — OFFICE VISIT (OUTPATIENT)
Dept: FAMILY MEDICINE CLINIC | Age: 89
End: 2024-01-24

## 2024-01-24 VITALS
BODY MASS INDEX: 25.38 KG/M2 | SYSTOLIC BLOOD PRESSURE: 130 MMHG | DIASTOLIC BLOOD PRESSURE: 76 MMHG | WEIGHT: 182 LBS | TEMPERATURE: 98.1 F | HEART RATE: 74 BPM | OXYGEN SATURATION: 94 %

## 2024-01-24 DIAGNOSIS — J44.1 COPD EXACERBATION (HCC): ICD-10-CM

## 2024-01-24 DIAGNOSIS — I48.0 PAF (PAROXYSMAL ATRIAL FIBRILLATION) (HCC): ICD-10-CM

## 2024-01-24 DIAGNOSIS — I71.43 INFRARENAL ABDOMINAL AORTIC ANEURYSM (AAA) WITHOUT RUPTURE (HCC): ICD-10-CM

## 2024-01-24 DIAGNOSIS — Z86.79 S/P AAA (ABDOMINAL AORTIC ANEURYSM) REPAIR: Primary | ICD-10-CM

## 2024-01-24 DIAGNOSIS — Z98.890 S/P AAA (ABDOMINAL AORTIC ANEURYSM) REPAIR: Primary | ICD-10-CM

## 2024-01-24 RX ORDER — PREGABALIN 75 MG/1
75 CAPSULE ORAL EVERY 12 HOURS SCHEDULED
COMMUNITY
Start: 2023-11-30

## 2024-01-24 RX ORDER — ASPIRIN 325 MG
325 TABLET ORAL DAILY
COMMUNITY

## 2024-01-24 ASSESSMENT — ENCOUNTER SYMPTOMS
COUGH: 0
ABDOMINAL PAIN: 0
CONSTIPATION: 0
RHINORRHEA: 0
TROUBLE SWALLOWING: 0
SORE THROAT: 0
VOMITING: 0
DIARRHEA: 0
NAUSEA: 0
SHORTNESS OF BREATH: 0

## 2024-01-24 ASSESSMENT — PATIENT HEALTH QUESTIONNAIRE - PHQ9
1. LITTLE INTEREST OR PLEASURE IN DOING THINGS: 0
SUM OF ALL RESPONSES TO PHQ QUESTIONS 1-9: 0
SUM OF ALL RESPONSES TO PHQ9 QUESTIONS 1 & 2: 0
2. FEELING DOWN, DEPRESSED OR HOPELESS: 0
SUM OF ALL RESPONSES TO PHQ QUESTIONS 1-9: 0

## 2024-01-26 ENCOUNTER — TELEPHONE (OUTPATIENT)
Dept: VASCULAR SURGERY | Facility: CLINIC | Age: 89
End: 2024-01-26
Payer: MEDICARE

## 2024-01-26 NOTE — TELEPHONE ENCOUNTER
UNABLE TO CONFIRM APPOINTMENT WITH [facility] ON 1/29/24. UNABLE TO LEAVE  ON EITHER CONTACT.

## 2024-01-29 ENCOUNTER — OFFICE VISIT (OUTPATIENT)
Dept: VASCULAR SURGERY | Facility: CLINIC | Age: 89
End: 2024-01-29
Payer: MEDICARE

## 2024-01-29 VITALS
OXYGEN SATURATION: 98 % | HEART RATE: 68 BPM | DIASTOLIC BLOOD PRESSURE: 74 MMHG | WEIGHT: 191 LBS | BODY MASS INDEX: 26.74 KG/M2 | SYSTOLIC BLOOD PRESSURE: 130 MMHG | HEIGHT: 71 IN

## 2024-01-29 DIAGNOSIS — E78.00 ELEVATED CHOLESTEROL: ICD-10-CM

## 2024-01-29 DIAGNOSIS — I10 PRIMARY HYPERTENSION: ICD-10-CM

## 2024-01-29 DIAGNOSIS — I71.43 INFRARENAL ABDOMINAL AORTIC ANEURYSM (AAA) WITHOUT RUPTURE: Primary | ICD-10-CM

## 2024-01-29 PROCEDURE — 1160F RVW MEDS BY RX/DR IN RCRD: CPT | Performed by: NURSE PRACTITIONER

## 2024-01-29 PROCEDURE — 1159F MED LIST DOCD IN RCRD: CPT | Performed by: NURSE PRACTITIONER

## 2024-01-29 PROCEDURE — 99024 POSTOP FOLLOW-UP VISIT: CPT | Performed by: NURSE PRACTITIONER

## 2024-01-29 NOTE — PROGRESS NOTES
1/29/2024        Rosalba Moody APRN  [address] [place] [address]    Nicolas Nieves  1935    Chief Complaint   Patient presents with    Post-op     Patient is here for a post op visit after having a ABDOMINAL AORTIC ANEURYSM REPAIR WITH ENDOGRAFT on 01/15/24. Patient denies any complaints,, concerns, or pain at this time.       Dear MARITZA Carlos    HPI  I had the pleasure of seeing your patient Nicolas Nieves in the office today.  Thank you kindly for this consultation.  As you recall, Nicolas Nieves is a 88 y.o.  male who you are currently following for routine health maintenance.  He was previously followed at [facility], last seen in August 2022, where they recommended annual follow up. He was found to have a saccular 5.6 cm infrarenal abdominal aortic aneurysm.  He underwent endovascular abdominal aortic aneurysm repair on 1/15/24 and is here for his 2 week follow up.  No complaints at this time.  He does have steri-strips still in place on left groin.    Past Medical History:   Diagnosis Date    Arthritis     Atrial fibrillation     Harmon's esophagus     CAD (coronary artery disease)     Cancer     COPD (chronic obstructive pulmonary disease)     Costochondritis     Elevated cholesterol     Gastritis     GERD (gastroesophageal reflux disease)     Hemorrhoids     History of colon cancer     History of colon polyps     Hypertension     Peptic ulcer     Renal calculi        Past Surgical History:   Procedure Laterality Date    ABDOMINAL AORTIC ANEURYSM REPAIR WITH ENDOGRAFT Bilateral 1/15/2024    Procedure: ABDOMINAL AORTIC ANEURYSM REPAIR WITH ENDOGRAFT;  Surgeon: Shai Burgos DO;  Location: [place] [address];  Service: Vascular;  Laterality: Bilateral;    CARDIAC CATHETERIZATION      stents x 4    CATARACT EXTRACTION, BILATERAL      CHOLECYSTECTOMY      COLONOSCOPY  04/25/2014    One 3mm hyperplastic rectal polyp; Repeat 3 years     COLONOSCOPY N/A 05/17/2017    The examined portion  of the ileum was normal; Patent end-to-side ileo-colonic anastomosis, characterized by healthy appearing mucosa; The entire examined colon is normal; No specimens collected; No plans to repeat due to age     COLONOSCOPY  04/17/2013    Mass in ascending colon-biopsied-marked with Malorie ink; Internal hemorrhoids     COLONOSCOPY  02/16/2010    Dr. Sanchez-Internal hermorrhoids; Repeat 3-5 years     COLONOSCOPY  02/26/2007    Dr. White-Internal hemorrhoids; Diminuitive hyperplastic rectal polyp; AVM right colon; Repeat 4 years    COLONOSCOPY  04/22/2003    Dr. White-Normal colonoscopy with internal hemorrhoids    ENDOSCOPY N/A 04/10/2019    No endoscopic esophageal abnormality to explain patient's dysphagia-esophagus dilated; A few gastric polyps-biopsied; Normal examined duodenum    ENDOSCOPY  04/17/2013    Duodenitis; LA grade A esophagitis     ENDOSCOPY  8/11/2020    Healed gastric ulcers     ENDOSCOPY  02/16/2010    Gastric ulcers     ENDOSCOPY  09/15/2009    Normal     ENDOSCOPY  05/15/2006    Dr. WhiteFygch-Ouivnswzv-vvajvmkm; [facility] HH     ENDOSCOPY  01/24/2005    Dr. White-Diffuse esophageal spasm-dilated; Gastritis-biopsied    ENDOSCOPY  04/25/2003    Dr. White-Stage II reflux esophagitis-rule out short-segment Harmon's esophagus; Schatzki's ring-dilated; Gastritis-biopsied    ENDOSCOPY N/A 08/08/2022    No endoscopic esophageal abnormality to explain patient's dysphagia; Normal stomach; Normal examined duodenum; No specimens collected    HEMICOLECTOMY Right 05/2013    LUNG REMOVAL, PARTIAL         Family History   Problem Relation Age of Onset    Alzheimer's disease Mother     Heart disease Father     Ulcers Father     Colon cancer Neg Hx     Colon polyps Neg Hx     Esophageal cancer Neg Hx     Liver cancer Neg Hx     Liver disease Neg Hx     Stomach cancer Neg Hx     Rectal cancer Neg Hx        Social History     Socioeconomic History    Marital status:    Tobacco Use    Smoking status: Former     Types:  "Cigarettes    Smokeless tobacco: Never   Vaping Use    Vaping Use: Never used   Substance and Sexual Activity    Alcohol use: Not Currently    Drug use: No    Sexual activity: Defer       Allergies   Allergen Reactions    Lortab [Hydrocodone-Acetaminophen] Nausea And Vomiting    Morphine And Related Nausea And Vomiting         Current Outpatient Medications:     aspirin 81 MG EC tablet, Take 1 tablet by mouth Daily., Disp: 90 tablet, Rfl: 6    DULoxetine (CYMBALTA) 60 MG capsule, Take 1 capsule by mouth Daily., Disp: , Rfl:     ELIQUIS 2.5 MG tablet tablet, Take 1 tablet by mouth 2 (Two) Times a Day., Disp: , Rfl:     glucosamine-chondroitin 500-400 MG capsule capsule, Take 1 capsule by mouth Daily., Disp: , Rfl:     isosorbide mononitrate (IMDUR) 30 MG 24 hr tablet, Take 1 tablet by mouth Daily., Disp: , Rfl:     metoprolol tartrate (LOPRESSOR) 50 MG tablet, Take 1 tablet by mouth 2 (Two) Times a Day., Disp: , Rfl:     pantoprazole (PROTONIX) 40 MG EC tablet, Take 1 tablet by mouth Daily., Disp: 180 tablet, Rfl: 3    pregabalin (LYRICA) 75 MG capsule, Take 1 capsule by mouth Every 12 (Twelve) Hours., Disp: , Rfl:     simvastatin (ZOCOR) 20 MG tablet, Take 1 tablet by mouth Daily., Disp: , Rfl:     Review of Systems   Constitutional: Negative.  Negative for fever.   HENT: Negative.     Eyes: Negative.    Respiratory: Negative.  Negative for shortness of breath.    Cardiovascular: Negative.  Negative for chest pain.   Gastrointestinal: Negative.  Negative for abdominal pain.   Endocrine: Negative.    Genitourinary: Negative.    Musculoskeletal: Negative.    Skin: Negative.    Allergic/Immunologic: Negative.    Neurological: Negative.    Hematological: Negative.    Psychiatric/Behavioral: Negative.         /74   Pulse 68   Ht 181 cm (71.26\")   Wt 86.6 kg (191 lb)   SpO2 98%   BMI 26.45 kg/m²       Physical Exam  Vitals and nursing note reviewed.   Constitutional:       Appearance: Normal appearance. He is " normal weight. He is not diaphoretic.   HENT:      Head: Normocephalic and atraumatic.      Nose: Nose normal. No congestion or rhinorrhea.   Eyes:      General: No scleral icterus.     Pupils: Pupils are equal, round, and reactive to light.   Cardiovascular:      Rate and Rhythm: Normal rate and regular rhythm.      Pulses: Normal pulses.           Carotid pulses are 2+ on the right side and 2+ on the left side.       Femoral pulses are 2+ on the right side and 2+ on the left side.       Popliteal pulses are 2+ on the right side and 2+ on the left side.        Dorsalis pedis pulses are 2+ on the right side and 2+ on the left side.        Posterior tibial pulses are 2+ on the right side and 2+ on the left side.      Comments: Bilateral groins healed  Pulmonary:      Effort: Pulmonary effort is normal. No respiratory distress.      Breath sounds: Normal breath sounds. No wheezing.   Abdominal:      General: Bowel sounds are normal.      Palpations: Abdomen is soft. There is no mass.      Tenderness: There is no abdominal tenderness.      Comments: No tenderness noted over repair site   Musculoskeletal:         General: Normal range of motion.      Cervical back: Normal range of motion and neck supple.      Right lower leg: No edema.      Left lower leg: No edema.   Skin:     General: Skin is warm and dry.   Neurological:      General: No focal deficit present.      Mental Status: He is alert and oriented to person, place, and time. Mental status is at baseline.      Sensory: No sensory deficit.   Psychiatric:         Mood and Affect: Mood normal.         Behavior: Behavior normal.         Thought Content: Thought content normal.               Patient Active Problem List   Diagnosis    Colon polyps    History of colon cancer    Other dysphagia    Gastroesophageal reflux disease without esophagitis    Abdominal pain, epigastric    Atrial fibrillation    Elevated cholesterol    Hypertension    Infrarenal abdominal  aortic aneurysm (AAA) without rupture    Preop testing    Aortic aneurysm        Diagnosis Plan   1. Infrarenal abdominal aortic aneurysm (AAA) without rupture  CT Angiogram Abdomen Pelvis      2. Primary hypertension        3. Elevated cholesterol              Plan: After thoroughly evaluating Nicolas Nieves, I believe the best course of action is to remain conservative from vascular surgery standpoint.   His bilateral groin incisions are healed, I did remove Steri-Strips from left groin that were still in place.  He denies tenderness over repair site.  He will return in 2 weeks with noninvasive testing for continued surveillance, to include CTA.  I did discuss vascular risk factors as they pertain to the progression of vascular disease including controlling hypertension and hyperlipidemia.  He is maintained on aspirin 81mg daily, Zocor 20mg nightly and Eliquis 2.5mg twice daily.  The patient is to continue taking their medications as previously discussed.   This was all discussed in full with complete understanding.  Thank you for allowing me to participate in the care of your patient.  Please do not hesitate to call with any questions or concerns.  We will keep you aware of any further encounters with Nicolas Nieves.        Sincerely yours,         Tamica Kelley, Malik Morse, DO

## 2024-01-31 ENCOUNTER — TELEPHONE (OUTPATIENT)
Dept: CARDIOLOGY CLINIC | Age: 89
End: 2024-01-31

## 2024-01-31 ENCOUNTER — OFFICE VISIT (OUTPATIENT)
Dept: FAMILY MEDICINE CLINIC | Age: 89
End: 2024-01-31
Payer: MEDICARE

## 2024-01-31 VITALS
SYSTOLIC BLOOD PRESSURE: 132 MMHG | OXYGEN SATURATION: 92 % | WEIGHT: 187 LBS | HEART RATE: 80 BPM | TEMPERATURE: 97.7 F | BODY MASS INDEX: 26.08 KG/M2 | DIASTOLIC BLOOD PRESSURE: 76 MMHG

## 2024-01-31 DIAGNOSIS — I49.9 IRREGULAR HEART BEAT: Primary | ICD-10-CM

## 2024-01-31 DIAGNOSIS — I48.0 PAF (PAROXYSMAL ATRIAL FIBRILLATION) (HCC): ICD-10-CM

## 2024-01-31 PROCEDURE — 93000 ELECTROCARDIOGRAM COMPLETE: CPT | Performed by: NURSE PRACTITIONER

## 2024-01-31 PROCEDURE — 1123F ACP DISCUSS/DSCN MKR DOCD: CPT | Performed by: NURSE PRACTITIONER

## 2024-01-31 PROCEDURE — G8427 DOCREV CUR MEDS BY ELIG CLIN: HCPCS | Performed by: NURSE PRACTITIONER

## 2024-01-31 PROCEDURE — 99214 OFFICE O/P EST MOD 30 MIN: CPT | Performed by: NURSE PRACTITIONER

## 2024-01-31 PROCEDURE — G8417 CALC BMI ABV UP PARAM F/U: HCPCS | Performed by: NURSE PRACTITIONER

## 2024-01-31 PROCEDURE — 1036F TOBACCO NON-USER: CPT | Performed by: NURSE PRACTITIONER

## 2024-01-31 PROCEDURE — G8482 FLU IMMUNIZE ORDER/ADMIN: HCPCS | Performed by: NURSE PRACTITIONER

## 2024-01-31 ASSESSMENT — ENCOUNTER SYMPTOMS
SORE THROAT: 0
RHINORRHEA: 0
COUGH: 0
SHORTNESS OF BREATH: 0
TROUBLE SWALLOWING: 0
CONSTIPATION: 0
NAUSEA: 0
VOMITING: 0
DIARRHEA: 0
ABDOMINAL PAIN: 0

## 2024-01-31 NOTE — PROGRESS NOTES
Esau Moctezuma (:  1935) is a 88 y.o. male,Established patient, here for evaluation of the following chief complaint(s):  Follow-up (Having issues with rapid heart rate. Went to [facility] er yesterday hr 130s. Feeling some better today but wanted to follow up. )      ASSESSMENT/PLAN:    ICD-10-CM    1. Irregular heart beat  I49.9 EKG 12 lead     EKG 12 lead     metoprolol tartrate (LOPRESSOR) 25 MG tablet     Longterm Continuous Cardiac Event Monitor    Spoke with Cindy Gayle with Cardiology. Increasing metoprolol and order zio. Patient to keep follow up with her for 2024    Zio to be placed tomorrow at 9:30am    EKG today sinus rhythm without ectopy, ST or T wave changes.  Rate of 66      2. PAF (paroxysmal atrial fibrillation) ([facility])  I48.0 metoprolol tartrate (LOPRESSOR) 25 MG tablet     Longterm Continuous Cardiac Event Monitor          Return if symptoms worsen or fail to improve.    SUBJECTIVE/OBJECTIVE:  HPI  Here for follow up from [facility] ER visit.   Went yesterday due to elevated heart rate. HR 130s per patient report.   Feeling better today but they wanted follow up.   Patient states my heart was racing. Sitting around it kept getting harder and harder. The ER doctor said to follow up. It has raced some today.   He is on eliquis and metoprolol due to hx of PAF.   Denies chest pain      Review of [facility] ER note 2024  EKG shows sinus rhythm rate of 94  WBC 8.6, hemoglobin 12.4, hematocrit 38.3, platelets 243, glucose 112, BUN 15, creatinine 1.3, sodium 142, potassium 3.5, troponin 15  Chest x-ray postoperative changes involving right lung with chronic right apical opacity and mild volume loss in the right hemothorax.  Otherwise negative    /76   Pulse 80   Temp 97.7 °F (36.5 °C) (Temporal)   Wt 84.8 kg (187 lb)   SpO2 92%   BMI 26.08 kg/m²     Review of Systems   Constitutional:  Negative for activity change, appetite change, fatigue, fever and unexpected weight change.   HENT:  Negative

## 2024-01-31 NOTE — PATIENT INSTRUCTIONS
Wear heart monitor for 1 week    Keep follow up with Cardiology as scheduled.     Adding metoprolol 25mg twice a day.

## 2024-02-01 ENCOUNTER — HOSPITAL ENCOUNTER (OUTPATIENT)
Dept: NON INVASIVE DIAGNOSTICS | Age: 89
Discharge: HOME | End: 2024-02-01
Payer: MEDICARE

## 2024-02-01 DIAGNOSIS — I48.0 PAF (PAROXYSMAL ATRIAL FIBRILLATION) (HCC): ICD-10-CM

## 2024-02-01 DIAGNOSIS — I49.9 IRREGULAR HEART BEAT: ICD-10-CM

## 2024-02-01 PROCEDURE — 93242 EXT ECG>48HR<7D RECORDING: CPT

## 2024-02-12 ENCOUNTER — TELEPHONE (OUTPATIENT)
Dept: VASCULAR SURGERY | Facility: CLINIC | Age: 89
End: 2024-02-12
Payer: MEDICARE

## 2024-02-13 ENCOUNTER — OFFICE VISIT (OUTPATIENT)
Dept: VASCULAR SURGERY | Facility: CLINIC | Age: 89
End: 2024-02-13
Payer: MEDICARE

## 2024-02-13 ENCOUNTER — HOSPITAL ENCOUNTER (OUTPATIENT)
Dept: CT IMAGING | Facility: HOSPITAL | Age: 89
Discharge: HOME | End: 2024-02-13
Admitting: NURSE PRACTITIONER
Payer: MEDICARE

## 2024-02-13 VITALS
HEIGHT: 71 IN | BODY MASS INDEX: 25.9 KG/M2 | OXYGEN SATURATION: 97 % | DIASTOLIC BLOOD PRESSURE: 64 MMHG | HEART RATE: 57 BPM | WEIGHT: 185 LBS | SYSTOLIC BLOOD PRESSURE: 122 MMHG

## 2024-02-13 DIAGNOSIS — I71.43 INFRARENAL ABDOMINAL AORTIC ANEURYSM (AAA) WITHOUT RUPTURE: ICD-10-CM

## 2024-02-13 DIAGNOSIS — E78.00 ELEVATED CHOLESTEROL: ICD-10-CM

## 2024-02-13 DIAGNOSIS — I10 PRIMARY HYPERTENSION: ICD-10-CM

## 2024-02-13 DIAGNOSIS — I71.43 INFRARENAL ABDOMINAL AORTIC ANEURYSM (AAA) WITHOUT RUPTURE: Primary | ICD-10-CM

## 2024-02-13 LAB — CREAT BLDA-MCNC: 1.3 MG/DL (ref 0.6–1.3)

## 2024-02-13 PROCEDURE — 82565 ASSAY OF CREATININE: CPT

## 2024-02-13 PROCEDURE — 25510000001 IOPAMIDOL PER 1 ML: Performed by: NURSE PRACTITIONER

## 2024-02-13 PROCEDURE — 74174 CTA ABD&PLVS W/CONTRAST: CPT

## 2024-02-13 RX ADMIN — IOPAMIDOL 100 ML: 755 INJECTION, SOLUTION INTRAVENOUS at 08:18

## 2024-02-21 ENCOUNTER — OFFICE VISIT (OUTPATIENT)
Dept: CARDIOLOGY CLINIC | Age: 89
End: 2024-02-21
Payer: MEDICARE

## 2024-02-21 VITALS
HEIGHT: 71 IN | SYSTOLIC BLOOD PRESSURE: 122 MMHG | WEIGHT: 186 LBS | HEART RATE: 76 BPM | DIASTOLIC BLOOD PRESSURE: 80 MMHG | BODY MASS INDEX: 26.04 KG/M2 | OXYGEN SATURATION: 96 %

## 2024-02-21 DIAGNOSIS — I48.0 PAROXYSMAL ATRIAL FIBRILLATION (HCC): ICD-10-CM

## 2024-02-21 DIAGNOSIS — E78.00 HYPERCHOLESTEROLEMIA: ICD-10-CM

## 2024-02-21 DIAGNOSIS — I10 PRIMARY HYPERTENSION: ICD-10-CM

## 2024-02-21 DIAGNOSIS — I25.10 CORONARY ARTERY DISEASE INVOLVING NATIVE CORONARY ARTERY OF NATIVE HEART WITHOUT ANGINA PECTORIS: Primary | ICD-10-CM

## 2024-02-21 PROCEDURE — G8417 CALC BMI ABV UP PARAM F/U: HCPCS | Performed by: CLINICAL NURSE SPECIALIST

## 2024-02-21 PROCEDURE — 1036F TOBACCO NON-USER: CPT | Performed by: CLINICAL NURSE SPECIALIST

## 2024-02-21 PROCEDURE — G8482 FLU IMMUNIZE ORDER/ADMIN: HCPCS | Performed by: CLINICAL NURSE SPECIALIST

## 2024-02-21 PROCEDURE — 99214 OFFICE O/P EST MOD 30 MIN: CPT | Performed by: CLINICAL NURSE SPECIALIST

## 2024-02-21 PROCEDURE — G8427 DOCREV CUR MEDS BY ELIG CLIN: HCPCS | Performed by: CLINICAL NURSE SPECIALIST

## 2024-02-21 PROCEDURE — 1123F ACP DISCUSS/DSCN MKR DOCD: CPT | Performed by: CLINICAL NURSE SPECIALIST

## 2024-02-21 NOTE — PROGRESS NOTES
[facility] Cardiology  [address] [place] [address]  Phone: [phone]  Fax: [phone]    OFFICE VISIT:  2024    Esau Moctezuma - : 1935    Reason For Visit:  Esau is a 88 y.o. male who is here for Follow-up, Atrial Fibrillation, and Coronary Artery Disease (Patient complains of pain probably from aneurysm removal he says. )    Stenting to LAD.  2021 nuclear stress test showed no evidence of ischemia or infarct with preserved LV function.  He continues on medical management.   Patient underwent endovascular AAA repair in January at [facility] with Dr. Smith.    He remains anticoagulated with no bleeding.     He states he has occasional chest tightness.  Sometimes after he walks back from the mailbox.  Sometimes it is positional.  No change from what he is had over the last several years.        Subjective  Esau denies exertional chest pain, shortness of breath, orthopnea, paroxysmal nocturnal dyspnea, syncope, presyncope, arrhythmia, edema and fatigue.  The patient denies numbness or weakness to suggest cerebrovascular accident or transient ischemic attack.    Ronald Amor APRN is PCP and follows labs.  Esau Moctezuma has the following history as [facility] in [facility]:    Patient Active Problem List    Diagnosis Date Noted    CAD (coronary artery disease), native coronary artery     COPD exacerbation (HCC) 2023    Primary osteoarthritis of right hip 2020    Paroxysmal atrial fibrillation (HCC) 2017    Colon polyps 2017    History of colon cancer 2017    Abdominal aortic aneurysm (AAA) without rupture (HCC) 2013    Hypercholesterolemia     Weight loss     Burgos's esophagus     Ulcer, gastric, acute 2012    Chronic chest wall pain 2011    Hypertension     Gastroesophageal reflux disease     Arthritis     Renal calculi     Costochondritis      Past Medical History:   Diagnosis Date    Adenocarcinoma (HCC)     colon

## 2024-02-21 NOTE — PATIENT INSTRUCTIONS
Maintain good blood pressure control-goal<130/80 at rest  Maintain good cholesterol control LDL goal<70 with arterial disease  If you are diabetic work to keep/obtain hemoglobin A1c< 7    Follow up in 6 mos   Call with any questions or concerns  Follow up with Ronald Amor APRN for non cardiac problems  Report any new problems  Cardiovascular Fitness-Exercise as tolerated.  Strive for 30 minutes of exercise most days of the week.    Cardiac / Healthy Diet- Avoid processed high fat foods, maintain low sodium/salt   Continue current medications as directed  Continue plan of treatment  It is always recommended that you bring your medications bottles with you to each visit - this is for your safety!

## 2024-02-22 ENCOUNTER — CLINICAL DOCUMENTATION (OUTPATIENT)
Dept: CARDIOLOGY CLINIC | Age: 89
End: 2024-02-22

## 2024-02-22 NOTE — PROGRESS NOTES
Received a call from patient's primary Bar SAHU that he received a ZIO report on patient that reported patient had 2 episodes of VT with VT episode lasting 8 minutes.  Patient followed by nurse practitioner Cindy Gayle.  Called patient at home and he reports no issues with no lightheadedness or syncopal episode.  Unable to access ZIO as it has not been downloaded into the system to review.  Possible erroneous dictation.  Have spoken with Cindy Gayle and she will address this tomorrow morning.

## 2024-03-01 ENCOUNTER — TELEPHONE (OUTPATIENT)
Dept: FAMILY MEDICINE CLINIC | Age: 89
End: 2024-03-01

## 2024-03-01 RX ORDER — PREGABALIN 75 MG/1
75 CAPSULE ORAL 2 TIMES DAILY
Qty: 180 CAPSULE | Refills: 0 | OUTPATIENT
Start: 2024-03-01

## 2024-03-01 NOTE — TELEPHONE ENCOUNTER
Patient was last seen 02/21/2024, next appointment is scheduled for 08/27/2024. The medication Lyrica is being requested today the last refill was filled on 04/24/2024.

## 2024-03-05 RX ORDER — PREGABALIN 75 MG/1
75 CAPSULE ORAL 2 TIMES DAILY
Qty: 180 CAPSULE | Refills: 0 | OUTPATIENT
Start: 2024-03-05

## 2024-03-08 DIAGNOSIS — N40.0 PROSTATE ENLARGEMENT: ICD-10-CM

## 2024-03-08 DIAGNOSIS — G62.9 NEUROPATHY: Primary | ICD-10-CM

## 2024-03-08 RX ORDER — TAMSULOSIN HYDROCHLORIDE 0.4 MG/1
0.4 CAPSULE ORAL DAILY
Qty: 90 CAPSULE | Refills: 0 | Status: SHIPPED | OUTPATIENT
Start: 2024-03-08

## 2024-03-08 RX ORDER — PREGABALIN 75 MG/1
75 CAPSULE ORAL 2 TIMES DAILY
Qty: 180 CAPSULE | Refills: 0 | OUTPATIENT
Start: 2024-03-08

## 2024-03-08 RX ORDER — PREGABALIN 75 MG/1
75 CAPSULE ORAL EVERY 12 HOURS SCHEDULED
Qty: 60 CAPSULE | Refills: 2 | Status: SHIPPED | OUTPATIENT
Start: 2024-03-08 | End: 2024-06-06

## 2024-03-08 NOTE — TELEPHONE ENCOUNTER
Received fax from pharmacy requesting refill on pts medication(s). Pt was last seen in office on 1/31/2024  and has a follow up scheduled for 4/24/2024. Will send request to  Bar Amor  for authorization.     Requested Prescriptions     Pending Prescriptions Disp Refills    tamsulosin (FLOMAX) 0.4 MG capsule [Pharmacy Med Name: Tamsulosin HCl 0.4 MG Oral Capsule] 90 capsule 0     Sig: Take 1 capsule by mouth once daily

## 2024-03-19 RX ORDER — ISOSORBIDE MONONITRATE 30 MG/1
30 TABLET, EXTENDED RELEASE ORAL DAILY
Qty: 90 TABLET | Refills: 3 | Status: SHIPPED | OUTPATIENT
Start: 2024-03-19

## 2024-03-19 NOTE — TELEPHONE ENCOUNTER
Received fax from pharmacy requesting refill on pts medication(s). Pt was last seen in office on 1/31/2024  and has a follow up scheduled for 4/24/2024. Will send request to  Bar Amor  for patient.     Requested Prescriptions     Pending Prescriptions Disp Refills    isosorbide mononitrate (IMDUR) 30 MG extended release tablet [Pharmacy Med Name: Isosorbide Mononitrate ER 30 MG Oral Tablet Extended Release 24 Hour] 90 tablet 0     Sig: Take 1 tablet by mouth once daily

## 2024-06-17 ENCOUNTER — TELEPHONE (OUTPATIENT)
Dept: FAMILY MEDICINE CLINIC | Age: 89
End: 2024-06-17

## 2024-06-17 DIAGNOSIS — G62.9 NEUROPATHY: ICD-10-CM

## 2024-06-17 RX ORDER — PREGABALIN 75 MG/1
75 CAPSULE ORAL EVERY 12 HOURS
Qty: 60 CAPSULE | Refills: 0 | OUTPATIENT
Start: 2024-06-17

## 2024-06-17 NOTE — TELEPHONE ENCOUNTER
Patient is requesting a refill on lyrica. Patient was last seen on 01/31/2024 , with a follow up on not scheduled at this time. The prescription was last filled on 05/16/2024

## 2024-06-19 ENCOUNTER — OFFICE VISIT (OUTPATIENT)
Dept: FAMILY MEDICINE CLINIC | Age: 89
End: 2024-06-19
Payer: MEDICARE

## 2024-06-19 VITALS
TEMPERATURE: 97.9 F | WEIGHT: 176.25 LBS | SYSTOLIC BLOOD PRESSURE: 132 MMHG | HEART RATE: 60 BPM | OXYGEN SATURATION: 96 % | BODY MASS INDEX: 24.58 KG/M2 | DIASTOLIC BLOOD PRESSURE: 80 MMHG

## 2024-06-19 DIAGNOSIS — I71.40 ABDOMINAL AORTIC ANEURYSM (AAA) WITHOUT RUPTURE, UNSPECIFIED PART (HCC): ICD-10-CM

## 2024-06-19 DIAGNOSIS — I48.0 PAROXYSMAL ATRIAL FIBRILLATION (HCC): ICD-10-CM

## 2024-06-19 DIAGNOSIS — I48.0 PAF (PAROXYSMAL ATRIAL FIBRILLATION) (HCC): ICD-10-CM

## 2024-06-19 DIAGNOSIS — Z00.00 MEDICARE ANNUAL WELLNESS VISIT, SUBSEQUENT: Primary | ICD-10-CM

## 2024-06-19 DIAGNOSIS — M94.0 COSTOCHONDRITIS: ICD-10-CM

## 2024-06-19 DIAGNOSIS — I25.10 CORONARY ARTERY DISEASE INVOLVING NATIVE CORONARY ARTERY OF NATIVE HEART WITHOUT ANGINA PECTORIS: ICD-10-CM

## 2024-06-19 DIAGNOSIS — G62.9 NEUROPATHY: ICD-10-CM

## 2024-06-19 DIAGNOSIS — K21.00 GASTROESOPHAGEAL REFLUX DISEASE WITH ESOPHAGITIS, UNSPECIFIED WHETHER HEMORRHAGE: ICD-10-CM

## 2024-06-19 DIAGNOSIS — Z79.01 CHRONIC ANTICOAGULATION: ICD-10-CM

## 2024-06-19 PROCEDURE — G8427 DOCREV CUR MEDS BY ELIG CLIN: HCPCS | Performed by: NURSE PRACTITIONER

## 2024-06-19 PROCEDURE — G8420 CALC BMI NORM PARAMETERS: HCPCS | Performed by: NURSE PRACTITIONER

## 2024-06-19 PROCEDURE — G0439 PPPS, SUBSEQ VISIT: HCPCS | Performed by: NURSE PRACTITIONER

## 2024-06-19 PROCEDURE — 1123F ACP DISCUSS/DSCN MKR DOCD: CPT | Performed by: NURSE PRACTITIONER

## 2024-06-19 PROCEDURE — 99213 OFFICE O/P EST LOW 20 MIN: CPT | Performed by: NURSE PRACTITIONER

## 2024-06-19 PROCEDURE — 1036F TOBACCO NON-USER: CPT | Performed by: NURSE PRACTITIONER

## 2024-06-19 RX ORDER — METHYLPREDNISOLONE 4 MG/1
TABLET ORAL
Qty: 1 KIT | Refills: 0 | Status: SHIPPED | OUTPATIENT
Start: 2024-06-19 | End: 2024-06-25

## 2024-06-19 RX ORDER — PREGABALIN 75 MG/1
75 CAPSULE ORAL EVERY 12 HOURS SCHEDULED
Qty: 60 CAPSULE | Refills: 5 | Status: SHIPPED | OUTPATIENT
Start: 2024-06-19 | End: 2024-12-16

## 2024-06-19 SDOH — ECONOMIC STABILITY: FOOD INSECURITY: WITHIN THE PAST 12 MONTHS, THE FOOD YOU BOUGHT JUST DIDN'T LAST AND YOU DIDN'T HAVE MONEY TO GET MORE.: NEVER TRUE

## 2024-06-19 SDOH — ECONOMIC STABILITY: FOOD INSECURITY: WITHIN THE PAST 12 MONTHS, YOU WORRIED THAT YOUR FOOD WOULD RUN OUT BEFORE YOU GOT MONEY TO BUY MORE.: NEVER TRUE

## 2024-06-19 SDOH — ECONOMIC STABILITY: INCOME INSECURITY: HOW HARD IS IT FOR YOU TO PAY FOR THE VERY BASICS LIKE FOOD, HOUSING, MEDICAL CARE, AND HEATING?: NOT HARD AT ALL

## 2024-06-19 ASSESSMENT — PATIENT HEALTH QUESTIONNAIRE - PHQ9
3. TROUBLE FALLING OR STAYING ASLEEP: NOT AT ALL
SUM OF ALL RESPONSES TO PHQ9 QUESTIONS 1 & 2: 1
4. FEELING TIRED OR HAVING LITTLE ENERGY: NOT AT ALL
SUM OF ALL RESPONSES TO PHQ QUESTIONS 1-9: 1
2. FEELING DOWN, DEPRESSED OR HOPELESS: SEVERAL DAYS
7. TROUBLE CONCENTRATING ON THINGS, SUCH AS READING THE NEWSPAPER OR WATCHING TELEVISION: NOT AT ALL
1. LITTLE INTEREST OR PLEASURE IN DOING THINGS: NOT AT ALL
9. THOUGHTS THAT YOU WOULD BE BETTER OFF DEAD, OR OF HURTING YOURSELF: NOT AT ALL
5. POOR APPETITE OR OVEREATING: NOT AT ALL
SUM OF ALL RESPONSES TO PHQ QUESTIONS 1-9: 1
8. MOVING OR SPEAKING SO SLOWLY THAT OTHER PEOPLE COULD HAVE NOTICED. OR THE OPPOSITE, BEING SO FIGETY OR RESTLESS THAT YOU HAVE BEEN MOVING AROUND A LOT MORE THAN USUAL: NOT AT ALL
SUM OF ALL RESPONSES TO PHQ QUESTIONS 1-9: 1
SUM OF ALL RESPONSES TO PHQ QUESTIONS 1-9: 1
10. IF YOU CHECKED OFF ANY PROBLEMS, HOW DIFFICULT HAVE THESE PROBLEMS MADE IT FOR YOU TO DO YOUR WORK, TAKE CARE OF THINGS AT HOME, OR GET ALONG WITH OTHER PEOPLE: NOT DIFFICULT AT ALL
6. FEELING BAD ABOUT YOURSELF - OR THAT YOU ARE A FAILURE OR HAVE LET YOURSELF OR YOUR FAMILY DOWN: NOT AT ALL

## 2024-06-19 NOTE — PROGRESS NOTES
Medicare Annual Wellness Visit    Esau Moctezuma is here for Medicare AWV (Pain in his chest he wants to talk about. Taking care of wife who is in nursing home dt broken hip. Thinks it is getting him down. )    Assessment & Plan   Medicare annual wellness visit, subsequent  PAF (paroxysmal atrial fibrillation) (HCC)  Abdominal aortic aneurysm (AAA) without rupture, unspecified part (HCC)  Gastroesophageal reflux disease with esophagitis, unspecified whether hemorrhage  Chronic anticoagulation  Paroxysmal atrial fibrillation (HCC)  Neuropathy  The following orders have not been finalized:  -     pregabalin (LYRICA) 75 MG capsule  Costochondritis  The following orders have not been finalized:  -     methylPREDNISolone (MEDROL DOSEPACK) 4 MG tablet  Coronary artery disease involving native coronary artery of native heart without angina pectoris    Recommendations for Preventive Services Due: see orders and patient instructions/AVS.  Recommended screening schedule for the next 5-10 years is provided to the patient in written form: see Patient Instructions/AVS.     Return in about 6 months (around 12/19/2024).     Subjective   The following acute and/or chronic problems were also addressed today:    CAD/PAF  Followed by cardiology, next appointment 08/27/2024  On imdur, simvastatin, eliquis and metoprolol  Chest pain - has costrochoniditis. Worse when rake or use arms a lot.   Dr Manuel said it was costrochondritis.   On lyrica and cymbalta  Denies having to use nitroglycerin, \"does not help with my pain\"  States had chest pain yesterday after visiting with wife   \"I am positive it is nothing to do with my heart, it comes and goes\"  Took two aspirin last night, did not help   Pain is constant,\"feels like I have a cold in my chest\"  States under a lot of stress   \"I have had this for so long that its hard to describe the pain\"  \"Almost went to the ER.\"  Pain is reproducible.      GERD  On protonix  Denies any dysphagia

## 2024-06-19 NOTE — PATIENT INSTRUCTIONS
use tobacco products. They can affect dental and general health.  Many older adults have a fixed income and feel that they can't afford dental care. But most towns and cities have programs in which dentists help older adults by lowering fees. Contact your area's public health offices or  for information about dental care in your area.  Using a toothbrush  Older adults with arthritis sometimes have trouble brushing their teeth because they can't easily hold the toothbrush. Their hands and fingers may be stiff, painful, or weak. If this is the case, you can:  Offer an electric toothbrush.  Enlarge the handle of a non-electric toothbrush by wrapping a sponge, an elastic bandage, or adhesive tape around it.  Push the toothbrush handle through a ball made of rubber or soft foam.  Make the handle longer and thicker by taping Popsicle sticks or tongue depressors to it.  You may also be able to buy special toothbrushes, toothpaste dispensers, and floss holders.  Your doctor may recommend a soft-bristle toothbrush if the person you care for bleeds easily. Bleeding can happen because of a health problem or from certain medicines.  A toothpaste for sensitive teeth may help if the person you care for has sensitive teeth.  How do you brush and floss someone's teeth?  If the person you are caring for has a hard time cleaning their teeth on their own, you may need to brush and floss their teeth for them. It may be easiest to have the person sit and face away from you, and to sit or stand behind them. That way you can steady their head against your arm as you reach around to floss and brush their teeth. Choose a place that has good lighting and is comfortable for both of you.  Before you begin, gather your supplies. You will need gloves, floss, a toothbrush, and a container to hold water if you are not near a sink. Wash and dry your hands well and put on gloves. Start by flossing:  Gently work a piece of floss

## 2024-06-20 ENCOUNTER — TELEPHONE (OUTPATIENT)
Dept: FAMILY MEDICINE CLINIC | Age: 89
End: 2024-06-20

## 2024-06-20 DIAGNOSIS — I48.0 PAF (PAROXYSMAL ATRIAL FIBRILLATION) (HCC): ICD-10-CM

## 2024-06-20 DIAGNOSIS — Z00.00 MEDICARE ANNUAL WELLNESS VISIT, SUBSEQUENT: ICD-10-CM

## 2024-06-20 DIAGNOSIS — I25.10 CORONARY ARTERY DISEASE INVOLVING NATIVE CORONARY ARTERY OF NATIVE HEART WITHOUT ANGINA PECTORIS: ICD-10-CM

## 2024-06-20 DIAGNOSIS — I48.0 PAROXYSMAL ATRIAL FIBRILLATION (HCC): ICD-10-CM

## 2024-06-20 LAB
ALBUMIN SERPL-MCNC: 4.2 G/DL (ref 3.5–5.2)
ALP SERPL-CCNC: 73 U/L (ref 40–130)
ALT SERPL-CCNC: 15 U/L (ref 5–41)
ANION GAP SERPL CALCULATED.3IONS-SCNC: 13 MMOL/L (ref 7–19)
AST SERPL-CCNC: 19 U/L (ref 5–40)
BASOPHILS # BLD: 0 K/UL (ref 0–0.2)
BASOPHILS NFR BLD: 0.4 % (ref 0–1)
BILIRUB SERPL-MCNC: 0.8 MG/DL (ref 0.2–1.2)
BUN SERPL-MCNC: 14 MG/DL (ref 8–23)
CALCIUM SERPL-MCNC: 9.1 MG/DL (ref 8.8–10.2)
CHLORIDE SERPL-SCNC: 105 MMOL/L (ref 98–111)
CHOLEST SERPL-MCNC: 167 MG/DL (ref 160–199)
CO2 SERPL-SCNC: 26 MMOL/L (ref 22–29)
CREAT SERPL-MCNC: 1.1 MG/DL (ref 0.5–1.2)
EOSINOPHIL # BLD: 0.1 K/UL (ref 0–0.6)
EOSINOPHIL NFR BLD: 1.6 % (ref 0–5)
ERYTHROCYTE [DISTWIDTH] IN BLOOD BY AUTOMATED COUNT: 17.2 % (ref 11.5–14.5)
GLUCOSE SERPL-MCNC: 103 MG/DL (ref 74–109)
HCT VFR BLD AUTO: 40.2 % (ref 42–52)
HDLC SERPL-MCNC: 43 MG/DL (ref 55–121)
HGB BLD-MCNC: 12.5 G/DL (ref 14–18)
IMM GRANULOCYTES # BLD: 0 K/UL
LDLC SERPL CALC-MCNC: 107 MG/DL
LYMPHOCYTES # BLD: 1.8 K/UL (ref 1.1–4.5)
LYMPHOCYTES NFR BLD: 26.3 % (ref 20–40)
MCH RBC QN AUTO: 28.4 PG (ref 27–31)
MCHC RBC AUTO-ENTMCNC: 31.1 G/DL (ref 33–37)
MCV RBC AUTO: 91.4 FL (ref 80–94)
MONOCYTES # BLD: 0.8 K/UL (ref 0–0.9)
MONOCYTES NFR BLD: 11.8 % (ref 0–10)
NEUTROPHILS # BLD: 4 K/UL (ref 1.5–7.5)
NEUTS SEG NFR BLD: 59.3 % (ref 50–65)
PLATELET # BLD AUTO: 157 K/UL (ref 130–400)
PMV BLD AUTO: 11.4 FL (ref 9.4–12.4)
POTASSIUM SERPL-SCNC: 4.3 MMOL/L (ref 3.5–5)
PROT SERPL-MCNC: 6.9 G/DL (ref 6.6–8.7)
RBC # BLD AUTO: 4.4 M/UL (ref 4.7–6.1)
SODIUM SERPL-SCNC: 144 MMOL/L (ref 136–145)
T4 FREE SERPL-MCNC: 1.03 NG/DL (ref 0.93–1.7)
TRIGL SERPL-MCNC: 83 MG/DL (ref 0–149)
TSH SERPL DL<=0.005 MIU/L-ACNC: 2.42 UIU/ML (ref 0.27–4.2)
WBC # BLD AUTO: 6.7 K/UL (ref 4.8–10.8)

## 2024-06-20 NOTE — TELEPHONE ENCOUNTER
----- Message from DAVIE Valdez sent at 6/20/2024  1:03 PM CDT -----  Please call patient and let them know results.   Mild anemia but stable blood counts    Other labs pending

## 2024-06-20 NOTE — TELEPHONE ENCOUNTER
----- Message from DAVIE Valdez sent at 6/20/2024  1:06 PM CDT -----  Please call patient and let them know results.   Cholesterol is mildly elevated.  With history of heart disease would recommend increasing simvastatin to 40 mg daily    Other labs pending

## 2024-06-20 NOTE — TELEPHONE ENCOUNTER
----- Message from DAVIE Valdez sent at 6/20/2024  1:20 PM CDT -----  Please call patient and let them know results.   Normal thyroid

## 2024-07-14 DIAGNOSIS — E78.00 HYPERCHOLESTEROLEMIA: ICD-10-CM

## 2024-07-15 RX ORDER — SIMVASTATIN 20 MG
20 TABLET ORAL NIGHTLY
Qty: 90 TABLET | Refills: 3 | Status: SHIPPED | OUTPATIENT
Start: 2024-07-15

## 2024-07-15 RX ORDER — APIXABAN 2.5 MG/1
TABLET, FILM COATED ORAL
Qty: 180 TABLET | Refills: 3 | Status: SHIPPED | OUTPATIENT
Start: 2024-07-15

## 2024-07-15 NOTE — TELEPHONE ENCOUNTER
Received fax from pharmacy requesting refill on pts medication(s). Pt was last seen in office on 6/19/2024  and has a follow up scheduled for Visit date not found. Will send request to  Bar Amor  for authorization.     Requested Prescriptions     Pending Prescriptions Disp Refills    simvastatin (ZOCOR) 20 MG tablet [Pharmacy Med Name: Simvastatin 20 MG Oral Tablet] 90 tablet 0     Sig: Take 1 tablet by mouth nightly

## 2024-07-16 DIAGNOSIS — K21.00 GASTROESOPHAGEAL REFLUX DISEASE WITH ESOPHAGITIS, UNSPECIFIED WHETHER HEMORRHAGE: ICD-10-CM

## 2024-07-16 RX ORDER — PANTOPRAZOLE SODIUM 40 MG/1
40 TABLET, DELAYED RELEASE ORAL DAILY
Qty: 90 TABLET | Refills: 3 | Status: SHIPPED | OUTPATIENT
Start: 2024-07-16

## 2024-07-16 NOTE — TELEPHONE ENCOUNTER
Received fax from pharmacy requesting refill on pts medication(s). Pt was last seen in office on 6/19/2024  and has a follow up scheduled for Visit date not found. Will send request to  Bar Amor  for authorization.     Requested Prescriptions     Pending Prescriptions Disp Refills    pantoprazole (PROTONIX) 40 MG tablet [Pharmacy Med Name: Pantoprazole Sodium 40 MG Oral Tablet Delayed Release] 90 tablet 3     Sig: Take 1 tablet by mouth once daily

## 2024-07-23 ENCOUNTER — OFFICE VISIT (OUTPATIENT)
Dept: FAMILY MEDICINE CLINIC | Age: 89
End: 2024-07-23
Payer: MEDICARE

## 2024-07-23 VITALS
TEMPERATURE: 97.3 F | HEART RATE: 81 BPM | OXYGEN SATURATION: 94 % | WEIGHT: 110 LBS | DIASTOLIC BLOOD PRESSURE: 76 MMHG | SYSTOLIC BLOOD PRESSURE: 134 MMHG | BODY MASS INDEX: 15.34 KG/M2

## 2024-07-23 DIAGNOSIS — J01.90 ACUTE SINUSITIS, RECURRENCE NOT SPECIFIED, UNSPECIFIED LOCATION: Primary | ICD-10-CM

## 2024-07-23 PROCEDURE — 99213 OFFICE O/P EST LOW 20 MIN: CPT | Performed by: NURSE PRACTITIONER

## 2024-07-23 PROCEDURE — 1036F TOBACCO NON-USER: CPT | Performed by: NURSE PRACTITIONER

## 2024-07-23 PROCEDURE — G8418 CALC BMI BLW LOW PARAM F/U: HCPCS | Performed by: NURSE PRACTITIONER

## 2024-07-23 PROCEDURE — G8427 DOCREV CUR MEDS BY ELIG CLIN: HCPCS | Performed by: NURSE PRACTITIONER

## 2024-07-23 PROCEDURE — 1123F ACP DISCUSS/DSCN MKR DOCD: CPT | Performed by: NURSE PRACTITIONER

## 2024-07-23 RX ORDER — DOXYCYCLINE HYCLATE 100 MG
100 TABLET ORAL 2 TIMES DAILY
Qty: 20 TABLET | Refills: 0 | Status: SHIPPED | OUTPATIENT
Start: 2024-07-23 | End: 2024-08-02

## 2024-07-23 ASSESSMENT — ENCOUNTER SYMPTOMS
NAUSEA: 0
CONSTIPATION: 0
TROUBLE SWALLOWING: 0
RHINORRHEA: 0
SORE THROAT: 0
COUGH: 1
VOMITING: 0
SHORTNESS OF BREATH: 0
ABDOMINAL PAIN: 0
DIARRHEA: 0

## 2024-07-23 NOTE — PROGRESS NOTES
Esau Moctezuma (:  1935) is a 89 y.o. male,Established patient, here for evaluation of the following chief complaint(s):  Chest Congestion (Drainage, shortness of breath at times. Cough. Sweating lot. Thinks possible fever. Started over 1 week ago. )      ASSESSMENT/PLAN:    ICD-10-CM    1. Acute sinusitis, recurrence not specified, unspecified location  J01.90 COVID-19     doxycycline hyclate (VIBRA-TABS) 100 MG tablet    If covid positive he is not candidate for Paxlovid. Checking due to wife having no symptoms.           Return if symptoms worsen or fail to improve.    SUBJECTIVE/OBJECTIVE:  HPI  Here for cough, drainage, sweating a lot.   I just dont feel good.   Onset 1 week.   Been taking aspirin and otc cold/flu medicine.   Wife thinks had fever the other day.   Symptoms are about the same as they have been. No better no worse.   Cough is productive yellow/green.     /76 (Site: Left Upper Arm, Position: Sitting, Cuff Size: Large Adult)   Pulse 81   Temp 97.3 °F (36.3 °C) (Temporal)   Wt 49.9 kg (110 lb)   SpO2 94%   BMI 15.34 kg/m²     Review of Systems   Constitutional:  Positive for diaphoresis. Negative for activity change, appetite change, fatigue, fever and unexpected weight change.   HENT:  Positive for congestion. Negative for ear pain, rhinorrhea, sore throat and trouble swallowing.    Eyes:  Negative for visual disturbance.   Respiratory:  Positive for cough. Negative for shortness of breath.    Cardiovascular:  Negative for chest pain, palpitations and leg swelling.   Gastrointestinal:  Negative for abdominal pain, constipation, diarrhea, nausea and vomiting.   Genitourinary:  Negative for flank pain.   Musculoskeletal:  Negative for arthralgias, myalgias, neck pain and neck stiffness.   Neurological:  Negative for headaches.   Psychiatric/Behavioral:  Negative for decreased concentration and sleep disturbance. The patient is not nervous/anxious.        Physical Exam  Vitals

## 2024-07-24 ENCOUNTER — TELEPHONE (OUTPATIENT)
Dept: FAMILY MEDICINE CLINIC | Age: 89
End: 2024-07-24

## 2024-07-24 LAB — SARS-COV-2 N GENE RESP QL NAA+PROBE: NOT DETECTED

## 2024-07-24 NOTE — TELEPHONE ENCOUNTER
----- Message from DAVIE Valdez sent at 7/24/2024  8:02 AM CDT -----  Please call patient and let them know results.   Negative covid

## 2024-08-29 DIAGNOSIS — N40.0 PROSTATE ENLARGEMENT: ICD-10-CM

## 2024-08-29 RX ORDER — TAMSULOSIN HYDROCHLORIDE 0.4 MG/1
0.4 CAPSULE ORAL DAILY
Qty: 90 CAPSULE | Refills: 3 | Status: SHIPPED | OUTPATIENT
Start: 2024-08-29

## 2024-08-29 NOTE — TELEPHONE ENCOUNTER
Received fax from pharmacy requesting refill on pts medication(s). Pt was last seen in office on 7/23/2024  and has a follow up scheduled for Visit date not found. Will send request to  Dr. Manuel  for authorization.     Requested Prescriptions     Pending Prescriptions Disp Refills    tamsulosin (FLOMAX) 0.4 MG capsule [Pharmacy Med Name: Tamsulosin HCl 0.4 MG Oral Capsule] 90 capsule 3     Sig: Take 1 capsule by mouth once daily

## 2024-10-18 RX ORDER — DULOXETIN HYDROCHLORIDE 60 MG/1
60 CAPSULE, DELAYED RELEASE ORAL DAILY
Qty: 90 CAPSULE | Refills: 3 | Status: SHIPPED | OUTPATIENT
Start: 2024-10-18

## 2024-10-18 NOTE — TELEPHONE ENCOUNTER
Received fax from pharmacy requesting refill on pts medication(s). Pt was last seen in office on 7/23/2024  and has a follow up scheduled for Visit date not found. Will send request to  Bar Amor  for authorization.     Requested Prescriptions     Pending Prescriptions Disp Refills    DULoxetine (CYMBALTA) 60 MG extended release capsule [Pharmacy Med Name: DULoxetine HCl 60 MG Oral Capsule Delayed Release Particles] 90 capsule 3     Sig: Take 1 capsule by mouth once daily

## 2024-10-22 RX ORDER — METOPROLOL TARTRATE 50 MG
TABLET ORAL
Qty: 180 TABLET | Refills: 0 | Status: SHIPPED | OUTPATIENT
Start: 2024-10-22

## 2025-01-02 DIAGNOSIS — G62.9 NEUROPATHY: ICD-10-CM

## 2025-01-03 RX ORDER — PREGABALIN 75 MG/1
75 CAPSULE ORAL EVERY 12 HOURS
Qty: 60 CAPSULE | Refills: 0 | Status: SHIPPED | OUTPATIENT
Start: 2025-01-03 | End: 2025-02-02

## 2025-01-03 NOTE — TELEPHONE ENCOUNTER
Received fax from pharmacy requesting refill on pts medication(s). Pt was last seen in office on 7/23/2024  and has a follow up scheduled for Visit date not found. Will send request to  Bar Amor  for patient.     Requested Prescriptions     Pending Prescriptions Disp Refills    pregabalin (LYRICA) 75 MG capsule [Pharmacy Med Name: Pregabalin 75 MG Oral Capsule] 60 capsule 0     Sig: Take 1 capsule by mouth in the morning and 1 capsule in the evening.     Pt due for 6 month appt for med refill - attempted to call pt to schedule appt no answer (asked for remote access code)

## 2025-01-23 RX ORDER — METOPROLOL TARTRATE 50 MG
TABLET ORAL
Qty: 180 TABLET | Refills: 0 | Status: SHIPPED | OUTPATIENT
Start: 2025-01-23

## 2025-02-03 DIAGNOSIS — G62.9 NEUROPATHY: ICD-10-CM

## 2025-02-03 RX ORDER — PREGABALIN 75 MG/1
CAPSULE ORAL
Qty: 60 CAPSULE | Refills: 5 | Status: SHIPPED | OUTPATIENT
Start: 2025-02-03 | End: 2025-08-02

## 2025-02-03 NOTE — TELEPHONE ENCOUNTER
Received fax from pharmacy requesting refill on pts medication(s). Pt was last seen in office on 7/23/2024  and has a follow up scheduled for Visit date not found. Will send request to  Bar Amor  for authorization.     Requested Prescriptions     Pending Prescriptions Disp Refills    pregabalin (LYRICA) 75 MG capsule [Pharmacy Med Name: Pregabalin 75 MG Oral Capsule] 60 capsule 0     Sig: TAKE 1 CAPSULE BY MOUTH IN THE MORNING AND 1 IN THE EVENING     Called patient to let him know he was due for visit for this medication. Offered to schedule him and he said \" no I will call back and get it\"

## 2025-03-25 ENCOUNTER — OFFICE VISIT (OUTPATIENT)
Age: 89
End: 2025-03-25
Payer: MEDICARE

## 2025-03-25 VITALS
HEIGHT: 71 IN | OXYGEN SATURATION: 95 % | DIASTOLIC BLOOD PRESSURE: 70 MMHG | TEMPERATURE: 98.3 F | WEIGHT: 183 LBS | BODY MASS INDEX: 25.62 KG/M2 | SYSTOLIC BLOOD PRESSURE: 132 MMHG | HEART RATE: 57 BPM

## 2025-03-25 DIAGNOSIS — R07.89 CHEST WALL PAIN: Primary | ICD-10-CM

## 2025-03-25 DIAGNOSIS — G62.9 NEUROPATHY: ICD-10-CM

## 2025-03-25 DIAGNOSIS — F43.21 GRIEVING: ICD-10-CM

## 2025-03-25 DIAGNOSIS — F32.9 REACTIVE DEPRESSION: ICD-10-CM

## 2025-03-25 PROCEDURE — 1160F RVW MEDS BY RX/DR IN RCRD: CPT | Performed by: NURSE PRACTITIONER

## 2025-03-25 PROCEDURE — 1123F ACP DISCUSS/DSCN MKR DOCD: CPT | Performed by: NURSE PRACTITIONER

## 2025-03-25 PROCEDURE — G8427 DOCREV CUR MEDS BY ELIG CLIN: HCPCS | Performed by: NURSE PRACTITIONER

## 2025-03-25 PROCEDURE — 1159F MED LIST DOCD IN RCRD: CPT | Performed by: NURSE PRACTITIONER

## 2025-03-25 PROCEDURE — 99214 OFFICE O/P EST MOD 30 MIN: CPT | Performed by: NURSE PRACTITIONER

## 2025-03-25 PROCEDURE — G8419 CALC BMI OUT NRM PARAM NOF/U: HCPCS | Performed by: NURSE PRACTITIONER

## 2025-03-25 RX ORDER — PREGABALIN 150 MG/1
150 CAPSULE ORAL 2 TIMES DAILY
Qty: 60 CAPSULE | Refills: 0 | Status: SHIPPED | OUTPATIENT
Start: 2025-03-25 | End: 2025-04-24

## 2025-03-25 SDOH — ECONOMIC STABILITY: FOOD INSECURITY: WITHIN THE PAST 12 MONTHS, THE FOOD YOU BOUGHT JUST DIDN'T LAST AND YOU DIDN'T HAVE MONEY TO GET MORE.: NEVER TRUE

## 2025-03-25 SDOH — ECONOMIC STABILITY: FOOD INSECURITY: WITHIN THE PAST 12 MONTHS, YOU WORRIED THAT YOUR FOOD WOULD RUN OUT BEFORE YOU GOT MONEY TO BUY MORE.: NEVER TRUE

## 2025-03-25 ASSESSMENT — ENCOUNTER SYMPTOMS
CONSTIPATION: 0
DIARRHEA: 0
SHORTNESS OF BREATH: 0
TROUBLE SWALLOWING: 0
RHINORRHEA: 0
SORE THROAT: 0
COUGH: 0
NAUSEA: 0
VOMITING: 0
ABDOMINAL PAIN: 0

## 2025-03-25 ASSESSMENT — PATIENT HEALTH QUESTIONNAIRE - PHQ9
SUM OF ALL RESPONSES TO PHQ QUESTIONS 1-9: 0
1. LITTLE INTEREST OR PLEASURE IN DOING THINGS: NOT AT ALL
2. FEELING DOWN, DEPRESSED OR HOPELESS: NOT AT ALL

## 2025-03-25 NOTE — PROGRESS NOTES
CANCER EXCISION  2016    TOTAL HIP ARTHROPLASTY Right 01/03/2020    RIGHT TOTAL HIP REPLACEMENT performed by Vj Beyer MD at [facility] OR       Family History   Problem Relation Age of Onset    Diabetes Mother     Coronary Art Dis Father     High Blood Pressure Father     High Cholesterol Father     Heart Attack Father     Stroke Father     Kidney Disease Son     Kidney Disease Son          Review of Systems   Constitutional:  Negative for activity change, appetite change, fatigue, fever and unexpected weight change.   HENT:  Negative for ear pain, rhinorrhea, sore throat and trouble swallowing.    Eyes:  Negative for visual disturbance.   Respiratory:  Negative for cough and shortness of breath.    Cardiovascular:  Positive for chest pain. Negative for palpitations and leg swelling.   Gastrointestinal:  Negative for abdominal pain, constipation, diarrhea, nausea and vomiting.   Genitourinary:  Negative for flank pain.   Musculoskeletal:  Negative for arthralgias, myalgias, neck pain and neck stiffness.   Neurological:  Negative for headaches.   Psychiatric/Behavioral:  Negative for decreased concentration and sleep disturbance. The patient is not nervous/anxious.           Objective   Blood pressure 132/70, pulse 57, temperature 98.3 °F (36.8 °C), temperature source Temporal, height 1.803 m (5' 11\"), weight 83 kg (183 lb), SpO2 95%.       Physical Exam  Vitals reviewed.   Constitutional:       Appearance: Normal appearance.   HENT:      Head: Normocephalic and atraumatic.   Eyes:      Conjunctiva/sclera: Conjunctivae normal.   Cardiovascular:      Rate and Rhythm: Normal rate and regular rhythm.      Pulses: Normal pulses.      Heart sounds: Normal heart sounds.   Chest:      Chest wall: Tenderness (with palpation) present.   Musculoskeletal:      Cervical back: Normal range of motion and neck supple.   Skin:     General: Skin is warm.   Neurological:      Mental Status: He is alert.   Psychiatric:         Mood and

## 2025-03-31 RX ORDER — ISOSORBIDE MONONITRATE 30 MG/1
30 TABLET, EXTENDED RELEASE ORAL DAILY
Qty: 90 TABLET | Refills: 3 | Status: SHIPPED | OUTPATIENT
Start: 2025-03-31

## 2025-03-31 NOTE — TELEPHONE ENCOUNTER
Received fax from pharmacy requesting refill on pts medication(s). Pt was last seen in office on 7/23/2024  and has a follow up scheduled for Visit date not found. Will send request to  Bar Amor  for authorization.     Requested Prescriptions     Pending Prescriptions Disp Refills    isosorbide mononitrate (IMDUR) 30 MG extended release tablet [Pharmacy Med Name: Isosorbide Mononitrate ER 30 MG Oral Tablet Extended Release 24 Hour] 90 tablet 3     Sig: Take 1 tablet by mouth once daily

## 2025-04-15 ENCOUNTER — OFFICE VISIT (OUTPATIENT)
Age: 89
End: 2025-04-15

## 2025-04-15 VITALS — BODY MASS INDEX: 25.62 KG/M2 | HEIGHT: 71 IN | WEIGHT: 183 LBS

## 2025-04-15 DIAGNOSIS — M25.561 RIGHT KNEE PAIN, UNSPECIFIED CHRONICITY: Primary | ICD-10-CM

## 2025-04-15 DIAGNOSIS — M17.11 PRIMARY OSTEOARTHRITIS OF RIGHT KNEE: ICD-10-CM

## 2025-04-15 RX ORDER — BUPIVACAINE HYDROCHLORIDE 5 MG/ML
4 INJECTION, SOLUTION PERINEURAL ONCE
Status: COMPLETED | OUTPATIENT
Start: 2025-04-15 | End: 2025-04-15

## 2025-04-15 RX ORDER — TRIAMCINOLONE ACETONIDE 40 MG/ML
40 INJECTION, SUSPENSION INTRA-ARTICULAR; INTRAMUSCULAR ONCE
Status: COMPLETED | OUTPATIENT
Start: 2025-04-15 | End: 2025-04-15

## 2025-04-15 RX ADMIN — BUPIVACAINE HYDROCHLORIDE 20 MG: 5 INJECTION, SOLUTION PERINEURAL at 13:50

## 2025-04-15 RX ADMIN — TRIAMCINOLONE ACETONIDE 40 MG: 40 INJECTION, SUSPENSION INTRA-ARTICULAR; INTRAMUSCULAR at 13:51

## 2025-04-15 ASSESSMENT — ENCOUNTER SYMPTOMS
ALLERGIC/IMMUNOLOGIC NEGATIVE: 1
RESPIRATORY NEGATIVE: 1
EYES NEGATIVE: 1
GASTROINTESTINAL NEGATIVE: 1

## 2025-04-15 NOTE — PROGRESS NOTES
[facility]  [facility]  [address]  [facility] [id number] [phone]       Esau Moctezuma (:  1935) is a 89 y.o. male,Established patient, here for evaluation of the following chief complaint(s):  Consultation (Right knee )        Assessment & Plan  1. Knee pain:  X-rays show arthritis. Reports pain behind the kneecap but no pain along the joint line. Examination reveals good extension, pain at 120 degrees, and no laxity. A Baker's cyst is noted. Pain is likely due to irritation from walking on a slope, causing increased synovial fluid production.    Administer a steroid injection to reduce inflammation and fluid production. Educated on the nature of Baker's cyst and its relation to synovial fluid production. Recommended avoiding activities that exacerbate knee pain, such as walking on slopes. Discussed potential benefits and risks of the steroid injection.    Follow-up appointment to be scheduled if symptoms persist or worsen.    PROCEDURE  Steroid injection was administered in the knee today.  The risks and benefits of the procedure were explained to the patient including the risk for infection, nerve and artery damage, continued pain, continued decreased range of motion, paresthesias, paralysis, loss of limb, loss of life and the need for surgery, and the patient conveyed their understanding and willingness to proceed.  Under aseptic technique, the right knee was injected using a 21-gauge needle with a solution of 40 mg / 1 cc of Kenalog corticosteroid and 4 cc of half percent bupivacaine local anesthetic.  The patient tolerated the procedure very well.  It is explained to the patient that this injection may be repeated as frequently as every 3 months or less frequently if their symptoms will allow.  They convey their understanding.       ICD-10-CM    1. Right knee pain, unspecified chronicity  M25.561 XR KNEE RIGHT (MIN 4

## 2025-04-25 ENCOUNTER — OFFICE VISIT (OUTPATIENT)
Age: 89
End: 2025-04-25
Payer: MEDICARE

## 2025-04-25 VITALS
SYSTOLIC BLOOD PRESSURE: 130 MMHG | OXYGEN SATURATION: 93 % | TEMPERATURE: 98.5 F | BODY MASS INDEX: 24.22 KG/M2 | HEART RATE: 70 BPM | WEIGHT: 173 LBS | HEIGHT: 71 IN | DIASTOLIC BLOOD PRESSURE: 70 MMHG

## 2025-04-25 DIAGNOSIS — I48.0 PAF (PAROXYSMAL ATRIAL FIBRILLATION) (HCC): Primary | ICD-10-CM

## 2025-04-25 DIAGNOSIS — Z79.01 CHRONIC ANTICOAGULATION: ICD-10-CM

## 2025-04-25 DIAGNOSIS — I25.10 CORONARY ARTERY DISEASE INVOLVING NATIVE CORONARY ARTERY OF NATIVE HEART WITHOUT ANGINA PECTORIS: ICD-10-CM

## 2025-04-25 DIAGNOSIS — I10 PRIMARY HYPERTENSION: ICD-10-CM

## 2025-04-25 DIAGNOSIS — E78.00 HYPERCHOLESTEROLEMIA: ICD-10-CM

## 2025-04-25 DIAGNOSIS — K21.00 GASTROESOPHAGEAL REFLUX DISEASE WITH ESOPHAGITIS, UNSPECIFIED WHETHER HEMORRHAGE: ICD-10-CM

## 2025-04-25 DIAGNOSIS — R07.89 CHEST WALL PAIN: ICD-10-CM

## 2025-04-25 DIAGNOSIS — I71.43 INFRARENAL ABDOMINAL AORTIC ANEURYSM (AAA) WITHOUT RUPTURE: ICD-10-CM

## 2025-04-25 DIAGNOSIS — G62.9 NEUROPATHY: ICD-10-CM

## 2025-04-25 PROCEDURE — G8420 CALC BMI NORM PARAMETERS: HCPCS | Performed by: NURSE PRACTITIONER

## 2025-04-25 PROCEDURE — 1159F MED LIST DOCD IN RCRD: CPT | Performed by: NURSE PRACTITIONER

## 2025-04-25 PROCEDURE — 99214 OFFICE O/P EST MOD 30 MIN: CPT | Performed by: NURSE PRACTITIONER

## 2025-04-25 PROCEDURE — G8427 DOCREV CUR MEDS BY ELIG CLIN: HCPCS | Performed by: NURSE PRACTITIONER

## 2025-04-25 PROCEDURE — 1160F RVW MEDS BY RX/DR IN RCRD: CPT | Performed by: NURSE PRACTITIONER

## 2025-04-25 PROCEDURE — 1123F ACP DISCUSS/DSCN MKR DOCD: CPT | Performed by: NURSE PRACTITIONER

## 2025-04-25 RX ORDER — PREGABALIN 150 MG/1
150 CAPSULE ORAL 2 TIMES DAILY
Qty: 60 CAPSULE | Refills: 2 | Status: SHIPPED | OUTPATIENT
Start: 2025-04-25 | End: 2025-07-24

## 2025-04-25 SDOH — ECONOMIC STABILITY: FOOD INSECURITY: WITHIN THE PAST 12 MONTHS, THE FOOD YOU BOUGHT JUST DIDN'T LAST AND YOU DIDN'T HAVE MONEY TO GET MORE.: NEVER TRUE

## 2025-04-25 SDOH — ECONOMIC STABILITY: FOOD INSECURITY: WITHIN THE PAST 12 MONTHS, YOU WORRIED THAT YOUR FOOD WOULD RUN OUT BEFORE YOU GOT MONEY TO BUY MORE.: NEVER TRUE

## 2025-04-25 ASSESSMENT — PATIENT HEALTH QUESTIONNAIRE - PHQ9
8. MOVING OR SPEAKING SO SLOWLY THAT OTHER PEOPLE COULD HAVE NOTICED. OR THE OPPOSITE, BEING SO FIGETY OR RESTLESS THAT YOU HAVE BEEN MOVING AROUND A LOT MORE THAN USUAL: NOT AT ALL
SUM OF ALL RESPONSES TO PHQ QUESTIONS 1-9: 1
4. FEELING TIRED OR HAVING LITTLE ENERGY: NOT AT ALL
9. THOUGHTS THAT YOU WOULD BE BETTER OFF DEAD, OR OF HURTING YOURSELF: NOT AT ALL
6. FEELING BAD ABOUT YOURSELF - OR THAT YOU ARE A FAILURE OR HAVE LET YOURSELF OR YOUR FAMILY DOWN: NOT AT ALL
SUM OF ALL RESPONSES TO PHQ QUESTIONS 1-9: 1
1. LITTLE INTEREST OR PLEASURE IN DOING THINGS: NOT AT ALL
SUM OF ALL RESPONSES TO PHQ QUESTIONS 1-9: 1
10. IF YOU CHECKED OFF ANY PROBLEMS, HOW DIFFICULT HAVE THESE PROBLEMS MADE IT FOR YOU TO DO YOUR WORK, TAKE CARE OF THINGS AT HOME, OR GET ALONG WITH OTHER PEOPLE: NOT DIFFICULT AT ALL
SUM OF ALL RESPONSES TO PHQ QUESTIONS 1-9: 1
2. FEELING DOWN, DEPRESSED OR HOPELESS: SEVERAL DAYS
5. POOR APPETITE OR OVEREATING: NOT AT ALL

## 2025-04-25 ASSESSMENT — ENCOUNTER SYMPTOMS
DIARRHEA: 0
COUGH: 0
NAUSEA: 0
ABDOMINAL PAIN: 0
SORE THROAT: 0
SHORTNESS OF BREATH: 0
CONSTIPATION: 0
VOMITING: 0
TROUBLE SWALLOWING: 0
RHINORRHEA: 0

## 2025-04-25 NOTE — PATIENT INSTRUCTIONS
Fasting labs at next visit.     Chair yoga at [facility] Monday 10am and [facility] on Friday 10 am.

## 2025-04-25 NOTE — PROGRESS NOTES
Esau Moctezuma (:  1935) is a 89 y.o. male, Established patient, here for evaluation of the following chief complaint(s):  Chest Pain (Here for follow up on chest wall pain. \"About the same\" )         Assessment & Plan  1. Chronic chest wall pain: Chronic.  - Reports intermittent pain with improvement since increasing pregabalin dosage.  - Currently on pregabalin 150 mg twice daily for about a week, noticing a slight difference.  - Advised to continue with the current dosage of pregabalin 150 mg twice daily.    2. Atrial fibrillation.  - Long-term use of Eliquis for approximately 20 years.  - No new symptoms reported related to atrial fibrillation.  - Prescription refill for Eliquis will be provided.  - Continued monitoring of atrial fibrillation symptoms and medication adherence.    3. Neuropathy  - Currently on pregabalin 150 mg twice daily for about a week, noticing a slight difference.  - Advised to continue with the current dosage of pregabalin 150 mg twice daily.    4. Health maintenance.  - Due for annual visit in 2025.  - Routine blood work, including cholesterol levels, will be checked during the follow-up visit.  - Encouraged to maintain regular follow-up visits for ongoing health monitoring.    Follow-up  - Follow-up appointment scheduled for 2025.    Results        ICD-10-CM    1. PAF (paroxysmal atrial fibrillation) ([facility])  I48.0 apixaban (ELIQUIS) 2.5 MG TABS tablet     TSH     T4, Free      2. Chest wall pain  R07.89       3. Neuropathy  G62.9       4. Gastroesophageal reflux disease with esophagitis, unspecified whether hemorrhage  K21.00       5. Hypercholesterolemia  E78.00 CBC with Auto Differential     Comprehensive Metabolic Panel     Lipid Panel      6. Chronic anticoagulation  Z79.01       7. Coronary artery disease involving native coronary artery of native heart without angina pectoris  I25.10 CBC with Auto Differential     Comprehensive Metabolic Panel     Lipid Panel

## 2025-04-28 RX ORDER — METOPROLOL TARTRATE 50 MG
50 TABLET ORAL 2 TIMES DAILY
Qty: 60 TABLET | Refills: 0 | Status: SHIPPED | OUTPATIENT
Start: 2025-04-28

## 2025-05-04 DIAGNOSIS — K21.00 GASTROESOPHAGEAL REFLUX DISEASE WITH ESOPHAGITIS, UNSPECIFIED WHETHER HEMORRHAGE: ICD-10-CM

## 2025-05-05 RX ORDER — METOPROLOL TARTRATE 50 MG
50 TABLET ORAL 2 TIMES DAILY
Qty: 180 TABLET | Refills: 0 | OUTPATIENT
Start: 2025-05-05

## 2025-05-05 RX ORDER — PANTOPRAZOLE SODIUM 40 MG/1
40 TABLET, DELAYED RELEASE ORAL DAILY
Qty: 90 TABLET | Refills: 3 | Status: SHIPPED | OUTPATIENT
Start: 2025-05-05

## 2025-05-05 NOTE — TELEPHONE ENCOUNTER
Received fax from pharmacy requesting refill on pts medication(s). Pt was last seen in office on 4/25/2025  and has a follow up scheduled for 7/24/2025. Will send request to  Bar Amor  for authorization.     Requested Prescriptions     Pending Prescriptions Disp Refills    pantoprazole (PROTONIX) 40 MG tablet [Pharmacy Med Name: Pantoprazole Sodium 40 MG Oral Tablet Delayed Release] 90 tablet 3     Sig: Take 1 tablet by mouth once daily

## 2025-06-09 RX ORDER — METOPROLOL TARTRATE 50 MG
50 TABLET ORAL 2 TIMES DAILY
Qty: 60 TABLET | Refills: 0 | Status: SHIPPED | OUTPATIENT
Start: 2025-06-09

## 2025-07-10 ENCOUNTER — APPOINTMENT (OUTPATIENT)
Dept: NUCLEAR MEDICINE | Age: 89
End: 2025-07-10
Payer: MEDICARE

## 2025-07-10 ENCOUNTER — APPOINTMENT (OUTPATIENT)
Age: 89
End: 2025-07-10
Payer: MEDICARE

## 2025-07-10 ENCOUNTER — APPOINTMENT (OUTPATIENT)
Dept: CT IMAGING | Age: 89
End: 2025-07-10
Payer: MEDICARE

## 2025-07-10 ENCOUNTER — APPOINTMENT (OUTPATIENT)
Dept: GENERAL RADIOLOGY | Age: 89
End: 2025-07-10
Attending: EMERGENCY MEDICINE
Payer: MEDICARE

## 2025-07-10 ENCOUNTER — HOSPITAL ENCOUNTER (INPATIENT)
Age: 89
LOS: 1 days | Discharge: HOME | End: 2025-07-11
Attending: EMERGENCY MEDICINE | Admitting: STUDENT IN AN ORGANIZED HEALTH CARE EDUCATION/TRAINING PROGRAM
Payer: MEDICARE

## 2025-07-10 DIAGNOSIS — R07.9 CHEST PAIN, UNSPECIFIED TYPE: ICD-10-CM

## 2025-07-10 DIAGNOSIS — R07.9 CHEST PAIN: Primary | ICD-10-CM

## 2025-07-10 LAB
ALBUMIN SERPL-MCNC: 4.1 G/DL (ref 3.5–5.2)
ALP SERPL-CCNC: 76 U/L (ref 40–129)
ALT SERPL-CCNC: 14 U/L (ref 10–50)
ANION GAP SERPL CALCULATED.3IONS-SCNC: 7 MMOL/L (ref 8–16)
AST SERPL-CCNC: 21 U/L (ref 10–50)
BASOPHILS # BLD: 0 K/UL (ref 0–0.2)
BASOPHILS NFR BLD: 0.5 % (ref 0–1)
BILIRUB SERPL-MCNC: 0.5 MG/DL (ref 0.2–1.2)
BUN SERPL-MCNC: 17 MG/DL (ref 8–23)
CALCIUM SERPL-MCNC: 9.3 MG/DL (ref 8.8–10.2)
CHLORIDE SERPL-SCNC: 107 MMOL/L (ref 98–107)
CHOLEST SERPL-MCNC: 142 MG/DL (ref 0–199)
CO2 SERPL-SCNC: 28 MMOL/L (ref 22–29)
CREAT SERPL-MCNC: 1.2 MG/DL (ref 0.7–1.2)
CRP SERPL-MCNC: 3.3 MG/L (ref 0–5)
D DIMER PPP FEU-MCNC: 2.41 UG/ML FEU (ref 0–0.48)
EKG P AXIS: 37 DEGREES
EKG P AXIS: 45 DEGREES
EKG P-R INTERVAL: 160 MS
EKG P-R INTERVAL: 172 MS
EKG Q-T INTERVAL: 402 MS
EKG Q-T INTERVAL: 426 MS
EKG QRS DURATION: 116 MS
EKG QRS DURATION: 118 MS
EKG QTC CALCULATION (BAZETT): 388 MS
EKG QTC CALCULATION (BAZETT): 409 MS
EKG T AXIS: 35 DEGREES
EKG T AXIS: 54 DEGREES
EOSINOPHIL # BLD: 0.3 K/UL (ref 0–0.6)
EOSINOPHIL NFR BLD: 5.2 % (ref 0–5)
ERYTHROCYTE [DISTWIDTH] IN BLOOD BY AUTOMATED COUNT: 18.3 % (ref 11.5–14.5)
ERYTHROCYTE [SEDIMENTATION RATE] IN BLOOD BY WESTERGREN METHOD: 43 MM/HR (ref 0–15)
GLUCOSE SERPL-MCNC: 105 MG/DL (ref 70–99)
HBA1C MFR BLD: 5.7 % (ref 4–5.6)
HCT VFR BLD AUTO: 39 % (ref 42–52)
HDLC SERPL-MCNC: 37 MG/DL (ref 40–60)
HGB BLD-MCNC: 12.4 G/DL (ref 14–18)
IMM GRANULOCYTES # BLD: 0.1 K/UL
INR PPP: 1.13 (ref 0.88–1.18)
LDLC SERPL CALC-MCNC: 85 MG/DL
LIPASE SERPL-CCNC: 35 U/L (ref 13–60)
LYMPHOCYTES # BLD: 1.6 K/UL (ref 1.1–4.5)
LYMPHOCYTES NFR BLD: 24.9 % (ref 20–40)
MAGNESIUM SERPL-MCNC: 2.2 MG/DL (ref 1.6–2.4)
MCH RBC QN AUTO: 29.3 PG (ref 27–31)
MCHC RBC AUTO-ENTMCNC: 31.8 G/DL (ref 33–37)
MCV RBC AUTO: 92.2 FL (ref 80–94)
MONOCYTES # BLD: 0.8 K/UL (ref 0–0.9)
MONOCYTES NFR BLD: 12.4 % (ref 0–10)
NEUTROPHILS # BLD: 3.6 K/UL (ref 1.5–7.5)
NEUTS SEG NFR BLD: 55.9 % (ref 50–65)
PLATELET # BLD AUTO: 155 K/UL (ref 130–400)
PMV BLD AUTO: 10.5 FL (ref 9.4–12.4)
POTASSIUM SERPL-SCNC: 5.1 MMOL/L (ref 3.5–5.1)
PROT SERPL-MCNC: 6.6 G/DL (ref 6.4–8.3)
PROTHROMBIN TIME: 14.4 SEC (ref 12–14.6)
RBC # BLD AUTO: 4.23 M/UL (ref 4.7–6.1)
SODIUM SERPL-SCNC: 142 MMOL/L (ref 136–145)
TRIGL SERPL-MCNC: 99 MG/DL (ref 0–149)
TROPONIN, HIGH SENSITIVITY: 26 NG/L (ref 0–22)
TROPONIN, HIGH SENSITIVITY: 27 NG/L (ref 0–22)
TROPONIN, HIGH SENSITIVITY: 31 NG/L (ref 0–22)
WBC # BLD AUTO: 6.5 K/UL (ref 4.8–10.8)

## 2025-07-10 PROCEDURE — 71275 CT ANGIOGRAPHY CHEST: CPT

## 2025-07-10 PROCEDURE — 6360000004 HC RX CONTRAST MEDICATION: Performed by: EMERGENCY MEDICINE

## 2025-07-10 PROCEDURE — 6360000004 HC RX CONTRAST MEDICATION

## 2025-07-10 PROCEDURE — 6370000000 HC RX 637 (ALT 250 FOR IP)

## 2025-07-10 PROCEDURE — 85025 COMPLETE CBC W/AUTO DIFF WBC: CPT

## 2025-07-10 PROCEDURE — 83735 ASSAY OF MAGNESIUM: CPT

## 2025-07-10 PROCEDURE — 85379 FIBRIN DEGRADATION QUANT: CPT

## 2025-07-10 PROCEDURE — 1200000000 HC SEMI PRIVATE

## 2025-07-10 PROCEDURE — 80061 LIPID PANEL: CPT

## 2025-07-10 PROCEDURE — 94760 N-INVAS EAR/PLS OXIMETRY 1: CPT

## 2025-07-10 PROCEDURE — 6370000000 HC RX 637 (ALT 250 FOR IP): Performed by: EMERGENCY MEDICINE

## 2025-07-10 PROCEDURE — 2500000003 HC RX 250 WO HCPCS

## 2025-07-10 PROCEDURE — C8929 TTE W OR WO FOL WCON,DOPPLER: HCPCS

## 2025-07-10 PROCEDURE — 86140 C-REACTIVE PROTEIN: CPT

## 2025-07-10 PROCEDURE — 6360000002 HC RX W HCPCS

## 2025-07-10 PROCEDURE — 93005 ELECTROCARDIOGRAM TRACING: CPT

## 2025-07-10 PROCEDURE — 99222 1ST HOSP IP/OBS MODERATE 55: CPT | Performed by: INTERNAL MEDICINE

## 2025-07-10 PROCEDURE — 99285 EMERGENCY DEPT VISIT HI MDM: CPT

## 2025-07-10 PROCEDURE — 80053 COMPREHEN METABOLIC PANEL: CPT

## 2025-07-10 PROCEDURE — 84484 ASSAY OF TROPONIN QUANT: CPT

## 2025-07-10 PROCEDURE — 83036 HEMOGLOBIN GLYCOSYLATED A1C: CPT

## 2025-07-10 PROCEDURE — 71045 X-RAY EXAM CHEST 1 VIEW: CPT

## 2025-07-10 PROCEDURE — 85610 PROTHROMBIN TIME: CPT

## 2025-07-10 PROCEDURE — 85652 RBC SED RATE AUTOMATED: CPT

## 2025-07-10 PROCEDURE — 2580000003 HC RX 258

## 2025-07-10 PROCEDURE — 83690 ASSAY OF LIPASE: CPT

## 2025-07-10 PROCEDURE — 36415 COLL VENOUS BLD VENIPUNCTURE: CPT

## 2025-07-10 RX ORDER — PANTOPRAZOLE SODIUM 40 MG/1
40 TABLET, DELAYED RELEASE ORAL DAILY
Status: DISCONTINUED | OUTPATIENT
Start: 2025-07-10 | End: 2025-07-11 | Stop reason: HOSPADM

## 2025-07-10 RX ORDER — MAGNESIUM SULFATE IN WATER 40 MG/ML
2000 INJECTION, SOLUTION INTRAVENOUS PRN
Status: DISCONTINUED | OUTPATIENT
Start: 2025-07-10 | End: 2025-07-11 | Stop reason: HOSPADM

## 2025-07-10 RX ORDER — POTASSIUM CHLORIDE 1500 MG/1
40 TABLET, EXTENDED RELEASE ORAL PRN
Status: DISCONTINUED | OUTPATIENT
Start: 2025-07-10 | End: 2025-07-11 | Stop reason: HOSPADM

## 2025-07-10 RX ORDER — ENOXAPARIN SODIUM 100 MG/ML
40 INJECTION SUBCUTANEOUS DAILY
Status: DISCONTINUED | OUTPATIENT
Start: 2025-07-10 | End: 2025-07-10

## 2025-07-10 RX ORDER — ISOSORBIDE MONONITRATE 60 MG/1
30 TABLET, EXTENDED RELEASE ORAL DAILY
Status: DISCONTINUED | OUTPATIENT
Start: 2025-07-10 | End: 2025-07-11 | Stop reason: HOSPADM

## 2025-07-10 RX ORDER — ONDANSETRON 2 MG/ML
4 INJECTION INTRAMUSCULAR; INTRAVENOUS EVERY 6 HOURS PRN
Status: DISCONTINUED | OUTPATIENT
Start: 2025-07-10 | End: 2025-07-11 | Stop reason: HOSPADM

## 2025-07-10 RX ORDER — IOPAMIDOL 755 MG/ML
75 INJECTION, SOLUTION INTRAVASCULAR
Status: COMPLETED | OUTPATIENT
Start: 2025-07-10 | End: 2025-07-10

## 2025-07-10 RX ORDER — ENOXAPARIN SODIUM 100 MG/ML
1 INJECTION SUBCUTANEOUS 2 TIMES DAILY
Status: DISCONTINUED | OUTPATIENT
Start: 2025-07-10 | End: 2025-07-11 | Stop reason: HOSPADM

## 2025-07-10 RX ORDER — PREGABALIN 50 MG/1
150 CAPSULE ORAL 2 TIMES DAILY
Status: DISCONTINUED | OUTPATIENT
Start: 2025-07-10 | End: 2025-07-11 | Stop reason: HOSPADM

## 2025-07-10 RX ORDER — ATORVASTATIN CALCIUM 40 MG/1
40 TABLET, FILM COATED ORAL NIGHTLY
Status: DISCONTINUED | OUTPATIENT
Start: 2025-07-10 | End: 2025-07-11 | Stop reason: HOSPADM

## 2025-07-10 RX ORDER — ASPIRIN 81 MG/1
81 TABLET, CHEWABLE ORAL DAILY
Status: DISCONTINUED | OUTPATIENT
Start: 2025-07-11 | End: 2025-07-11 | Stop reason: HOSPADM

## 2025-07-10 RX ORDER — POLYETHYLENE GLYCOL 3350 17 G/17G
17 POWDER, FOR SOLUTION ORAL DAILY PRN
Status: DISCONTINUED | OUTPATIENT
Start: 2025-07-10 | End: 2025-07-11 | Stop reason: HOSPADM

## 2025-07-10 RX ORDER — SODIUM CHLORIDE 0.9 % (FLUSH) 0.9 %
5-40 SYRINGE (ML) INJECTION PRN
Status: DISCONTINUED | OUTPATIENT
Start: 2025-07-10 | End: 2025-07-11 | Stop reason: HOSPADM

## 2025-07-10 RX ORDER — SODIUM CHLORIDE 9 MG/ML
INJECTION, SOLUTION INTRAVENOUS PRN
Status: DISCONTINUED | OUTPATIENT
Start: 2025-07-10 | End: 2025-07-11 | Stop reason: HOSPADM

## 2025-07-10 RX ORDER — SODIUM CHLORIDE 9 MG/ML
INJECTION, SOLUTION INTRAVENOUS CONTINUOUS
Status: ACTIVE | OUTPATIENT
Start: 2025-07-10 | End: 2025-07-11

## 2025-07-10 RX ORDER — METOPROLOL TARTRATE 50 MG
50 TABLET ORAL 2 TIMES DAILY
Status: DISCONTINUED | OUTPATIENT
Start: 2025-07-10 | End: 2025-07-11 | Stop reason: HOSPADM

## 2025-07-10 RX ORDER — HYDRALAZINE HYDROCHLORIDE 20 MG/ML
10 INJECTION INTRAMUSCULAR; INTRAVENOUS EVERY 6 HOURS PRN
Status: DISCONTINUED | OUTPATIENT
Start: 2025-07-10 | End: 2025-07-11 | Stop reason: HOSPADM

## 2025-07-10 RX ORDER — ASPIRIN 81 MG/1
324 TABLET, CHEWABLE ORAL ONCE
Status: COMPLETED | OUTPATIENT
Start: 2025-07-10 | End: 2025-07-10

## 2025-07-10 RX ORDER — ATORVASTATIN CALCIUM 20 MG/1
20 TABLET, FILM COATED ORAL DAILY
Status: DISCONTINUED | OUTPATIENT
Start: 2025-07-10 | End: 2025-07-11 | Stop reason: HOSPADM

## 2025-07-10 RX ORDER — POTASSIUM CHLORIDE 7.45 MG/ML
10 INJECTION INTRAVENOUS PRN
Status: DISCONTINUED | OUTPATIENT
Start: 2025-07-10 | End: 2025-07-11 | Stop reason: HOSPADM

## 2025-07-10 RX ORDER — ACETAMINOPHEN 325 MG/1
650 TABLET ORAL EVERY 6 HOURS PRN
Status: DISCONTINUED | OUTPATIENT
Start: 2025-07-10 | End: 2025-07-11 | Stop reason: HOSPADM

## 2025-07-10 RX ORDER — TAMSULOSIN HYDROCHLORIDE 0.4 MG/1
0.4 CAPSULE ORAL DAILY
Status: DISCONTINUED | OUTPATIENT
Start: 2025-07-10 | End: 2025-07-11 | Stop reason: HOSPADM

## 2025-07-10 RX ORDER — REGADENOSON 0.08 MG/ML
0.4 INJECTION, SOLUTION INTRAVENOUS
Status: COMPLETED | OUTPATIENT
Start: 2025-07-10 | End: 2025-07-11

## 2025-07-10 RX ORDER — ONDANSETRON 4 MG/1
4 TABLET, ORALLY DISINTEGRATING ORAL EVERY 8 HOURS PRN
Status: DISCONTINUED | OUTPATIENT
Start: 2025-07-10 | End: 2025-07-11 | Stop reason: HOSPADM

## 2025-07-10 RX ORDER — SODIUM CHLORIDE 0.9 % (FLUSH) 0.9 %
5-40 SYRINGE (ML) INJECTION EVERY 12 HOURS SCHEDULED
Status: DISCONTINUED | OUTPATIENT
Start: 2025-07-10 | End: 2025-07-11 | Stop reason: HOSPADM

## 2025-07-10 RX ADMIN — IOPAMIDOL 75 ML: 755 INJECTION, SOLUTION INTRAVENOUS at 13:36

## 2025-07-10 RX ADMIN — HYDRALAZINE HYDROCHLORIDE 10 MG: 20 INJECTION INTRAMUSCULAR; INTRAVENOUS at 12:50

## 2025-07-10 RX ADMIN — ASPIRIN 324 MG: 81 TABLET, CHEWABLE ORAL at 10:15

## 2025-07-10 RX ADMIN — SODIUM CHLORIDE: 0.9 INJECTION, SOLUTION INTRAVENOUS at 21:02

## 2025-07-10 RX ADMIN — PREGABALIN 150 MG: 50 CAPSULE ORAL at 21:03

## 2025-07-10 RX ADMIN — SODIUM CHLORIDE, PRESERVATIVE FREE 10 ML: 5 INJECTION INTRAVENOUS at 21:14

## 2025-07-10 RX ADMIN — ATORVASTATIN CALCIUM 40 MG: 40 TABLET, FILM COATED ORAL at 21:03

## 2025-07-10 RX ADMIN — SODIUM CHLORIDE: 0.9 INJECTION, SOLUTION INTRAVENOUS at 12:55

## 2025-07-10 RX ADMIN — ENOXAPARIN SODIUM 80 MG: 100 INJECTION SUBCUTANEOUS at 15:02

## 2025-07-10 RX ADMIN — SULFUR HEXAFLUORIDE 2 ML: 60.7; .19; .19 INJECTION, POWDER, LYOPHILIZED, FOR SUSPENSION INTRAVENOUS; INTRAVESICAL at 14:24

## 2025-07-10 RX ADMIN — ACETAMINOPHEN 650 MG: 325 TABLET ORAL at 21:04

## 2025-07-10 RX ADMIN — ENOXAPARIN SODIUM 80 MG: 100 INJECTION SUBCUTANEOUS at 21:03

## 2025-07-10 ASSESSMENT — ENCOUNTER SYMPTOMS
WHEEZING: 0
EYES NEGATIVE: 1
VOMITING: 0
COUGH: 0
NAUSEA: 0
SHORTNESS OF BREATH: 1
DIARRHEA: 0
ABDOMINAL DISTENTION: 0
GASTROINTESTINAL NEGATIVE: 1
SHORTNESS OF BREATH: 0
ABDOMINAL PAIN: 0

## 2025-07-10 ASSESSMENT — PAIN SCALES - GENERAL: PAINLEVEL_OUTOF10: 4

## 2025-07-10 ASSESSMENT — PAIN DESCRIPTION - ORIENTATION: ORIENTATION: MID

## 2025-07-10 ASSESSMENT — PAIN DESCRIPTION - LOCATION: LOCATION: STERNUM

## 2025-07-10 NOTE — ACP (ADVANCE CARE PLANNING)
Advance Care Planning     Advance Care Planning Inpatient Note  [facility] Department    Today's Date: 7/10/2025  Unit: [facility] ICU    Received request from rounding.  Upon review of chart and communication with care team, patient's decision making abilities are not in question.. Patient was/were present in the room during visit.    Goals of ACP Conversation:  Facilitate a discussion related to patient's goals of care as they align with the patient's values and beliefs.    Health Care Decision Makers:       Primary Decision Maker: Juan Moctezuma - Child - [phone]  Summary:  Verified Healthcare Decision Maker     Advance Care Planning Documents (Patient Wishes):  None     Assessment:  This is a 89-year-old  male patient who named his only son Juan Moctezuma as PDM.    Interventions:  Encouraged ongoing ACP conversation with future decision makers and loved ones    Care Preferences Communicated:   No    Outcomes/Plan:  Patient named PDM    Electronically signed by Chaplain Fournier Mai on 7/10/2025 at 3:58 PM

## 2025-07-10 NOTE — PROGRESS NOTES
ED TO INPATIENT SBAR HANDOFF    Patient Name: Esau Moctezuma   : 1935  89 y.o.   Family/Caregiver Present: No  Code Status Order: Prior    C-SSRS: Risk of Suicide: No Risk  Sitter No  Restraints:         Situation  Chief Complaint   Patient presents with    Chest Pain     Started yesterday      Brief Description of Patient's Condition: Esau Moctezuma is a 89 y.o. male who presents to the emergency department with complaint of chest pain. Pain started yesterday in the center of his chest and has been constant since onset. No trigger to onset of pain but had been cleaning up his back yard before onset. Says it feels heavy. Has felt short of breath, particularly with exertion since pain started.   Has h/o CAD with prior stents. Also has h/o costochondritis and says this feels more similar to what he has had with that before.  Pain worsens with exertion. No change with movement. Does have pain with palpation along lower sternum.  Mental Status: oriented, alert, coherent, logical, thought processes intact, and able to concentrate and follow conversation  Arrived from: home    Imaging:   XR CHEST PORTABLE   Final Result       No acute cardiopulmonary process.           ______________________________________    Electronically signed by: KARMA ALEJANDRE M.D.   Date:     07/10/2025   Time:    09:15         COVID-19 Results:   Internal Administration   First Dose COVID-19, PFIZER PURPLE top, DILUTE for use, (age 12 y+), 30mcg/0.3mL  2021   Second Dose COVID-19, PFIZER PURPLE top, DILUTE for use, (age 12 y+), 30mcg/0.3mL   2021       Last COVID Lab SARS-CoV-2, PCR (no units)   Date Value   2024 Not Detected     SARS-CoV-2, AZAM (no units)   Date Value   2020 NOT DETECTED     SARS-CoV-2, NAAT (no units)   Date Value   2021 Not Detected           Abnormal labs:   Abnormal Labs Reviewed   CBC WITH AUTO DIFFERENTIAL - Abnormal; Notable for the following components:       Result Value    RBC 4.23 (*)

## 2025-07-10 NOTE — ED PROVIDER NOTES
[facility] ICU  EMERGENCY DEPARTMENT ENCOUNTER      Pt Name: Esau Moctezuma  MRN: [id number]  Birthdate 1935  Date of evaluation: 7/10/2025  Provider: Christiano Jurado Jr, MD    CHIEF COMPLAINT       Chief Complaint   Patient presents with    Chest Pain     Started yesterday          HISTORY OF PRESENT ILLNESS   (Location/Symptom, Timing/Onset,Context/Setting, Quality, Duration, Modifying Factors, Severity)  Note limiting factors.   Esau Moctezuma is a 89 y.o. male who presents to the emergency department with complaint of chest pain. Pain started yesterday in the center of his chest and has been constant since onset. No trigger to onset of pain but had been cleaning up his back yard before onset. Says it feels heavy. Has felt short of breath, particularly with exertion since pain started.   Has h/o CAD with prior stents. Also has h/o costochondritis and says this feels more similar to what he has had with that before.  Pain worsens with exertion. No change with movement. Does have pain with palpation along lower sternum.    HPI    NursingNotes were reviewed.    REVIEW OF SYSTEMS    (2-9 systems for level 4, 10 or more for level 5)     Review of Systems   Constitutional: Negative.    HENT: Negative.     Eyes: Negative.    Respiratory:  Positive for shortness of breath.    Cardiovascular:  Positive for chest pain.   Gastrointestinal: Negative.    Genitourinary: Negative.    Musculoskeletal: Negative.    Skin: Negative.    Neurological: Negative.    Hematological: Negative.    Psychiatric/Behavioral: Negative.         A complete review of systems was performed and is negative except as noted above in the HPI.       PAST MEDICAL HISTORY     Past Medical History:   Diagnosis Date    Adenocarcinoma (HCC)     colon    Aneurysm aortic    Anxiety     Arthritis     Polyarticular    Atrial fibrillation (HCC)     Burgos's esophagus 2010    H/o    CAD (coronary artery disease)     CAD (coronary artery disease), native coronary artery

## 2025-07-10 NOTE — CONSULTS
contributors to chest pain  Monitor and maintain potassium > 4.0 and magnesium > 2.0  Cardiology will continue to follow for interpretation of Adeline scan, ongoing telemetry review, and further risk stratification    ===========================  ADDENDUM  ===========================    I have personally seen and examined the patient independently. I discussed the case with the nurse practitioner whose note is above. Please see my addendum below.     89-year-old man with a history of CAD status post prior PCI several years ago, A-fib on Eliquis, history of costochondritis who presents with substernal pain was constant.  Workup demonstrated minimally elevated troponin.  While in the ED, he also had about 10 seconds of monomorphic wide-complex tachycardia during which.  He was totally asymptomatic.    Given age, symptoms and minimally elevated troponin with a history of CAD recommend Adeline scan to rule out obstructive CAD  Will follow-up echocardiogram  Can be transferred out of the ICU to the telemetry floor      Tio Best MD  7/10/2025 4:19 PM

## 2025-07-10 NOTE — PROGRESS NOTES
Spiritual Health History and Assessment/Progress Note  [facility]    (P) Advance Care Planning,  ,  ,      Name: Esau Moctezuma MRN: [id number]    Age: 89 y.o.     Sex: male   Language: English   Muslim: Synagogue   Chest pain     Date: 7/10/2025            Total Time Calculated: (P) 45 min              Spiritual Assessment began in [facility] ICU        Referral/Consult From: (P) Rounding   Encounter Overview/Reason: (P) Advance Care Planning  Service Provided For: (P) Patient (in ICU)    Dori, Belief, Meaning:   Patient identifies as spiritual  Family/Friends No family/friends present      Importance and Influence:  Patient has spiritual/personal beliefs that influence decisions regarding their health  Family/Friends No family/friends present    Community:  Patient is connected with a spiritual community and feels well-supported. Support system includes: Children  Family/Friends No family/friends present    Assessment and Plan of Care:   The patient is a Syn[employer] support by his son. Patient was hopeful.  Patient Interventions include: Facilitated expression of thoughts and feelings, Explored spiritual coping/struggle/distress, Affirmed coping skills/support systems, and Assisted in Advance Care Planning conversation  Family/Friends Interventions include: No family/friends present    Patient Plan of Care: Spiritual Care available upon further referral  Family/Friends Plan of Care: No family/friends present    Electronically signed by Chaplain Fournier Mai on 7/10/2025 at 3:55 PM

## 2025-07-10 NOTE — PROGRESS NOTES
4 Eyes Skin Assessment     NAME:  Esau Moctezuma  YOB: 1935  MEDICAL RECORD NUMBER:  [id number]    The patient is being assessed for  Transfer to New Unit    I agree that at least one RN has performed a thorough Head to Toe Skin Assessment on the patient. ALL assessment sites listed below have been assessed.      Areas assessed by both nurses:    Head, Face, Ears, Shoulders, Back, Chest, Arms, Elbows, Hands, Sacrum. Buttock, Coccyx, Ischium, Legs. Feet and Heels, and Under Medical Devices         Does the Patient have a Wound? No noted wound(s)       Dariusz Prevention initiated by RN: No  Wound Care Orders initiated by RN: No    Pressure Injury (Stage 1,2,3,4, Unstageable, DTI, NWPT, and Complex wounds) if present, place Wound referral order by RN under : No    New Ostomies, if present place, Ostomy referral order under : No     Nurse 1 eSignature: Electronically signed by Lionel Barth RN on 7/10/25 at 6:33 PM CDT    **SHARE this note so that the co-signing nurse can place an eSignature**    Nurse 2 eSignature: Electronically signed by Venkatesh Sanches RN on 7/10/25 at 6:34 PM CDT

## 2025-07-10 NOTE — PROGRESS NOTES
4 Eyes Skin Assessment     NAME:  Esau Moctezuma  YOB: 1935  MEDICAL RECORD NUMBER:  [id number]    The patient is being assessed for  Admission    I agree that at least one RN has performed a thorough Head to Toe Skin Assessment on the patient. ALL assessment sites listed below have been assessed.      Areas assessed by both nurses:    Head, Face, Ears, Shoulders, Back, Chest, Arms, Elbows, Hands, Sacrum. Buttock, Coccyx, Ischium, Legs. Feet and Heels, and Under Medical Devices         Does the Patient have a Wound? No noted wound(s)       Dariusz Prevention initiated by RN: No  Wound Care Orders initiated by RN: No    Pressure Injury (Stage 1,2,3,4, Unstageable, DTI, NWPT, and Complex wounds) if present, place Wound referral order by RN under : No    New Ostomies, if present place, Ostomy referral order under : No     Nurse 1 eSignature: Electronically signed by Enoch Chaudhary RN on 7/10/25 at 1:07 PM CDT    **SHARE this note so that the co-signing nurse can place an eSignature**    Nurse 2 eSignature: {Esignature:[id number]}

## 2025-07-10 NOTE — H&P
[facility]      Hospitalist - History & Physical      PCP: Ronald Amor APRN    Date of Admission: 7/10/2025    Date of Service: 7/10/2025    Chief Complaint:  CP    History Of Present Illness:   This patient is a 89 y.o. male with past medical history of anxiety, AAA s/p endovascular repair 1/2024, atrial fibrillation on Eliquis, CAD with prior stents, GERD, hypertension, hyperlipidemia who presented to [facility] ED on 7/10 for evaluation of chest pain.  Patient has a history of chronic chest wall pain/costochondritis, and is maintained on Lyrica outpatient for this.  Patient states that yesterday, he was sitting on the couch when he began to have pain in the center of his chest.  He was previously working on his yard.  He states that normally it resolves relatively quickly, however the pain has been constant since onset which is atypical for his chronic chest wall pain.  In ED, chemistry panel indicates normal renal function with no significant electrolyte abnormalities, normal LFTs, CBC overall reassuring.  Troponin 31 with repeat 26.  D-dimer was elevated at 2.41.  CXR showed no acute cardiopulmonary process.  EKG showed sinus bradycardia without ischemic changes.  Patient noted to be hypertensive in ED, with SBP up to 200.  He did have a run of VT which lasted several seconds per RN. Patient admitted to hospitalist with consult to cardiology for further evaluation and management.     His CTA showed no PE, minimal patchy consolidative opacities RLL, unclear etiology recommending short-term follow-up, otherwise no acute findings.  Cardiology recommending to proceed with Lexiscan tomorrow morning.    Past Medical History:        Diagnosis Date    Adenocarcinoma (HCC)     colon    Aneurysm aortic    Anxiety     Arthritis     Polyarticular    Atrial fibrillation (HCC)     Burgos's esophagus 2010    H/o    CAD (coronary artery disease)     CAD (coronary artery disease), native coronary artery

## 2025-07-11 ENCOUNTER — APPOINTMENT (OUTPATIENT)
Dept: NUCLEAR MEDICINE | Age: 89
End: 2025-07-11
Payer: MEDICARE

## 2025-07-11 ENCOUNTER — APPOINTMENT (OUTPATIENT)
Age: 89
End: 2025-07-11
Payer: MEDICARE

## 2025-07-11 VITALS
DIASTOLIC BLOOD PRESSURE: 75 MMHG | WEIGHT: 174.8 LBS | SYSTOLIC BLOOD PRESSURE: 141 MMHG | HEART RATE: 78 BPM | TEMPERATURE: 97.3 F | RESPIRATION RATE: 18 BRPM | BODY MASS INDEX: 24.38 KG/M2 | OXYGEN SATURATION: 95 %

## 2025-07-11 LAB
ANION GAP SERPL CALCULATED.3IONS-SCNC: 10 MMOL/L (ref 8–16)
BASOPHILS # BLD: 0 K/UL (ref 0–0.2)
BASOPHILS NFR BLD: 0.6 % (ref 0–1)
BUN SERPL-MCNC: 14 MG/DL (ref 8–23)
CALCIUM SERPL-MCNC: 8.6 MG/DL (ref 8.8–10.2)
CHLORIDE SERPL-SCNC: 109 MMOL/L (ref 98–107)
CO2 SERPL-SCNC: 25 MMOL/L (ref 22–29)
CREAT SERPL-MCNC: 1.1 MG/DL (ref 0.7–1.2)
ECHO AO ASC DIAM: 3.3 CM
ECHO AO ROOT DIAM: 2.6 CM
ECHO AO SINUS VALSALVA DIAM: 3 CM
ECHO AO ST JNCT DIAM: 3 CM
ECHO AV AREA PEAK VELOCITY: 2.5 CM2
ECHO AV AREA VTI: 2.1 CM2
ECHO AV MEAN GRADIENT: 6 MMHG
ECHO AV MEAN VELOCITY: 1.1 M/S
ECHO AV PEAK GRADIENT: 9 MMHG
ECHO AV PEAK VELOCITY: 1.5 M/S
ECHO AV VELOCITY RATIO: 0.67
ECHO AV VTI: 38 CM
ECHO EST RA PRESSURE: 3 MMHG
ECHO IVC PROX: 1.3 CM
ECHO LA AREA 2C: 14 CM2
ECHO LA AREA 4C: 8.6 CM2
ECHO LA DIAMETER: 3.4 CM
ECHO LA MAJOR AXIS: 3.1 CM
ECHO LA MINOR AXIS: 4.5 CM
ECHO LA TO AORTIC ROOT RATIO: 1.31
ECHO LA VOL MOD A2C: 34 ML (ref 18–58)
ECHO LA VOL MOD A4C: 16 ML (ref 18–58)
ECHO LV E' LATERAL VELOCITY: 11.9 CM/S
ECHO LV E' SEPTAL VELOCITY: 8.05 CM/S
ECHO LV EDV A2C: 113 ML
ECHO LV EDV A4C: 114 ML
ECHO LV EF PHYSICIAN: 65 %
ECHO LV EJECTION FRACTION A2C: 63 %
ECHO LV EJECTION FRACTION A4C: 77 %
ECHO LV ESV A2C: 42 ML
ECHO LV ESV A4C: 27 ML
ECHO LV FRACTIONAL SHORTENING: 11 % (ref 28–44)
ECHO LV INTERNAL DIMENSION DIASTOLIC: 5.3 CM (ref 4.2–5.9)
ECHO LV INTERNAL DIMENSION SYSTOLIC: 4.7 CM
ECHO LV IVSD: 1 CM (ref 0.6–1)
ECHO LV MASS 2D: 200.4 G (ref 88–224)
ECHO LV POSTERIOR WALL DIASTOLIC: 1 CM (ref 0.6–1)
ECHO LV RELATIVE WALL THICKNESS RATIO: 0.38
ECHO LVOT AREA: 3.8 CM2
ECHO LVOT AV VTI INDEX: 0.56
ECHO LVOT DIAM: 2.2 CM
ECHO LVOT MEAN GRADIENT: 2 MMHG
ECHO LVOT PEAK GRADIENT: 4 MMHG
ECHO LVOT PEAK VELOCITY: 1 M/S
ECHO LVOT SV: 80.2 ML
ECHO LVOT VTI: 21.1 CM
ECHO MV A VELOCITY: 0.91 M/S
ECHO MV AREA VTI: 2.1 CM2
ECHO MV E DECELERATION TIME (DT): 208 MS
ECHO MV E VELOCITY: 0.83 M/S
ECHO MV E/A RATIO: 0.91
ECHO MV E/E' LATERAL: 6.97
ECHO MV E/E' RATIO (AVERAGED): 8.64
ECHO MV E/E' SEPTAL: 10.31
ECHO MV LVOT VTI INDEX: 1.78
ECHO MV MAX VELOCITY: 1.2 M/S
ECHO MV MEAN GRADIENT: 2 MMHG
ECHO MV MEAN VELOCITY: 0.6 M/S
ECHO MV PEAK GRADIENT: 5 MMHG
ECHO MV VTI: 37.6 CM
ECHO RA AREA 4C: 10.9 CM2
ECHO RA VOLUME: 26 ML
ECHO RIGHT VENTRICULAR SYSTOLIC PRESSURE (RVSP): 38 MMHG
ECHO RV BASAL DIMENSION: 3.6 CM
ECHO RV INTERNAL DIMENSION: 3.3 CM
ECHO RV LONGITUDINAL DIMENSION: 6.7 CM
ECHO RV MID DIMENSION: 3.3 CM
ECHO RV TAPSE: 2.5 CM (ref 1.7–?)
ECHO TV REGURGITANT MAX VELOCITY: 2.97 M/S
ECHO TV REGURGITANT PEAK GRADIENT: 35 MMHG
EOSINOPHIL # BLD: 0.3 K/UL (ref 0–0.6)
EOSINOPHIL NFR BLD: 4.5 % (ref 0–5)
ERYTHROCYTE [DISTWIDTH] IN BLOOD BY AUTOMATED COUNT: 18.2 % (ref 11.5–14.5)
GLUCOSE SERPL-MCNC: 82 MG/DL (ref 70–99)
HCT VFR BLD AUTO: 36.5 % (ref 42–52)
HGB BLD-MCNC: 11.8 G/DL (ref 14–18)
IMM GRANULOCYTES # BLD: 0.1 K/UL
LYMPHOCYTES # BLD: 1.6 K/UL (ref 1.1–4.5)
LYMPHOCYTES NFR BLD: 22.1 % (ref 20–40)
MCH RBC QN AUTO: 29.5 PG (ref 27–31)
MCHC RBC AUTO-ENTMCNC: 32.3 G/DL (ref 33–37)
MCV RBC AUTO: 91.3 FL (ref 80–94)
MONOCYTES # BLD: 0.8 K/UL (ref 0–0.9)
MONOCYTES NFR BLD: 11 % (ref 0–10)
NEUTROPHILS # BLD: 4.3 K/UL (ref 1.5–7.5)
NEUTS SEG NFR BLD: 60 % (ref 50–65)
NUC STRESS EJECTION FRACTION: 62 %
PLATELET # BLD AUTO: 149 K/UL (ref 130–400)
PMV BLD AUTO: 10.9 FL (ref 9.4–12.4)
POTASSIUM SERPL-SCNC: 4.3 MMOL/L (ref 3.5–5)
RBC # BLD AUTO: 4 M/UL (ref 4.7–6.1)
SODIUM SERPL-SCNC: 144 MMOL/L (ref 136–145)
STRESS BASELINE DIAS BP: 87 MMHG
STRESS BASELINE HR: 72 BPM
STRESS BASELINE SYS BP: 187 MMHG
STRESS ESTIMATED WORKLOAD: 1 METS
STRESS EXERCISE DUR MIN: 5 MIN
STRESS EXERCISE DUR SEC: 0 SEC
STRESS PEAK DIAS BP: 80 MMHG
STRESS PEAK SYS BP: 195 MMHG
STRESS PERCENT HR ACHIEVED: 67 %
STRESS POST PEAK HR: 88 BPM
STRESS RATE PRESSURE PRODUCT: NORMAL BPM*MMHG
STRESS TARGET HR: 132 BPM
TROPONIN, HIGH SENSITIVITY: 30 NG/L (ref 0–22)
WBC # BLD AUTO: 7.1 K/UL (ref 4.8–10.8)

## 2025-07-11 PROCEDURE — 84484 ASSAY OF TROPONIN QUANT: CPT

## 2025-07-11 PROCEDURE — 93018 CV STRESS TEST I&R ONLY: CPT | Performed by: INTERNAL MEDICINE

## 2025-07-11 PROCEDURE — 2500000003 HC RX 250 WO HCPCS

## 2025-07-11 PROCEDURE — 85025 COMPLETE CBC W/AUTO DIFF WBC: CPT

## 2025-07-11 PROCEDURE — 94760 N-INVAS EAR/PLS OXIMETRY 1: CPT

## 2025-07-11 PROCEDURE — 6360000002 HC RX W HCPCS

## 2025-07-11 PROCEDURE — 3430000000 HC RX DIAGNOSTIC RADIOPHARMACEUTICAL

## 2025-07-11 PROCEDURE — 80048 BASIC METABOLIC PNL TOTAL CA: CPT

## 2025-07-11 PROCEDURE — 93306 TTE W/DOPPLER COMPLETE: CPT | Performed by: INTERNAL MEDICINE

## 2025-07-11 PROCEDURE — 6370000000 HC RX 637 (ALT 250 FOR IP)

## 2025-07-11 PROCEDURE — A9502 TC99M TETROFOSMIN: HCPCS

## 2025-07-11 PROCEDURE — 93016 CV STRESS TEST SUPVJ ONLY: CPT | Performed by: INTERNAL MEDICINE

## 2025-07-11 PROCEDURE — 93246 EXT ECG>7D<15D RECORDING: CPT

## 2025-07-11 PROCEDURE — 99232 SBSQ HOSP IP/OBS MODERATE 35: CPT | Performed by: INTERNAL MEDICINE

## 2025-07-11 PROCEDURE — 78452 HT MUSCLE IMAGE SPECT MULT: CPT | Performed by: INTERNAL MEDICINE

## 2025-07-11 PROCEDURE — 36415 COLL VENOUS BLD VENIPUNCTURE: CPT

## 2025-07-11 PROCEDURE — 78452 HT MUSCLE IMAGE SPECT MULT: CPT

## 2025-07-11 RX ORDER — REGADENOSON 0.08 MG/ML
0.4 INJECTION, SOLUTION INTRAVENOUS
Status: DISCONTINUED | OUTPATIENT
Start: 2025-07-11 | End: 2025-07-11 | Stop reason: HOSPADM

## 2025-07-11 RX ORDER — AMINOPHYLLINE 25 MG/ML
50 INJECTION, SOLUTION INTRAVENOUS PRN
Status: DISCONTINUED | OUTPATIENT
Start: 2025-07-11 | End: 2025-07-11 | Stop reason: HOSPADM

## 2025-07-11 RX ORDER — NITROGLYCERIN 0.4 MG/1
0.4 TABLET SUBLINGUAL EVERY 5 MIN PRN
Status: DISCONTINUED | OUTPATIENT
Start: 2025-07-11 | End: 2025-07-11 | Stop reason: HOSPADM

## 2025-07-11 RX ORDER — ATROPINE SULFATE 0.1 MG/ML
0.5 INJECTION INTRAVENOUS EVERY 5 MIN PRN
Status: DISCONTINUED | OUTPATIENT
Start: 2025-07-11 | End: 2025-07-11 | Stop reason: HOSPADM

## 2025-07-11 RX ORDER — METOPROLOL TARTRATE 1 MG/ML
5 INJECTION, SOLUTION INTRAVENOUS EVERY 5 MIN PRN
Status: DISCONTINUED | OUTPATIENT
Start: 2025-07-11 | End: 2025-07-11 | Stop reason: HOSPADM

## 2025-07-11 RX ORDER — SODIUM CHLORIDE 0.9 % (FLUSH) 0.9 %
5-40 SYRINGE (ML) INJECTION PRN
Status: DISCONTINUED | OUTPATIENT
Start: 2025-07-11 | End: 2025-07-11 | Stop reason: HOSPADM

## 2025-07-11 RX ORDER — SODIUM CHLORIDE 9 MG/ML
500 INJECTION, SOLUTION INTRAVENOUS CONTINUOUS PRN
Status: DISCONTINUED | OUTPATIENT
Start: 2025-07-11 | End: 2025-07-11 | Stop reason: HOSPADM

## 2025-07-11 RX ORDER — ALBUTEROL SULFATE 90 UG/1
2 INHALANT RESPIRATORY (INHALATION) PRN
Status: DISCONTINUED | OUTPATIENT
Start: 2025-07-11 | End: 2025-07-11 | Stop reason: HOSPADM

## 2025-07-11 RX ADMIN — REGADENOSON 0.4 MG: 0.08 INJECTION, SOLUTION INTRAVENOUS at 09:15

## 2025-07-11 RX ADMIN — SODIUM CHLORIDE, PRESERVATIVE FREE 10 ML: 5 INJECTION INTRAVENOUS at 10:20

## 2025-07-11 RX ADMIN — TETROFOSMIN 24 MILLICURIE: 0.23 INJECTION, POWDER, LYOPHILIZED, FOR SOLUTION INTRAVENOUS at 10:20

## 2025-07-11 RX ADMIN — PREGABALIN 150 MG: 50 CAPSULE ORAL at 10:16

## 2025-07-11 RX ADMIN — ENOXAPARIN SODIUM 80 MG: 100 INJECTION SUBCUTANEOUS at 10:23

## 2025-07-11 RX ADMIN — TAMSULOSIN HYDROCHLORIDE 0.4 MG: 0.4 CAPSULE ORAL at 10:16

## 2025-07-11 RX ADMIN — PANTOPRAZOLE SODIUM 40 MG: 40 TABLET, DELAYED RELEASE ORAL at 10:16

## 2025-07-11 RX ADMIN — ISOSORBIDE MONONITRATE 30 MG: 60 TABLET, EXTENDED RELEASE ORAL at 10:31

## 2025-07-11 RX ADMIN — TETROFOSMIN 8 MILLICURIE: 0.23 INJECTION, POWDER, LYOPHILIZED, FOR SOLUTION INTRAVENOUS at 10:20

## 2025-07-11 RX ADMIN — ATORVASTATIN CALCIUM 20 MG: 20 TABLET, FILM COATED ORAL at 10:16

## 2025-07-11 RX ADMIN — ASPIRIN 81 MG: 81 TABLET, CHEWABLE ORAL at 10:17

## 2025-07-11 NOTE — DISCHARGE SUMMARY
[facility]    Discharge Summary      Esau Moctezuma  :  1935  MRN:  [id number]    Admit date:  7/10/2025  Discharge date:    2025    Discharging Physician:  Dr. Huitron     Advance Directive: Full Code    Consults: cardiology    Primary Care Physician:  Ronald Amor, DAVIE    Discharge Diagnoses:  Principal Problem:    Chest pain  Active Problems:    CAD (coronary artery disease), native coronary artery    Hypertension    Gastroesophageal reflux disease    Hypercholesterolemia    Burgos's esophagus    Abdominal aortic aneurysm (AAA) without rupture    Paroxysmal atrial fibrillation (HCC)  Resolved Problems:    * No resolved hospital problems. *      Portions of this note have been copied forward, however, changed to reflect the most current clinical status of this patient.    Hospital Course:    This patient is a 89 y.o. male with past medical history of anxiety, AAA s/p endovascular repair 2024, atrial fibrillation on Eliquis, CAD with prior stents, GERD, hypertension, hyperlipidemia who presented to [facility] ED on 7/10 for evaluation of chest pain.  Patient has a history of chronic chest wall pain/costochondritis, and is maintained on Lyrica outpatient for this.  Patient states that yesterday, he was sitting on the couch when he began to have pain in the center of his chest.  He was previously working on his yard.  He states that normally it resolves relatively quickly, however the pain has been constant since onset which is atypical for his chronic chest wall pain.  In ED, chemistry panel indicates normal renal function with no significant electrolyte abnormalities, normal LFTs, CBC overall reassuring.  Troponin 31 with repeat 26.  D-dimer was elevated at 2.41.  CXR showed no acute cardiopulmonary process.  EKG showed sinus bradycardia without ischemic changes.  Patient noted to be hypertensive in ED, with SBP up to 200.  He did have a run of VT which lasted several seconds per RN.  24.38 kg/m²   General appearance: Alert and cooperative   Chest: Breath sounds clear and equal bilaterally  Cardiac: RRR  Abdomen: soft, non-tender  Extremities: No peripheral edema. Normal ROM. Peripheral pulses palpable.  Neurologic: Grossly intact. No focal deficits.     Discharge Medications:        Medication List        PAUSE taking these medications      metoprolol tartrate 50 MG tablet  Wait to take this until: July 12, 2025  Hold for heart rate <60  Commonly known as: LOPRESSOR  Take 1 tablet by mouth twice daily            CONTINUE taking these medications      apixaban 2.5 MG Tabs tablet  Commonly known as: Eliquis  Take 1 tablet by mouth 2 times daily     aspirin 325 MG tablet     DULoxetine 60 MG extended release capsule  Commonly known as: CYMBALTA  Take 1 capsule by mouth once daily     isosorbide mononitrate 30 MG extended release tablet  Commonly known as: IMDUR  Take 1 tablet by mouth once daily     nitroGLYCERIN 0.4 MG SL tablet  Commonly known as: Nitrostat  Place 1 tablet under the tongue every 5 minutes as needed for Chest pain     pantoprazole 40 MG tablet  Commonly known as: PROTONIX  Take 1 tablet by mouth once daily     pregabalin 150 MG capsule  Commonly known as: LYRICA  Take 1 capsule by mouth 2 times daily for 90 days. Max Daily Amount: 300 mg     simvastatin 20 MG tablet  Commonly known as: ZOCOR  Take 1 tablet by mouth nightly     tamsulosin 0.4 MG capsule  Commonly known as: FLOMAX  Take 1 capsule by mouth once daily               Discharge Instructions:   Follow up with Ronald Amor APRN in 3 days.    Follow up with cardiology within 1-2 weeks of discharge.     Take medications as directed.      Resume activity as tolerated.    Followup Appointments Scheduled at Time of Discharge:  Future Appointments   Date Time Provider Department Center   7/16/2025 10:00 AM Ronald Amor APRN [facility]   7/24/2025 10:40 AM Ronald Amor APRN

## 2025-07-11 NOTE — PROGRESS NOTES
Cardiology Daily Note Norberto Chandra, APRN - CNP      Patient:  Esau Moctezuma  [id number]    Patient Active Problem List    Diagnosis Date Noted    CAD (coronary artery disease), native coronary artery     Chest pain 07/10/2025    Primary osteoarthritis of right knee 04/15/2025    COPD exacerbation (HCC) 04/24/2023    Primary osteoarthritis of right hip 01/03/2020    Paroxysmal atrial fibrillation (HCC) 12/11/2017    Colon polyps 04/11/2017    History of colon cancer 04/11/2017    Abdominal aortic aneurysm (AAA) without rupture 11/12/2013    Hypercholesterolemia     Burgos's esophagus     Chronic chest wall pain 12/20/2011    Hypertension     Gastroesophageal reflux disease     Arthritis     Renal calculi     Costochondritis        Admit Date:  7/10/2025    Admission Problem List: Present on Admission:   Chest pain   Paroxysmal atrial fibrillation (HCC)   Hypertension   Hypercholesterolemia   Gastroesophageal reflux disease   CAD (coronary artery disease), native coronary artery   Burgos's esophagus   Abdominal aortic aneurysm (AAA) without rupture      Cardiac Specific Data:  Specialty Problems          Cardiology Problems    CAD (coronary artery disease), native coronary artery        Hypertension        Chronic chest wall pain        Hypercholesterolemia        Abdominal aortic aneurysm (AAA) without rupture        Paroxysmal atrial fibrillation (HCC)        * (Principal) Chest pain           Subjective:  Mr. Moctezuma 89-year-old male with history of CAD, atrial fibrillation, and prior LAD stent who is being followed for recent chest pain and a brief run of VT.  He reports doing well, without recurrence of chest discomfort, palpitations, dizziness, dyspnea.  He feels back to his baseline and has no symptoms overnight.    Objective:   BP (!) 141/75   Pulse 78   Temp 97.3 °F (36.3 °C) (Temporal)   Resp 18   Wt 79.3 kg (174 lb 12.8 oz)   SpO2 95%   BMI 24.38 kg/m²     No intake or output data in the 24 hours ending  central lobular. The airways are patent and unremarkable. No pneumothorax.       No pulmonary embolism .  Minimal patchy consolidative opacities in the right lower lobe may relate to focal pneumonia although neoplasm not excluded. Short term 3 month follow up recommended after appropriate therapy  Calcified pleural plaque consistent with previous asbestos exposure.  3.7 cm ectatic ascending aorta.  All CT scans are performed using dose optimization techniques as appropriate to the performed exam and include at least one of the following: Automated exposure control, adjustment of the mA and/or kV according to size, and the use of iterative reconstruction technique.  ______________________________________ Electronically signed by: KARMA ALEJANDRE M.D. Date:     07/10/2025 Time:    13:57     XR CHEST PORTABLE  Result Date: 7/10/2025  CHEST RADIOGRAPH PORTABLE  INDICATION: Chest pain  COMPARISON: Chest radiograph 10/7/21  TECHNIQUE: AP portable upright chest radiograph(s). 1 view(s) total.  FINDINGS: Stable bullous disease in the lungs.Lungs are clear with no focal consolidations, pneumothoraces or pleural effusions. Cardiomediastinal silhouette is within normal limits. Atheromatous calcifications of the aorta. Degenerative changes of the spine.       No acute cardiopulmonary process.   ______________________________________ Electronically signed by: KARMA ALEJANDRE M.D. Date:     07/10/2025 Time:    09:15         Assessment:  89-year-old with known CAD s/p LAD stent and paroxysmal atrial fibrillation presenting with exertional chest pressure, elevated troponins 31, 26, 27, and an 8-second run of VT on telemetry.  The chest pain is atypical in part due to reproducibility with palpation.  Troponin elevation without classic ischemic symptoms or ECG changes  Hemodynamically stable with no recurrent VT episodes since admission  Potassium 5.1  BUN 17, creatinine 1.2, GFR 58  Magnesium 2.2  D-dimer 2.41  Hemoglobin A1c 5.7  Portable

## 2025-07-14 ENCOUNTER — TELEPHONE (OUTPATIENT)
Age: 89
End: 2025-07-14

## 2025-07-14 RX ORDER — METOPROLOL TARTRATE 50 MG
TABLET ORAL
Qty: 60 TABLET | Refills: 0 | Status: SHIPPED | OUTPATIENT
Start: 2025-07-14 | End: 2025-07-15 | Stop reason: SDUPTHER

## 2025-07-14 NOTE — PROGRESS NOTES
Physician Progress Note      PATIENT:               ALLI ALVARADO  CSN #:                  [id number]  :                       1935  ADMIT DATE:       7/10/2025 9:17 AM  DISCH DATE:        2025 2:43 PM  RESPONDING  PROVIDER #:        Christian Huitron MD          QUERY TEXT:    Chest pain is documented in the medical record. Please specify the underlying   cause:    The clinical indicators include:  -\"history of chronic chest wall pain/costochondritis, and is maintained on   Lyrica outpatient for this;  He states that normally it resolves relatively   quickly, however the pain has been constant since onset which is atypical for   his chronic chest wall pain.\" (7/10 H&P Dr. Huitron)  -\"He does endorse reproducibility of the discomfort with palpation along the   lower sternum and reports the pain feels somewhat similar to prior episodes of   costochondritis, though more persistent and exertionally provoked; Troponin   elevation without classic ischemic symptoms or ECG changes.\" (7/10 Cardiology   Dr. Best)  -\"no significant electrolyte abnormalities;  Troponin 31 with repeat 26.    D-dimer was elevated at 2.41.  CXR showed no acute cardiopulmonary process.\"   (7/10 H&P Dr. Huitron)  -\"EKG showed sinus bradycardia without ischemic changes\". (7/10 H&P Dr. Huitron)  -\"Patient noted to be hypertensive in ED, with SBP up to 200\".  (7/10 H&P Dr. Huitron)  -\"He did have a run of VT which lasted several seconds per RN\" (7/10 H&P Dr. Huitron)  -\"Hemodynamically stable with no recurrent VT episodes since admission;   Continue cardiac monitoring overnight.  8-second run of VT noted but patient   remained stable without recurrent episodes.  No antiarrhythmics initiated at   this time.  Continue metoprolol.\" (7/10 Cardiology Dr. Best)  -\"Echocardiogram normal LV function EF 6 5-70%, left ventricular size is   normal, normal wall motion, no valvular dysfunction. Adeline scan   performed-negative for

## 2025-07-14 NOTE — TELEPHONE ENCOUNTER
Care Transitions Initial Follow Up Call    Outreach made within 2 business days of discharge: Yes    Patient: Esau Moctezuma Patient : 1935   MRN: [id number]  Reason for Admission: chest pain  Discharge Date: 25       Spoke with: Called pt, no option to leave a voicemail.     Discharge department/facility: [facility]     Follow Up  Future Appointments   Date Time Provider Department Center   2025 10:00 AM Ronald Amor APRN [facility] DEP   2025 10:40 AM Ronald Amor APRN [facility] DEP       Mio Ridley MA

## 2025-07-15 ENCOUNTER — OFFICE VISIT (OUTPATIENT)
Age: 89
End: 2025-07-15

## 2025-07-15 ENCOUNTER — TELEPHONE (OUTPATIENT)
Age: 89
End: 2025-07-15

## 2025-07-15 VITALS
HEIGHT: 71 IN | TEMPERATURE: 98.2 F | SYSTOLIC BLOOD PRESSURE: 138 MMHG | OXYGEN SATURATION: 95 % | DIASTOLIC BLOOD PRESSURE: 84 MMHG | WEIGHT: 179 LBS | BODY MASS INDEX: 25.06 KG/M2 | HEART RATE: 112 BPM

## 2025-07-15 DIAGNOSIS — I25.10 CORONARY ARTERY DISEASE INVOLVING NATIVE CORONARY ARTERY OF NATIVE HEART WITHOUT ANGINA PECTORIS: ICD-10-CM

## 2025-07-15 DIAGNOSIS — R07.89 CHEST WALL PAIN: Primary | ICD-10-CM

## 2025-07-15 LAB
EKG P AXIS: 37 DEGREES
EKG P AXIS: 45 DEGREES
EKG P-R INTERVAL: 160 MS
EKG P-R INTERVAL: 172 MS
EKG Q-T INTERVAL: 402 MS
EKG Q-T INTERVAL: 426 MS
EKG QRS DURATION: 116 MS
EKG QRS DURATION: 118 MS
EKG QTC CALCULATION (BAZETT): 388 MS
EKG QTC CALCULATION (BAZETT): 409 MS
EKG T AXIS: 35 DEGREES
EKG T AXIS: 54 DEGREES

## 2025-07-15 RX ORDER — METHYLPREDNISOLONE 4 MG/1
TABLET ORAL
Qty: 1 KIT | Refills: 0 | Status: SHIPPED | OUTPATIENT
Start: 2025-07-15 | End: 2025-07-17

## 2025-07-15 RX ORDER — METOPROLOL TARTRATE 50 MG
50 TABLET ORAL 2 TIMES DAILY
Qty: 60 TABLET | Refills: 3 | Status: SHIPPED | OUTPATIENT
Start: 2025-07-15

## 2025-07-15 RX ORDER — TRAMADOL HYDROCHLORIDE 50 MG/1
50 TABLET ORAL EVERY 8 HOURS PRN
Qty: 9 TABLET | Refills: 0 | Status: SHIPPED | OUTPATIENT
Start: 2025-07-15 | End: 2025-07-18

## 2025-07-15 SDOH — ECONOMIC STABILITY: FOOD INSECURITY: WITHIN THE PAST 12 MONTHS, THE FOOD YOU BOUGHT JUST DIDN'T LAST AND YOU DIDN'T HAVE MONEY TO GET MORE.: NEVER TRUE

## 2025-07-15 SDOH — ECONOMIC STABILITY: FOOD INSECURITY: WITHIN THE PAST 12 MONTHS, YOU WORRIED THAT YOUR FOOD WOULD RUN OUT BEFORE YOU GOT MONEY TO BUY MORE.: NEVER TRUE

## 2025-07-15 ASSESSMENT — PATIENT HEALTH QUESTIONNAIRE - PHQ9
SUM OF ALL RESPONSES TO PHQ QUESTIONS 1-9: 5
6. FEELING BAD ABOUT YOURSELF - OR THAT YOU ARE A FAILURE OR HAVE LET YOURSELF OR YOUR FAMILY DOWN: NOT AT ALL
9. THOUGHTS THAT YOU WOULD BE BETTER OFF DEAD, OR OF HURTING YOURSELF: NOT AT ALL
SUM OF ALL RESPONSES TO PHQ QUESTIONS 1-9: 5
10. IF YOU CHECKED OFF ANY PROBLEMS, HOW DIFFICULT HAVE THESE PROBLEMS MADE IT FOR YOU TO DO YOUR WORK, TAKE CARE OF THINGS AT HOME, OR GET ALONG WITH OTHER PEOPLE: SOMEWHAT DIFFICULT
SUM OF ALL RESPONSES TO PHQ QUESTIONS 1-9: 5
4. FEELING TIRED OR HAVING LITTLE ENERGY: SEVERAL DAYS
8. MOVING OR SPEAKING SO SLOWLY THAT OTHER PEOPLE COULD HAVE NOTICED. OR THE OPPOSITE, BEING SO FIGETY OR RESTLESS THAT YOU HAVE BEEN MOVING AROUND A LOT MORE THAN USUAL: NOT AT ALL
2. FEELING DOWN, DEPRESSED OR HOPELESS: SEVERAL DAYS
7. TROUBLE CONCENTRATING ON THINGS, SUCH AS READING THE NEWSPAPER OR WATCHING TELEVISION: NOT AT ALL
1. LITTLE INTEREST OR PLEASURE IN DOING THINGS: SEVERAL DAYS
5. POOR APPETITE OR OVEREATING: SEVERAL DAYS
3. TROUBLE FALLING OR STAYING ASLEEP: SEVERAL DAYS
SUM OF ALL RESPONSES TO PHQ QUESTIONS 1-9: 5

## 2025-07-15 ASSESSMENT — ENCOUNTER SYMPTOMS
CONSTIPATION: 0
SHORTNESS OF BREATH: 0
VOMITING: 0
RHINORRHEA: 0
NAUSEA: 0
DIARRHEA: 0
ABDOMINAL PAIN: 0
TROUBLE SWALLOWING: 0
COUGH: 0
SORE THROAT: 0

## 2025-07-15 NOTE — TELEPHONE ENCOUNTER
Care Transitions Initial Follow Up Call    Outreach made within 2 business days of discharge: Yes    Patient: Esau Moctezuma Patient : 1935   MRN: [id number]  Reason for Admission: chest pain  Discharge Date: 25       Spoke with: Pt appt is within 2 business days of D/C, eligible for TCM    Discharge department/facility: [facility]     Follow Up  Future Appointments   Date Time Provider Department Center   7/15/2025 11:40 AM Ronald Amor APRN [facility] DEP   2025 10:40 AM Ronald Amor APRN [facility] DEP       Mio Ridley MA

## 2025-07-15 NOTE — PROGRESS NOTES
Date of Office Visit:  7/15/2025  Date of Hospital Admission: 7/10/25  Date of Hospital Discharge: 25  Readmission Risk Score (high >=14%. Medium >=10%):Readmission Risk Score: 11.5      Care management risk score Rising risk (score 2-5) and Complex Care (Scores >=6): No Risk Score On File     Non face to face  following discharge, date last encounter closed (first attempt may have been earlier): 07/15/2025     Call initiated 2 business days of discharge: Yes     Esau Moctezuma (:  1935) is a 89 y.o. male, Established patient, here for evaluation of the following chief complaint(s):  Follow-Up from Hospital (Here for follow up chest pain. Continues to have chest pain. Hurts in mid epigastric area. All tests \"didn't find nothing\" \" ijust need this pain to stop\" )         Assessment & Plan  1. Chest pain: Stable. Recent CAT scan results were normal and cardiac testing, ruling out any cardiac-related issues. The most probable cause of discomfort is inflammation. His pain is reproducible at costochondral joint  - Prescription for tramadol for use as needed when experiencing pain.  - Initiate a short course of steroids to assess effectiveness in alleviating pain. Take steroids with food. If steroids reduce pain, discontinue tramadol.    2. Atrial fibrillation: History of atrial fibrillation, currently on Eliquis.  - Resume metoprolol on 2025, hold if heart rate is <60. Heart rate currently a little fast.  - Prescription for metoprolol will be sent to pharmacy.    3. Costochondritis: Pain reproducible upon examination, indicating most likely musculoskeletal origin.  - Prescription for tramadol for pain management.  - Initiate a short course of steroids to reduce inflammation.    Follow-up  - Follow-up appointment scheduled for 1 week from now.  - Call and schedule a follow-up appointment with cardiologist in a month.    Results  Labs   - Troponin: Initial troponin was 31 with repeat of 26   -

## 2025-07-16 ENCOUNTER — TELEPHONE (OUTPATIENT)
Dept: CARDIOLOGY CLINIC | Age: 89
End: 2025-07-16

## 2025-07-16 NOTE — TELEPHONE ENCOUNTER
I tried calling and it asks me for the remote access code. I then tried his son and was able to leave a VM on his phone to return call to office so we can move his appt up as requested. 7/16/25 AH

## 2025-07-16 NOTE — TELEPHONE ENCOUNTER
I had previously tried to call the patient themselves but am unable to leave a VM as it asks for a remote access code. I call his emergency contact number which is his son and was able to leave a VM with him. I asked them to return call to office so we can get his appt moved up. It was requested by provider that we move appt up. 7/16/25 AH

## 2025-07-16 NOTE — TELEPHONE ENCOUNTER
Tried to call patient back at number listed. No answer, machine stated \"please enter your meeting number now\"

## 2025-07-16 NOTE — TELEPHONE ENCOUNTER
Esau called to schedule from ed referral. Pt is supposed to be seen within a week according to the hospital notes. Pt wanted to go ahead and accept any appt. Please return call if can get pt a sooner appt. Please advise.

## 2025-07-17 ENCOUNTER — OFFICE VISIT (OUTPATIENT)
Dept: CARDIOLOGY CLINIC | Age: 89
End: 2025-07-17
Payer: MEDICARE

## 2025-07-17 VITALS
SYSTOLIC BLOOD PRESSURE: 104 MMHG | RESPIRATION RATE: 16 BRPM | OXYGEN SATURATION: 97 % | WEIGHT: 180 LBS | HEART RATE: 70 BPM | DIASTOLIC BLOOD PRESSURE: 62 MMHG | HEIGHT: 71 IN | BODY MASS INDEX: 25.2 KG/M2

## 2025-07-17 DIAGNOSIS — I25.10 CORONARY ARTERY DISEASE INVOLVING NATIVE CORONARY ARTERY OF NATIVE HEART WITHOUT ANGINA PECTORIS: Primary | ICD-10-CM

## 2025-07-17 DIAGNOSIS — E78.5 DYSLIPIDEMIA: ICD-10-CM

## 2025-07-17 DIAGNOSIS — I48.0 PAF (PAROXYSMAL ATRIAL FIBRILLATION) (HCC): ICD-10-CM

## 2025-07-17 DIAGNOSIS — I10 ESSENTIAL HYPERTENSION: ICD-10-CM

## 2025-07-17 PROCEDURE — 1036F TOBACCO NON-USER: CPT | Performed by: NURSE PRACTITIONER

## 2025-07-17 PROCEDURE — G8419 CALC BMI OUT NRM PARAM NOF/U: HCPCS | Performed by: NURSE PRACTITIONER

## 2025-07-17 PROCEDURE — 99214 OFFICE O/P EST MOD 30 MIN: CPT | Performed by: NURSE PRACTITIONER

## 2025-07-17 PROCEDURE — 1111F DSCHRG MED/CURRENT MED MERGE: CPT | Performed by: NURSE PRACTITIONER

## 2025-07-17 PROCEDURE — 1159F MED LIST DOCD IN RCRD: CPT | Performed by: NURSE PRACTITIONER

## 2025-07-17 PROCEDURE — 1123F ACP DISCUSS/DSCN MKR DOCD: CPT | Performed by: NURSE PRACTITIONER

## 2025-07-17 PROCEDURE — G8427 DOCREV CUR MEDS BY ELIG CLIN: HCPCS | Performed by: NURSE PRACTITIONER

## 2025-07-17 ASSESSMENT — ENCOUNTER SYMPTOMS
WHEEZING: 0
SHORTNESS OF BREATH: 0
CHEST TIGHTNESS: 0
SORE THROAT: 0
COUGH: 0

## 2025-07-17 NOTE — PROGRESS NOTES
[facility] Cardiology   Established Patient Office Visit  [address]  [address]  [facility] [address]  [phone]        OFFICE VISIT:  2025    Esau Moctezuma - : 1935    Reason For Visit:  Esau is a 89 y.o. male who is here for Follow-Up from Hospital (Pt adv no complaints at this time) and New Patient (To provider)    1. Coronary artery disease involving native coronary artery of native heart without angina pectoris    2. PAF (paroxysmal atrial fibrillation) (HCC)    3. Essential hypertension    4. Dyslipidemia        Patient with a history of paroxysmal atrial fib, hypertension, dyslipidemia, coronary artery disease.  Did have stent to LAD in the past.  Nuclear stress test 2021 showing no evidence of ischemia.  He underwent endovascular AAA repair in 2024 at [facility] by Dr. Smith.    Patient presented to the emergency department on 7/10/2025 for complaints of chest pain.  He does have a history of chronic chest wall pain/costochondritis and is maintained on Lyrica outpatient for this.  Workup in the ER included normal renal function with no significant electrolyte abnormalities, normal LFTs, CBC stable.  Troponin 31 with repeat of 26.  D-dimer was elevated.  Chest x-ray showed no acute cardiopulmonary process.  EKG showed sinus bradycardia without any ischemic changes.  Patient was noted to be hypertensive with a blood pressure systolic up to 200.  He did have a run of VT that lasted several seconds per telemetry.  He was admitted to the hospitalist and cardiology was consulted.  CTA showed no PE.  Minimal patchy consolidative opacities right lower lobe.  2D echo showed EF 65 to 70%.  Normal wall motion.  Indeterminate diastolic function.  Lexiscan was negative for myocardial ischemia.  He had no further episodes of VT during admission.      Patient has followed up with his primary care provider on 7/15/2025 prescription for tramadol and steroids for costochondritis.    Patient

## 2025-07-24 ENCOUNTER — OFFICE VISIT (OUTPATIENT)
Age: 89
End: 2025-07-24

## 2025-07-24 VITALS
SYSTOLIC BLOOD PRESSURE: 106 MMHG | WEIGHT: 174 LBS | HEART RATE: 78 BPM | HEIGHT: 71 IN | BODY MASS INDEX: 24.36 KG/M2 | OXYGEN SATURATION: 96 % | TEMPERATURE: 98.4 F | DIASTOLIC BLOOD PRESSURE: 60 MMHG

## 2025-07-24 DIAGNOSIS — I10 PRIMARY HYPERTENSION: ICD-10-CM

## 2025-07-24 DIAGNOSIS — N40.1 BENIGN PROSTATIC HYPERPLASIA WITH URINARY FREQUENCY: ICD-10-CM

## 2025-07-24 DIAGNOSIS — E78.00 HYPERCHOLESTEROLEMIA: ICD-10-CM

## 2025-07-24 DIAGNOSIS — I48.0 PAROXYSMAL ATRIAL FIBRILLATION (HCC): ICD-10-CM

## 2025-07-24 DIAGNOSIS — I25.10 CORONARY ARTERY DISEASE INVOLVING NATIVE CORONARY ARTERY OF NATIVE HEART WITHOUT ANGINA PECTORIS: ICD-10-CM

## 2025-07-24 DIAGNOSIS — Z79.01 CHRONIC ANTICOAGULATION: ICD-10-CM

## 2025-07-24 DIAGNOSIS — R35.0 BENIGN PROSTATIC HYPERPLASIA WITH URINARY FREQUENCY: ICD-10-CM

## 2025-07-24 DIAGNOSIS — I71.43 INFRARENAL ABDOMINAL AORTIC ANEURYSM (AAA) WITHOUT RUPTURE: ICD-10-CM

## 2025-07-24 DIAGNOSIS — Z00.00 MEDICARE ANNUAL WELLNESS VISIT, SUBSEQUENT: Primary | ICD-10-CM

## 2025-07-24 DIAGNOSIS — H61.23 BILATERAL IMPACTED CERUMEN: ICD-10-CM

## 2025-07-24 DIAGNOSIS — K21.9 GASTROESOPHAGEAL REFLUX DISEASE, UNSPECIFIED WHETHER ESOPHAGITIS PRESENT: ICD-10-CM

## 2025-07-24 DIAGNOSIS — K22.70 BARRETT'S ESOPHAGUS WITHOUT DYSPLASIA: ICD-10-CM

## 2025-07-24 PROBLEM — J44.1 COPD EXACERBATION (HCC): Status: RESOLVED | Noted: 2023-04-24 | Resolved: 2025-07-24

## 2025-07-24 LAB
CREAT UR-MCNC: 206 MG/DL (ref 39–259)
MICROALBUMIN UR-MCNC: 4.01 MG/DL (ref 0–1.99)
MICROALBUMIN/CREAT UR-RTO: 19.5 MG/G (ref 0–29)

## 2025-07-24 SDOH — ECONOMIC STABILITY: FOOD INSECURITY: WITHIN THE PAST 12 MONTHS, YOU WORRIED THAT YOUR FOOD WOULD RUN OUT BEFORE YOU GOT MONEY TO BUY MORE.: NEVER TRUE

## 2025-07-24 SDOH — ECONOMIC STABILITY: FOOD INSECURITY: WITHIN THE PAST 12 MONTHS, THE FOOD YOU BOUGHT JUST DIDN'T LAST AND YOU DIDN'T HAVE MONEY TO GET MORE.: NEVER TRUE

## 2025-07-24 ASSESSMENT — ENCOUNTER SYMPTOMS
CONSTIPATION: 0
COUGH: 0
DIARRHEA: 0
TROUBLE SWALLOWING: 0
SHORTNESS OF BREATH: 0
VOMITING: 0
RHINORRHEA: 0
SORE THROAT: 0
ABDOMINAL PAIN: 0
NAUSEA: 0

## 2025-07-24 ASSESSMENT — PATIENT HEALTH QUESTIONNAIRE - PHQ9
1. LITTLE INTEREST OR PLEASURE IN DOING THINGS: NOT AT ALL
SUM OF ALL RESPONSES TO PHQ QUESTIONS 1-9: 0
10. IF YOU CHECKED OFF ANY PROBLEMS, HOW DIFFICULT HAVE THESE PROBLEMS MADE IT FOR YOU TO DO YOUR WORK, TAKE CARE OF THINGS AT HOME, OR GET ALONG WITH OTHER PEOPLE: NOT DIFFICULT AT ALL
6. FEELING BAD ABOUT YOURSELF - OR THAT YOU ARE A FAILURE OR HAVE LET YOURSELF OR YOUR FAMILY DOWN: NOT AT ALL
SUM OF ALL RESPONSES TO PHQ QUESTIONS 1-9: 0
SUM OF ALL RESPONSES TO PHQ QUESTIONS 1-9: 0
5. POOR APPETITE OR OVEREATING: NOT AT ALL
4. FEELING TIRED OR HAVING LITTLE ENERGY: NOT AT ALL
SUM OF ALL RESPONSES TO PHQ QUESTIONS 1-9: 0
8. MOVING OR SPEAKING SO SLOWLY THAT OTHER PEOPLE COULD HAVE NOTICED. OR THE OPPOSITE, BEING SO FIGETY OR RESTLESS THAT YOU HAVE BEEN MOVING AROUND A LOT MORE THAN USUAL: NOT AT ALL
2. FEELING DOWN, DEPRESSED OR HOPELESS: NOT AT ALL
9. THOUGHTS THAT YOU WOULD BE BETTER OFF DEAD, OR OF HURTING YOURSELF: NOT AT ALL
7. TROUBLE CONCENTRATING ON THINGS, SUCH AS READING THE NEWSPAPER OR WATCHING TELEVISION: NOT AT ALL
3. TROUBLE FALLING OR STAYING ASLEEP: NOT AT ALL

## 2025-07-24 ASSESSMENT — LIFESTYLE VARIABLES
HOW MANY STANDARD DRINKS CONTAINING ALCOHOL DO YOU HAVE ON A TYPICAL DAY: PATIENT DOES NOT DRINK
HOW OFTEN DO YOU HAVE A DRINK CONTAINING ALCOHOL: NEVER

## 2025-07-24 NOTE — PATIENT INSTRUCTIONS
Learning About Being Active as an Older Adult  Why is being active important as you get older?     Being active is one of the best things you can do for your health. And it's never too late to start. Being active--or getting active, if you aren't already--has definite benefits. It can:  Give you more energy,  Keep your mind sharp.  Improve balance to reduce your risk of falls.  Help you manage chronic illness with fewer medicines.  No matter how old you are, how fit you are, or what health problems you have, there is a form of activity that will work for you. And the more physical activity you can do, the better your overall health will be.  What kinds of activity can help you stay healthy?  Being more active will make your daily activities easier. Physical activity includes planned exercise and things you do in daily life. There are four types of activity:  Aerobic.  Doing aerobic activity makes your heart and lungs strong.  Includes walking, dancing, and gardening.  Aim for at least 2½ hours spread throughout the week.  It improves your energy and can help you sleep better.  Muscle-strengthening.  This type of activity can help maintain muscle and strengthen bones.  Includes climbing stairs, using resistance bands, and lifting or carrying heavy loads.  Aim for at least twice a week.  It can help protect the knees and other joints.  Stretching.  Stretching gives you better range of motion in joints and muscles.  Includes upper arm stretches, calf stretches, and gentle yoga.  Aim for at least twice a week, preferably after your muscles are warmed up from other activities.  It can help you function better in daily life.  Balancing.  This helps you stay coordinated and have good posture.  Includes heel-to-toe walking, asiya chi, and certain types of yoga.  Aim for at least 3 days a week.  It can reduce your risk of falling.  Even if you have a hard time meeting the recommendations, it's better to be more active

## 2025-08-04 DIAGNOSIS — E78.00 HYPERCHOLESTEROLEMIA: ICD-10-CM

## 2025-08-05 PROBLEM — R00.2 PALPITATIONS: Status: ACTIVE | Noted: 2025-08-05

## 2025-08-05 RX ORDER — SIMVASTATIN 20 MG
20 TABLET ORAL NIGHTLY
Qty: 90 TABLET | Refills: 3 | Status: SHIPPED | OUTPATIENT
Start: 2025-08-05

## 2025-08-19 DIAGNOSIS — R07.89 CHEST WALL PAIN: ICD-10-CM

## 2025-08-19 DIAGNOSIS — G62.9 NEUROPATHY: ICD-10-CM

## 2025-08-19 RX ORDER — PREGABALIN 150 MG/1
150 CAPSULE ORAL 2 TIMES DAILY
Qty: 60 CAPSULE | Refills: 2 | Status: SHIPPED | OUTPATIENT
Start: 2025-08-19 | End: 2025-11-17

## (undated) DEVICE — PAD MAJOR VASCULAR: Brand: MEDLINE INDUSTRIES, INC.

## (undated) DEVICE — ANTIBACTERIAL UNDYED BRAIDED (POLYGLACTIN 910), SYNTHETIC ABSORBABLE SUTURE: Brand: COATED VICRYL

## (undated) DEVICE — SOLUTION IV IRRIG POUR BRL 0.9% SODIUM CHL 2F7124

## (undated) DEVICE — DRP SPECIAL PROC 4PC W/FC POUCH

## (undated) DEVICE — CONMED SCOPE SAVER BITE BLOCK, 20X27 MM: Brand: SCOPE SAVER

## (undated) DEVICE — CHLORAPREP 26ML ORANGE

## (undated) DEVICE — GLV SURG BIOGEL LTX PF 7 1/2

## (undated) DEVICE — Device: Brand: DEFENDO AIR/WATER/SUCTION AND BIOPSY VALVE

## (undated) DEVICE — YANKAUER,BULB TIP WITH VENT: Brand: ARGYLE

## (undated) DEVICE — CATH FLSH OMNI SFT 5F 90CM

## (undated) DEVICE — CUFF,BP,DISP,1 TUBE,ADULT,HP: Brand: MEDLINE

## (undated) DEVICE — GAUZE,SPONGE,4"X4",16PLY,XRAY,STRL,LF: Brand: MEDLINE

## (undated) DEVICE — CLTH CLENS READYCLEANSE PERI CARE PK/5

## (undated) DEVICE — SYSTEM SKIN CLSR 22CM DERMBND PRINEO

## (undated) DEVICE — PACK ANT HIP CDS

## (undated) DEVICE — PINNACLE INTRODUCER SHEATH: Brand: PINNACLE

## (undated) DEVICE — ENDOGATOR AUXILIARY WATER JET CONNECTOR: Brand: ENDOGATOR

## (undated) DEVICE — TOTAL TRAY, 16FR 10ML SIL FOLEY, URN: Brand: MEDLINE

## (undated) DEVICE — STRIP,CLOSURE,WOUND,MEDI-STRIP,1/2X4: Brand: MEDLINE

## (undated) DEVICE — APPL CHLORAPREP HI/LITE 26ML ORNG

## (undated) DEVICE — MSK O2 MD CONCENTR A/ LF 7FT 1P/U

## (undated) DEVICE — SENSR O2 OXIMAX FNGR A/ 18IN NONSTR

## (undated) DEVICE — SPNG GZ WOVN 4X4IN 12PLY 10/BX STRL

## (undated) DEVICE — SUTURE VCRL SZ 0 L27IN ABSRB UD L36MM CT-1 1/2 CIR J260H

## (undated) DEVICE — DUAL CUT SAGITTAL BLADE

## (undated) DEVICE — AGENT HEMSTAT 3GM PURIFIED PLNT STARCH PWD ABSRB ARISTA AH

## (undated) DEVICE — SUT MNCRYL 4/0 PS2 27IN UD MCP426H

## (undated) DEVICE — A2000 MULTI-USE SYRINGE KIT, P/N 701277-003KIT CONTENTS: 100ML CONTRAST RESERVOIR AND TUBING WITH CONTRAST SPIKE AND CLAMP: Brand: A2000 MULTI-USE SYRINGE KIT

## (undated) DEVICE — DRSNG SURESITE WNDW 4X4.5

## (undated) DEVICE — TBG SMPL FLTR LINE NASL 02/C02 A/ BX/100

## (undated) DEVICE — NON-WOVEN ADHESIVE WOUND DRESSING: Brand: PRIMAPORE ADHESIVE DRESSING 30*10CM

## (undated) DEVICE — INTENDED FOR TISSUE SEPARATION, AND OTHER PROCEDURES THAT REQUIRE A SHARP SURGICAL BLADE TO PUNCTURE OR CUT.: Brand: BARD-PARKER ®  SAFETY SCALPED

## (undated) DEVICE — GLV SURG BIOGEL LTX PF 6 1/2

## (undated) DEVICE — SHEATH SENTRANT 14F 28CM

## (undated) DEVICE — ST MIC/INTRO ACC SHRP/NDL TUNG/TP NITNL 5F 45CM 7CM

## (undated) DEVICE — FRCP BX RADJAW4 NDL 2.8 240 STD OG

## (undated) DEVICE — COVER POS PERINL POST NS 082501

## (undated) DEVICE — MODEL BT2000 P/N 700287-012KIT CONTENTS: MANIFOLD WITH SALINE AND CONTRAST PORTS, SALINE TUBING WITH SPIKE AND HAND SYRINGE, TRANSDUCER: Brand: BT2000 AUTOMATED MANIFOLD KIT

## (undated) DEVICE — Z INACTIVE USE 2660664 SOLUTION IRRIG 3000ML 0.9% SOD CHL USP UROMATIC PLAS CONT

## (undated) DEVICE — BALN STENTGR RELIANT 8F 100CM

## (undated) DEVICE — DEV INFL ALLIANCE2 SYS

## (undated) DEVICE — THE CHANNEL CLEANING BRUSH IS A NYLON FLEXI BRUSH ATTACHED TO A FLEXIBLE PLASTIC SHEATH DESIGNED TO SAFELY REMOVE DEBRIS FROM FLEXIBLE ENDOSCOPES.

## (undated) DEVICE — SHEATH SENTRANT 12F 28CM

## (undated) DEVICE — SUTURE VCRL SZ 2-0 L36IN ABSRB UD L36MM CT-1 1/2 CIR J945H

## (undated) DEVICE — PINNACLE R/O II INTRODUCER SHEATH WITH RADIOPAQUE MARKER: Brand: PINNACLE

## (undated) DEVICE — SOLUTION IV 250ML 0.9% SOD CHL PH 5 INJ USP VIAFLX PLAS

## (undated) DEVICE — SNAP KOVER: Brand: UNBRANDED

## (undated) DEVICE — RADIFOCUS GLIDEWIRE ADVANTAGE GUIDEWIRE: Brand: GLIDEWIRE ADVANTAGE

## (undated) DEVICE — LIGHT SUCT UNTETHERED SCINTILLANT

## (undated) DEVICE — CATH SZ ACCUVU SEG/20CM OMNI 5F 100CM

## (undated) DEVICE — STERILE (14X122CM) TELESCOPICALLY-FOLDED COVER: Brand: CIV-CLEAR™ TRANSDUCER COVER

## (undated) DEVICE — MODEL AT P65, P/N 701554-001KIT CONTENTS: HAND CONTROLLER, 3-WAY HIGH-PRESSURE STOPCOCK WITH ROTATING END AND PREMIUM HIGH-PRESSURE TUBING: Brand: ANGIOTOUCH® KIT

## (undated) DEVICE — PERCLOSE™ PROSTYLE™ SUTURE-MEDIATED CLOSURE AND REPAIR SYSTEM: Brand: PERCLOSE™ PROSTYLE™

## (undated) DEVICE — ESOPHAGEAL BALLOON DILATATION CATHETER: Brand: CRE FIXED WIRE

## (undated) DEVICE — SUT NONABS PROLENE TPR/HEMOSEAL DBL/ARM HS/7 5/0 60CM BLU

## (undated) DEVICE — 3M™ TEGADERM™ TRANSPARENT FILM DRESSING FRAME STYLE, 1628, 6 IN X 8 IN (15 CM X 20 CM), 10/CT 8CT/CASE: Brand: 3M™ TEGADERM™

## (undated) DEVICE — SYR LUERLOK 50ML

## (undated) DEVICE — GLOVE SURG SZ 9 L12IN FNGR THK13MIL BRN LTX SYN POLYMER W